# Patient Record
Sex: FEMALE | Race: ASIAN | NOT HISPANIC OR LATINO | Employment: UNEMPLOYED | URBAN - METROPOLITAN AREA
[De-identification: names, ages, dates, MRNs, and addresses within clinical notes are randomized per-mention and may not be internally consistent; named-entity substitution may affect disease eponyms.]

---

## 2017-01-05 ENCOUNTER — OFFICE VISIT - HEALTHEAST (OUTPATIENT)
Dept: NURSING | Facility: CLINIC | Age: 57
End: 2017-01-05

## 2017-01-05 ENCOUNTER — AMBULATORY - HEALTHEAST (OUTPATIENT)
Dept: FAMILY MEDICINE | Facility: CLINIC | Age: 57
End: 2017-01-05

## 2017-01-05 DIAGNOSIS — M13.0 POLYARTHRITIS: ICD-10-CM

## 2017-01-05 DIAGNOSIS — E11.40 TYPE 2 DIABETES MELLITUS WITH DIABETIC NEUROPATHY, WITHOUT LONG-TERM CURRENT USE OF INSULIN (H): ICD-10-CM

## 2017-01-05 DIAGNOSIS — B65.9 SCHISTOSOMIASIS: ICD-10-CM

## 2017-01-05 DIAGNOSIS — M51.379 DEGENERATION OF LUMBAR OR LUMBOSACRAL INTERVERTEBRAL DISC: ICD-10-CM

## 2017-01-11 ENCOUNTER — COMMUNICATION - HEALTHEAST (OUTPATIENT)
Dept: FAMILY MEDICINE | Facility: CLINIC | Age: 57
End: 2017-01-11

## 2017-01-11 ENCOUNTER — HOME CARE/HOSPICE - HEALTHEAST (OUTPATIENT)
Dept: HOME HEALTH SERVICES | Facility: HOME HEALTH | Age: 57
End: 2017-01-11

## 2017-01-11 DIAGNOSIS — E11.9 TYPE II DIABETES MELLITUS (H): ICD-10-CM

## 2017-01-16 ENCOUNTER — AMBULATORY - HEALTHEAST (OUTPATIENT)
Dept: FAMILY MEDICINE | Facility: CLINIC | Age: 57
End: 2017-01-16

## 2017-01-16 DIAGNOSIS — G47.00 INSOMNIA, UNSPECIFIED: ICD-10-CM

## 2017-01-18 ENCOUNTER — COMMUNICATION - HEALTHEAST (OUTPATIENT)
Dept: NURSING | Facility: CLINIC | Age: 57
End: 2017-01-18

## 2017-01-18 ENCOUNTER — HOME CARE/HOSPICE - HEALTHEAST (OUTPATIENT)
Dept: HOME HEALTH SERVICES | Facility: HOME HEALTH | Age: 57
End: 2017-01-18

## 2017-01-18 ENCOUNTER — COMMUNICATION - HEALTHEAST (OUTPATIENT)
Dept: HOME HEALTH SERVICES | Facility: HOME HEALTH | Age: 57
End: 2017-01-18

## 2017-01-19 ENCOUNTER — RECORDS - HEALTHEAST (OUTPATIENT)
Dept: ADMINISTRATIVE | Facility: OTHER | Age: 57
End: 2017-01-19

## 2017-01-19 ENCOUNTER — AMBULATORY - HEALTHEAST (OUTPATIENT)
Dept: FAMILY MEDICINE | Facility: CLINIC | Age: 57
End: 2017-01-19

## 2017-01-19 DIAGNOSIS — Z91.81 RISK FOR FALLS: ICD-10-CM

## 2017-01-19 DIAGNOSIS — E66.9 OBESITY: ICD-10-CM

## 2017-01-19 DIAGNOSIS — M25.50 JOINT PAIN: ICD-10-CM

## 2017-01-25 ENCOUNTER — COMMUNICATION - HEALTHEAST (OUTPATIENT)
Dept: FAMILY MEDICINE | Facility: CLINIC | Age: 57
End: 2017-01-25

## 2017-01-25 ENCOUNTER — HOME CARE/HOSPICE - HEALTHEAST (OUTPATIENT)
Dept: HOME HEALTH SERVICES | Facility: HOME HEALTH | Age: 57
End: 2017-01-25

## 2017-01-25 DIAGNOSIS — M54.2 CERVICALGIA: ICD-10-CM

## 2017-02-01 ENCOUNTER — OFFICE VISIT - HEALTHEAST (OUTPATIENT)
Dept: NURSING | Facility: CLINIC | Age: 57
End: 2017-02-01

## 2017-02-01 DIAGNOSIS — E16.2 HYPOGLYCEMIA: ICD-10-CM

## 2017-02-01 DIAGNOSIS — E11.9 TYPE 2 DIABETES MELLITUS WITHOUT COMPLICATION, WITHOUT LONG-TERM CURRENT USE OF INSULIN (H): ICD-10-CM

## 2017-02-01 LAB — HBA1C MFR BLD: 8.5 % (ref 3.5–6)

## 2017-02-02 ENCOUNTER — COMMUNICATION - HEALTHEAST (OUTPATIENT)
Dept: NURSING | Facility: CLINIC | Age: 57
End: 2017-02-02

## 2017-02-08 ENCOUNTER — HOME CARE/HOSPICE - HEALTHEAST (OUTPATIENT)
Dept: HOME HEALTH SERVICES | Facility: HOME HEALTH | Age: 57
End: 2017-02-08

## 2017-02-08 ENCOUNTER — COMMUNICATION - HEALTHEAST (OUTPATIENT)
Dept: FAMILY MEDICINE | Facility: CLINIC | Age: 57
End: 2017-02-08

## 2017-02-08 DIAGNOSIS — M51.379 DEGENERATION OF LUMBAR OR LUMBOSACRAL INTERVERTEBRAL DISC: ICD-10-CM

## 2017-02-14 ENCOUNTER — AMBULATORY - HEALTHEAST (OUTPATIENT)
Dept: FAMILY MEDICINE | Facility: CLINIC | Age: 57
End: 2017-02-14

## 2017-02-15 ENCOUNTER — COMMUNICATION - HEALTHEAST (OUTPATIENT)
Dept: FAMILY MEDICINE | Facility: CLINIC | Age: 57
End: 2017-02-15

## 2017-02-22 ENCOUNTER — HOME CARE/HOSPICE - HEALTHEAST (OUTPATIENT)
Dept: HOME HEALTH SERVICES | Facility: HOME HEALTH | Age: 57
End: 2017-02-22

## 2017-02-22 ENCOUNTER — COMMUNICATION - HEALTHEAST (OUTPATIENT)
Dept: FAMILY MEDICINE | Facility: CLINIC | Age: 57
End: 2017-02-22

## 2017-03-01 ENCOUNTER — HOME CARE/HOSPICE - HEALTHEAST (OUTPATIENT)
Dept: HOME HEALTH SERVICES | Facility: HOME HEALTH | Age: 57
End: 2017-03-01

## 2017-03-02 ENCOUNTER — AMBULATORY - HEALTHEAST (OUTPATIENT)
Dept: FAMILY MEDICINE | Facility: CLINIC | Age: 57
End: 2017-03-02

## 2017-03-02 DIAGNOSIS — R52 PAIN: ICD-10-CM

## 2017-03-08 ENCOUNTER — HOME CARE/HOSPICE - HEALTHEAST (OUTPATIENT)
Dept: HOME HEALTH SERVICES | Facility: HOME HEALTH | Age: 57
End: 2017-03-08

## 2017-03-09 ENCOUNTER — OFFICE VISIT - HEALTHEAST (OUTPATIENT)
Dept: FAMILY MEDICINE | Facility: CLINIC | Age: 57
End: 2017-03-09

## 2017-03-09 DIAGNOSIS — E11.40: ICD-10-CM

## 2017-03-09 DIAGNOSIS — R52 PAIN: ICD-10-CM

## 2017-03-09 DIAGNOSIS — M25.50 JOINT PAIN: ICD-10-CM

## 2017-03-09 DIAGNOSIS — B65.9 SCHISTOSOMIASIS: ICD-10-CM

## 2017-03-09 DIAGNOSIS — E55.9 VITAMIN D DEFICIENCY: ICD-10-CM

## 2017-03-09 DIAGNOSIS — M13.0 POLYARTHRITIS: ICD-10-CM

## 2017-03-09 DIAGNOSIS — E11.40 DIABETES MELLITUS WITH NEUROPATHY (H): ICD-10-CM

## 2017-03-09 DIAGNOSIS — E66.01 MORBID OBESITY, UNSPECIFIED OBESITY TYPE (H): ICD-10-CM

## 2017-03-09 DIAGNOSIS — G47.00 INSOMNIA: ICD-10-CM

## 2017-03-09 ASSESSMENT — MIFFLIN-ST. JEOR: SCORE: 1414.46

## 2017-03-14 ENCOUNTER — COMMUNICATION - HEALTHEAST (OUTPATIENT)
Dept: NURSING | Facility: CLINIC | Age: 57
End: 2017-03-14

## 2017-03-17 ENCOUNTER — RECORDS - HEALTHEAST (OUTPATIENT)
Dept: ADMINISTRATIVE | Facility: OTHER | Age: 57
End: 2017-03-17

## 2017-03-20 ENCOUNTER — COMMUNICATION - HEALTHEAST (OUTPATIENT)
Dept: HOME HEALTH SERVICES | Facility: HOME HEALTH | Age: 57
End: 2017-03-20

## 2017-03-20 ENCOUNTER — HOME CARE/HOSPICE - HEALTHEAST (OUTPATIENT)
Dept: HOME HEALTH SERVICES | Facility: HOME HEALTH | Age: 57
End: 2017-03-20

## 2017-03-20 ENCOUNTER — COMMUNICATION - HEALTHEAST (OUTPATIENT)
Dept: FAMILY MEDICINE | Facility: CLINIC | Age: 57
End: 2017-03-20

## 2017-03-21 ENCOUNTER — AMBULATORY - HEALTHEAST (OUTPATIENT)
Dept: FAMILY MEDICINE | Facility: CLINIC | Age: 57
End: 2017-03-21

## 2017-03-29 ENCOUNTER — OFFICE VISIT - HEALTHEAST (OUTPATIENT)
Dept: SURGERY | Facility: CLINIC | Age: 57
End: 2017-03-29

## 2017-03-29 ENCOUNTER — HOME CARE/HOSPICE - HEALTHEAST (OUTPATIENT)
Dept: HOME HEALTH SERVICES | Facility: HOME HEALTH | Age: 57
End: 2017-03-29

## 2017-03-29 DIAGNOSIS — E11.9 TYPE II DIABETES MELLITUS (H): ICD-10-CM

## 2017-03-29 DIAGNOSIS — Z71.3 WEIGHT LOSS COUNSELING, ENCOUNTER FOR: ICD-10-CM

## 2017-03-29 ASSESSMENT — MIFFLIN-ST. JEOR: SCORE: 1404.48

## 2017-04-11 ENCOUNTER — COMMUNICATION - HEALTHEAST (OUTPATIENT)
Dept: FAMILY MEDICINE | Facility: CLINIC | Age: 57
End: 2017-04-11

## 2017-04-11 DIAGNOSIS — K21.9 GERD (GASTROESOPHAGEAL REFLUX DISEASE): ICD-10-CM

## 2017-04-20 ENCOUNTER — COMMUNICATION - HEALTHEAST (OUTPATIENT)
Dept: FAMILY MEDICINE | Facility: CLINIC | Age: 57
End: 2017-04-20

## 2017-04-20 ENCOUNTER — OFFICE VISIT - HEALTHEAST (OUTPATIENT)
Dept: FAMILY MEDICINE | Facility: CLINIC | Age: 57
End: 2017-04-20

## 2017-04-20 ENCOUNTER — RECORDS - HEALTHEAST (OUTPATIENT)
Dept: MAMMOGRAPHY | Facility: CLINIC | Age: 57
End: 2017-04-20

## 2017-04-20 DIAGNOSIS — R52 PAIN: ICD-10-CM

## 2017-04-20 DIAGNOSIS — M13.0 POLYARTHRITIS: ICD-10-CM

## 2017-04-20 DIAGNOSIS — E55.9 VITAMIN D DEFICIENCY: ICD-10-CM

## 2017-04-20 DIAGNOSIS — E66.01 MORBID OBESITY, UNSPECIFIED OBESITY TYPE (H): ICD-10-CM

## 2017-04-20 DIAGNOSIS — E11.40: ICD-10-CM

## 2017-04-20 DIAGNOSIS — E11.9 TYPE 2 DIABETES MELLITUS WITHOUT COMPLICATION, WITHOUT LONG-TERM CURRENT USE OF INSULIN (H): ICD-10-CM

## 2017-04-20 DIAGNOSIS — E11.9 TYPE II DIABETES MELLITUS (H): ICD-10-CM

## 2017-04-20 DIAGNOSIS — E11.40 DIABETES MELLITUS WITH NEUROPATHY (H): ICD-10-CM

## 2017-04-20 DIAGNOSIS — Z12.31 ENCOUNTER FOR SCREENING MAMMOGRAM FOR MALIGNANT NEOPLASM OF BREAST: ICD-10-CM

## 2017-04-20 DIAGNOSIS — M25.50 JOINT PAIN: ICD-10-CM

## 2017-04-20 ASSESSMENT — MIFFLIN-ST. JEOR: SCORE: 1412.87

## 2017-04-25 ENCOUNTER — COMMUNICATION - HEALTHEAST (OUTPATIENT)
Dept: FAMILY MEDICINE | Facility: CLINIC | Age: 57
End: 2017-04-25

## 2017-05-02 ENCOUNTER — COMMUNICATION - HEALTHEAST (OUTPATIENT)
Dept: NURSING | Facility: CLINIC | Age: 57
End: 2017-05-02

## 2017-05-10 ENCOUNTER — COMMUNICATION - HEALTHEAST (OUTPATIENT)
Dept: FAMILY MEDICINE | Facility: CLINIC | Age: 57
End: 2017-05-10

## 2017-05-10 DIAGNOSIS — Z00.00 PREVENTATIVE HEALTH CARE: ICD-10-CM

## 2017-05-23 ENCOUNTER — OFFICE VISIT - HEALTHEAST (OUTPATIENT)
Dept: FAMILY MEDICINE | Facility: CLINIC | Age: 57
End: 2017-05-23

## 2017-05-23 DIAGNOSIS — E66.01 MORBID OBESITY, UNSPECIFIED OBESITY TYPE (H): ICD-10-CM

## 2017-05-23 DIAGNOSIS — E55.9 VITAMIN D DEFICIENCY: ICD-10-CM

## 2017-05-23 DIAGNOSIS — E11.9 DIABETES (H): ICD-10-CM

## 2017-05-23 DIAGNOSIS — E11.9 TYPE 2 DIABETES MELLITUS WITHOUT COMPLICATION, WITHOUT LONG-TERM CURRENT USE OF INSULIN (H): ICD-10-CM

## 2017-05-23 DIAGNOSIS — M25.50 JOINT PAIN: ICD-10-CM

## 2017-05-23 DIAGNOSIS — Z79.899 POLYPHARMACY: ICD-10-CM

## 2017-05-23 LAB — HBA1C MFR BLD: 7.5 % (ref 3.5–6)

## 2017-05-23 ASSESSMENT — MIFFLIN-ST. JEOR: SCORE: 1428.18

## 2017-05-24 ENCOUNTER — COMMUNICATION - HEALTHEAST (OUTPATIENT)
Dept: FAMILY MEDICINE | Facility: CLINIC | Age: 57
End: 2017-05-24

## 2017-05-24 DIAGNOSIS — E11.9 DM (DIABETES MELLITUS) (H): ICD-10-CM

## 2017-05-31 ENCOUNTER — COMMUNICATION - HEALTHEAST (OUTPATIENT)
Dept: CARE COORDINATION | Facility: CLINIC | Age: 57
End: 2017-05-31

## 2017-06-08 ENCOUNTER — AMBULATORY - HEALTHEAST (OUTPATIENT)
Dept: CARE COORDINATION | Facility: CLINIC | Age: 57
End: 2017-06-08

## 2017-06-22 ENCOUNTER — COMMUNICATION - HEALTHEAST (OUTPATIENT)
Dept: FAMILY MEDICINE | Facility: CLINIC | Age: 57
End: 2017-06-22

## 2017-06-28 ENCOUNTER — COMMUNICATION - HEALTHEAST (OUTPATIENT)
Dept: NURSING | Facility: CLINIC | Age: 57
End: 2017-06-28

## 2017-07-03 ENCOUNTER — RECORDS - HEALTHEAST (OUTPATIENT)
Dept: ADMINISTRATIVE | Facility: OTHER | Age: 57
End: 2017-07-03

## 2017-07-15 ENCOUNTER — COMMUNICATION - HEALTHEAST (OUTPATIENT)
Dept: FAMILY MEDICINE | Facility: CLINIC | Age: 57
End: 2017-07-15

## 2017-07-15 DIAGNOSIS — M79.2 NEURALGIA: ICD-10-CM

## 2017-07-18 ENCOUNTER — COMMUNICATION - HEALTHEAST (OUTPATIENT)
Dept: FAMILY MEDICINE | Facility: CLINIC | Age: 57
End: 2017-07-18

## 2017-07-18 ENCOUNTER — OFFICE VISIT - HEALTHEAST (OUTPATIENT)
Dept: FAMILY MEDICINE | Facility: CLINIC | Age: 57
End: 2017-07-18

## 2017-07-18 DIAGNOSIS — Z79.899 POLYPHARMACY: ICD-10-CM

## 2017-07-18 DIAGNOSIS — G47.00 INSOMNIA: ICD-10-CM

## 2017-07-18 DIAGNOSIS — E11.00 TYPE 2 DIABETES MELLITUS WITH HYPEROSMOLARITY WITHOUT COMA, UNSPECIFIED LONG TERM INSULIN USE STATUS: ICD-10-CM

## 2017-07-18 DIAGNOSIS — R52 PAIN: ICD-10-CM

## 2017-07-18 DIAGNOSIS — E55.9 VITAMIN D DEFICIENCY: ICD-10-CM

## 2017-07-18 DIAGNOSIS — E66.01 MORBID OBESITY, UNSPECIFIED OBESITY TYPE (H): ICD-10-CM

## 2017-07-18 DIAGNOSIS — F43.10 PTSD (POST-TRAUMATIC STRESS DISORDER): ICD-10-CM

## 2017-07-18 ASSESSMENT — MIFFLIN-ST. JEOR: SCORE: 1473.43

## 2017-07-19 ENCOUNTER — COMMUNICATION - HEALTHEAST (OUTPATIENT)
Dept: FAMILY MEDICINE | Facility: CLINIC | Age: 57
End: 2017-07-19

## 2017-07-19 DIAGNOSIS — M25.50 JOINT PAIN: ICD-10-CM

## 2017-07-19 DIAGNOSIS — R52 PAIN: ICD-10-CM

## 2017-07-25 ENCOUNTER — RECORDS - HEALTHEAST (OUTPATIENT)
Dept: ADMINISTRATIVE | Facility: OTHER | Age: 57
End: 2017-07-25

## 2017-08-10 ENCOUNTER — COMMUNICATION - HEALTHEAST (OUTPATIENT)
Dept: NURSING | Facility: CLINIC | Age: 57
End: 2017-08-10

## 2017-08-10 ENCOUNTER — RECORDS - HEALTHEAST (OUTPATIENT)
Dept: ADMINISTRATIVE | Facility: OTHER | Age: 57
End: 2017-08-10

## 2017-08-14 ENCOUNTER — AMBULATORY - HEALTHEAST (OUTPATIENT)
Dept: CARE COORDINATION | Facility: CLINIC | Age: 57
End: 2017-08-14

## 2017-08-16 ENCOUNTER — OFFICE VISIT - HEALTHEAST (OUTPATIENT)
Dept: SLEEP MEDICINE | Facility: CLINIC | Age: 57
End: 2017-08-16

## 2017-08-16 DIAGNOSIS — G47.10 HYPERSOMNIA, UNSPECIFIED: ICD-10-CM

## 2017-08-16 DIAGNOSIS — E66.9 OBESITY: ICD-10-CM

## 2017-08-16 DIAGNOSIS — R06.83 SNORING: ICD-10-CM

## 2017-08-16 DIAGNOSIS — G47.8 SLEEP DYSFUNCTION WITH SLEEP STAGE DISTURBANCE: ICD-10-CM

## 2017-08-16 ASSESSMENT — MIFFLIN-ST. JEOR: SCORE: 1483.4

## 2017-08-23 ENCOUNTER — COMMUNICATION - HEALTHEAST (OUTPATIENT)
Dept: FAMILY MEDICINE | Facility: CLINIC | Age: 57
End: 2017-08-23

## 2017-08-23 DIAGNOSIS — M25.50 JOINT PAIN: ICD-10-CM

## 2017-08-23 DIAGNOSIS — R52 PAIN: ICD-10-CM

## 2017-08-24 ENCOUNTER — OFFICE VISIT - HEALTHEAST (OUTPATIENT)
Dept: FAMILY MEDICINE | Facility: CLINIC | Age: 57
End: 2017-08-24

## 2017-08-24 DIAGNOSIS — E11.00 TYPE 2 DIABETES MELLITUS WITH HYPEROSMOLARITY WITHOUT COMA, UNSPECIFIED LONG TERM INSULIN USE STATUS: ICD-10-CM

## 2017-08-24 DIAGNOSIS — Z79.899 POLYPHARMACY: ICD-10-CM

## 2017-08-24 DIAGNOSIS — F43.10 PTSD (POST-TRAUMATIC STRESS DISORDER): ICD-10-CM

## 2017-08-24 DIAGNOSIS — E55.9 VITAMIN D DEFICIENCY: ICD-10-CM

## 2017-08-24 DIAGNOSIS — Z23 NEED FOR VACCINATION: ICD-10-CM

## 2017-08-24 DIAGNOSIS — E66.01 MORBID OBESITY, UNSPECIFIED OBESITY TYPE (H): ICD-10-CM

## 2017-08-24 LAB — HBA1C MFR BLD: 7.6 % (ref 3.5–6)

## 2017-08-24 ASSESSMENT — MIFFLIN-ST. JEOR: SCORE: 1471.55

## 2017-08-30 ENCOUNTER — COMMUNICATION - HEALTHEAST (OUTPATIENT)
Dept: FAMILY MEDICINE | Facility: CLINIC | Age: 57
End: 2017-08-30

## 2017-08-30 DIAGNOSIS — R52 PAIN: ICD-10-CM

## 2017-08-30 DIAGNOSIS — M25.50 JOINT PAIN: ICD-10-CM

## 2017-09-12 ENCOUNTER — COMMUNICATION - HEALTHEAST (OUTPATIENT)
Dept: FAMILY MEDICINE | Facility: CLINIC | Age: 57
End: 2017-09-12

## 2017-09-12 DIAGNOSIS — R52 PAIN: ICD-10-CM

## 2017-09-12 DIAGNOSIS — M25.50 JOINT PAIN: ICD-10-CM

## 2017-09-26 ENCOUNTER — COMMUNICATION - HEALTHEAST (OUTPATIENT)
Dept: NURSING | Facility: CLINIC | Age: 57
End: 2017-09-26

## 2017-10-03 ENCOUNTER — OFFICE VISIT - HEALTHEAST (OUTPATIENT)
Dept: FAMILY MEDICINE | Facility: CLINIC | Age: 57
End: 2017-10-03

## 2017-10-03 DIAGNOSIS — E88.810 METABOLIC SYNDROME: ICD-10-CM

## 2017-10-03 DIAGNOSIS — E11.00 TYPE 2 DIABETES MELLITUS WITH HYPEROSMOLARITY WITHOUT COMA, UNSPECIFIED LONG TERM INSULIN USE STATUS: ICD-10-CM

## 2017-10-03 DIAGNOSIS — E55.9 VITAMIN D DEFICIENCY: ICD-10-CM

## 2017-10-03 DIAGNOSIS — E66.01 MORBID OBESITY, UNSPECIFIED OBESITY TYPE (H): ICD-10-CM

## 2017-10-03 DIAGNOSIS — I10 HTN (HYPERTENSION): ICD-10-CM

## 2017-10-03 DIAGNOSIS — R25.2 CRAMP AND SPASM: ICD-10-CM

## 2017-10-03 DIAGNOSIS — F43.10 PTSD (POST-TRAUMATIC STRESS DISORDER): ICD-10-CM

## 2017-10-03 DIAGNOSIS — Z79.899 POLYPHARMACY: ICD-10-CM

## 2017-10-03 ASSESSMENT — MIFFLIN-ST. JEOR: SCORE: 1517.08

## 2017-10-05 ENCOUNTER — COMMUNICATION - HEALTHEAST (OUTPATIENT)
Dept: FAMILY MEDICINE | Facility: CLINIC | Age: 57
End: 2017-10-05

## 2017-10-05 DIAGNOSIS — K21.9 GERD (GASTROESOPHAGEAL REFLUX DISEASE): ICD-10-CM

## 2017-10-16 ENCOUNTER — COMMUNICATION - HEALTHEAST (OUTPATIENT)
Dept: ADMINISTRATIVE | Facility: CLINIC | Age: 57
End: 2017-10-16

## 2017-10-17 ENCOUNTER — COMMUNICATION - HEALTHEAST (OUTPATIENT)
Dept: FAMILY MEDICINE | Facility: CLINIC | Age: 57
End: 2017-10-17

## 2017-10-25 ENCOUNTER — COMMUNICATION - HEALTHEAST (OUTPATIENT)
Dept: FAMILY MEDICINE | Facility: CLINIC | Age: 57
End: 2017-10-25

## 2017-10-25 DIAGNOSIS — M25.50 JOINT PAIN: ICD-10-CM

## 2017-10-25 DIAGNOSIS — R52 PAIN: ICD-10-CM

## 2017-10-25 DIAGNOSIS — M51.379 DEGENERATION OF LUMBAR OR LUMBOSACRAL INTERVERTEBRAL DISC: ICD-10-CM

## 2017-10-26 ENCOUNTER — COMMUNICATION - HEALTHEAST (OUTPATIENT)
Dept: FAMILY MEDICINE | Facility: CLINIC | Age: 57
End: 2017-10-26

## 2017-10-27 ENCOUNTER — COMMUNICATION - HEALTHEAST (OUTPATIENT)
Dept: FAMILY MEDICINE | Facility: CLINIC | Age: 57
End: 2017-10-27

## 2017-10-30 ENCOUNTER — COMMUNICATION - HEALTHEAST (OUTPATIENT)
Dept: FAMILY MEDICINE | Facility: CLINIC | Age: 57
End: 2017-10-30

## 2017-11-01 ENCOUNTER — COMMUNICATION - HEALTHEAST (OUTPATIENT)
Dept: CARE COORDINATION | Facility: CLINIC | Age: 57
End: 2017-11-01

## 2017-11-15 ENCOUNTER — COMMUNICATION - HEALTHEAST (OUTPATIENT)
Dept: FAMILY MEDICINE | Facility: CLINIC | Age: 57
End: 2017-11-15

## 2017-11-15 DIAGNOSIS — M79.2 NEURALGIA: ICD-10-CM

## 2017-11-16 ENCOUNTER — AMBULATORY - HEALTHEAST (OUTPATIENT)
Dept: CARE COORDINATION | Facility: CLINIC | Age: 57
End: 2017-11-16

## 2017-11-16 DIAGNOSIS — M12.9 ARTHROPATHY: ICD-10-CM

## 2017-11-29 ENCOUNTER — COMMUNICATION - HEALTHEAST (OUTPATIENT)
Dept: FAMILY MEDICINE | Facility: CLINIC | Age: 57
End: 2017-11-29

## 2017-11-29 DIAGNOSIS — M25.50 JOINT PAIN: ICD-10-CM

## 2017-11-29 DIAGNOSIS — R52 PAIN: ICD-10-CM

## 2017-12-04 ENCOUNTER — OFFICE VISIT - HEALTHEAST (OUTPATIENT)
Dept: FAMILY MEDICINE | Facility: CLINIC | Age: 57
End: 2017-12-04

## 2017-12-04 DIAGNOSIS — I10 HTN (HYPERTENSION): ICD-10-CM

## 2017-12-04 DIAGNOSIS — E55.9 VITAMIN D DEFICIENCY: ICD-10-CM

## 2017-12-04 DIAGNOSIS — E11.00 TYPE 2 DIABETES MELLITUS WITH HYPEROSMOLARITY WITHOUT COMA, UNSPECIFIED LONG TERM INSULIN USE STATUS: ICD-10-CM

## 2017-12-04 DIAGNOSIS — F43.10 PTSD (POST-TRAUMATIC STRESS DISORDER): ICD-10-CM

## 2017-12-04 DIAGNOSIS — E88.810 METABOLIC SYNDROME: ICD-10-CM

## 2017-12-04 DIAGNOSIS — Z00.00 ANNUAL PHYSICAL EXAM: ICD-10-CM

## 2017-12-04 DIAGNOSIS — L85.3 DRY SKIN DERMATITIS: ICD-10-CM

## 2017-12-04 DIAGNOSIS — E66.01 MORBID OBESITY, UNSPECIFIED OBESITY TYPE (H): ICD-10-CM

## 2017-12-04 DIAGNOSIS — D64.9 ANEMIA: ICD-10-CM

## 2017-12-04 DIAGNOSIS — R25.2 CRAMP AND SPASM: ICD-10-CM

## 2017-12-04 DIAGNOSIS — Z79.899 POLYPHARMACY: ICD-10-CM

## 2017-12-04 LAB
CHOLEST SERPL-MCNC: 181 MG/DL
FASTING STATUS PATIENT QL REPORTED: YES
HBA1C MFR BLD: 6.9 % (ref 3.5–6)
HDLC SERPL-MCNC: 35 MG/DL
LDLC SERPL CALC-MCNC: 112 MG/DL
TRIGL SERPL-MCNC: 172 MG/DL

## 2017-12-04 ASSESSMENT — MIFFLIN-ST. JEOR: SCORE: 1464.47

## 2017-12-05 ENCOUNTER — AMBULATORY - HEALTHEAST (OUTPATIENT)
Dept: FAMILY MEDICINE | Facility: CLINIC | Age: 57
End: 2017-12-05

## 2017-12-05 DIAGNOSIS — E55.9 VITAMIN D DEFICIENCY: ICD-10-CM

## 2017-12-07 ENCOUNTER — COMMUNICATION - HEALTHEAST (OUTPATIENT)
Dept: FAMILY MEDICINE | Facility: CLINIC | Age: 57
End: 2017-12-07

## 2017-12-14 ENCOUNTER — RECORDS - HEALTHEAST (OUTPATIENT)
Dept: ADMINISTRATIVE | Facility: OTHER | Age: 57
End: 2017-12-14

## 2017-12-21 ENCOUNTER — AMBULATORY - HEALTHEAST (OUTPATIENT)
Dept: NURSING | Facility: CLINIC | Age: 57
End: 2017-12-21

## 2017-12-26 ENCOUNTER — COMMUNICATION - HEALTHEAST (OUTPATIENT)
Dept: NURSING | Facility: CLINIC | Age: 57
End: 2017-12-26

## 2017-12-28 ENCOUNTER — COMMUNICATION - HEALTHEAST (OUTPATIENT)
Dept: FAMILY MEDICINE | Facility: CLINIC | Age: 57
End: 2017-12-28

## 2017-12-28 DIAGNOSIS — R52 PAIN: ICD-10-CM

## 2017-12-28 DIAGNOSIS — M25.50 JOINT PAIN: ICD-10-CM

## 2017-12-29 ENCOUNTER — HOSPITAL ENCOUNTER (OUTPATIENT)
Dept: MRI IMAGING | Facility: HOSPITAL | Age: 57
Discharge: HOME | End: 2017-12-29
Attending: FAMILY MEDICINE

## 2017-12-29 DIAGNOSIS — R25.2 CRAMP AND SPASM: ICD-10-CM

## 2018-01-09 ENCOUNTER — OFFICE VISIT - HEALTHEAST (OUTPATIENT)
Dept: FAMILY MEDICINE | Facility: CLINIC | Age: 58
End: 2018-01-09

## 2018-01-09 DIAGNOSIS — R25.2 CRAMP AND SPASM: ICD-10-CM

## 2018-01-09 DIAGNOSIS — E88.810 METABOLIC SYNDROME: ICD-10-CM

## 2018-01-09 DIAGNOSIS — M13.0 POLYARTHRITIS: ICD-10-CM

## 2018-01-09 DIAGNOSIS — I10 HTN (HYPERTENSION): ICD-10-CM

## 2018-01-09 DIAGNOSIS — E78.5 HYPERLIPIDEMIA LDL GOAL <100: ICD-10-CM

## 2018-01-09 DIAGNOSIS — E11.00 TYPE 2 DIABETES MELLITUS WITH HYPEROSMOLARITY WITHOUT COMA, UNSPECIFIED LONG TERM INSULIN USE STATUS: ICD-10-CM

## 2018-01-09 DIAGNOSIS — E55.9 VITAMIN D DEFICIENCY: ICD-10-CM

## 2018-01-09 DIAGNOSIS — F43.10 PTSD (POST-TRAUMATIC STRESS DISORDER): ICD-10-CM

## 2018-01-09 DIAGNOSIS — E66.01 MORBID OBESITY, UNSPECIFIED OBESITY TYPE (H): ICD-10-CM

## 2018-01-09 DIAGNOSIS — Z12.11 SCREEN FOR COLON CANCER: ICD-10-CM

## 2018-01-09 ASSESSMENT — MIFFLIN-ST. JEOR: SCORE: 1466.73

## 2018-01-13 ENCOUNTER — COMMUNICATION - HEALTHEAST (OUTPATIENT)
Dept: FAMILY MEDICINE | Facility: CLINIC | Age: 58
End: 2018-01-13

## 2018-01-13 DIAGNOSIS — E11.40 TYPE 2 DIABETES MELLITUS WITH DIABETIC NEUROPATHY, WITHOUT LONG-TERM CURRENT USE OF INSULIN (H): ICD-10-CM

## 2018-01-16 ENCOUNTER — AMBULATORY - HEALTHEAST (OUTPATIENT)
Dept: LAB | Facility: CLINIC | Age: 58
End: 2018-01-16

## 2018-01-16 DIAGNOSIS — Z12.11 SCREEN FOR COLON CANCER: ICD-10-CM

## 2018-01-16 LAB
HEMOCCULT SP1 STL QL: NEGATIVE
HEMOCCULT SP2 STL QL: NEGATIVE
HEMOCCULT SP3 STL QL: NEGATIVE

## 2018-01-22 ENCOUNTER — AMBULATORY - HEALTHEAST (OUTPATIENT)
Dept: NURSING | Facility: CLINIC | Age: 58
End: 2018-01-22

## 2018-01-23 ENCOUNTER — RECORDS - HEALTHEAST (OUTPATIENT)
Dept: ADMINISTRATIVE | Facility: OTHER | Age: 58
End: 2018-01-23

## 2018-01-25 ENCOUNTER — COMMUNICATION - HEALTHEAST (OUTPATIENT)
Dept: FAMILY MEDICINE | Facility: CLINIC | Age: 58
End: 2018-01-25

## 2018-01-25 DIAGNOSIS — R52 PAIN: ICD-10-CM

## 2018-01-25 DIAGNOSIS — M25.50 JOINT PAIN: ICD-10-CM

## 2018-01-25 DIAGNOSIS — K21.9 GERD (GASTROESOPHAGEAL REFLUX DISEASE): ICD-10-CM

## 2018-01-31 ENCOUNTER — COMMUNICATION - HEALTHEAST (OUTPATIENT)
Dept: FAMILY MEDICINE | Facility: CLINIC | Age: 58
End: 2018-01-31

## 2018-01-31 DIAGNOSIS — M79.2 NEURALGIA: ICD-10-CM

## 2018-02-22 ENCOUNTER — COMMUNICATION - HEALTHEAST (OUTPATIENT)
Dept: FAMILY MEDICINE | Facility: CLINIC | Age: 58
End: 2018-02-22

## 2018-02-22 DIAGNOSIS — M25.50 JOINT PAIN: ICD-10-CM

## 2018-02-22 DIAGNOSIS — R52 PAIN: ICD-10-CM

## 2018-03-08 ENCOUNTER — COMMUNICATION - HEALTHEAST (OUTPATIENT)
Dept: FAMILY MEDICINE | Facility: CLINIC | Age: 58
End: 2018-03-08

## 2018-03-08 DIAGNOSIS — M79.2 NEURALGIA: ICD-10-CM

## 2018-03-15 ENCOUNTER — COMMUNICATION - HEALTHEAST (OUTPATIENT)
Dept: FAMILY MEDICINE | Facility: CLINIC | Age: 58
End: 2018-03-15

## 2018-03-15 DIAGNOSIS — F34.1 DYSTHYMIC DISORDER: ICD-10-CM

## 2018-03-16 ENCOUNTER — COMMUNICATION - HEALTHEAST (OUTPATIENT)
Dept: FAMILY MEDICINE | Facility: CLINIC | Age: 58
End: 2018-03-16

## 2018-03-16 DIAGNOSIS — F34.1 DYSTHYMIC DISORDER: ICD-10-CM

## 2018-03-19 ENCOUNTER — AMBULATORY - HEALTHEAST (OUTPATIENT)
Dept: FAMILY MEDICINE | Facility: CLINIC | Age: 58
End: 2018-03-19

## 2018-03-22 ENCOUNTER — COMMUNICATION - HEALTHEAST (OUTPATIENT)
Dept: FAMILY MEDICINE | Facility: CLINIC | Age: 58
End: 2018-03-22

## 2018-03-22 DIAGNOSIS — R52 PAIN: ICD-10-CM

## 2018-03-22 DIAGNOSIS — M25.50 JOINT PAIN: ICD-10-CM

## 2018-03-29 ENCOUNTER — AMBULATORY - HEALTHEAST (OUTPATIENT)
Dept: FAMILY MEDICINE | Facility: CLINIC | Age: 58
End: 2018-03-29

## 2018-03-29 ENCOUNTER — OFFICE VISIT - HEALTHEAST (OUTPATIENT)
Dept: FAMILY MEDICINE | Facility: CLINIC | Age: 58
End: 2018-03-29

## 2018-03-29 ENCOUNTER — COMMUNICATION - HEALTHEAST (OUTPATIENT)
Dept: SURGERY | Facility: CLINIC | Age: 58
End: 2018-03-29

## 2018-03-29 DIAGNOSIS — E11.00 TYPE 2 DIABETES MELLITUS WITH HYPEROSMOLARITY WITHOUT COMA, UNSPECIFIED LONG TERM INSULIN USE STATUS: ICD-10-CM

## 2018-03-29 DIAGNOSIS — Z88.9 MULTIPLE ALLERGIES: ICD-10-CM

## 2018-03-29 DIAGNOSIS — E66.01 MORBID OBESITY, UNSPECIFIED OBESITY TYPE (H): ICD-10-CM

## 2018-03-29 DIAGNOSIS — R25.2 CRAMP AND SPASM: ICD-10-CM

## 2018-03-29 DIAGNOSIS — E88.810 METABOLIC SYNDROME: ICD-10-CM

## 2018-03-29 DIAGNOSIS — E11.9 DIABETES (H): ICD-10-CM

## 2018-03-29 DIAGNOSIS — M54.2 CERVICALGIA: ICD-10-CM

## 2018-03-29 DIAGNOSIS — E55.9 VITAMIN D DEFICIENCY: ICD-10-CM

## 2018-03-29 DIAGNOSIS — L85.3 DRY SKIN: ICD-10-CM

## 2018-03-29 DIAGNOSIS — Z79.899 POLYPHARMACY: ICD-10-CM

## 2018-03-29 DIAGNOSIS — F43.10 PTSD (POST-TRAUMATIC STRESS DISORDER): ICD-10-CM

## 2018-03-29 LAB
25(OH)D3 SERPL-MCNC: 12.8 NG/ML (ref 30–80)
CREAT UR-MCNC: 31.4 MG/DL
HBA1C MFR BLD: 8.7 % (ref 3.5–6)
MICROALBUMIN UR-MCNC: <0.5 MG/DL (ref 0–1.99)
MICROALBUMIN/CREAT UR: NORMAL MG/G

## 2018-03-29 ASSESSMENT — MIFFLIN-ST. JEOR: SCORE: 1457.66

## 2018-04-05 ENCOUNTER — COMMUNICATION - HEALTHEAST (OUTPATIENT)
Dept: FAMILY MEDICINE | Facility: CLINIC | Age: 58
End: 2018-04-05

## 2018-04-05 DIAGNOSIS — M25.50 JOINT PAIN: ICD-10-CM

## 2018-04-05 DIAGNOSIS — M79.2 NEURALGIA: ICD-10-CM

## 2018-04-05 DIAGNOSIS — R52 PAIN: ICD-10-CM

## 2018-04-06 ENCOUNTER — COMMUNICATION - HEALTHEAST (OUTPATIENT)
Dept: FAMILY MEDICINE | Facility: CLINIC | Age: 58
End: 2018-04-06

## 2018-04-06 DIAGNOSIS — E11.40 DIABETES MELLITUS WITH NEUROPATHY (H): ICD-10-CM

## 2018-04-12 ENCOUNTER — RECORDS - HEALTHEAST (OUTPATIENT)
Dept: ADMINISTRATIVE | Facility: OTHER | Age: 58
End: 2018-04-12

## 2018-04-19 ENCOUNTER — COMMUNICATION - HEALTHEAST (OUTPATIENT)
Dept: FAMILY MEDICINE | Facility: CLINIC | Age: 58
End: 2018-04-19

## 2018-04-23 ENCOUNTER — OFFICE VISIT - HEALTHEAST (OUTPATIENT)
Dept: FAMILY MEDICINE | Facility: CLINIC | Age: 58
End: 2018-04-23

## 2018-04-23 ENCOUNTER — RECORDS - HEALTHEAST (OUTPATIENT)
Dept: ADMINISTRATIVE | Facility: OTHER | Age: 58
End: 2018-04-23

## 2018-04-23 DIAGNOSIS — M54.32 SCIATICA OF LEFT SIDE: ICD-10-CM

## 2018-04-23 DIAGNOSIS — E66.01 MORBID OBESITY, UNSPECIFIED OBESITY TYPE (H): ICD-10-CM

## 2018-04-23 DIAGNOSIS — M79.89 BILATERAL HAND SWELLING: ICD-10-CM

## 2018-04-23 DIAGNOSIS — E88.810 METABOLIC SYNDROME: ICD-10-CM

## 2018-04-23 DIAGNOSIS — M79.89 TOE SWELLING: ICD-10-CM

## 2018-04-23 DIAGNOSIS — E11.40: ICD-10-CM

## 2018-04-23 DIAGNOSIS — F34.1 DYSTHYMIC DISORDER: ICD-10-CM

## 2018-04-23 DIAGNOSIS — M12.9 ARTHROPATHY: ICD-10-CM

## 2018-04-23 DIAGNOSIS — F43.10 PTSD (POST-TRAUMATIC STRESS DISORDER): ICD-10-CM

## 2018-04-23 ASSESSMENT — MIFFLIN-ST. JEOR: SCORE: 1466.73

## 2018-04-24 LAB — ANA SER QL: 0.8 U

## 2018-04-30 ENCOUNTER — OFFICE VISIT - HEALTHEAST (OUTPATIENT)
Dept: PHYSICAL THERAPY | Facility: REHABILITATION | Age: 58
End: 2018-04-30

## 2018-04-30 DIAGNOSIS — M54.16 LUMBAR RADICULOPATHY: ICD-10-CM

## 2018-04-30 DIAGNOSIS — M62.81 MUSCLE WEAKNESS (GENERALIZED): ICD-10-CM

## 2018-04-30 DIAGNOSIS — R26.9 ABNORMALITY OF GAIT: ICD-10-CM

## 2018-04-30 DIAGNOSIS — M12.9 ARTHROPATHY: ICD-10-CM

## 2018-05-03 ENCOUNTER — COMMUNICATION - HEALTHEAST (OUTPATIENT)
Dept: FAMILY MEDICINE | Facility: CLINIC | Age: 58
End: 2018-05-03

## 2018-05-07 ENCOUNTER — AMBULATORY - HEALTHEAST (OUTPATIENT)
Dept: FAMILY MEDICINE | Facility: CLINIC | Age: 58
End: 2018-05-07

## 2018-05-07 DIAGNOSIS — L29.9 ITCHY SKIN: ICD-10-CM

## 2018-05-11 ENCOUNTER — COMMUNICATION - HEALTHEAST (OUTPATIENT)
Dept: FAMILY MEDICINE | Facility: CLINIC | Age: 58
End: 2018-05-11

## 2018-05-11 ENCOUNTER — OFFICE VISIT - HEALTHEAST (OUTPATIENT)
Dept: SURGERY | Facility: CLINIC | Age: 58
End: 2018-05-11

## 2018-05-11 DIAGNOSIS — R29.898 LEFT ARM WEAKNESS: ICD-10-CM

## 2018-05-11 DIAGNOSIS — M62.838 MUSCLE SPASM: ICD-10-CM

## 2018-05-11 DIAGNOSIS — R60.9 EDEMA: ICD-10-CM

## 2018-05-11 DIAGNOSIS — E66.01 OBESITY, CLASS III, BMI 40-49.9 (MORBID OBESITY) (H): ICD-10-CM

## 2018-05-11 LAB
ALBUMIN SERPL-MCNC: 3.2 G/DL (ref 3.5–5)
ALP SERPL-CCNC: 200 U/L (ref 45–120)
ALT SERPL W P-5'-P-CCNC: 15 U/L (ref 0–45)
ANION GAP SERPL CALCULATED.3IONS-SCNC: 11 MMOL/L (ref 5–18)
AST SERPL W P-5'-P-CCNC: 21 U/L (ref 0–40)
BILIRUB SERPL-MCNC: 0.5 MG/DL (ref 0–1)
BUN SERPL-MCNC: 7 MG/DL (ref 8–22)
CALCIUM SERPL-MCNC: 8.7 MG/DL (ref 8.5–10.5)
CHLORIDE BLD-SCNC: 102 MMOL/L (ref 98–107)
CO2 SERPL-SCNC: 29 MMOL/L (ref 22–31)
CREAT SERPL-MCNC: 0.62 MG/DL (ref 0.6–1.1)
GFR SERPL CREATININE-BSD FRML MDRD: >60 ML/MIN/1.73M2
GLUCOSE BLD-MCNC: 104 MG/DL (ref 70–125)
MAGNESIUM SERPL-MCNC: 1.8 MG/DL (ref 1.8–2.6)
POTASSIUM BLD-SCNC: 3.8 MMOL/L (ref 3.5–5)
PROT SERPL-MCNC: 7.1 G/DL (ref 6–8)
SODIUM SERPL-SCNC: 142 MMOL/L (ref 136–145)
VIT B12 SERPL-MCNC: 499 PG/ML (ref 213–816)

## 2018-05-11 ASSESSMENT — MIFFLIN-ST. JEOR: SCORE: 1480.34

## 2018-05-14 ENCOUNTER — OFFICE VISIT - HEALTHEAST (OUTPATIENT)
Dept: PHYSICAL THERAPY | Facility: REHABILITATION | Age: 58
End: 2018-05-14

## 2018-05-14 DIAGNOSIS — M12.9 ARTHROPATHY: ICD-10-CM

## 2018-05-14 DIAGNOSIS — M62.81 MUSCLE WEAKNESS (GENERALIZED): ICD-10-CM

## 2018-05-14 DIAGNOSIS — M54.16 LUMBAR RADICULOPATHY: ICD-10-CM

## 2018-05-14 DIAGNOSIS — R26.9 ABNORMALITY OF GAIT: ICD-10-CM

## 2018-05-15 ENCOUNTER — AMBULATORY - HEALTHEAST (OUTPATIENT)
Dept: FAMILY MEDICINE | Facility: CLINIC | Age: 58
End: 2018-05-15

## 2018-05-15 DIAGNOSIS — E11.00 TYPE 2 DIABETES MELLITUS WITH HYPEROSMOLARITY WITHOUT COMA, UNSPECIFIED LONG TERM INSULIN USE STATUS: ICD-10-CM

## 2018-05-16 ENCOUNTER — OFFICE VISIT - HEALTHEAST (OUTPATIENT)
Dept: PHYSICAL THERAPY | Facility: REHABILITATION | Age: 58
End: 2018-05-16

## 2018-05-16 DIAGNOSIS — M12.9 ARTHROPATHY: ICD-10-CM

## 2018-05-16 DIAGNOSIS — M62.81 MUSCLE WEAKNESS (GENERALIZED): ICD-10-CM

## 2018-05-16 DIAGNOSIS — R26.9 ABNORMALITY OF GAIT: ICD-10-CM

## 2018-05-16 DIAGNOSIS — M54.16 LUMBAR RADICULOPATHY: ICD-10-CM

## 2018-05-16 LAB — VIT B1 PYROPHOSHATE BLD-SCNC: 201 NMOL/L (ref 70–180)

## 2018-05-21 ENCOUNTER — OFFICE VISIT - HEALTHEAST (OUTPATIENT)
Dept: PHYSICAL THERAPY | Facility: REHABILITATION | Age: 58
End: 2018-05-21

## 2018-05-21 DIAGNOSIS — M12.9 ARTHROPATHY: ICD-10-CM

## 2018-05-21 DIAGNOSIS — M54.16 LUMBAR RADICULOPATHY: ICD-10-CM

## 2018-05-21 DIAGNOSIS — R26.9 ABNORMALITY OF GAIT: ICD-10-CM

## 2018-05-21 DIAGNOSIS — M62.81 MUSCLE WEAKNESS (GENERALIZED): ICD-10-CM

## 2018-05-23 ENCOUNTER — OFFICE VISIT - HEALTHEAST (OUTPATIENT)
Dept: PHYSICAL THERAPY | Facility: REHABILITATION | Age: 58
End: 2018-05-23

## 2018-05-23 DIAGNOSIS — M12.9 ARTHROPATHY: ICD-10-CM

## 2018-05-23 DIAGNOSIS — M62.81 MUSCLE WEAKNESS (GENERALIZED): ICD-10-CM

## 2018-05-23 DIAGNOSIS — M54.16 LUMBAR RADICULOPATHY: ICD-10-CM

## 2018-05-23 DIAGNOSIS — R26.9 ABNORMALITY OF GAIT: ICD-10-CM

## 2018-06-13 ENCOUNTER — RECORDS - HEALTHEAST (OUTPATIENT)
Dept: ADMINISTRATIVE | Facility: OTHER | Age: 58
End: 2018-06-13

## 2018-06-19 ENCOUNTER — HOSPITAL ENCOUNTER (OUTPATIENT)
Dept: MRI IMAGING | Facility: HOSPITAL | Age: 58
Discharge: HOME | End: 2018-06-19
Attending: PSYCHIATRY & NEUROLOGY

## 2018-06-19 DIAGNOSIS — G24.8 FOCAL DYSTONIA: ICD-10-CM

## 2018-06-19 LAB
CREAT BLD-MCNC: 0.6 MG/DL
CREAT BLD-MCNC: 0.6 MG/DL (ref 0.6–1.1)
POC GFR AMER AF HE - HISTORICAL: >60 ML/MIN/1.73M2
POC GFR NON AMER AF HE - HISTORICAL: >60 ML/MIN/1.73M2

## 2018-06-27 ENCOUNTER — OFFICE VISIT - HEALTHEAST (OUTPATIENT)
Dept: PHARMACY | Facility: CLINIC | Age: 58
End: 2018-06-27

## 2018-06-27 DIAGNOSIS — R52 GENERALIZED PAIN: ICD-10-CM

## 2018-06-27 DIAGNOSIS — E11.42 TYPE 2 DIABETES MELLITUS WITH DIABETIC POLYNEUROPATHY, WITHOUT LONG-TERM CURRENT USE OF INSULIN (H): ICD-10-CM

## 2018-06-27 DIAGNOSIS — R79.89 LOW VITAMIN D LEVEL: ICD-10-CM

## 2018-07-02 ENCOUNTER — OFFICE VISIT - HEALTHEAST (OUTPATIENT)
Dept: FAMILY MEDICINE | Facility: CLINIC | Age: 58
End: 2018-07-02

## 2018-07-02 DIAGNOSIS — M13.0 POLYARTHRITIS: ICD-10-CM

## 2018-07-02 DIAGNOSIS — G24.1 ADULT ONSET FOCAL TORSION DYSTONIA: ICD-10-CM

## 2018-07-02 DIAGNOSIS — E11.40: ICD-10-CM

## 2018-07-02 DIAGNOSIS — Z79.899 POLYPHARMACY: ICD-10-CM

## 2018-07-02 DIAGNOSIS — F43.10 PTSD (POST-TRAUMATIC STRESS DISORDER): ICD-10-CM

## 2018-07-02 DIAGNOSIS — E88.810 METABOLIC SYNDROME: ICD-10-CM

## 2018-07-02 DIAGNOSIS — M54.32 SCIATICA OF LEFT SIDE: ICD-10-CM

## 2018-07-02 DIAGNOSIS — E11.00 TYPE 2 DIABETES MELLITUS WITH HYPEROSMOLARITY WITHOUT COMA, UNSPECIFIED LONG TERM INSULIN USE STATUS: ICD-10-CM

## 2018-07-02 DIAGNOSIS — E55.9 VITAMIN D DEFICIENCY: ICD-10-CM

## 2018-07-02 DIAGNOSIS — E66.01 MORBID OBESITY, UNSPECIFIED OBESITY TYPE (H): ICD-10-CM

## 2018-07-02 LAB
HBA1C MFR BLD: 8.1 % (ref 3.5–6)
TSH SERPL DL<=0.005 MIU/L-ACNC: 1.23 UIU/ML (ref 0.3–5)

## 2018-07-02 ASSESSMENT — MIFFLIN-ST. JEOR: SCORE: 1440.32

## 2018-07-03 LAB — 25(OH)D3 SERPL-MCNC: 12.7 NG/ML (ref 30–80)

## 2018-07-05 ENCOUNTER — AMBULATORY - HEALTHEAST (OUTPATIENT)
Dept: FAMILY MEDICINE | Facility: CLINIC | Age: 58
End: 2018-07-05

## 2018-07-05 DIAGNOSIS — E55.9 VITAMIN D DEFICIENCY: ICD-10-CM

## 2018-07-05 LAB
CERULOPLASMIN SERPL-MCNC: 44 MG/DL (ref 23–53)
COPPER SERPL-MCNC: 161 UG/DL (ref 80–155)

## 2018-07-06 ENCOUNTER — COMMUNICATION - HEALTHEAST (OUTPATIENT)
Dept: SURGERY | Facility: CLINIC | Age: 58
End: 2018-07-06

## 2018-08-09 ENCOUNTER — COMMUNICATION - HEALTHEAST (OUTPATIENT)
Dept: FAMILY MEDICINE | Facility: CLINIC | Age: 58
End: 2018-08-09

## 2018-08-09 DIAGNOSIS — M79.2 NEURALGIA: ICD-10-CM

## 2018-08-13 ENCOUNTER — RECORDS - HEALTHEAST (OUTPATIENT)
Dept: ADMINISTRATIVE | Facility: OTHER | Age: 58
End: 2018-08-13

## 2018-08-15 ENCOUNTER — OFFICE VISIT - HEALTHEAST (OUTPATIENT)
Dept: PHARMACY | Facility: CLINIC | Age: 58
End: 2018-08-15

## 2018-08-15 DIAGNOSIS — R05.9 COUGH: ICD-10-CM

## 2018-08-15 DIAGNOSIS — E11.42 TYPE 2 DIABETES MELLITUS WITH DIABETIC POLYNEUROPATHY, WITHOUT LONG-TERM CURRENT USE OF INSULIN (H): ICD-10-CM

## 2018-08-21 ENCOUNTER — OFFICE VISIT - HEALTHEAST (OUTPATIENT)
Dept: FAMILY MEDICINE | Facility: CLINIC | Age: 58
End: 2018-08-21

## 2018-08-21 DIAGNOSIS — E11.00 TYPE 2 DIABETES MELLITUS WITH HYPEROSMOLARITY WITHOUT COMA, UNSPECIFIED LONG TERM INSULIN USE STATUS: ICD-10-CM

## 2018-08-21 DIAGNOSIS — R09.82 POST-NASAL DRIP: ICD-10-CM

## 2018-08-21 DIAGNOSIS — R05.9 COUGH: ICD-10-CM

## 2018-08-21 ASSESSMENT — MIFFLIN-ST. JEOR: SCORE: 1440.59

## 2018-09-18 ENCOUNTER — OFFICE VISIT - HEALTHEAST (OUTPATIENT)
Dept: FAMILY MEDICINE | Facility: CLINIC | Age: 58
End: 2018-09-18

## 2018-09-18 DIAGNOSIS — R09.82 POST-NASAL DRIP: ICD-10-CM

## 2018-09-18 DIAGNOSIS — E88.810 METABOLIC SYNDROME: ICD-10-CM

## 2018-09-18 DIAGNOSIS — E11.00 TYPE 2 DIABETES MELLITUS WITH HYPEROSMOLARITY WITHOUT COMA, UNSPECIFIED LONG TERM INSULIN USE STATUS: ICD-10-CM

## 2018-09-18 DIAGNOSIS — E55.9 VITAMIN D DEFICIENCY: ICD-10-CM

## 2018-09-18 DIAGNOSIS — E66.01 MORBID OBESITY, UNSPECIFIED OBESITY TYPE (H): ICD-10-CM

## 2018-09-18 DIAGNOSIS — R05.9 COUGH: ICD-10-CM

## 2018-09-18 DIAGNOSIS — Z79.899 POLYPHARMACY: ICD-10-CM

## 2018-09-18 DIAGNOSIS — Z23 NEED FOR IMMUNIZATION AGAINST INFLUENZA: ICD-10-CM

## 2018-09-18 DIAGNOSIS — M13.0 POLYARTHRITIS: ICD-10-CM

## 2018-09-18 DIAGNOSIS — F43.10 PTSD (POST-TRAUMATIC STRESS DISORDER): ICD-10-CM

## 2018-09-18 ASSESSMENT — MIFFLIN-ST. JEOR: SCORE: 1445.7

## 2018-09-24 ENCOUNTER — OFFICE VISIT - HEALTHEAST (OUTPATIENT)
Dept: PHARMACY | Facility: CLINIC | Age: 58
End: 2018-09-24

## 2018-09-24 DIAGNOSIS — R05.9 COUGH: ICD-10-CM

## 2018-09-24 DIAGNOSIS — E11.42 TYPE 2 DIABETES MELLITUS WITH DIABETIC POLYNEUROPATHY, WITHOUT LONG-TERM CURRENT USE OF INSULIN (H): ICD-10-CM

## 2018-09-24 DIAGNOSIS — R52 GENERALIZED PAIN: ICD-10-CM

## 2018-09-24 DIAGNOSIS — Z79.899 POLYPHARMACY: ICD-10-CM

## 2018-10-01 ENCOUNTER — COMMUNICATION - HEALTHEAST (OUTPATIENT)
Dept: FAMILY MEDICINE | Facility: CLINIC | Age: 58
End: 2018-10-01

## 2018-10-01 DIAGNOSIS — M25.50 JOINT PAIN: ICD-10-CM

## 2018-10-01 DIAGNOSIS — R52 PAIN: ICD-10-CM

## 2018-10-09 ENCOUNTER — OFFICE VISIT - HEALTHEAST (OUTPATIENT)
Dept: FAMILY MEDICINE | Facility: CLINIC | Age: 58
End: 2018-10-09

## 2018-10-09 DIAGNOSIS — E88.810 METABOLIC SYNDROME: ICD-10-CM

## 2018-10-09 DIAGNOSIS — R60.9 1+ PITTING EDEMA: ICD-10-CM

## 2018-10-09 DIAGNOSIS — R09.82 POST-NASAL DRIP: ICD-10-CM

## 2018-10-09 DIAGNOSIS — E11.40: ICD-10-CM

## 2018-10-09 DIAGNOSIS — E11.9 DIABETES MELLITUS, TYPE 2 (H): ICD-10-CM

## 2018-10-09 DIAGNOSIS — E66.01 MORBID OBESITY, UNSPECIFIED OBESITY TYPE (H): ICD-10-CM

## 2018-10-09 DIAGNOSIS — F43.10 PTSD (POST-TRAUMATIC STRESS DISORDER): ICD-10-CM

## 2018-10-09 DIAGNOSIS — E55.9 VITAMIN D DEFICIENCY: ICD-10-CM

## 2018-10-09 DIAGNOSIS — Z79.899 POLYPHARMACY: ICD-10-CM

## 2018-10-09 DIAGNOSIS — R06.83 SNORING: ICD-10-CM

## 2018-10-09 DIAGNOSIS — R25.2 CRAMP AND SPASM: ICD-10-CM

## 2018-10-09 DIAGNOSIS — R05.9 COUGH: ICD-10-CM

## 2018-10-09 LAB
ALBUMIN UR-MCNC: NEGATIVE MG/DL
APPEARANCE UR: ABNORMAL
BACTERIA #/AREA URNS HPF: ABNORMAL HPF
BILIRUB UR QL STRIP: NEGATIVE
COLOR UR AUTO: YELLOW
GLUCOSE UR STRIP-MCNC: NEGATIVE MG/DL
HBA1C MFR BLD: 6.6 % (ref 3.5–6)
HGB UR QL STRIP: NEGATIVE
KETONES UR STRIP-MCNC: NEGATIVE MG/DL
LEUKOCYTE ESTERASE UR QL STRIP: ABNORMAL
NITRATE UR QL: NEGATIVE
PH UR STRIP: 8 [PH] (ref 5–8)
RBC #/AREA URNS AUTO: ABNORMAL HPF
SP GR UR STRIP: 1.01 (ref 1–1.03)
SQUAMOUS #/AREA URNS AUTO: ABNORMAL LPF
UROBILINOGEN UR STRIP-ACNC: ABNORMAL
WBC #/AREA URNS AUTO: ABNORMAL HPF

## 2018-10-09 ASSESSMENT — MIFFLIN-ST. JEOR: SCORE: 1440.02

## 2018-10-10 LAB — BACTERIA SPEC CULT: NO GROWTH

## 2018-10-17 ENCOUNTER — RECORDS - HEALTHEAST (OUTPATIENT)
Dept: ADMINISTRATIVE | Facility: OTHER | Age: 58
End: 2018-10-17

## 2018-10-23 ENCOUNTER — COMMUNICATION - HEALTHEAST (OUTPATIENT)
Dept: ADMINISTRATIVE | Facility: CLINIC | Age: 58
End: 2018-10-23

## 2018-10-29 ENCOUNTER — COMMUNICATION - HEALTHEAST (OUTPATIENT)
Dept: FAMILY MEDICINE | Facility: CLINIC | Age: 58
End: 2018-10-29

## 2018-10-29 DIAGNOSIS — K21.9 GERD (GASTROESOPHAGEAL REFLUX DISEASE): ICD-10-CM

## 2018-11-20 ENCOUNTER — OFFICE VISIT - HEALTHEAST (OUTPATIENT)
Dept: SLEEP MEDICINE | Facility: CLINIC | Age: 58
End: 2018-11-20

## 2018-11-20 DIAGNOSIS — G47.10 HYPERSOMNIA: ICD-10-CM

## 2018-11-20 DIAGNOSIS — G47.8 SLEEP DYSFUNCTION WITH SLEEP STAGE DISTURBANCE: ICD-10-CM

## 2018-11-20 DIAGNOSIS — R06.83 SNORING: ICD-10-CM

## 2018-11-26 ENCOUNTER — COMMUNICATION - HEALTHEAST (OUTPATIENT)
Dept: FAMILY MEDICINE | Facility: CLINIC | Age: 58
End: 2018-11-26

## 2018-11-26 DIAGNOSIS — M79.2 NEURALGIA: ICD-10-CM

## 2018-11-26 DIAGNOSIS — Z88.9 MULTIPLE ALLERGIES: ICD-10-CM

## 2018-11-26 DIAGNOSIS — M51.379 DEGENERATION OF LUMBAR OR LUMBOSACRAL INTERVERTEBRAL DISC: ICD-10-CM

## 2018-11-27 ENCOUNTER — OFFICE VISIT - HEALTHEAST (OUTPATIENT)
Dept: FAMILY MEDICINE | Facility: CLINIC | Age: 58
End: 2018-11-27

## 2018-11-27 DIAGNOSIS — R79.89 ELEVATED VITAMIN B12 LEVEL: ICD-10-CM

## 2018-11-27 DIAGNOSIS — R25.2 CRAMP AND SPASM: ICD-10-CM

## 2018-11-27 DIAGNOSIS — F32.1 MODERATE MAJOR DEPRESSION (H): ICD-10-CM

## 2018-11-27 DIAGNOSIS — R79.0 ABNORMAL BLOOD LEVEL OF COPPER: ICD-10-CM

## 2018-11-27 DIAGNOSIS — R09.82 POST-NASAL DRIP: ICD-10-CM

## 2018-11-27 DIAGNOSIS — F43.10 PTSD (POST-TRAUMATIC STRESS DISORDER): ICD-10-CM

## 2018-11-27 DIAGNOSIS — E11.00 TYPE 2 DIABETES MELLITUS WITH HYPEROSMOLARITY WITHOUT COMA, WITHOUT LONG-TERM CURRENT USE OF INSULIN (H): ICD-10-CM

## 2018-11-27 DIAGNOSIS — Z79.899 POLYPHARMACY: ICD-10-CM

## 2018-11-27 DIAGNOSIS — E55.9 VITAMIN D DEFICIENCY: ICD-10-CM

## 2018-11-27 ASSESSMENT — MIFFLIN-ST. JEOR: SCORE: 1459.31

## 2018-11-28 LAB — 25(OH)D3 SERPL-MCNC: 14.7 NG/ML (ref 30–80)

## 2018-11-30 LAB
COPPER SERPL-MCNC: 146 UG/DL (ref 80–155)
VIT B1 PYROPHOSHATE BLD-SCNC: 111 NMOL/L (ref 70–180)

## 2018-12-02 ENCOUNTER — RECORDS - HEALTHEAST (OUTPATIENT)
Dept: ADMINISTRATIVE | Facility: OTHER | Age: 58
End: 2018-12-02

## 2018-12-03 ENCOUNTER — RECORDS - HEALTHEAST (OUTPATIENT)
Dept: ADMINISTRATIVE | Facility: OTHER | Age: 58
End: 2018-12-03

## 2018-12-04 ENCOUNTER — RECORDS - HEALTHEAST (OUTPATIENT)
Dept: ADMINISTRATIVE | Facility: OTHER | Age: 58
End: 2018-12-04

## 2018-12-05 ENCOUNTER — RECORDS - HEALTHEAST (OUTPATIENT)
Dept: ADMINISTRATIVE | Facility: OTHER | Age: 58
End: 2018-12-05

## 2018-12-06 ENCOUNTER — RECORDS - HEALTHEAST (OUTPATIENT)
Dept: ADMINISTRATIVE | Facility: OTHER | Age: 58
End: 2018-12-06

## 2018-12-08 ENCOUNTER — RECORDS - HEALTHEAST (OUTPATIENT)
Dept: ADMINISTRATIVE | Facility: OTHER | Age: 58
End: 2018-12-08

## 2018-12-09 ENCOUNTER — RECORDS - HEALTHEAST (OUTPATIENT)
Dept: ADMINISTRATIVE | Facility: OTHER | Age: 58
End: 2018-12-09

## 2018-12-11 ENCOUNTER — COMMUNICATION - HEALTHEAST (OUTPATIENT)
Dept: FAMILY MEDICINE | Facility: CLINIC | Age: 58
End: 2018-12-11

## 2018-12-13 ENCOUNTER — COMMUNICATION - HEALTHEAST (OUTPATIENT)
Dept: FAMILY MEDICINE | Facility: CLINIC | Age: 58
End: 2018-12-13

## 2018-12-13 ENCOUNTER — OFFICE VISIT - HEALTHEAST (OUTPATIENT)
Dept: FAMILY MEDICINE | Facility: CLINIC | Age: 58
End: 2018-12-13

## 2018-12-13 DIAGNOSIS — R09.82 POST-NASAL DRIP: ICD-10-CM

## 2018-12-13 DIAGNOSIS — F43.10 PTSD (POST-TRAUMATIC STRESS DISORDER): ICD-10-CM

## 2018-12-13 DIAGNOSIS — E11.00 TYPE 2 DIABETES MELLITUS WITH HYPEROSMOLARITY WITHOUT COMA, WITHOUT LONG-TERM CURRENT USE OF INSULIN (H): ICD-10-CM

## 2018-12-13 DIAGNOSIS — Z09 HOSPITAL DISCHARGE FOLLOW-UP: ICD-10-CM

## 2018-12-13 DIAGNOSIS — E87.6 HYPOKALEMIA: ICD-10-CM

## 2018-12-13 DIAGNOSIS — I50.30 DIASTOLIC CONGESTIVE HEART FAILURE, UNSPECIFIED HF CHRONICITY (H): ICD-10-CM

## 2018-12-13 DIAGNOSIS — Z75.8 LANGUAGE BARRIER AFFECTING HEALTH CARE: ICD-10-CM

## 2018-12-13 DIAGNOSIS — Z60.3 LANGUAGE BARRIER AFFECTING HEALTH CARE: ICD-10-CM

## 2018-12-13 DIAGNOSIS — D50.8 OTHER IRON DEFICIENCY ANEMIA: ICD-10-CM

## 2018-12-13 DIAGNOSIS — R25.2 CRAMP AND SPASM: ICD-10-CM

## 2018-12-13 DIAGNOSIS — Z79.899 POLYPHARMACY: ICD-10-CM

## 2018-12-13 DIAGNOSIS — Z74.8 DEPENDENT FOR TRANSPORTATION: ICD-10-CM

## 2018-12-13 LAB
ANION GAP SERPL CALCULATED.3IONS-SCNC: 13 MMOL/L (ref 5–18)
BASOPHILS # BLD AUTO: 0 THOU/UL (ref 0–0.2)
BASOPHILS NFR BLD AUTO: 0 % (ref 0–2)
BUN SERPL-MCNC: 9 MG/DL (ref 8–22)
CALCIUM SERPL-MCNC: 8.8 MG/DL (ref 8.5–10.5)
CHLORIDE BLD-SCNC: 94 MMOL/L (ref 98–107)
CO2 SERPL-SCNC: 31 MMOL/L (ref 22–31)
CREAT SERPL-MCNC: 0.8 MG/DL (ref 0.6–1.1)
EOSINOPHIL # BLD AUTO: 0.1 THOU/UL (ref 0–0.4)
EOSINOPHIL NFR BLD AUTO: 1 % (ref 0–6)
ERYTHROCYTE [DISTWIDTH] IN BLOOD BY AUTOMATED COUNT: 21.8 % (ref 11–14.5)
GFR SERPL CREATININE-BSD FRML MDRD: >60 ML/MIN/1.73M2
GLUCOSE BLD-MCNC: 298 MG/DL (ref 70–125)
HCT VFR BLD AUTO: 36.9 % (ref 35–47)
HGB BLD-MCNC: 10.1 G/DL (ref 12–16)
LYMPHOCYTES # BLD AUTO: 2.1 THOU/UL (ref 0.8–4.4)
LYMPHOCYTES NFR BLD AUTO: 22 % (ref 20–40)
MCH RBC QN AUTO: 21.7 PG (ref 27–34)
MCHC RBC AUTO-ENTMCNC: 27.4 G/DL (ref 32–36)
MCV RBC AUTO: 79 FL (ref 80–100)
MONOCYTES # BLD AUTO: 0.5 THOU/UL (ref 0–0.9)
MONOCYTES NFR BLD AUTO: 5 % (ref 2–10)
NEUTROPHILS # BLD AUTO: 6.5 THOU/UL (ref 2–7.7)
NEUTROPHILS NFR BLD AUTO: 71 % (ref 50–70)
PLATELET # BLD AUTO: 204 THOU/UL (ref 140–440)
PMV BLD AUTO: 11.8 FL (ref 8.5–12.5)
POTASSIUM BLD-SCNC: 3.6 MMOL/L (ref 3.5–5)
RBC # BLD AUTO: 4.65 MILL/UL (ref 3.8–5.4)
SODIUM SERPL-SCNC: 138 MMOL/L (ref 136–145)
WBC: 9.2 THOU/UL (ref 4–11)

## 2018-12-13 SDOH — SOCIAL STABILITY - SOCIAL INSECURITY: ACCULTURATION DIFFICULTY: Z60.3

## 2018-12-13 ASSESSMENT — MIFFLIN-ST. JEOR: SCORE: 1391.44

## 2018-12-14 ENCOUNTER — AMBULATORY - HEALTHEAST (OUTPATIENT)
Dept: FAMILY MEDICINE | Facility: CLINIC | Age: 58
End: 2018-12-14

## 2018-12-14 ENCOUNTER — COMMUNICATION - HEALTHEAST (OUTPATIENT)
Dept: LAB | Facility: CLINIC | Age: 58
End: 2018-12-14

## 2018-12-14 ENCOUNTER — RECORDS - HEALTHEAST (OUTPATIENT)
Dept: ADMINISTRATIVE | Facility: OTHER | Age: 58
End: 2018-12-14

## 2018-12-14 DIAGNOSIS — I50.30 DIASTOLIC CONGESTIVE HEART FAILURE, UNSPECIFIED HF CHRONICITY (H): ICD-10-CM

## 2018-12-18 ENCOUNTER — COMMUNICATION - HEALTHEAST (OUTPATIENT)
Dept: FAMILY MEDICINE | Facility: CLINIC | Age: 58
End: 2018-12-18

## 2018-12-21 ENCOUNTER — AMBULATORY - HEALTHEAST (OUTPATIENT)
Dept: LAB | Facility: CLINIC | Age: 58
End: 2018-12-21

## 2018-12-21 ENCOUNTER — AMBULATORY - HEALTHEAST (OUTPATIENT)
Dept: FAMILY MEDICINE | Facility: CLINIC | Age: 58
End: 2018-12-21

## 2018-12-21 DIAGNOSIS — I50.30 DIASTOLIC CONGESTIVE HEART FAILURE, UNSPECIFIED HF CHRONICITY (H): ICD-10-CM

## 2018-12-21 LAB — BNP SERPL-MCNC: <10 PG/ML (ref 0–87)

## 2018-12-24 ENCOUNTER — OFFICE VISIT - HEALTHEAST (OUTPATIENT)
Dept: CARDIOLOGY | Facility: CLINIC | Age: 58
End: 2018-12-24

## 2018-12-24 ENCOUNTER — AMBULATORY - HEALTHEAST (OUTPATIENT)
Dept: CARDIOLOGY | Facility: CLINIC | Age: 58
End: 2018-12-24

## 2018-12-24 DIAGNOSIS — R07.9 CHEST PAIN, UNSPECIFIED TYPE: ICD-10-CM

## 2018-12-24 DIAGNOSIS — R09.02 HYPOXIA: ICD-10-CM

## 2018-12-24 DIAGNOSIS — I27.20 PULMONARY HYPERTENSION (H): ICD-10-CM

## 2018-12-24 LAB
ATRIAL RATE - MUSE: 74 BPM
DIASTOLIC BLOOD PRESSURE - MUSE: NORMAL MMHG
INTERPRETATION ECG - MUSE: NORMAL
P AXIS - MUSE: 43 DEGREES
PR INTERVAL - MUSE: 170 MS
QRS DURATION - MUSE: 78 MS
QT - MUSE: 410 MS
QTC - MUSE: 455 MS
R AXIS - MUSE: 53 DEGREES
SYSTOLIC BLOOD PRESSURE - MUSE: NORMAL MMHG
T AXIS - MUSE: 20 DEGREES
VENTRICULAR RATE- MUSE: 74 BPM

## 2018-12-24 ASSESSMENT — MIFFLIN-ST. JEOR: SCORE: 1398.69

## 2018-12-27 ENCOUNTER — COMMUNICATION - HEALTHEAST (OUTPATIENT)
Dept: SCHEDULING | Facility: CLINIC | Age: 58
End: 2018-12-27

## 2019-01-03 ENCOUNTER — OFFICE VISIT - HEALTHEAST (OUTPATIENT)
Dept: FAMILY MEDICINE | Facility: CLINIC | Age: 59
End: 2019-01-03

## 2019-01-03 DIAGNOSIS — I27.20 PULMONARY HTN (H): ICD-10-CM

## 2019-01-03 DIAGNOSIS — I10 ESSENTIAL HYPERTENSION: ICD-10-CM

## 2019-01-03 DIAGNOSIS — L84 PRE-ULCERATIVE CORN OR CALLOUS: ICD-10-CM

## 2019-01-03 DIAGNOSIS — R09.02 HYPOXIA: ICD-10-CM

## 2019-01-03 DIAGNOSIS — D50.8 IRON DEFICIENCY ANEMIA SECONDARY TO INADEQUATE DIETARY IRON INTAKE: ICD-10-CM

## 2019-01-03 DIAGNOSIS — E11.00 TYPE 2 DIABETES MELLITUS WITH HYPEROSMOLARITY WITHOUT COMA, WITHOUT LONG-TERM CURRENT USE OF INSULIN (H): ICD-10-CM

## 2019-01-03 DIAGNOSIS — E66.01 MORBID OBESITY, UNSPECIFIED OBESITY TYPE (H): ICD-10-CM

## 2019-01-03 DIAGNOSIS — Z51.81 ENCOUNTER FOR THERAPEUTIC DRUG MONITORING: ICD-10-CM

## 2019-01-03 DIAGNOSIS — Z79.899 POLYPHARMACY: ICD-10-CM

## 2019-01-03 LAB
ALBUMIN SERPL-MCNC: 3.9 G/DL (ref 3.5–5)
ALBUMIN SERPL-MCNC: 3.9 G/DL (ref 3.5–5)
ALP SERPL-CCNC: 229 U/L (ref 45–120)
ALP SERPL-CCNC: 229 U/L (ref 45–120)
ALT SERPL W P-5'-P-CCNC: 45 U/L (ref 0–45)
ALT SERPL W P-5'-P-CCNC: 45 U/L (ref 0–45)
ANION GAP SERPL CALCULATED.3IONS-SCNC: 20 MMOL/L (ref 5–18)
AST SERPL W P-5'-P-CCNC: 42 U/L (ref 0–40)
AST SERPL W P-5'-P-CCNC: 42 U/L (ref 0–40)
BASOPHILS # BLD AUTO: 0.1 THOU/UL (ref 0–0.2)
BASOPHILS NFR BLD AUTO: 1 % (ref 0–2)
BILIRUB DIRECT SERPL-MCNC: 0.2 MG/DL
BILIRUB SERPL-MCNC: 0.4 MG/DL (ref 0–1)
BILIRUB SERPL-MCNC: 0.4 MG/DL (ref 0–1)
BUN SERPL-MCNC: 9 MG/DL (ref 8–22)
CALCIUM SERPL-MCNC: 10 MG/DL (ref 8.5–10.5)
CHLORIDE BLD-SCNC: 94 MMOL/L (ref 98–107)
CHOLEST SERPL-MCNC: 253 MG/DL
CO2 SERPL-SCNC: 27 MMOL/L (ref 22–31)
CREAT SERPL-MCNC: 0.73 MG/DL (ref 0.6–1.1)
EOSINOPHIL # BLD AUTO: 0.2 THOU/UL (ref 0–0.4)
EOSINOPHIL NFR BLD AUTO: 2 % (ref 0–6)
ERYTHROCYTE [DISTWIDTH] IN BLOOD BY AUTOMATED COUNT: 23.4 % (ref 11–14.5)
FASTING STATUS PATIENT QL REPORTED: NO
GFR SERPL CREATININE-BSD FRML MDRD: >60 ML/MIN/1.73M2
GLUCOSE BLD-MCNC: 117 MG/DL (ref 70–125)
HBA1C MFR BLD: 6.3 % (ref 3.5–6)
HCT VFR BLD AUTO: 41 % (ref 35–47)
HDLC SERPL-MCNC: 53 MG/DL
HGB BLD-MCNC: 12.8 G/DL (ref 12–16)
HIV 1+2 AB+HIV1 P24 AG SERPL QL IA: NEGATIVE
LDLC SERPL CALC-MCNC: 145 MG/DL
LYMPHOCYTES # BLD AUTO: 2.5 THOU/UL (ref 0.8–4.4)
LYMPHOCYTES NFR BLD AUTO: 27 % (ref 20–40)
MCH RBC QN AUTO: 24.5 PG (ref 27–34)
MCHC RBC AUTO-ENTMCNC: 31.4 G/DL (ref 32–36)
MCV RBC AUTO: 78 FL (ref 80–100)
MONOCYTES # BLD AUTO: 0.6 THOU/UL (ref 0–0.9)
MONOCYTES NFR BLD AUTO: 6 % (ref 2–10)
NEUTROPHILS # BLD AUTO: 6.1 THOU/UL (ref 2–7.7)
NEUTROPHILS NFR BLD AUTO: 65 % (ref 50–70)
PLATELET # BLD AUTO: 211 THOU/UL (ref 140–440)
PMV BLD AUTO: 8.8 FL (ref 7–10)
POTASSIUM BLD-SCNC: 3.3 MMOL/L (ref 3.5–5)
PROT SERPL-MCNC: 8 G/DL (ref 6–8)
PROT SERPL-MCNC: 8 G/DL (ref 6–8)
RBC # BLD AUTO: 5.25 MILL/UL (ref 3.8–5.4)
SODIUM SERPL-SCNC: 141 MMOL/L (ref 136–145)
TRIGL SERPL-MCNC: 274 MG/DL
TSH SERPL DL<=0.005 MIU/L-ACNC: 2.3 UIU/ML (ref 0.3–5)
WBC: 9.5 THOU/UL (ref 4–11)

## 2019-01-03 ASSESSMENT — MIFFLIN-ST. JEOR: SCORE: 1387.35

## 2019-01-07 ENCOUNTER — AMBULATORY - HEALTHEAST (OUTPATIENT)
Dept: FAMILY MEDICINE | Facility: CLINIC | Age: 59
End: 2019-01-07

## 2019-01-07 DIAGNOSIS — E87.6 HYPOKALEMIA: ICD-10-CM

## 2019-01-07 LAB — ANA SER QL: 0.6 U

## 2019-01-10 ENCOUNTER — RECORDS - HEALTHEAST (OUTPATIENT)
Dept: ADMINISTRATIVE | Facility: OTHER | Age: 59
End: 2019-01-10

## 2019-01-11 ENCOUNTER — AMBULATORY - HEALTHEAST (OUTPATIENT)
Dept: LAB | Facility: CLINIC | Age: 59
End: 2019-01-11

## 2019-01-11 DIAGNOSIS — E87.6 HYPOKALEMIA: ICD-10-CM

## 2019-01-11 LAB — POTASSIUM BLD-SCNC: 3.4 MMOL/L (ref 3.5–5)

## 2019-01-23 ENCOUNTER — HOSPITAL ENCOUNTER (OUTPATIENT)
Dept: NUCLEAR MEDICINE | Facility: HOSPITAL | Age: 59
Discharge: HOME | End: 2019-01-23
Attending: INTERNAL MEDICINE

## 2019-01-23 ENCOUNTER — HOSPITAL ENCOUNTER (OUTPATIENT)
Dept: RADIOLOGY | Facility: HOSPITAL | Age: 59
Discharge: HOME | End: 2019-01-23
Attending: INTERNAL MEDICINE

## 2019-01-23 DIAGNOSIS — I27.20 PULMONARY HYPERTENSION (H): ICD-10-CM

## 2019-01-23 DIAGNOSIS — R09.02 HYPOXIA: ICD-10-CM

## 2019-01-24 ENCOUNTER — COMMUNICATION - HEALTHEAST (OUTPATIENT)
Dept: FAMILY MEDICINE | Facility: CLINIC | Age: 59
End: 2019-01-24

## 2019-01-24 DIAGNOSIS — E78.5 HYPERLIPIDEMIA LDL GOAL <100: ICD-10-CM

## 2019-01-24 DIAGNOSIS — E11.00 TYPE 2 DIABETES MELLITUS WITH HYPEROSMOLARITY WITHOUT COMA (H): ICD-10-CM

## 2019-01-27 ENCOUNTER — COMMUNICATION - HEALTHEAST (OUTPATIENT)
Dept: FAMILY MEDICINE | Facility: CLINIC | Age: 59
End: 2019-01-27

## 2019-01-27 DIAGNOSIS — E87.6 HYPOKALEMIA: ICD-10-CM

## 2019-01-29 ENCOUNTER — OFFICE VISIT - HEALTHEAST (OUTPATIENT)
Dept: FAMILY MEDICINE | Facility: CLINIC | Age: 59
End: 2019-01-29

## 2019-01-29 DIAGNOSIS — R09.02 HYPOXIA: ICD-10-CM

## 2019-01-29 DIAGNOSIS — I10 ESSENTIAL HYPERTENSION: ICD-10-CM

## 2019-01-29 DIAGNOSIS — H53.8 BLURRY VISION: ICD-10-CM

## 2019-01-29 DIAGNOSIS — Z79.899 POLYPHARMACY: ICD-10-CM

## 2019-01-29 DIAGNOSIS — I27.20 PULMONARY HTN (H): ICD-10-CM

## 2019-01-29 DIAGNOSIS — E11.00 TYPE 2 DIABETES MELLITUS WITH HYPEROSMOLARITY WITHOUT COMA, WITHOUT LONG-TERM CURRENT USE OF INSULIN (H): ICD-10-CM

## 2019-01-29 DIAGNOSIS — E87.6 HYPOKALEMIA: ICD-10-CM

## 2019-01-29 DIAGNOSIS — F43.10 PTSD (POST-TRAUMATIC STRESS DISORDER): ICD-10-CM

## 2019-01-29 DIAGNOSIS — E66.01 MORBID OBESITY, UNSPECIFIED OBESITY TYPE (H): ICD-10-CM

## 2019-01-29 ASSESSMENT — MIFFLIN-ST. JEOR: SCORE: 1377.15

## 2019-02-04 ENCOUNTER — AMBULATORY - HEALTHEAST (OUTPATIENT)
Dept: LAB | Facility: HOSPITAL | Age: 59
End: 2019-02-04

## 2019-02-04 DIAGNOSIS — E87.6 HYPOKALEMIA: ICD-10-CM

## 2019-02-04 LAB
ANION GAP SERPL CALCULATED.3IONS-SCNC: 11 MMOL/L (ref 5–18)
BUN SERPL-MCNC: 8 MG/DL (ref 8–22)
CALCIUM SERPL-MCNC: 9.9 MG/DL (ref 8.5–10.5)
CHLORIDE BLD-SCNC: 92 MMOL/L (ref 98–107)
CO2 SERPL-SCNC: 34 MMOL/L (ref 22–31)
CREAT SERPL-MCNC: 0.87 MG/DL (ref 0.6–1.1)
GFR SERPL CREATININE-BSD FRML MDRD: >60 ML/MIN/1.73M2
GLUCOSE BLD-MCNC: 327 MG/DL (ref 70–125)
POTASSIUM BLD-SCNC: 4.2 MMOL/L (ref 3.5–5)
SODIUM SERPL-SCNC: 137 MMOL/L (ref 136–145)

## 2019-02-06 ENCOUNTER — COMMUNICATION - HEALTHEAST (OUTPATIENT)
Dept: FAMILY MEDICINE | Facility: CLINIC | Age: 59
End: 2019-02-06

## 2019-02-06 DIAGNOSIS — E11.40 TYPE 2 DIABETES MELLITUS WITH DIABETIC NEUROPATHY, WITHOUT LONG-TERM CURRENT USE OF INSULIN (H): ICD-10-CM

## 2019-02-07 ENCOUNTER — COMMUNICATION - HEALTHEAST (OUTPATIENT)
Dept: CARDIOLOGY | Facility: CLINIC | Age: 59
End: 2019-02-07

## 2019-02-10 ENCOUNTER — COMMUNICATION - HEALTHEAST (OUTPATIENT)
Dept: FAMILY MEDICINE | Facility: CLINIC | Age: 59
End: 2019-02-10

## 2019-02-11 ENCOUNTER — AMBULATORY - HEALTHEAST (OUTPATIENT)
Dept: FAMILY MEDICINE | Facility: CLINIC | Age: 59
End: 2019-02-11

## 2019-02-18 ENCOUNTER — COMMUNICATION - HEALTHEAST (OUTPATIENT)
Dept: FAMILY MEDICINE | Facility: CLINIC | Age: 59
End: 2019-02-18

## 2019-02-18 ENCOUNTER — AMBULATORY - HEALTHEAST (OUTPATIENT)
Dept: CARE COORDINATION | Facility: CLINIC | Age: 59
End: 2019-02-18

## 2019-02-18 DIAGNOSIS — E87.6 HYPOKALEMIA: ICD-10-CM

## 2019-02-18 DIAGNOSIS — F34.1 DYSTHYMIC DISORDER: ICD-10-CM

## 2019-02-18 DIAGNOSIS — E66.01 MORBID OBESITY, UNSPECIFIED OBESITY TYPE (H): ICD-10-CM

## 2019-02-18 DIAGNOSIS — E11.9 TYPE 2 DIABETES MELLITUS WITHOUT COMPLICATION, WITHOUT LONG-TERM CURRENT USE OF INSULIN (H): ICD-10-CM

## 2019-02-19 ENCOUNTER — OFFICE VISIT - HEALTHEAST (OUTPATIENT)
Dept: CARDIOLOGY | Facility: CLINIC | Age: 59
End: 2019-02-19

## 2019-02-19 DIAGNOSIS — I27.20 PULMONARY HTN (H): ICD-10-CM

## 2019-02-19 ASSESSMENT — MIFFLIN-ST. JEOR: SCORE: 1385.09

## 2019-02-21 ENCOUNTER — OFFICE VISIT - HEALTHEAST (OUTPATIENT)
Dept: FAMILY MEDICINE | Facility: CLINIC | Age: 59
End: 2019-02-21

## 2019-02-21 DIAGNOSIS — M54.2 CERVICALGIA: ICD-10-CM

## 2019-02-21 DIAGNOSIS — I27.20 PULMONARY HTN (H): ICD-10-CM

## 2019-02-21 DIAGNOSIS — H54.7 POOR VISION: ICD-10-CM

## 2019-02-21 DIAGNOSIS — R25.2 CRAMP AND SPASM: ICD-10-CM

## 2019-02-21 ASSESSMENT — MIFFLIN-ST. JEOR: SCORE: 1379.64

## 2019-02-22 ENCOUNTER — AMBULATORY - HEALTHEAST (OUTPATIENT)
Dept: NURSING | Facility: CLINIC | Age: 59
End: 2019-02-22

## 2019-02-26 ENCOUNTER — RECORDS - HEALTHEAST (OUTPATIENT)
Dept: MAMMOGRAPHY | Facility: CLINIC | Age: 59
End: 2019-02-26

## 2019-02-26 ENCOUNTER — COMMUNICATION - HEALTHEAST (OUTPATIENT)
Dept: ADMINISTRATIVE | Facility: CLINIC | Age: 59
End: 2019-02-26

## 2019-02-26 DIAGNOSIS — Z12.31 ENCOUNTER FOR SCREENING MAMMOGRAM FOR MALIGNANT NEOPLASM OF BREAST: ICD-10-CM

## 2019-03-01 ENCOUNTER — COMMUNICATION - HEALTHEAST (OUTPATIENT)
Dept: SLEEP MEDICINE | Facility: CLINIC | Age: 59
End: 2019-03-01

## 2019-03-01 DIAGNOSIS — R06.83 SNORING: ICD-10-CM

## 2019-03-01 DIAGNOSIS — G47.10 HYPERSOMNIA: ICD-10-CM

## 2019-03-05 ENCOUNTER — COMMUNICATION - HEALTHEAST (OUTPATIENT)
Dept: NURSING | Facility: CLINIC | Age: 59
End: 2019-03-05

## 2019-03-11 ENCOUNTER — HOSPITAL ENCOUNTER (OUTPATIENT)
Dept: MRI IMAGING | Facility: HOSPITAL | Age: 59
Discharge: HOME | End: 2019-03-11
Attending: INTERNAL MEDICINE

## 2019-03-11 DIAGNOSIS — I27.20 PULMONARY HTN (H): ICD-10-CM

## 2019-03-18 ENCOUNTER — AMBULATORY - HEALTHEAST (OUTPATIENT)
Dept: CARE COORDINATION | Facility: CLINIC | Age: 59
End: 2019-03-18

## 2019-03-18 LAB
CCTA EJECTION FRACTION: 62 %
CCTA INTERVENTRICULAR SETPUM: 0.7 CM (ref 0.6–1.1)
CCTA LEFT INTERNAL DIMENSION IN SYSTOLE: 3.1 CM (ref 2.1–4)
CCTA LEFT VENTRICULAR INTERNAL DIMENSION IN DIASTOLE: 4.9 CM (ref 3.5–6)
CCTA LEFT VENTRICULAR MASS: 101 G
CCTA POSTERIOR WALL: 0.6 CM (ref 0.6–1.1)
MR CARDIAC LEFT VENTRIAL CARDIAC INDEX: 2.8 L/MIN/M2 (ref 1.75–3.8)
MR CARDIAC LEFT VENTRICAL CARDIAC OUTPUT: 5.2 L/MIN (ref 2.8–8.8)
MR CARDIAC LEFT VENTRICULAR DIASTOLIC VOLUME INDEX: 69.2 ML/M2 (ref 41–81)
MR CARDIAC LEFT VENTRICULAR MASS INDEX: 61.63 G/M2 (ref 63–95)
MR CARDIAC LEFT VENTRICULAR MASS: 114 G (ref 75–175)
MR CARDIAC LEFT VENTRICULAR STROKE VOLUME INDEX: 43.25 ML/M2 (ref 26–56)
MR CARDIAC LEFT VENTRICULAR SYSTOLIC VOLUME INDEX: 25.95 ML/M2 (ref 12–20)
MR EJECTION FRACTION: 62.5 %
MR HEIGHT: 1.52 M
MR LEFT VENTRICULAR DYSTOLIC VOLUMEN: 128 ML (ref 52–141)
MR LEFT VENTRICULAR STROKE VOLUMEN: 80 ML (ref 33–97)
MR LEFT VENTRICULAR SYSTOLIC VOLUME: 48 ML (ref 13–51)
MR WEIGHT: 88.8 KG

## 2019-03-19 ENCOUNTER — COMMUNICATION - HEALTHEAST (OUTPATIENT)
Dept: FAMILY MEDICINE | Facility: CLINIC | Age: 59
End: 2019-03-19

## 2019-03-27 ENCOUNTER — AMBULATORY - HEALTHEAST (OUTPATIENT)
Dept: NURSING | Facility: CLINIC | Age: 59
End: 2019-03-27

## 2019-03-28 ENCOUNTER — RECORDS - HEALTHEAST (OUTPATIENT)
Dept: ADMINISTRATIVE | Facility: OTHER | Age: 59
End: 2019-03-28

## 2019-04-01 ENCOUNTER — COMMUNICATION - HEALTHEAST (OUTPATIENT)
Dept: NURSING | Facility: CLINIC | Age: 59
End: 2019-04-01

## 2019-04-01 ENCOUNTER — COMMUNICATION - HEALTHEAST (OUTPATIENT)
Dept: FAMILY MEDICINE | Facility: CLINIC | Age: 59
End: 2019-04-01

## 2019-04-01 DIAGNOSIS — E55.9 VITAMIN D DEFICIENCY: ICD-10-CM

## 2019-04-02 ENCOUNTER — COMMUNICATION - HEALTHEAST (OUTPATIENT)
Dept: FAMILY MEDICINE | Facility: CLINIC | Age: 59
End: 2019-04-02

## 2019-04-02 ENCOUNTER — AMBULATORY - HEALTHEAST (OUTPATIENT)
Dept: FAMILY MEDICINE | Facility: CLINIC | Age: 59
End: 2019-04-02

## 2019-04-02 DIAGNOSIS — M54.2 CERVICALGIA: ICD-10-CM

## 2019-04-02 DIAGNOSIS — K59.00 CONSTIPATED: ICD-10-CM

## 2019-04-02 DIAGNOSIS — R25.2 CRAMP AND SPASM: ICD-10-CM

## 2019-04-04 ENCOUNTER — RECORDS - HEALTHEAST (OUTPATIENT)
Dept: ADMINISTRATIVE | Facility: OTHER | Age: 59
End: 2019-04-04

## 2019-04-15 ENCOUNTER — RECORDS - HEALTHEAST (OUTPATIENT)
Dept: ADMINISTRATIVE | Facility: OTHER | Age: 59
End: 2019-04-15

## 2019-04-16 ENCOUNTER — AMBULATORY - HEALTHEAST (OUTPATIENT)
Dept: PULMONOLOGY | Facility: OTHER | Age: 59
End: 2019-04-16

## 2019-04-16 DIAGNOSIS — I27.20 PULMONARY HTN (H): ICD-10-CM

## 2019-04-17 ENCOUNTER — RECORDS - HEALTHEAST (OUTPATIENT)
Dept: ADMINISTRATIVE | Facility: OTHER | Age: 59
End: 2019-04-17

## 2019-04-17 ENCOUNTER — RECORDS - HEALTHEAST (OUTPATIENT)
Dept: SLEEP MEDICINE | Facility: CLINIC | Age: 59
End: 2019-04-17

## 2019-04-17 DIAGNOSIS — R06.83 SNORING: ICD-10-CM

## 2019-04-17 DIAGNOSIS — G47.10 HYPERSOMNIA, UNSPECIFIED: ICD-10-CM

## 2019-04-19 ENCOUNTER — RECORDS - HEALTHEAST (OUTPATIENT)
Dept: ADMINISTRATIVE | Facility: OTHER | Age: 59
End: 2019-04-19

## 2019-04-23 ENCOUNTER — OFFICE VISIT - HEALTHEAST (OUTPATIENT)
Dept: FAMILY MEDICINE | Facility: CLINIC | Age: 59
End: 2019-04-23

## 2019-04-23 ENCOUNTER — RECORDS - HEALTHEAST (OUTPATIENT)
Dept: ADMINISTRATIVE | Facility: OTHER | Age: 59
End: 2019-04-23

## 2019-04-23 ENCOUNTER — COMMUNICATION - HEALTHEAST (OUTPATIENT)
Dept: SLEEP MEDICINE | Facility: CLINIC | Age: 59
End: 2019-04-23

## 2019-04-23 DIAGNOSIS — I27.20 PULMONARY HTN (H): ICD-10-CM

## 2019-04-23 DIAGNOSIS — Z60.3 LANGUAGE BARRIER AFFECTING HEALTH CARE: ICD-10-CM

## 2019-04-23 DIAGNOSIS — Z59.6 POVERTY: ICD-10-CM

## 2019-04-23 DIAGNOSIS — E11.00 TYPE 2 DIABETES MELLITUS WITH HYPEROSMOLARITY WITHOUT COMA, WITHOUT LONG-TERM CURRENT USE OF INSULIN (H): ICD-10-CM

## 2019-04-23 DIAGNOSIS — Z00.00 ANNUAL PHYSICAL EXAM: ICD-10-CM

## 2019-04-23 DIAGNOSIS — E66.01 MORBID OBESITY, UNSPECIFIED OBESITY TYPE (H): ICD-10-CM

## 2019-04-23 DIAGNOSIS — F43.10 PTSD (POST-TRAUMATIC STRESS DISORDER): ICD-10-CM

## 2019-04-23 DIAGNOSIS — Z75.8 LANGUAGE BARRIER AFFECTING HEALTH CARE: ICD-10-CM

## 2019-04-23 DIAGNOSIS — E11.9 CONTROLLED TYPE 2 DIABETES MELLITUS WITHOUT COMPLICATION, WITHOUT LONG-TERM CURRENT USE OF INSULIN (H): ICD-10-CM

## 2019-04-23 DIAGNOSIS — Z12.11 SCREEN FOR COLON CANCER: ICD-10-CM

## 2019-04-23 DIAGNOSIS — E55.9 VITAMIN D DEFICIENCY: ICD-10-CM

## 2019-04-23 DIAGNOSIS — F17.200 SMOKER: ICD-10-CM

## 2019-04-23 DIAGNOSIS — Z71.6 ENCOUNTER FOR SMOKING CESSATION COUNSELING: ICD-10-CM

## 2019-04-23 DIAGNOSIS — Z12.11 COLON CANCER SCREENING: ICD-10-CM

## 2019-04-23 DIAGNOSIS — I10 ESSENTIAL HYPERTENSION: ICD-10-CM

## 2019-04-23 DIAGNOSIS — Z79.899 POLYPHARMACY: ICD-10-CM

## 2019-04-23 SDOH — ECONOMIC STABILITY - INCOME SECURITY: LOW INCOME: Z59.6

## 2019-04-23 SDOH — SOCIAL STABILITY - SOCIAL INSECURITY: ACCULTURATION DIFFICULTY: Z60.3

## 2019-04-23 ASSESSMENT — MIFFLIN-ST. JEOR: SCORE: 1378.57

## 2019-04-24 ENCOUNTER — AMBULATORY - HEALTHEAST (OUTPATIENT)
Dept: FAMILY MEDICINE | Facility: CLINIC | Age: 59
End: 2019-04-24

## 2019-04-24 DIAGNOSIS — E11.00 TYPE 2 DIABETES MELLITUS WITH HYPEROSMOLARITY WITHOUT COMA, WITHOUT LONG-TERM CURRENT USE OF INSULIN (H): ICD-10-CM

## 2019-04-24 DIAGNOSIS — E11.9 CONTROLLED TYPE 2 DIABETES MELLITUS WITHOUT COMPLICATION, WITHOUT LONG-TERM CURRENT USE OF INSULIN (H): ICD-10-CM

## 2019-04-25 ENCOUNTER — OFFICE VISIT - HEALTHEAST (OUTPATIENT)
Dept: CARDIOLOGY | Facility: CLINIC | Age: 59
End: 2019-04-25

## 2019-04-25 DIAGNOSIS — E11.00 TYPE 2 DIABETES MELLITUS WITH HYPEROSMOLARITY WITHOUT COMA, WITHOUT LONG-TERM CURRENT USE OF INSULIN (H): ICD-10-CM

## 2019-05-01 ENCOUNTER — OFFICE VISIT - HEALTHEAST (OUTPATIENT)
Dept: SLEEP MEDICINE | Facility: CLINIC | Age: 59
End: 2019-05-01

## 2019-05-01 ENCOUNTER — COMMUNICATION - HEALTHEAST (OUTPATIENT)
Dept: SLEEP MEDICINE | Facility: CLINIC | Age: 59
End: 2019-05-01

## 2019-05-08 ENCOUNTER — OFFICE VISIT - HEALTHEAST (OUTPATIENT)
Dept: SLEEP MEDICINE | Facility: CLINIC | Age: 59
End: 2019-05-08

## 2019-05-08 DIAGNOSIS — G47.69 SLEEP-RELATED MOVEMENT DISORDER: ICD-10-CM

## 2019-05-08 DIAGNOSIS — G47.34 SLEEP RELATED HYPOXIA: ICD-10-CM

## 2019-05-08 DIAGNOSIS — G47.10 HYPERSOMNIA: ICD-10-CM

## 2019-05-08 DIAGNOSIS — G47.33 OBSTRUCTIVE SLEEP APNEA: ICD-10-CM

## 2019-05-08 ASSESSMENT — MIFFLIN-ST. JEOR: SCORE: 1381.12

## 2019-05-10 ENCOUNTER — RECORDS - HEALTHEAST (OUTPATIENT)
Dept: HEALTH INFORMATION MANAGEMENT | Facility: CLINIC | Age: 59
End: 2019-05-10

## 2019-05-14 ENCOUNTER — AMBULATORY - HEALTHEAST (OUTPATIENT)
Dept: LAB | Facility: CLINIC | Age: 59
End: 2019-05-14

## 2019-05-14 DIAGNOSIS — Z00.00 ANNUAL PHYSICAL EXAM: ICD-10-CM

## 2019-05-14 DIAGNOSIS — Z12.11 COLON CANCER SCREENING: ICD-10-CM

## 2019-05-14 LAB
HEMOCCULT SP1 STL QL: NEGATIVE
HEMOCCULT SP2 STL QL: NEGATIVE
HEMOCCULT SP3 STL QL: NEGATIVE

## 2019-05-20 ENCOUNTER — RECORDS - HEALTHEAST (OUTPATIENT)
Dept: ADMINISTRATIVE | Facility: OTHER | Age: 59
End: 2019-05-20

## 2019-05-20 ENCOUNTER — AMBULATORY - HEALTHEAST (OUTPATIENT)
Dept: FAMILY MEDICINE | Facility: CLINIC | Age: 59
End: 2019-05-20

## 2019-05-20 DIAGNOSIS — R25.2 CRAMP AND SPASM: ICD-10-CM

## 2019-05-20 DIAGNOSIS — M54.2 CERVICALGIA: ICD-10-CM

## 2019-06-11 ENCOUNTER — COMMUNICATION - HEALTHEAST (OUTPATIENT)
Dept: FAMILY MEDICINE | Facility: CLINIC | Age: 59
End: 2019-06-11

## 2019-06-11 DIAGNOSIS — I10 ESSENTIAL HYPERTENSION: ICD-10-CM

## 2019-06-17 ENCOUNTER — AMBULATORY - HEALTHEAST (OUTPATIENT)
Dept: FAMILY MEDICINE | Facility: CLINIC | Age: 59
End: 2019-06-17

## 2019-06-17 DIAGNOSIS — K59.00 CONSTIPATED: ICD-10-CM

## 2019-06-26 ENCOUNTER — RECORDS - HEALTHEAST (OUTPATIENT)
Dept: ADMINISTRATIVE | Facility: OTHER | Age: 59
End: 2019-06-26

## 2019-07-02 ENCOUNTER — RECORDS - HEALTHEAST (OUTPATIENT)
Dept: HEALTH INFORMATION MANAGEMENT | Facility: CLINIC | Age: 59
End: 2019-07-02

## 2019-07-02 ENCOUNTER — COMMUNICATION - HEALTHEAST (OUTPATIENT)
Dept: NURSING | Facility: CLINIC | Age: 59
End: 2019-07-02

## 2019-07-03 ENCOUNTER — AMBULATORY - HEALTHEAST (OUTPATIENT)
Dept: CARE COORDINATION | Facility: CLINIC | Age: 59
End: 2019-07-03

## 2019-07-09 ENCOUNTER — AMBULATORY - HEALTHEAST (OUTPATIENT)
Dept: FAMILY MEDICINE | Facility: CLINIC | Age: 59
End: 2019-07-09

## 2019-07-09 ENCOUNTER — RECORDS - HEALTHEAST (OUTPATIENT)
Dept: ADMINISTRATIVE | Facility: OTHER | Age: 59
End: 2019-07-09

## 2019-07-11 ENCOUNTER — COMMUNICATION - HEALTHEAST (OUTPATIENT)
Dept: CARE COORDINATION | Facility: CLINIC | Age: 59
End: 2019-07-11

## 2019-07-11 DIAGNOSIS — G47.8 PULMONARY HYPERTENSION DUE TO SLEEP-DISORDERED BREATHING (H): ICD-10-CM

## 2019-07-11 DIAGNOSIS — I27.29 PULMONARY HYPERTENSION DUE TO SLEEP-DISORDERED BREATHING (H): ICD-10-CM

## 2019-07-11 DIAGNOSIS — F34.1 DYSTHYMIC DISORDER: ICD-10-CM

## 2019-07-11 DIAGNOSIS — F43.10 PTSD (POST-TRAUMATIC STRESS DISORDER): ICD-10-CM

## 2019-07-12 ENCOUNTER — COMMUNICATION - HEALTHEAST (OUTPATIENT)
Dept: NURSING | Facility: CLINIC | Age: 59
End: 2019-07-12

## 2019-07-15 ENCOUNTER — AMBULATORY - HEALTHEAST (OUTPATIENT)
Dept: FAMILY MEDICINE | Facility: CLINIC | Age: 59
End: 2019-07-15

## 2019-07-16 ENCOUNTER — COMMUNICATION - HEALTHEAST (OUTPATIENT)
Dept: FAMILY MEDICINE | Facility: CLINIC | Age: 59
End: 2019-07-16

## 2019-07-16 ENCOUNTER — OFFICE VISIT - HEALTHEAST (OUTPATIENT)
Dept: FAMILY MEDICINE | Facility: CLINIC | Age: 59
End: 2019-07-16

## 2019-07-16 DIAGNOSIS — E55.9 VITAMIN D DEFICIENCY: ICD-10-CM

## 2019-07-16 DIAGNOSIS — Z00.00 ANNUAL PHYSICAL EXAM: ICD-10-CM

## 2019-07-16 DIAGNOSIS — E11.00 TYPE 2 DIABETES MELLITUS WITH HYPEROSMOLARITY WITHOUT COMA, WITHOUT LONG-TERM CURRENT USE OF INSULIN (H): ICD-10-CM

## 2019-07-16 LAB
ANION GAP SERPL CALCULATED.3IONS-SCNC: 9 MMOL/L (ref 5–18)
BUN SERPL-MCNC: 15 MG/DL (ref 8–22)
CALCIUM SERPL-MCNC: 9.2 MG/DL (ref 8.5–10.5)
CHLORIDE BLD-SCNC: 92 MMOL/L (ref 98–107)
CO2 SERPL-SCNC: 33 MMOL/L (ref 22–31)
CREAT SERPL-MCNC: 0.76 MG/DL (ref 0.6–1.1)
CREAT UR-MCNC: 63.3 MG/DL
GFR SERPL CREATININE-BSD FRML MDRD: >60 ML/MIN/1.73M2
GLUCOSE BLD-MCNC: 241 MG/DL (ref 70–125)
HBA1C MFR BLD: 8.6 % (ref 3.5–6)
MICROALBUMIN UR-MCNC: <0.5 MG/DL (ref 0–1.99)
MICROALBUMIN/CREAT UR: NORMAL MG/G
POTASSIUM BLD-SCNC: 4 MMOL/L (ref 3.5–5)
SODIUM SERPL-SCNC: 134 MMOL/L (ref 136–145)

## 2019-07-16 ASSESSMENT — MIFFLIN-ST. JEOR: SCORE: 1372.93

## 2019-07-17 ENCOUNTER — AMBULATORY - HEALTHEAST (OUTPATIENT)
Dept: OTHER | Facility: CLINIC | Age: 59
End: 2019-07-17

## 2019-07-17 LAB — 25(OH)D3 SERPL-MCNC: 18.8 NG/ML (ref 30–80)

## 2019-07-23 ENCOUNTER — COMMUNICATION - HEALTHEAST (OUTPATIENT)
Dept: FAMILY MEDICINE | Facility: CLINIC | Age: 59
End: 2019-07-23

## 2019-07-23 ENCOUNTER — OFFICE VISIT - HEALTHEAST (OUTPATIENT)
Dept: FAMILY MEDICINE | Facility: CLINIC | Age: 59
End: 2019-07-23

## 2019-07-23 DIAGNOSIS — R25.2 CRAMP AND SPASM: ICD-10-CM

## 2019-07-23 DIAGNOSIS — F17.200 SMOKER: ICD-10-CM

## 2019-07-23 DIAGNOSIS — E08.40 DIABETES MELLITUS DUE TO UNDERLYING CONDITION WITH DIABETIC NEUROPATHY, WITHOUT LONG-TERM CURRENT USE OF INSULIN (H): ICD-10-CM

## 2019-07-23 DIAGNOSIS — I10 ESSENTIAL HYPERTENSION: ICD-10-CM

## 2019-07-23 DIAGNOSIS — E55.9 VITAMIN D DEFICIENCY: ICD-10-CM

## 2019-07-23 DIAGNOSIS — G47.8 PULMONARY HYPERTENSION DUE TO SLEEP-DISORDERED BREATHING (H): ICD-10-CM

## 2019-07-23 DIAGNOSIS — Z12.11 COLON CANCER SCREENING: ICD-10-CM

## 2019-07-23 DIAGNOSIS — Z79.899 POLYPHARMACY: ICD-10-CM

## 2019-07-23 DIAGNOSIS — Z91.148 NONCOMPLIANCE WITH MEDICATION REGIMEN: ICD-10-CM

## 2019-07-23 DIAGNOSIS — E11.42 DIABETIC POLYNEUROPATHY ASSOCIATED WITH TYPE 2 DIABETES MELLITUS (H): ICD-10-CM

## 2019-07-23 DIAGNOSIS — Z60.3 LANGUAGE BARRIER AFFECTING HEALTH CARE: ICD-10-CM

## 2019-07-23 DIAGNOSIS — Z59.6 POVERTY: ICD-10-CM

## 2019-07-23 DIAGNOSIS — Z75.8 LANGUAGE BARRIER AFFECTING HEALTH CARE: ICD-10-CM

## 2019-07-23 DIAGNOSIS — E66.01 MORBID OBESITY, UNSPECIFIED OBESITY TYPE (H): ICD-10-CM

## 2019-07-23 DIAGNOSIS — E11.42 TYPE 2 DIABETES MELLITUS WITH DIABETIC POLYNEUROPATHY, WITHOUT LONG-TERM CURRENT USE OF INSULIN (H): ICD-10-CM

## 2019-07-23 DIAGNOSIS — G47.33 OSA (OBSTRUCTIVE SLEEP APNEA): ICD-10-CM

## 2019-07-23 DIAGNOSIS — E11.9 CONTROLLED TYPE 2 DIABETES MELLITUS WITHOUT COMPLICATION, WITHOUT LONG-TERM CURRENT USE OF INSULIN (H): ICD-10-CM

## 2019-07-23 DIAGNOSIS — K59.03 DRUG-INDUCED CONSTIPATION: ICD-10-CM

## 2019-07-23 DIAGNOSIS — I27.29 PULMONARY HYPERTENSION DUE TO SLEEP-DISORDERED BREATHING (H): ICD-10-CM

## 2019-07-23 DIAGNOSIS — M12.9 ARTHROPATHY: ICD-10-CM

## 2019-07-23 DIAGNOSIS — F43.10 PTSD (POST-TRAUMATIC STRESS DISORDER): ICD-10-CM

## 2019-07-23 LAB — HBA1C MFR BLD: NORMAL % (ref 3.5–6)

## 2019-07-23 SDOH — ECONOMIC STABILITY - INCOME SECURITY: LOW INCOME: Z59.6

## 2019-07-23 SDOH — SOCIAL STABILITY - SOCIAL INSECURITY: ACCULTURATION DIFFICULTY: Z60.3

## 2019-07-23 ASSESSMENT — MIFFLIN-ST. JEOR: SCORE: 1389.62

## 2019-07-29 ENCOUNTER — COMMUNICATION - HEALTHEAST (OUTPATIENT)
Dept: FAMILY MEDICINE | Facility: CLINIC | Age: 59
End: 2019-07-29

## 2019-08-05 ENCOUNTER — COMMUNICATION - HEALTHEAST (OUTPATIENT)
Dept: NURSING | Facility: CLINIC | Age: 59
End: 2019-08-05

## 2019-08-14 ENCOUNTER — AMBULATORY - HEALTHEAST (OUTPATIENT)
Dept: NURSING | Facility: CLINIC | Age: 59
End: 2019-08-14

## 2019-08-28 ENCOUNTER — AMBULATORY - HEALTHEAST (OUTPATIENT)
Dept: FAMILY MEDICINE | Facility: CLINIC | Age: 59
End: 2019-08-28

## 2019-08-28 DIAGNOSIS — F34.1 DYSTHYMIC DISORDER: ICD-10-CM

## 2019-08-29 ENCOUNTER — OFFICE VISIT - HEALTHEAST (OUTPATIENT)
Dept: FAMILY MEDICINE | Facility: CLINIC | Age: 59
End: 2019-08-29

## 2019-08-29 DIAGNOSIS — R25.2 CRAMP AND SPASM: ICD-10-CM

## 2019-08-29 DIAGNOSIS — M54.2 CERVICALGIA: ICD-10-CM

## 2019-08-29 DIAGNOSIS — Z79.899 POLYPHARMACY: ICD-10-CM

## 2019-08-29 DIAGNOSIS — H25.89 OTHER AGE-RELATED CATARACT OF BOTH EYES: ICD-10-CM

## 2019-08-29 DIAGNOSIS — Z75.8 LANGUAGE BARRIER AFFECTING HEALTH CARE: ICD-10-CM

## 2019-08-29 DIAGNOSIS — I27.29 PULMONARY HYPERTENSION DUE TO SLEEP-DISORDERED BREATHING (H): ICD-10-CM

## 2019-08-29 DIAGNOSIS — E11.9 CONTROLLED TYPE 2 DIABETES MELLITUS WITHOUT COMPLICATION, WITHOUT LONG-TERM CURRENT USE OF INSULIN (H): ICD-10-CM

## 2019-08-29 DIAGNOSIS — Z01.818 PREOP GENERAL PHYSICAL EXAM: ICD-10-CM

## 2019-08-29 DIAGNOSIS — G47.8 PULMONARY HYPERTENSION DUE TO SLEEP-DISORDERED BREATHING (H): ICD-10-CM

## 2019-08-29 DIAGNOSIS — E66.01 MORBID OBESITY, UNSPECIFIED OBESITY TYPE (H): ICD-10-CM

## 2019-08-29 DIAGNOSIS — H54.7 POOR VISION: ICD-10-CM

## 2019-08-29 DIAGNOSIS — E08.40 DIABETES MELLITUS DUE TO UNDERLYING CONDITION WITH DIABETIC NEUROPATHY, WITHOUT LONG-TERM CURRENT USE OF INSULIN (H): ICD-10-CM

## 2019-08-29 DIAGNOSIS — G47.33 OSA (OBSTRUCTIVE SLEEP APNEA): ICD-10-CM

## 2019-08-29 DIAGNOSIS — Z60.3 LANGUAGE BARRIER AFFECTING HEALTH CARE: ICD-10-CM

## 2019-08-29 DIAGNOSIS — G89.4 CHRONIC PAIN SYNDROME: ICD-10-CM

## 2019-08-29 LAB
ANION GAP SERPL CALCULATED.3IONS-SCNC: 17 MMOL/L (ref 5–18)
ATRIAL RATE - MUSE: 77 BPM
BUN SERPL-MCNC: 13 MG/DL (ref 8–22)
CALCIUM SERPL-MCNC: 9.3 MG/DL (ref 8.5–10.5)
CHLORIDE BLD-SCNC: 96 MMOL/L (ref 98–107)
CO2 SERPL-SCNC: 27 MMOL/L (ref 22–31)
CREAT SERPL-MCNC: 0.83 MG/DL (ref 0.6–1.1)
DIASTOLIC BLOOD PRESSURE - MUSE: 68 MMHG
GFR SERPL CREATININE-BSD FRML MDRD: >60 ML/MIN/1.73M2
GLUCOSE BLD-MCNC: 183 MG/DL (ref 70–125)
INTERPRETATION ECG - MUSE: NORMAL
P AXIS - MUSE: 43 DEGREES
POTASSIUM BLD-SCNC: 4.1 MMOL/L (ref 3.5–5)
PR INTERVAL - MUSE: 158 MS
QRS DURATION - MUSE: 82 MS
QT - MUSE: 406 MS
QTC - MUSE: 459 MS
R AXIS - MUSE: 54 DEGREES
SODIUM SERPL-SCNC: 140 MMOL/L (ref 136–145)
SYSTOLIC BLOOD PRESSURE - MUSE: 112 MMHG
T AXIS - MUSE: 30 DEGREES
VENTRICULAR RATE- MUSE: 77 BPM

## 2019-08-29 SDOH — SOCIAL STABILITY - SOCIAL INSECURITY: ACCULTURATION DIFFICULTY: Z60.3

## 2019-08-29 ASSESSMENT — MIFFLIN-ST. JEOR: SCORE: 1386.67

## 2019-09-16 ENCOUNTER — OFFICE VISIT - HEALTHEAST (OUTPATIENT)
Dept: FAMILY MEDICINE | Facility: CLINIC | Age: 59
End: 2019-09-16

## 2019-09-16 DIAGNOSIS — F40.298 OTHER SPECIFIED PHOBIA: ICD-10-CM

## 2019-09-16 DIAGNOSIS — F43.10 PTSD (POST-TRAUMATIC STRESS DISORDER): ICD-10-CM

## 2019-09-16 DIAGNOSIS — F34.1 DYSTHYMIC DISORDER: ICD-10-CM

## 2019-09-16 DIAGNOSIS — Z23 NEED FOR IMMUNIZATION AGAINST INFLUENZA: ICD-10-CM

## 2019-09-16 ASSESSMENT — MIFFLIN-ST. JEOR: SCORE: 1384.85

## 2019-09-25 ENCOUNTER — COMMUNICATION - HEALTHEAST (OUTPATIENT)
Dept: NURSING | Facility: CLINIC | Age: 59
End: 2019-09-25

## 2019-09-26 ENCOUNTER — RECORDS - HEALTHEAST (OUTPATIENT)
Dept: ADMINISTRATIVE | Facility: OTHER | Age: 59
End: 2019-09-26

## 2019-09-29 ENCOUNTER — COMMUNICATION - HEALTHEAST (OUTPATIENT)
Dept: FAMILY MEDICINE | Facility: CLINIC | Age: 59
End: 2019-09-29

## 2019-09-29 DIAGNOSIS — E08.40 DIABETES MELLITUS DUE TO UNDERLYING CONDITION WITH DIABETIC NEUROPATHY, WITHOUT LONG-TERM CURRENT USE OF INSULIN (H): ICD-10-CM

## 2019-10-31 ENCOUNTER — AMBULATORY - HEALTHEAST (OUTPATIENT)
Dept: FAMILY MEDICINE | Facility: CLINIC | Age: 59
End: 2019-10-31

## 2019-10-31 ENCOUNTER — AMBULATORY - HEALTHEAST (OUTPATIENT)
Dept: LAB | Facility: CLINIC | Age: 59
End: 2019-10-31

## 2019-10-31 DIAGNOSIS — E55.9 VITAMIN D DEFICIENCY: ICD-10-CM

## 2019-11-01 LAB — 25(OH)D3 SERPL-MCNC: 19.4 NG/ML (ref 30–80)

## 2019-11-11 ENCOUNTER — COMMUNICATION - HEALTHEAST (OUTPATIENT)
Dept: FAMILY MEDICINE | Facility: CLINIC | Age: 59
End: 2019-11-11

## 2019-11-11 DIAGNOSIS — E11.42 DIABETIC POLYNEUROPATHY ASSOCIATED WITH TYPE 2 DIABETES MELLITUS (H): ICD-10-CM

## 2019-11-11 DIAGNOSIS — K21.9 GERD (GASTROESOPHAGEAL REFLUX DISEASE): ICD-10-CM

## 2019-11-11 DIAGNOSIS — M12.9 ARTHROPATHY: ICD-10-CM

## 2019-11-13 ENCOUNTER — COMMUNICATION - HEALTHEAST (OUTPATIENT)
Dept: FAMILY MEDICINE | Facility: CLINIC | Age: 59
End: 2019-11-13

## 2019-12-11 ENCOUNTER — AMBULATORY - HEALTHEAST (OUTPATIENT)
Dept: FAMILY MEDICINE | Facility: CLINIC | Age: 59
End: 2019-12-11

## 2019-12-11 DIAGNOSIS — F34.1 DYSTHYMIC DISORDER: ICD-10-CM

## 2019-12-11 DIAGNOSIS — F32.1 MODERATE MAJOR DEPRESSION (H): ICD-10-CM

## 2019-12-11 DIAGNOSIS — E08.40 DIABETES MELLITUS DUE TO UNDERLYING CONDITION WITH DIABETIC NEUROPATHY, WITHOUT LONG-TERM CURRENT USE OF INSULIN (H): ICD-10-CM

## 2019-12-12 ENCOUNTER — OFFICE VISIT - HEALTHEAST (OUTPATIENT)
Dept: FAMILY MEDICINE | Facility: CLINIC | Age: 59
End: 2019-12-12

## 2019-12-12 DIAGNOSIS — I51.89 DIASTOLIC DYSFUNCTION: ICD-10-CM

## 2019-12-12 DIAGNOSIS — E78.5 DYSLIPIDEMIA: ICD-10-CM

## 2019-12-12 DIAGNOSIS — E55.9 VITAMIN D DEFICIENCY: ICD-10-CM

## 2019-12-12 DIAGNOSIS — G47.8 PULMONARY HYPERTENSION DUE TO SLEEP-DISORDERED BREATHING (H): ICD-10-CM

## 2019-12-12 DIAGNOSIS — I27.29 PULMONARY HYPERTENSION DUE TO SLEEP-DISORDERED BREATHING (H): ICD-10-CM

## 2019-12-12 DIAGNOSIS — E08.40 DIABETES MELLITUS DUE TO UNDERLYING CONDITION WITH DIABETIC NEUROPATHY, WITHOUT LONG-TERM CURRENT USE OF INSULIN (H): ICD-10-CM

## 2019-12-12 DIAGNOSIS — F32.1 MODERATE MAJOR DEPRESSION (H): ICD-10-CM

## 2019-12-12 DIAGNOSIS — E11.9 CONTROLLED TYPE 2 DIABETES MELLITUS WITHOUT COMPLICATION, WITHOUT LONG-TERM CURRENT USE OF INSULIN (H): ICD-10-CM

## 2019-12-12 DIAGNOSIS — D50.8 IRON DEFICIENCY ANEMIA SECONDARY TO INADEQUATE DIETARY IRON INTAKE: ICD-10-CM

## 2019-12-12 DIAGNOSIS — G47.33 OSA (OBSTRUCTIVE SLEEP APNEA): ICD-10-CM

## 2019-12-12 DIAGNOSIS — Z51.81 ENCOUNTER FOR THERAPEUTIC DRUG MONITORING: ICD-10-CM

## 2019-12-12 DIAGNOSIS — I10 ESSENTIAL HYPERTENSION: ICD-10-CM

## 2019-12-12 DIAGNOSIS — F43.10 PTSD (POST-TRAUMATIC STRESS DISORDER): ICD-10-CM

## 2019-12-12 DIAGNOSIS — Z11.59 NEED FOR HEPATITIS B SCREENING TEST: ICD-10-CM

## 2019-12-12 DIAGNOSIS — F34.1 DYSTHYMIC DISORDER: ICD-10-CM

## 2019-12-12 DIAGNOSIS — Z11.59 NEED FOR HEPATITIS C SCREENING TEST: ICD-10-CM

## 2019-12-12 DIAGNOSIS — H40.002 GLAUCOMA SUSPECT, LEFT: ICD-10-CM

## 2019-12-12 LAB — HBA1C MFR BLD: 8.1 % (ref 3.5–6)

## 2019-12-12 ASSESSMENT — MIFFLIN-ST. JEOR: SCORE: 1377.15

## 2019-12-12 ASSESSMENT — PATIENT HEALTH QUESTIONNAIRE - PHQ9: SUM OF ALL RESPONSES TO PHQ QUESTIONS 1-9: 2

## 2020-01-09 ENCOUNTER — AMBULATORY - HEALTHEAST (OUTPATIENT)
Dept: LAB | Facility: CLINIC | Age: 60
End: 2020-01-09

## 2020-01-09 DIAGNOSIS — E08.40 DIABETES MELLITUS DUE TO UNDERLYING CONDITION WITH DIABETIC NEUROPATHY, WITHOUT LONG-TERM CURRENT USE OF INSULIN (H): ICD-10-CM

## 2020-01-09 DIAGNOSIS — E78.5 DYSLIPIDEMIA: ICD-10-CM

## 2020-01-09 DIAGNOSIS — Z51.81 ENCOUNTER FOR THERAPEUTIC DRUG MONITORING: ICD-10-CM

## 2020-01-09 DIAGNOSIS — I51.89 DIASTOLIC DYSFUNCTION: ICD-10-CM

## 2020-01-09 DIAGNOSIS — D50.8 IRON DEFICIENCY ANEMIA SECONDARY TO INADEQUATE DIETARY IRON INTAKE: ICD-10-CM

## 2020-01-09 DIAGNOSIS — Z11.59 NEED FOR HEPATITIS C SCREENING TEST: ICD-10-CM

## 2020-01-09 DIAGNOSIS — Z11.59 NEED FOR HEPATITIS B SCREENING TEST: ICD-10-CM

## 2020-01-09 LAB
ALBUMIN SERPL-MCNC: 3.4 G/DL (ref 3.5–5)
ALP SERPL-CCNC: 192 U/L (ref 45–120)
ALT SERPL W P-5'-P-CCNC: 15 U/L (ref 0–45)
ANION GAP SERPL CALCULATED.3IONS-SCNC: 14 MMOL/L (ref 5–18)
AST SERPL W P-5'-P-CCNC: 17 U/L (ref 0–40)
BASOPHILS # BLD AUTO: 0 THOU/UL (ref 0–0.2)
BASOPHILS NFR BLD AUTO: 0 % (ref 0–2)
BILIRUB SERPL-MCNC: 0.5 MG/DL (ref 0–1)
BNP SERPL-MCNC: <10 PG/ML (ref 0–91)
BUN SERPL-MCNC: 10 MG/DL (ref 8–22)
CALCIUM SERPL-MCNC: 9.5 MG/DL (ref 8.5–10.5)
CHLORIDE BLD-SCNC: 100 MMOL/L (ref 98–107)
CHOLEST SERPL-MCNC: 205 MG/DL
CO2 SERPL-SCNC: 29 MMOL/L (ref 22–31)
CREAT SERPL-MCNC: 0.73 MG/DL (ref 0.6–1.1)
EOSINOPHIL # BLD AUTO: 0.1 THOU/UL (ref 0–0.4)
EOSINOPHIL NFR BLD AUTO: 2 % (ref 0–6)
ERYTHROCYTE [DISTWIDTH] IN BLOOD BY AUTOMATED COUNT: 15.4 % (ref 11–14.5)
FASTING STATUS PATIENT QL REPORTED: YES
GFR SERPL CREATININE-BSD FRML MDRD: >60 ML/MIN/1.73M2
GLUCOSE BLD-MCNC: 169 MG/DL (ref 70–125)
HCT VFR BLD AUTO: 36.7 % (ref 35–47)
HDLC SERPL-MCNC: 36 MG/DL
HGB BLD-MCNC: 10.1 G/DL (ref 12–16)
LDLC SERPL CALC-MCNC: 113 MG/DL
LYMPHOCYTES # BLD AUTO: 1.6 THOU/UL (ref 0.8–4.4)
LYMPHOCYTES NFR BLD AUTO: 24 % (ref 20–40)
MCH RBC QN AUTO: 21.6 PG (ref 27–34)
MCHC RBC AUTO-ENTMCNC: 27.5 G/DL (ref 32–36)
MCV RBC AUTO: 78 FL (ref 80–100)
MONOCYTES # BLD AUTO: 0.4 THOU/UL (ref 0–0.9)
MONOCYTES NFR BLD AUTO: 5 % (ref 2–10)
NEUTROPHILS # BLD AUTO: 4.6 THOU/UL (ref 2–7.7)
NEUTROPHILS NFR BLD AUTO: 68 % (ref 50–70)
PLATELET # BLD AUTO: 189 THOU/UL (ref 140–440)
PMV BLD AUTO: 12.8 FL (ref 8.5–12.5)
POTASSIUM BLD-SCNC: 4.1 MMOL/L (ref 3.5–5)
PROT SERPL-MCNC: 7.3 G/DL (ref 6–8)
RBC # BLD AUTO: 4.68 MILL/UL (ref 3.8–5.4)
SODIUM SERPL-SCNC: 143 MMOL/L (ref 136–145)
TRIGL SERPL-MCNC: 281 MG/DL
WBC: 6.7 THOU/UL (ref 4–11)

## 2020-01-10 LAB
HBV CORE AB SERPL QL IA: POSITIVE
HBV SURFACE AG SERPL QL IA: NEGATIVE
HCV AB SERPL QL IA: NEGATIVE
HEPATITIS B SURFACE ANTIBODY LHE- HISTORICAL: POSITIVE

## 2020-02-01 ENCOUNTER — COMMUNICATION - HEALTHEAST (OUTPATIENT)
Dept: FAMILY MEDICINE | Facility: CLINIC | Age: 60
End: 2020-02-01

## 2020-02-01 DIAGNOSIS — E87.6 HYPOKALEMIA: ICD-10-CM

## 2020-02-01 DIAGNOSIS — E78.5 HYPERLIPIDEMIA LDL GOAL <100: ICD-10-CM

## 2020-02-01 DIAGNOSIS — E11.00 TYPE 2 DIABETES MELLITUS WITH HYPEROSMOLARITY WITHOUT COMA (H): ICD-10-CM

## 2020-02-10 ENCOUNTER — COMMUNICATION - HEALTHEAST (OUTPATIENT)
Dept: FAMILY MEDICINE | Facility: CLINIC | Age: 60
End: 2020-02-10

## 2020-02-10 DIAGNOSIS — E11.40 TYPE 2 DIABETES MELLITUS WITH DIABETIC NEUROPATHY, WITHOUT LONG-TERM CURRENT USE OF INSULIN (H): ICD-10-CM

## 2020-03-02 ENCOUNTER — COMMUNICATION - HEALTHEAST (OUTPATIENT)
Dept: FAMILY MEDICINE | Facility: CLINIC | Age: 60
End: 2020-03-02

## 2020-03-02 DIAGNOSIS — R25.2 CRAMP AND SPASM: ICD-10-CM

## 2020-03-02 DIAGNOSIS — I10 ESSENTIAL HYPERTENSION: ICD-10-CM

## 2020-03-02 DIAGNOSIS — M54.2 CERVICALGIA: ICD-10-CM

## 2020-03-02 DIAGNOSIS — G89.4 CHRONIC PAIN SYNDROME: ICD-10-CM

## 2020-03-30 ENCOUNTER — COMMUNICATION - HEALTHEAST (OUTPATIENT)
Dept: FAMILY MEDICINE | Facility: CLINIC | Age: 60
End: 2020-03-30

## 2020-03-30 DIAGNOSIS — E55.9 VITAMIN D DEFICIENCY: ICD-10-CM

## 2020-06-22 ENCOUNTER — COMMUNICATION - HEALTHEAST (OUTPATIENT)
Dept: FAMILY MEDICINE | Facility: CLINIC | Age: 60
End: 2020-06-22

## 2020-06-22 DIAGNOSIS — R25.2 CRAMP AND SPASM: ICD-10-CM

## 2020-06-22 DIAGNOSIS — G89.4 CHRONIC PAIN SYNDROME: ICD-10-CM

## 2020-06-22 DIAGNOSIS — E11.42 TYPE 2 DIABETES MELLITUS WITH DIABETIC POLYNEUROPATHY, WITHOUT LONG-TERM CURRENT USE OF INSULIN (H): ICD-10-CM

## 2020-06-22 DIAGNOSIS — M54.2 CERVICALGIA: ICD-10-CM

## 2020-06-22 DIAGNOSIS — E11.9 CONTROLLED TYPE 2 DIABETES MELLITUS WITHOUT COMPLICATION, WITHOUT LONG-TERM CURRENT USE OF INSULIN (H): ICD-10-CM

## 2020-08-03 ENCOUNTER — AMBULATORY - HEALTHEAST (OUTPATIENT)
Dept: FAMILY MEDICINE | Facility: CLINIC | Age: 60
End: 2020-08-03

## 2020-08-03 DIAGNOSIS — E08.40 DIABETES MELLITUS DUE TO UNDERLYING CONDITION WITH DIABETIC NEUROPATHY, WITHOUT LONG-TERM CURRENT USE OF INSULIN (H): ICD-10-CM

## 2020-08-03 DIAGNOSIS — E56.9 VITAMIN DEFICIENCY: ICD-10-CM

## 2020-08-04 ENCOUNTER — OFFICE VISIT - HEALTHEAST (OUTPATIENT)
Dept: FAMILY MEDICINE | Facility: CLINIC | Age: 60
End: 2020-08-04

## 2020-08-04 ENCOUNTER — RECORDS - HEALTHEAST (OUTPATIENT)
Dept: ADMINISTRATIVE | Facility: OTHER | Age: 60
End: 2020-08-04

## 2020-08-04 DIAGNOSIS — Z79.899 POLYPHARMACY: ICD-10-CM

## 2020-08-04 DIAGNOSIS — E56.9 VITAMIN DEFICIENCY: ICD-10-CM

## 2020-08-04 DIAGNOSIS — R25.2 CRAMP AND SPASM: ICD-10-CM

## 2020-08-04 DIAGNOSIS — M54.2 CERVICALGIA: ICD-10-CM

## 2020-08-04 DIAGNOSIS — E08.40 DIABETES MELLITUS DUE TO UNDERLYING CONDITION WITH DIABETIC NEUROPATHY, WITHOUT LONG-TERM CURRENT USE OF INSULIN (H): ICD-10-CM

## 2020-08-04 DIAGNOSIS — Z60.3 LANGUAGE BARRIER AFFECTING HEALTH CARE: ICD-10-CM

## 2020-08-04 DIAGNOSIS — Z12.11 COLON CANCER SCREENING: ICD-10-CM

## 2020-08-04 DIAGNOSIS — E66.01 MORBID OBESITY, UNSPECIFIED OBESITY TYPE (H): ICD-10-CM

## 2020-08-04 DIAGNOSIS — H40.003 GLAUCOMA SUSPECT, BILATERAL: ICD-10-CM

## 2020-08-04 DIAGNOSIS — H54.7 POOR VISION: ICD-10-CM

## 2020-08-04 DIAGNOSIS — Z79.899 CONTROLLED SUBSTANCE AGREEMENT SIGNED: ICD-10-CM

## 2020-08-04 DIAGNOSIS — I27.29 PULMONARY HYPERTENSION DUE TO SLEEP-DISORDERED BREATHING (H): ICD-10-CM

## 2020-08-04 DIAGNOSIS — G89.4 CHRONIC PAIN SYNDROME: ICD-10-CM

## 2020-08-04 DIAGNOSIS — Z75.8 LANGUAGE BARRIER AFFECTING HEALTH CARE: ICD-10-CM

## 2020-08-04 DIAGNOSIS — Z00.00 ANNUAL PHYSICAL EXAM: ICD-10-CM

## 2020-08-04 DIAGNOSIS — E11.65 POORLY CONTROLLED TYPE 2 DIABETES MELLITUS (H): ICD-10-CM

## 2020-08-04 DIAGNOSIS — Z59.6 POVERTY: ICD-10-CM

## 2020-08-04 DIAGNOSIS — G47.8 PULMONARY HYPERTENSION DUE TO SLEEP-DISORDERED BREATHING (H): ICD-10-CM

## 2020-08-04 LAB
CREAT UR-MCNC: 78.2 MG/DL
HBA1C MFR BLD: 7.3 %
MICROALBUMIN UR-MCNC: 1.13 MG/DL (ref 0–1.99)
MICROALBUMIN/CREAT UR: 14.5 MG/G

## 2020-08-04 SDOH — SOCIAL STABILITY - SOCIAL INSECURITY: ACCULTURATION DIFFICULTY: Z60.3

## 2020-08-04 SDOH — ECONOMIC STABILITY - INCOME SECURITY: LOW INCOME: Z59.6

## 2020-08-04 ASSESSMENT — MIFFLIN-ST. JEOR: SCORE: 1381.79

## 2020-08-04 ASSESSMENT — PATIENT HEALTH QUESTIONNAIRE - PHQ9: SUM OF ALL RESPONSES TO PHQ QUESTIONS 1-9: 5

## 2020-08-05 LAB — 25(OH)D3 SERPL-MCNC: 23.2 NG/ML (ref 30–80)

## 2020-08-10 ENCOUNTER — COMMUNICATION - HEALTHEAST (OUTPATIENT)
Dept: FAMILY MEDICINE | Facility: CLINIC | Age: 60
End: 2020-08-10

## 2020-08-27 ENCOUNTER — RECORDS - HEALTHEAST (OUTPATIENT)
Dept: ADMINISTRATIVE | Facility: OTHER | Age: 60
End: 2020-08-27

## 2020-08-27 LAB — RETINOPATHY: NEGATIVE

## 2020-09-08 ENCOUNTER — RECORDS - HEALTHEAST (OUTPATIENT)
Dept: HEALTH INFORMATION MANAGEMENT | Facility: CLINIC | Age: 60
End: 2020-09-08

## 2020-10-13 ENCOUNTER — COMMUNICATION - HEALTHEAST (OUTPATIENT)
Dept: FAMILY MEDICINE | Facility: CLINIC | Age: 60
End: 2020-10-13

## 2020-10-13 DIAGNOSIS — K21.9 GERD (GASTROESOPHAGEAL REFLUX DISEASE): ICD-10-CM

## 2020-11-05 ENCOUNTER — OFFICE VISIT - HEALTHEAST (OUTPATIENT)
Dept: FAMILY MEDICINE | Facility: CLINIC | Age: 60
End: 2020-11-05

## 2020-11-05 DIAGNOSIS — K02.9 DENTAL CARIES: ICD-10-CM

## 2020-11-05 DIAGNOSIS — Z51.81 ENCOUNTER FOR THERAPEUTIC DRUG MONITORING: ICD-10-CM

## 2020-11-05 DIAGNOSIS — Z59.6 POVERTY: ICD-10-CM

## 2020-11-05 DIAGNOSIS — G47.33 OSA (OBSTRUCTIVE SLEEP APNEA): ICD-10-CM

## 2020-11-05 DIAGNOSIS — I10 ESSENTIAL HYPERTENSION: ICD-10-CM

## 2020-11-05 DIAGNOSIS — Z75.8 LANGUAGE BARRIER AFFECTING HEALTH CARE: ICD-10-CM

## 2020-11-05 DIAGNOSIS — Z79.899 POLYPHARMACY: ICD-10-CM

## 2020-11-05 DIAGNOSIS — R25.2 CRAMP OF MUSCLE OF LEFT UPPER EXTREMITY: ICD-10-CM

## 2020-11-05 DIAGNOSIS — Z60.3 LANGUAGE BARRIER AFFECTING HEALTH CARE: ICD-10-CM

## 2020-11-05 DIAGNOSIS — E08.41 DIABETIC MONONEUROPATHY ASSOCIATED WITH DIABETES MELLITUS DUE TO UNDERLYING CONDITION (H): ICD-10-CM

## 2020-11-05 DIAGNOSIS — F32.1 MODERATE MAJOR DEPRESSION (H): ICD-10-CM

## 2020-11-05 DIAGNOSIS — E66.01 MORBID OBESITY, UNSPECIFIED OBESITY TYPE (H): ICD-10-CM

## 2020-11-05 DIAGNOSIS — E08.40 DIABETES MELLITUS DUE TO UNDERLYING CONDITION WITH DIABETIC NEUROPATHY, WITHOUT LONG-TERM CURRENT USE OF INSULIN (H): ICD-10-CM

## 2020-11-05 DIAGNOSIS — I27.29 PULMONARY HYPERTENSION DUE TO SLEEP-DISORDERED BREATHING (H): ICD-10-CM

## 2020-11-05 DIAGNOSIS — Z12.11 SCREEN FOR COLON CANCER: ICD-10-CM

## 2020-11-05 DIAGNOSIS — G47.8 PULMONARY HYPERTENSION DUE TO SLEEP-DISORDERED BREATHING (H): ICD-10-CM

## 2020-11-05 DIAGNOSIS — Z23 NEED FOR IMMUNIZATION AGAINST INFLUENZA: ICD-10-CM

## 2020-11-05 LAB
ALBUMIN SERPL-MCNC: 3.4 G/DL (ref 3.5–5)
ALP SERPL-CCNC: 174 U/L (ref 45–120)
ALT SERPL W P-5'-P-CCNC: 12 U/L (ref 0–45)
ANION GAP SERPL CALCULATED.3IONS-SCNC: 9 MMOL/L (ref 5–18)
AST SERPL W P-5'-P-CCNC: 16 U/L (ref 0–40)
BILIRUB SERPL-MCNC: 0.6 MG/DL (ref 0–1)
BUN SERPL-MCNC: 9 MG/DL (ref 8–22)
CALCIUM SERPL-MCNC: 8.8 MG/DL (ref 8.5–10.5)
CHLORIDE BLD-SCNC: 95 MMOL/L (ref 98–107)
CO2 SERPL-SCNC: 33 MMOL/L (ref 22–31)
CREAT SERPL-MCNC: 0.87 MG/DL (ref 0.6–1.1)
GFR SERPL CREATININE-BSD FRML MDRD: >60 ML/MIN/1.73M2
GLUCOSE BLD-MCNC: 293 MG/DL (ref 70–125)
HBA1C MFR BLD: 7.6 %
MAGNESIUM SERPL-MCNC: 1.9 MG/DL (ref 1.8–2.6)
POTASSIUM BLD-SCNC: 3.8 MMOL/L (ref 3.5–5)
PROT SERPL-MCNC: 7.3 G/DL (ref 6–8)
SODIUM SERPL-SCNC: 137 MMOL/L (ref 136–145)
VIT B12 SERPL-MCNC: 539 PG/ML (ref 213–816)

## 2020-11-05 SDOH — ECONOMIC STABILITY - INCOME SECURITY: LOW INCOME: Z59.6

## 2020-11-05 SDOH — SOCIAL STABILITY - SOCIAL INSECURITY: ACCULTURATION DIFFICULTY: Z60.3

## 2020-11-05 ASSESSMENT — MIFFLIN-ST. JEOR: SCORE: 1389.43

## 2020-11-08 ENCOUNTER — COMMUNICATION - HEALTHEAST (OUTPATIENT)
Dept: FAMILY MEDICINE | Facility: CLINIC | Age: 60
End: 2020-11-08

## 2020-11-08 DIAGNOSIS — I10 ESSENTIAL HYPERTENSION: ICD-10-CM

## 2020-11-09 LAB
HEMOCCULT SP1 STL QL: NEGATIVE
HEMOCCULT SP2 STL QL: POSITIVE
HEMOCCULT SP3 STL QL: NEGATIVE

## 2020-11-20 ENCOUNTER — COMMUNICATION - HEALTHEAST (OUTPATIENT)
Dept: FAMILY MEDICINE | Facility: CLINIC | Age: 60
End: 2020-11-20

## 2020-12-10 ENCOUNTER — OFFICE VISIT - HEALTHEAST (OUTPATIENT)
Dept: PODIATRY | Facility: CLINIC | Age: 60
End: 2020-12-10

## 2020-12-10 DIAGNOSIS — M21.6X1 PRONATION DEFORMITY OF BOTH FEET: ICD-10-CM

## 2020-12-10 DIAGNOSIS — E08.42 DIABETIC POLYNEUROPATHY ASSOCIATED WITH DIABETES MELLITUS DUE TO UNDERLYING CONDITION (H): ICD-10-CM

## 2020-12-10 DIAGNOSIS — M21.6X2 PRONATION DEFORMITY OF BOTH FEET: ICD-10-CM

## 2020-12-10 ASSESSMENT — MIFFLIN-ST. JEOR: SCORE: 1388.13

## 2021-01-05 ENCOUNTER — COMMUNICATION - HEALTHEAST (OUTPATIENT)
Dept: FAMILY MEDICINE | Facility: CLINIC | Age: 61
End: 2021-01-05

## 2021-01-05 DIAGNOSIS — E78.5 HYPERLIPIDEMIA LDL GOAL <100: ICD-10-CM

## 2021-01-05 DIAGNOSIS — E11.00 TYPE 2 DIABETES MELLITUS WITH HYPEROSMOLARITY WITHOUT COMA (H): ICD-10-CM

## 2021-01-05 DIAGNOSIS — E87.6 HYPOKALEMIA: ICD-10-CM

## 2021-01-21 ENCOUNTER — COMMUNICATION - HEALTHEAST (OUTPATIENT)
Dept: FAMILY MEDICINE | Facility: CLINIC | Age: 61
End: 2021-01-21

## 2021-01-21 DIAGNOSIS — G89.4 CHRONIC PAIN SYNDROME: ICD-10-CM

## 2021-01-21 DIAGNOSIS — R25.2 CRAMP AND SPASM: ICD-10-CM

## 2021-01-21 DIAGNOSIS — M54.2 CERVICALGIA: ICD-10-CM

## 2021-02-17 ENCOUNTER — AMBULATORY - HEALTHEAST (OUTPATIENT)
Dept: FAMILY MEDICINE | Facility: CLINIC | Age: 61
End: 2021-02-17

## 2021-02-17 DIAGNOSIS — E08.40 DIABETES MELLITUS DUE TO UNDERLYING CONDITION WITH DIABETIC NEUROPATHY, WITHOUT LONG-TERM CURRENT USE OF INSULIN (H): ICD-10-CM

## 2021-02-17 DIAGNOSIS — E55.9 VITAMIN D INSUFFICIENCY: ICD-10-CM

## 2021-02-18 ENCOUNTER — OFFICE VISIT - HEALTHEAST (OUTPATIENT)
Dept: FAMILY MEDICINE | Facility: CLINIC | Age: 61
End: 2021-02-18

## 2021-02-18 DIAGNOSIS — E08.40 DIABETES MELLITUS DUE TO UNDERLYING CONDITION WITH DIABETIC NEUROPATHY, WITHOUT LONG-TERM CURRENT USE OF INSULIN (H): ICD-10-CM

## 2021-02-18 DIAGNOSIS — E78.2 MIXED HYPERLIPIDEMIA: ICD-10-CM

## 2021-02-18 DIAGNOSIS — Z60.3 LANGUAGE BARRIER AFFECTING HEALTH CARE: ICD-10-CM

## 2021-02-18 DIAGNOSIS — E55.9 VITAMIN D INSUFFICIENCY: ICD-10-CM

## 2021-02-18 DIAGNOSIS — I27.29 PULMONARY HYPERTENSION DUE TO SLEEP-DISORDERED BREATHING (H): ICD-10-CM

## 2021-02-18 DIAGNOSIS — Z79.899 POLYPHARMACY: ICD-10-CM

## 2021-02-18 DIAGNOSIS — Z75.8 LANGUAGE BARRIER AFFECTING HEALTH CARE: ICD-10-CM

## 2021-02-18 DIAGNOSIS — G47.8 PULMONARY HYPERTENSION DUE TO SLEEP-DISORDERED BREATHING (H): ICD-10-CM

## 2021-02-18 DIAGNOSIS — E66.01 MORBID OBESITY, UNSPECIFIED OBESITY TYPE (H): ICD-10-CM

## 2021-02-18 DIAGNOSIS — F32.1 MODERATE MAJOR DEPRESSION (H): ICD-10-CM

## 2021-02-18 LAB
CHOLEST SERPL-MCNC: 156 MG/DL
FASTING STATUS PATIENT QL REPORTED: ABNORMAL
HBA1C MFR BLD: 8 %
HDLC SERPL-MCNC: 37 MG/DL
LDLC SERPL CALC-MCNC: 67 MG/DL
TRIGL SERPL-MCNC: 262 MG/DL

## 2021-02-18 SDOH — SOCIAL STABILITY - SOCIAL INSECURITY: ACCULTURATION DIFFICULTY: Z60.3

## 2021-02-18 ASSESSMENT — MIFFLIN-ST. JEOR: SCORE: 1380.82

## 2021-02-18 ASSESSMENT — PATIENT HEALTH QUESTIONNAIRE - PHQ9: SUM OF ALL RESPONSES TO PHQ QUESTIONS 1-9: 6

## 2021-02-19 LAB — 25(OH)D3 SERPL-MCNC: 18.2 NG/ML (ref 30–80)

## 2021-02-23 ENCOUNTER — AMBULATORY - HEALTHEAST (OUTPATIENT)
Dept: FAMILY MEDICINE | Facility: CLINIC | Age: 61
End: 2021-02-23

## 2021-02-23 DIAGNOSIS — E55.9 VITAMIN D DEFICIENCY: ICD-10-CM

## 2021-02-24 ENCOUNTER — AMBULATORY - HEALTHEAST (OUTPATIENT)
Dept: OTHER | Facility: CLINIC | Age: 61
End: 2021-02-24

## 2021-03-04 ENCOUNTER — COMMUNICATION - HEALTHEAST (OUTPATIENT)
Dept: FAMILY MEDICINE | Facility: CLINIC | Age: 61
End: 2021-03-04

## 2021-03-04 DIAGNOSIS — E11.40 TYPE 2 DIABETES MELLITUS WITH DIABETIC NEUROPATHY, WITHOUT LONG-TERM CURRENT USE OF INSULIN (H): ICD-10-CM

## 2021-03-17 ENCOUNTER — AMBULATORY - HEALTHEAST (OUTPATIENT)
Dept: EDUCATION SERVICES | Facility: CLINIC | Age: 61
End: 2021-03-17

## 2021-03-17 DIAGNOSIS — E11.9 CONTROLLED TYPE 2 DIABETES MELLITUS WITHOUT COMPLICATION, WITHOUT LONG-TERM CURRENT USE OF INSULIN (H): ICD-10-CM

## 2021-04-04 ENCOUNTER — COMMUNICATION - HEALTHEAST (OUTPATIENT)
Dept: FAMILY MEDICINE | Facility: CLINIC | Age: 61
End: 2021-04-04

## 2021-04-04 DIAGNOSIS — E55.9 VITAMIN D DEFICIENCY: ICD-10-CM

## 2021-04-10 ENCOUNTER — RECORDS - HEALTHEAST (OUTPATIENT)
Dept: ADMINISTRATIVE | Facility: OTHER | Age: 61
End: 2021-04-10

## 2021-04-10 ENCOUNTER — OFFICE VISIT - HEALTHEAST (OUTPATIENT)
Dept: FAMILY MEDICINE | Facility: CLINIC | Age: 61
End: 2021-04-10

## 2021-04-10 DIAGNOSIS — R22.0 SWELLING OF LEFT SIDE OF FACE: ICD-10-CM

## 2021-04-10 DIAGNOSIS — K04.7 TOOTH ABSCESS: ICD-10-CM

## 2021-04-12 ENCOUNTER — COMMUNICATION - HEALTHEAST (OUTPATIENT)
Dept: FAMILY MEDICINE | Facility: CLINIC | Age: 61
End: 2021-04-12

## 2021-04-12 DIAGNOSIS — M54.2 CERVICALGIA: ICD-10-CM

## 2021-04-12 DIAGNOSIS — G89.4 CHRONIC PAIN SYNDROME: ICD-10-CM

## 2021-04-12 DIAGNOSIS — R25.2 CRAMP AND SPASM: ICD-10-CM

## 2021-04-14 ENCOUNTER — AMBULATORY - HEALTHEAST (OUTPATIENT)
Dept: EDUCATION SERVICES | Facility: CLINIC | Age: 61
End: 2021-04-14

## 2021-04-14 DIAGNOSIS — E11.9 CONTROLLED TYPE 2 DIABETES MELLITUS WITHOUT COMPLICATION, WITHOUT LONG-TERM CURRENT USE OF INSULIN (H): ICD-10-CM

## 2021-04-21 ENCOUNTER — TRANSFERRED RECORDS (OUTPATIENT)
Dept: HEALTH INFORMATION MANAGEMENT | Facility: CLINIC | Age: 61
End: 2021-04-21

## 2021-05-12 ENCOUNTER — TRANSFERRED RECORDS (OUTPATIENT)
Dept: HEALTH INFORMATION MANAGEMENT | Facility: CLINIC | Age: 61
End: 2021-05-12

## 2021-05-20 ENCOUNTER — RECORDS - HEALTHEAST (OUTPATIENT)
Dept: ADMINISTRATIVE | Facility: OTHER | Age: 61
End: 2021-05-20

## 2021-05-20 ENCOUNTER — OFFICE VISIT - HEALTHEAST (OUTPATIENT)
Dept: FAMILY MEDICINE | Facility: CLINIC | Age: 61
End: 2021-05-20

## 2021-05-20 ENCOUNTER — RECORDS - HEALTHEAST (OUTPATIENT)
Dept: MAMMOGRAPHY | Facility: CLINIC | Age: 61
End: 2021-05-20

## 2021-05-20 DIAGNOSIS — R53.83 FATIGUE, UNSPECIFIED TYPE: ICD-10-CM

## 2021-05-20 DIAGNOSIS — Z12.31 ENCOUNTER FOR SCREENING MAMMOGRAM FOR MALIGNANT NEOPLASM OF BREAST: ICD-10-CM

## 2021-05-20 DIAGNOSIS — Z60.3 LANGUAGE BARRIER AFFECTING HEALTH CARE: ICD-10-CM

## 2021-05-20 DIAGNOSIS — Z75.8 LANGUAGE BARRIER AFFECTING HEALTH CARE: ICD-10-CM

## 2021-05-20 DIAGNOSIS — G47.8 PULMONARY HYPERTENSION DUE TO SLEEP-DISORDERED BREATHING (H): ICD-10-CM

## 2021-05-20 DIAGNOSIS — D22.9 ATYPICAL MOLE: ICD-10-CM

## 2021-05-20 DIAGNOSIS — D50.8 IRON DEFICIENCY ANEMIA SECONDARY TO INADEQUATE DIETARY IRON INTAKE: ICD-10-CM

## 2021-05-20 DIAGNOSIS — I27.29 PULMONARY HYPERTENSION DUE TO SLEEP-DISORDERED BREATHING (H): ICD-10-CM

## 2021-05-20 DIAGNOSIS — E55.9 VITAMIN D INSUFFICIENCY: ICD-10-CM

## 2021-05-20 DIAGNOSIS — E66.01 MORBID OBESITY, UNSPECIFIED OBESITY TYPE (H): ICD-10-CM

## 2021-05-20 DIAGNOSIS — G47.33 OSA (OBSTRUCTIVE SLEEP APNEA): ICD-10-CM

## 2021-05-20 DIAGNOSIS — E11.9 CONTROLLED TYPE 2 DIABETES MELLITUS WITHOUT COMPLICATION, WITHOUT LONG-TERM CURRENT USE OF INSULIN (H): ICD-10-CM

## 2021-05-20 DIAGNOSIS — Z12.31 ENCOUNTER FOR SCREENING MAMMOGRAM FOR BREAST CANCER: ICD-10-CM

## 2021-05-20 DIAGNOSIS — Z79.899 POLYPHARMACY: ICD-10-CM

## 2021-05-20 DIAGNOSIS — Z00.00 ANNUAL PHYSICAL EXAM: ICD-10-CM

## 2021-05-20 DIAGNOSIS — Z12.11 SCREEN FOR COLON CANCER: ICD-10-CM

## 2021-05-20 LAB
BASOPHILS # BLD AUTO: 0 THOU/UL (ref 0–0.2)
BASOPHILS NFR BLD AUTO: 0 % (ref 0–2)
EOSINOPHIL # BLD AUTO: 0.2 THOU/UL (ref 0–0.4)
EOSINOPHIL NFR BLD AUTO: 2 % (ref 0–6)
ERYTHROCYTE [DISTWIDTH] IN BLOOD BY AUTOMATED COUNT: 19.7 % (ref 11–14.5)
HBA1C MFR BLD: 6.9 %
HCT VFR BLD AUTO: 24.8 % (ref 35–47)
HGB BLD-MCNC: 6.1 G/DL (ref 12–16)
IMM GRANULOCYTES # BLD: 0 THOU/UL
IMM GRANULOCYTES NFR BLD: 1 %
LYMPHOCYTES # BLD AUTO: 1.4 THOU/UL (ref 0.8–4.4)
LYMPHOCYTES NFR BLD AUTO: 18 % (ref 20–40)
MCH RBC QN AUTO: 16.4 PG (ref 27–34)
MCHC RBC AUTO-ENTMCNC: 24.6 G/DL (ref 32–36)
MCV RBC AUTO: 67 FL (ref 80–100)
MONOCYTES # BLD AUTO: 0.6 THOU/UL (ref 0–0.9)
MONOCYTES NFR BLD AUTO: 7 % (ref 2–10)
NEUTROPHILS # BLD AUTO: 5.7 THOU/UL (ref 2–7.7)
NEUTROPHILS NFR BLD AUTO: 72 % (ref 50–70)
OVALOCYTES: ABNORMAL
PLAT MORPH BLD: NORMAL
PLATELET # BLD AUTO: 179 THOU/UL (ref 140–440)
PMV BLD AUTO: ABNORMAL FL
POLYCHROMASIA BLD QL SMEAR: ABNORMAL
RBC # BLD AUTO: 3.72 MILL/UL (ref 3.8–5.4)
TARGETS BLD QL SMEAR: ABNORMAL
TSH SERPL DL<=0.005 MIU/L-ACNC: 1.27 UIU/ML (ref 0.3–5)
WBC: 7.9 THOU/UL (ref 4–11)

## 2021-05-20 SDOH — SOCIAL STABILITY - SOCIAL INSECURITY: ACCULTURATION DIFFICULTY: Z60.3

## 2021-05-20 ASSESSMENT — MIFFLIN-ST. JEOR: SCORE: 1354.84

## 2021-05-21 LAB — 25(OH)D3 SERPL-MCNC: 37.4 NG/ML (ref 30–80)

## 2021-05-25 ENCOUNTER — COMMUNICATION - HEALTHEAST (OUTPATIENT)
Dept: ADMINISTRATIVE | Facility: CLINIC | Age: 61
End: 2021-05-25

## 2021-05-26 ASSESSMENT — PATIENT HEALTH QUESTIONNAIRE - PHQ9: SUM OF ALL RESPONSES TO PHQ QUESTIONS 1-9: 2

## 2021-05-27 VITALS
SYSTOLIC BLOOD PRESSURE: 106 MMHG | OXYGEN SATURATION: 98 % | DIASTOLIC BLOOD PRESSURE: 66 MMHG | RESPIRATION RATE: 18 BRPM | HEART RATE: 78 BPM | TEMPERATURE: 98 F

## 2021-05-27 VITALS — HEIGHT: 60 IN | WEIGHT: 192.4 LBS | BODY MASS INDEX: 37.92 KG/M2

## 2021-05-27 NOTE — PROGRESS NOTES
This Maintenance Wellness Plan provides private information in regards to the work I have done with my Care Team from my Primary Care Clinic.  This document provides insight on the goals I have worked hard to achieve.  My Care Team congratulates me on my journey to become well.  With the assistance of my Clinic Care Guide and Primary Care Physician,  I have succeeded in improving areas of my health that I identified as barriers to becoming well.  I will continue to seek wellness and use the skills I have obtained to further my journey.  My Care Guide will follow up with me in three months.  In the meantime, if I should have any questions or concerns I will contact my Care Guide.      My Clinic Care Coordination Wellness Plan    [facility]  [address] [place]  [address]  [phone]      My Preferred Method of Contact:  Phone: [phone]    My Primary/Preferred Language:  Brittney    Preferred Learning Style:  Face to face discussion, Pictures/Diagrams and Hands on teaching    Emergency Contact: Extended Emergency Contact Information  Primary Emergency Contact: Emilia Rivera   [place]  Mobile Phone: [phone]  Relation: Child  Secondary Emergency Contact: Ryder Medina  Home Phone: [phone]  Relation: Friend     PCP:  Mei Carbone MD  Specialists:    [facility], [facility] and [facility].   Accomplishments:  Goals       COMPLETED: I have found an apartment and am also at the top of the wait list for public housing  (pt-stated)      Action steps to achieve this goal    1.  I will speak with the [facility] at outreach telephone calls.   2.  I will come meet with [facility]  on 03/27/2019 to help with housing resources.  3.  I will provide any necessary paperwork/documentation in a timely manner if needed to assist with this process      Goal update: 03/05/2019.  Date goal set:  02/22/2019        COMPLETED: I would like  to be less depressed. (pt-stated)      Action steps to achieve this goal:  1. I feel that my depression symptoms have been stabled for the most time however I will continue with individual therapy every two weeks and taking medicines as prescribed daily.        COMPLETED: I would like to get less arthritis pain. (pt-stated)      Action steps to achieve this goal:  1. I will continue taking medicine tramadol as prescribed daily.  2. I will put heat pad on twice a day to tolerate pain on my knees.  3. I will continue with exercises to reduce pain.        COMPLETED: I would like to prevent my diabetes getting worst.  (pt-stated)      Action steps to achieve this goal:    1.  I have been taking my medications daily for diabetes and checking my blood sugar two times a day.  2.  I will try my best to prevent my diabetes worsening by eating healthy diet that recommended and taking medications daily.  3.  I will follow up with primary doctor again on 8/24/2017.      Goal update: 08/10/2017.        COMPLETED: Medications:      Take one metformin with first and last meals of the day.        COMPLETED: Nutrition:      Eat 2 meals/day, first meal about 10am and 2nd meal about 6-7pm.  Keep rice to 1 cup or less per meal.            Advanced Directive/Living Will: The patient was given information regarding Adanced Directives/Living Will    Clinical Emergency Plan:  Emergency Plan Recommendation:     When to Use the Emergency Department (ED)  An emergency means you could die if you don t get care quickly. Or you could be hurt permanently (disabled). Read below to know when to use -- and when not to use -- an emergency department (also called ED).     Dangers to your life  Here are examples of emergencies. These need immediate care:  A hard time breathing  Severe chest pain  Choking  Severe bleeding  Suddenly not able to move or speak  Blacking out (fainting)`  Poisoning     Dangers of permanent injuries  Here are other  emergencies. These also need immediate care:  Deep cuts or severe burns  Broken bones, or sudden severe pain and swelling in a joint     When it s an emergency  If you have an emergency, follow these steps:     1. Go to the nearest emergency department  If you can, go to the hospital ED closest to you right away.  If you cannot get there right away, or if it is not safe to take yourself, call 911 or your police emergency number.  2. Call your primary care doctor  Tell your doctor about the emergency. Call within 24 hours of going to the ED.  If you cannot call, have someone call for you.  Go to your doctor (not the ED) for any follow-up care.     When it s not an emergency  If a problem is not an emergency, follow these steps:     1. Call your primary care doctor  If you don t know the name of your doctor, call your health plan.  If you cannot call, have someone call for you.  2. Follow instructions  Your doctor will tell you what you should do.  You may be told to see your doctor right away. You may be told to go to the ED. Or you may be told to go to an urgent care center.  Follow your doctor s advice.  Depression  Everyone feels down at times. The blues are a natural part of life. But an unhappy period that s intense or lasts for more than a couple of weeks can be a sign of depression. Depression is a serious illness. It can disrupt the lives of family and friends. If you know someone you think may be depressed, find out what you can do to help.     Know the serious signals  Warning signals for suicide include:    Threats or talk of suicide    Statements such as  I won t be a problem much longer  or  Nothing matters     Giving away possessions or making a will or  arrangements    Buying a gun or other weapon    Sudden, unexplained cheerfulness or calm after a period of depression  If you notice any of these signs, get help right away. Call a health care professional, mental health clinic, or suicide  hotline and ask what action to take. In an emergency, don t hesitate to call the police.     [facility] Mental Health Crisis Lines:  [facility] [phone]  [facility] [phone]  [facility] [phone]  [facility] [phone]  [facility], Adults [phone]  [facility], Children [phone]  [facility], Adults [phone]  [facility], Children [phone]  Long-Term Complications of Diabetes can cause health problems over time. These are called complications. They are more likely to happen if your blood sugar is often too high. Over time, high blood sugar can damage blood vessels in your body. It is important to keep your blood sugar in your target range. This can help prevent or delay complications from diabetes.  Possible complications  Complications of diabetes include:    Eye problems, including damage to the blood vessels in the eyes (retinopathy), pressure in the eye (glaucoma), and clouding of the eye s lens (a cataract). Eye problems can eventually lead to irreversible blindness.     Tooth and gum problems (periodontal disease), causing loss of teeth and bone    Blood vessel (vascular) disease leading to circulation problems, heart attack or stroke, or a need for amputation of a limb     Problems with sexual function leading to erectile dysfunction in men and sexual discomfort in women     Kidney disease (nephropathy) can eventually lead to kidney failure, which may require dialysis or kidney transplant     Nerve problems (neuropathy), causing pain or loss of feeling in your feet and other parts of your body, potentially leading to an amputation of a limb     High blood pressure (hypertension), putting strain on your heart and blood vessels    Serious infections, possibly leading to loss of toes, feet, or limbs  How to avoid complications  The serious consequences of these complications may be avoidable for most people with diabetes by managing your blood  glucose, blood pressure, and cholesterol levels. This can help you feel better and stay healthy. You can manage diabetes by tracking your blood sugar. You can also eat healthy and exercise to avoid gaining weight. And you should take medicine if directed by your healthcare provider.  Call your health care provider if:    You vomit or have diarrhea for more than 6 hours.    Your blood glucose level is higher than usual or over 250 mg/dL after you have taken extra insulin (if recommended in your sick-day plan).    You take oral medicine for diabetes, and your blood sugar is higher than usual or over 250 mg/dL, before a meal and stays that high for more than 24 hours.    Your blood glucose is lower than usual or less than 70 mg/dL    You have moderate to large amounts of ketones in your blood or urine.    You aren t better after 2 days.    All [facility] clinic patients have access to a Nurse 24 hours a day, 7 days a week.  If you have questions or want advice from a Nurse, please know [facility] is here for you.  You can call your clinic and they will connect you or you can call Care Connection at [phone].  [facility] also has Walk In Care clinics in multiple locations.  Call the number listed above for more information about our Walk In Care clinics or visit the [facility] website at [email or web address].

## 2021-05-27 NOTE — TELEPHONE ENCOUNTER
RN cannot approve Refill Request    RN can NOT refill this medication historical medication requested.     Jackie Gonzalez, [facility] Triage/Med Refill 4/3/2019    Requested Prescriptions   Pending Prescriptions Disp Refills     senna (SENOKOT) 8.6 mg tablet  0     Sig: Take 1 tablet by mouth daily.    GI Medications Refill Protocol Passed - 4/2/2019 10:49 AM       Passed - PCP or prescribing provider visit in last 12 or next 3 months.    Last office visit with prescriber/PCP: 2/21/2019 Mei aCrbone MD OR same dept: 2/21/2019 Mei Carbone MD OR same specialty: 2/21/2019 Mei Carbone MD  Last physical: 12/4/2017 Last MTM visit: Visit date not found   Next visit within 3 mo: Visit date not found  Next physical within 3 mo: Visit date not found  Prescriber OR PCP: Mei Carbone MD  Last diagnosis associated with med order: There are no diagnoses linked to this encounter.  If protocol passes may refill for 12 months if within 3 months of last provider visit (or a total of 15 months).

## 2021-05-27 NOTE — TELEPHONE ENCOUNTER
"Orders being requested: Change current orders for Tylenol  Reason service is needed/diagnosis: Patient wants orders to be for \"as needed\"  When are orders needed by: asap  Where to send Orders: Other:  verbal  Okay to leave detailed message?  Yes    Current orders are for 2x daily. Patient wants \"as needed\" because that is how she's been taking it.       "

## 2021-05-27 NOTE — TELEPHONE ENCOUNTER
Refill Request  Did you contact pharmacy: No  Medication name: Senna 8.6mg   Requested Prescriptions      No prescriptions requested or ordered in this encounter     Who prescribed the medication: Dr. Chivo Gaviria at [facility]  Pharmacy Name and Location: [facility]  Is patient out of medication: No.  5 days left  Patient notified refills processed in 72 hours:  yes  Okay to leave a detailed message: yes

## 2021-05-27 NOTE — PROGRESS NOTES
Assessment  SW met with pt and  Ra to discuss psychosocial situation which has changed since [facility] RN assessment.  Pt is now living by herself in an apartment.  Rent is $789 and her income is $775.  Pt recently befriended a woman named Ryder Medina who helped her find an apartment and move out of her daughters home.  Pt showed SW letter that Ryder wrote to [facility] [employer] worker along with Change Report form that was submitted updating her Shelter costs.  In the letter it states that Nery co-signed on pts apartment.      Next pt showed SW letter from [facility] stating she reached top of waiting list and following apartments available: [address] floor at [address] and [address] floor at [address].  Pt says she gets dizzy in the elevator and has fainted in the past.  She does not want these apartments.  She wants to wait for an apartment on a lower floor.      With permission SW called and spoke to Ryder Medina.  SW discussed that as a co-signer Ryder could have her own legal consequences if pt is unable to pay her rent.  Ryder says she fully understood her own risks before deciding to co-sign.  She says she has been attempting to persuade pt to take public housing unit with the idea that she could move to lower floor in the building when a unit becomes available.  SW confirmed that this could be a possibility.  However, Ryder is also okay with pt staying on waiting list until another unit becomes available.  She is aware housing form needs to be returned within one week and she will assist pt in completing form tomorrow.     ELENO discussed with pt that Ryder is in the process of becoming pts PCA with [facility].        Action Plan:    will:  1)  Completed housing goal.  No further SW needs identified       Care Guide will:  Goals completed, no active goals.  Place pt on maintenance.

## 2021-05-27 NOTE — TELEPHONE ENCOUNTER
RN cannot approve Refill Request    RN can NOT refill this medication med is not covered by policy/route to provider.    Antonio Dennsi, [facility] Triage/Med Refill 4/2/2019    Requested Prescriptions   Pending Prescriptions Disp Refills     VITAMIN D2 50,000 unit capsule [Pharmacy Med Name: VIT D2 1.25 MG (50,000 UNIT 54589 CAP] 4 capsule 12     Sig: TAKE 1 CAPSULE (50,000 UNITS TOTAL) BY MOUTH ONCE A WEEK.    There is no refill protocol information for this order

## 2021-05-28 NOTE — PROGRESS NOTES
Dear Dr. Carbone, Mei Garay MD  [address] [place] [address],    Thank you for the opportunity to participate in the care of Trixie Sofia.     She is a 59 y.o.  female patient who comes to the sleep medicine clinic for review of her sleep study. The study was completed on 04/17/2019 which showed the the patient had moderate obstructive sleep apnea, sleep related hypoventilation/hypoxia and periodic limb movement of sleep.  Optimal titration pressure was not obtained on the night of the study.    Current Outpatient Medications   Medication Sig Dispense Refill     artificial tears, hypromellose, (GONIOVISC) 2.5 % ophthalmic solution 2 drops each eye as needed 15 mL 12     atorvastatin (LIPITOR) 20 MG tablet TAKE 1 PILL BY MOUTH EVERYDAY FOR CHOLESTEROL 30 tablet 11     blood glucose test strips Use 1 each As Directed 3 (three) times a day. Dispense brand per patient's insurance at pharmacy discretion. 300 strip 11     condoms - male Misc Use as needed 24 each 11     fluticasone (FLONASE ALLERGY RELIEF) 50 mcg/actuation nasal spray 1 spray into each nostril daily. 16 g 12     furosemide (LASIX) 40 MG tablet Take 1 tablet (40 mg total) by mouth 2 (two) times a day. 60 tablet 3     glipiZIDE (GLUCOTROL XL) 10 MG 24 hr tablet Take 1 tablet (10 mg total) by mouth daily. 90 tablet 3     JANUMET XR 50-1,000 mg TM24 TAKE 1 TABLET BY MOUTH 2 (TWO) TIMES A DAY FOR DIABETES 180 tablet 3     lancets (ONETOUCH DELICA LANCETS) 33 gauge Misc Use to check blood sugar three times daily. 300 each 11     omeprazole (PRILOSEC) 20 MG capsule TAKE 1 CAPSULE (20 MG TOTAL) BY MOUTH DAILY BEFORE BREAKFAST. FOR STOMACH 90 capsule 3     potassium chloride (K-DUR,KLOR-CON) 20 MEQ tablet TAKE 1 TABLET (20 MEQ TOTAL) BY MOUTH DAILY. 30 tablet 10     senna (SENOKOT) 8.6 mg tablet Take 1 tablet by mouth daily. 100 tablet 3     VITAMIN D2 50,000 unit capsule TAKE 1 CAPSULE (50,000 UNITS TOTAL) BY MOUTH ONCE A WEEK. 4 capsule 12      "acetaminophen (Q-PAP EXTRA STRENGTH) 500 MG tablet Take 2 tablets (1,000 mg total) by mouth every 4 (four) hours as needed for pain. 100 tablet 11     ammonium lactate (LAC-HYDRIN) 12 % lotion Apply topically 2 (two) times a day. Apply to affected area 400 g 0     No current facility-administered medications for this visit.        Allergies   Allergen Reactions     No Known Drug Allergies        Epworths Sleepiness Scale 8/16/2017 11/20/2018   Sitting and reading 1 1   Watching TV 1 1   Sitting, inactive in a public place (e.g. a theatre or a meeting) 1 1   As a passenger in a car for an hour without a break 0 0   Lying down to rest in the afternoon when circumstances permit 0 1   Sitting and talking to someone 1 0   Sitting quietly after a lunch without alcohol 0 0   In a car, while stopped for a few minutes in traffic 0 0   Total score 4 4       Physical Exam:  BP 90/64   Pulse 82   Ht 4' 11.75\" (1.518 m)   Wt 197 lb (89.4 kg)   LMP 01/30/2013   SpO2 94%   BMI 38.80 kg/m    BMI:Body mass index is 38.8 kg/m .   GEN: NAD, obese  Psych: normal mood, normal affect     Labs/Studies:  - We reviewed the results of the overnight PSG as described on the HPI.     Lab Results   Component Value Date    WBC 9.5 01/03/2019    HGB 12.8 01/03/2019    HCT 41.0 01/03/2019    MCV 78 (L) 01/03/2019     01/03/2019         Chemistry        Component Value Date/Time     02/04/2019 1133    K 4.2 02/04/2019 1133    CL 92 (L) 02/04/2019 1133    CO2 34 (H) 02/04/2019 1133    BUN 8 02/04/2019 1133    CREATININE 0.87 02/04/2019 1133     (H) 02/04/2019 1133        Component Value Date/Time    CALCIUM 9.9 02/04/2019 1133    ALKPHOS 229 (H) 01/03/2019 1526    ALKPHOS 229 (H) 01/03/2019 1526    AST 42 (H) 01/03/2019 1526    AST 42 (H) 01/03/2019 1526    ALT 45 01/03/2019 1526    ALT 45 01/03/2019 1526    BILITOT 0.4 01/03/2019 1526    BILITOT 0.4 01/03/2019 1526            No results found for: FERRITIN        Assessment " and Plan:  In summary Trixie Sofia is a 59 y.o. year old female here for review of her sleep study.  1. Obstructive Sleep Apnea/Sleep related hypoxia  I reviewed the underlying pathophysiology with the patient.  I also reviewed the results of sleep study with the patient and a  in detail.  I explained that this was a difficult titration study and recommended that we have her return to get an in lab titration study with transcutaneous CO2 monitoring.  The patient declined any further testing.  Due to the severity of the patient's sleep disordered breathing I also strongly recommend that if the patient is unwilling to undergo an in lab titration study, that we empirically start her on BPAP therapy.  I explained to her through her niece that if her sleep disordered breathing is left untreated, she is at risk for cardiovascular events.  Despite knowing this the patient declined pressure therapy at this point in time.  I will give her a handout underlying the pathophysiology of obstructive sleep apnea and a list of the durable medical equipment companies that we commonly work with.  If the patient changes her mind, I welcome her to call us.  2.  Hypersomnia  3.  Periodic limb movement of sleep  Most likely will resolve after optimal pressure therapy.  4.  Other sleep disturbance     Patient verbalized understanding of these issues, agrees with the plan and all questions were answered today. Patient was given an opportuntity to voice any other symptoms or concerns not listed above. Patient did not have any other symptoms or concerns.        Eric Leung DO  Board Certified in Internal Medicine and Sleep Medicine  [facility].    (Note created with Dragon voice recognition and unintended spelling errors and word substitutions may occur)     All the information was obtained through the help of the patient's niece Ryder Medina who is fluent in both English and Brittney and will serve as the  interpretor. The patient waived the right to get an official .

## 2021-05-28 NOTE — TELEPHONE ENCOUNTER
Please call the patient to schedule for an earlier follow-up visit to review the results of the sleep study.  Please schedule patient to follow-up with me within 3 weeks.  May double book except for my 1/2 days.Thank you.

## 2021-05-28 NOTE — TELEPHONE ENCOUNTER
Since the patient no showed today, please mail a copy of the patient's sleep study to her and advise her in the letter to call us back to schedule a follow up visit to review her results. Thank you.

## 2021-05-28 NOTE — TELEPHONE ENCOUNTER
Letter with results mailed to the patient's address on file.    Maggie Hammonds CMA 5/1/2019 3:36 PM

## 2021-05-28 NOTE — PROGRESS NOTES
"This patient was a no show. I have no idea why this note was opened and the clinic and Epic team did not help me to get rid of it, so I am registering these comments so as to close the note. I consider this to a be a \"Catch 22\" situation.  "

## 2021-05-28 NOTE — PROGRESS NOTES
FEMALE PREVENTATIVE EXAM    Assessment and Plan:   Annual exam  Pap/HPV neg 2014 - refuses repeat today  mammo neg 2/26/19   Colon - FOBT neg 1/2018, repeat today     DMT2 - well controlled  A1c 6.3 on 1/3/19  - check today   taking janumet XR  (she want to stop b/c pills to big for her)  Advised in Jan 2019 to restart glipizide and but stop pioglitazone given CHF and edema   on 12/4/17 on lipitor but she stopped this last month - >145 on 1/3/19  microalbumin wnl 3/2018 - check today  Eye 3/29/18  And not c/o blurry vision - apt today  Smoker     Smoking cessation discussed   Started 13yo and daily since (47yrs of smoking pipe)  Pt unaware of risks associated with smoking. Discussed health risks today.    She is not ready to quit.     Vit D deficiency -  14.7 on 11/27/18 and taking ergo       PTSD: sx well controlled with current meds  Taking venlafaxine 225 and trazodone 50 and now on amitriptyline       pulm HTN and CHF  F/u with cardiology 4/25/19  Tests results listed below    Next follow up:  No follow-ups on file.    Immunization Review  Adult Imm Review: No immunizations due today  BMI: 38.3       I discussed the following with the patient:   Adult Healthy Living: Importance of regular exercise  Healthy nutrition  Weight loss referral options  Use of seat belts      Subjective:   Chief Complaint: Trixie Sofia is an 59 y.o. female here for a preventative health visit.     HPI: here for px for Prevention to discuss cancer screening with  mammo, pap, fobt    DMT2 - she wants to stop the big pill  A1c 6.3 on 1/3/19     taking janumet XR  (she want to stop b/c pills to big for her)  Advised in Jan 2019 to restart glipizide and but stop pioglitazone given CHF and edema  LDL restarted lipitor - >145 on 1/3/19  microalbumin wnl 3/2018  Eye 3/29/18  And not c/o blurry vision - apt today  Smoker - 3 pipes daily     Vit D deficiency -  14.7 on 11/27/18 and taking ergo       PTSD: sx well controlled with current  meds  Taking venlafaxine 225 and trazodone 50 and now on amitriptyline       pulm HTN and CHF  PFT complete 4/1919:  Impression:  Full Pulmonary Function Test is abnormal due to mildly reduced diffusion capacity, but RT comments indicate that patient's testing did not meet ATS standard for testing and he was unable to achieve appropriate vital capacity for diffusion capacity testing. Isolated reduction fusion capacity can be seen with conditions such as air trapping, pulmonary hypertension, or pulmonary edema.  Spirometry is normal.  Spirometry is not consistent with reversibility.  There is no hyperinflation.  There is no air-trapping.  Diffusion capacity when corrected for hemoglobin is mildly reduced.  MR MR cardiac aorta - 3/18/19  1.  No significant incidental extracardiac findings.  2.  Please refer to cardiologist's dictation for the cardiac MRI report.  Sleep study to r/o DEIRDRE - scheduled for 4/17/19 awaiting report        Healthy Habits  Are you taking a daily aspirin? No  Do you typically exercising at least 40 min, 3-4 times per week?  NO  Do you usually eat at least 4 servings of fruit and vegetables a day, include whole grains and fiber and avoid regularly eating high fat foods? Yes  Have you had an eye exam in the past two years? Yes  Do you see a dentist twice per year? NO  Do you have any concerns regarding sleep? No    Safety Screen  If you own firearms, are they secured in a locked gun cabinet or with trigger locks? The patient does not own any firearms  Do you feel you are safe where you are living?: Yes (4/23/2019  9:03 AM)  Do you feel you are safe in your relationship(s)?: Yes (4/23/2019  9:03 AM)      Review of Systems:  Please see above.  The rest of the review of systems are negative for all systems.       Cancer Screening       Status Date      PAP SMEAR Overdue 5/27/2017      Done 5/27/2014     FECAL OCCULT BLOOD/FIT ANNUAL COLON CANCER SCREENING Overdue 1/16/2019      Done 1/16/2018 OCCULT  BLOOD(ICT)     Patient has more history with this topic...    MAMMOGRAM Next Due 2/26/2021      Done 2/26/2019 MAMMO SCREENING BILATERAL     Patient has more history with this topic...          Patient Care Team:  Mei Carbone MD as PCP - General (Family Medicine)  Jose Guzman as Psychologist (Behavioral Health)  [facility] (Ophthalmology)  Klever as RN, Care Manager  Alexandre Garcia as Clinic Care Coordination Care Guide (Primary Care - CC)  Ryder Medina        History     Reviewed By Date/Time Sections Reviewed    Pat Del Rosario CMA 4/23/2019  9:06 AM Tobacco        Current Outpatient Medications   Medication Sig     acetaminophen (Q-PAP EXTRA STRENGTH) 500 MG tablet Take 2 tablets (1,000 mg total) by mouth every 4 (four) hours as needed for pain.     ammonium lactate (LAC-HYDRIN) 12 % lotion Apply topically 2 (two) times a day. Apply to affected area     artificial tears, hypromellose, (GONIOVISC) 2.5 % ophthalmic solution 2 drops each eye as needed     atorvastatin (LIPITOR) 20 MG tablet TAKE 1 PILL BY MOUTH EVERYDAY FOR CHOLESTEROL     blood glucose test strips Use 1 each As Directed 3 (three) times a day. Dispense brand per patient's insurance at pharmacy discretion.     condoms - male Misc Use as needed     fluticasone (FLONASE ALLERGY RELIEF) 50 mcg/actuation nasal spray 1 spray into each nostril daily.     furosemide (LASIX) 40 MG tablet Take 1 tablet (40 mg total) by mouth 2 (two) times a day.     glipiZIDE (GLUCOTROL XL) 10 MG 24 hr tablet Take 1 tablet (10 mg total) by mouth daily.     JANUMET XR 50-1,000 mg TM24 TAKE 1 TABLET BY MOUTH 2 (TWO) TIMES A DAY FOR DIABETES     lancets (ONETOUCH DELICA LANCETS) 33 gauge Misc Use to check blood sugar three times daily.     omeprazole (PRILOSEC) 20 MG capsule TAKE 1 CAPSULE (20 MG TOTAL) BY MOUTH DAILY BEFORE BREAKFAST. FOR STOMACH     potassium chloride (K-DUR,KLOR-CON) 20 MEQ tablet TAKE 1 TABLET (20 MEQ TOTAL) BY MOUTH DAILY.     senna (SENOKOT) 8.6  "mg tablet Take 1 tablet by mouth daily.     triamcinolone (KENALOG) 0.1 % cream Apply to affected area twice daily as needed.     VITAMIN D2 50,000 unit capsule TAKE 1 CAPSULE (50,000 UNITS TOTAL) BY MOUTH ONCE A WEEK.         Objective:   Vital Signs:   Visit Vitals  /60   Pulse 70   Temp 97.9  F (36.6  C) (Oral)   Resp 14   Ht 4' 11.96\" (1.523 m)   Wt 195 lb 11.2 oz (88.8 kg)   LMP 01/30/2013   SpO2 98%   BMI 38.27 kg/m           PHYSICAL EXAM  OBJECTIVE:  Vitals listed above within normal limits  General appearance: well groomed, pleasant, well hydrated, nontoxic appearing  ENT: PERRL, throat clear  Neck: neck supple, no lymphadenopathy, no thyromegaly  Lungs: lungs clear to auscultation bilaterally, no wheezes or rhonchi  Heart: regular rate and rhythm, no murmurs, rubs or gallops  Abdomen: soft, nontender  Neuro: no focal deficits, CN II-XII grossly intact, alert and oriented  Psych:  mood stable, appears to have good insight and judgment  Pelvic exam: declines  BREAST EXAM: declines      The 10-year ASCVD risk score (Rockham JAYLIN Jr., et al., 2013) is: 11.7%    Values used to calculate the score:      Age: 59 years      Sex: Female      Is Non- : No      Diabetic: Yes      Tobacco smoker: Yes      Systolic Blood Pressure: 100 mmHg      Is BP treated: Yes      HDL Cholesterol: 53 mg/dL      Total Cholesterol: 253 mg/dL      "

## 2021-05-29 NOTE — TELEPHONE ENCOUNTER
Refill Approved    Rx renewed per Medication Renewal Policy. Medication was last renewed on 2/11/19.    Jackie Gonzalez, [facility] Triage/Med Refill 6/12/2019     Requested Prescriptions   Pending Prescriptions Disp Refills     furosemide (LASIX) 40 MG tablet [Pharmacy Med Name: FUROSEMIDE 40 MG TAB 40 TAB] 60 tablet 3     Sig: TAKE 1 TABLET (40 MG TOTAL) BY MOUTH 2 (TWO) TIMES A DAY FOR EDEMA       Diuretics/Combination Diuretics Refill Protocol  Passed - 6/11/2019  4:43 PM        Passed - Visit with PCP or prescribing provider visit in past 12 months     Last office visit with prescriber/PCP: 2/21/2019 Mei Carbone MD OR same dept: 2/21/2019 Mei Carbone MD OR same specialty: 2/21/2019 Mei Carbone MD  Last physical: 4/23/2019 Last MTM visit: Visit date not found   Next visit within 3 mo: Visit date not found  Next physical within 3 mo: Visit date not found  Prescriber OR PCP: Mei Carbone MD  Last diagnosis associated with med order: There are no diagnoses linked to this encounter.  If protocol passes may refill for 12 months if within 3 months of last provider visit (or a total of 15 months).             Passed - Serum Potassium in past 12 months      Lab Results   Component Value Date    Potassium 4.2 02/04/2019             Passed - Serum Sodium in past 12 months      Lab Results   Component Value Date    Sodium 137 02/04/2019             Passed - Blood pressure on file in past 12 months     BP Readings from Last 1 Encounters:   05/08/19 90/64             Passed - Serum Creatinine in past 12 months      Creatinine   Date Value Ref Range Status   02/04/2019 0.87 0.60 - 1.10 mg/dL Final

## 2021-05-30 VITALS — WEIGHT: 202.25 LBS | BODY MASS INDEX: 39.71 KG/M2 | HEIGHT: 60 IN

## 2021-05-30 VITALS — BODY MASS INDEX: 39.78 KG/M2 | WEIGHT: 202.6 LBS | HEIGHT: 60 IN

## 2021-05-30 VITALS — HEIGHT: 60 IN | BODY MASS INDEX: 39.34 KG/M2 | WEIGHT: 200.4 LBS

## 2021-05-30 NOTE — TELEPHONE ENCOUNTER
Fax received from [facility], they have started the Prior Authorization Process via Cover My Meds    CoverMyMeds Key: [id number]    Medication Name:   JANUMET XR 50-1,000 mg TM24 180 tablet 3 2/8/2019     Sig: TAKE 1 TABLET BY MOUTH 2 (TWO) TIMES A DAY FOR DIABETES    Sent to pharmacy as: JANUMET XR 50-1,000 mg TM24    E-Prescribing Status: Receipt confirmed by pharmacy (2/8/2019  2:36 PM CST)          Insurance Plan: CADY  PBM: [employer]  Patient ID: [id number]    Please complete the PA process

## 2021-05-30 NOTE — TELEPHONE ENCOUNTER
Refill Approved    Rx renewed per Medication Renewal Policy. Medication was last renewed on 6/27/18.    Jackie Gonzalez, [facility] Connection Triage/Med Refill 7/24/2019     Requested Prescriptions   Pending Prescriptions Disp Refills     lancets (ONETOUCH DELICA LANCETS) 33 gauge Misc [Pharmacy Med Name: ONE TOUCH DELICA 33 LANCET MISC] 300 each 11     Sig: USE TO CHECK BLOOD SUGAR THREE TIMES DAILY.       Diabetic Supplies Refill Protocol Passed - 7/23/2019  4:19 PM        Passed - Visit with PCP or prescribing provider visit in last 6 months     Last office visit with prescriber/PCP: 7/23/2019 Mei Carbone MD OR same dept: 7/23/2019 Mei Carbone MD OR same specialty: 7/23/2019 Mei Carbone MD  Last physical: 4/23/2019 Last MTM visit: Visit date not found   Next visit within 3 mo: Visit date not found  Next physical within 3 mo: Visit date not found  Prescriber OR PCP: Mei Carbone MD  Last diagnosis associated with med order: 1. Type 2 diabetes mellitus with diabetic polyneuropathy, without long-term current use of insulin  - ONETOUCH DELICA LANCETS 33 gauge Misc [Pharmacy Med Name: ONE TOUCH DELICA 33 LANCET MISC]; USE TO CHECK BLOOD SUGAR THREE TIMES DAILY.  Dispense: 300 each; Refill: 11    If protocol passes may refill for 12 months if within 3 months of last provider visit (or a total of 15 months).             Passed - A1C in last 6 months     Hemoglobin A1c   Date Value Ref Range Status   07/23/2019  3.5 - 6.0 % Corrected     Comment:     This is a corrected result. Previous result was 9.1 % on 7/23/2019 at 1059 CDT

## 2021-05-30 NOTE — TELEPHONE ENCOUNTER
Central PA team  [phone]  Pool: HE PA MED ([id number])          PA has been initiated.       PA form completed and faxed insurance via Cover My Meds     Key:  [id number] - PA Case ID: [id number] - Rx #: [id number]     Medication:  Janumet XR 50-1000MG er tablets    Insurance:  [facility]        Response will be received via fax and may take up to 5-10 business days depending on plan

## 2021-05-30 NOTE — PROGRESS NOTES
Patient left, unable to wait per Provider.  Per Provider, call patient today to inform her that she is jeopardizing her health by walking out of her Provider (PCP and cardiac) appointments.          Pt to be called by [facility] care team to discuss issues.   See phone notes.     Dr. Mei Carbone  7/16/2019

## 2021-05-30 NOTE — PROGRESS NOTES
This Graduation Wellness Plan provides private information in regards to the work I have done with my Care Team from my Primary Care Clinic.  This document provides insight on the goals I have accomplished.  My Care Team congratulates me on my journey to maintain wellness.  This document will help guide me on my journey to maintain a healthy lifestyle.  I will use this to help me overcome any barriers I may encounter.  If I should have any questions or concerns, I will continue to contact the members of my Care Team or contact my Primary Care Clinic for assistance.       Clinical Emergency Plan:  Emergency Plan Recommendation:    When to Use the Emergency Department (ED)  An emergency means you could die if you don t get care quickly. Or you could be hurt permanently (disabled). Read below to know when to use -- and when not to use -- an emergency department (also called ED).     Dangers to your life  Here are examples of emergencies. These need immediate care:  A hard time breathing  Severe chest pain  Choking  Severe bleeding  Suddenly not able to move or speak  Blacking out (fainting)`  Poisoning     Dangers of permanent injuries  Here are other emergencies. These also need immediate care:  Deep cuts or severe burns  Broken bones, or sudden severe pain and swelling in a joint     When it s an emergency  If you have an emergency, follow these steps:     1. Go to the nearest emergency department  If you can, go to the hospital ED closest to you right away.  If you cannot get there right away, or if it is not safe to take yourself, call 911 or your police emergency number.  2. Call your primary care doctor  Tell your doctor about the emergency. Call within 24 hours of going to the ED.  If you cannot call, have someone call for you.  Go to your doctor (not the ED) for any follow-up care.     When it s not an emergency  If a problem is not an emergency, follow these steps:     1. Call your primary care doctor  If you  don t know the name of your doctor, call your health plan.  If you cannot call, have someone call for you.  2. Follow instructions  Your doctor will tell you what you should do.  You may be told to see your doctor right away. You may be told to go to the ED. Or you may be told to go to an urgent care center.  Follow your doctor s advice.  jLong-Term Complications of Diabetes can cause health problems over time. These are called complications. They are more likely to happen if your blood sugar is often too high. Over time, high blood sugar can damage blood vessels in your body. It is important to keep your blood sugar in your target range. This can help prevent or delay complications from diabetes.  Possible complications  Complications of diabetes include:    Eye problems, including damage to the blood vessels in the eyes (retinopathy), pressure in the eye (glaucoma), and clouding of the eye s lens (a cataract). Eye problems can eventually lead to irreversible blindness.     Tooth and gum problems (periodontal disease), causing loss of teeth and bone    Blood vessel (vascular) disease leading to circulation problems, heart attack or stroke, or a need for amputation of a limb     Problems with sexual function leading to erectile dysfunction in men and sexual discomfort in women     Kidney disease (nephropathy) can eventually lead to kidney failure, which may require dialysis or kidney transplant     Nerve problems (neuropathy), causing pain or loss of feeling in your feet and other parts of your body, potentially leading to an amputation of a limb     High blood pressure (hypertension), putting strain on your heart and blood vessels    Serious infections, possibly leading to loss of toes, feet, or limbs  How to avoid complications  The serious consequences of these complications may be avoidable for most people with diabetes by managing your blood glucose, blood pressure, and cholesterol levels. This can help you  feel better and stay healthy. You can manage diabetes by tracking your blood sugar. You can also eat healthy and exercise to avoid gaining weight. And you should take medicine if directed by your healthcare provider.  Call your health care provider if:    You vomit or have diarrhea for more than 6 hours.    Your blood glucose level is higher than usual or over 250 mg/dL after you have taken extra insulin (if recommended in your sick-day plan).    You take oral medicine for diabetes, and your blood sugar is higher than usual or over 250 mg/dL, before a meal and stays that high for more than 24 hours.    Your blood glucose is lower than usual or less than 70 mg/dL    You have moderate to large amounts of ketones in your blood or urine.    You aren t better after 2 days.  Depression  Everyone feels down at times. The blues are a natural part of life. But an unhappy period that s intense or lasts for more than a couple of weeks can be a sign of depression. Depression is a serious illness. It can disrupt the lives of family and friends. If you know someone you think may be depressed, find out what you can do to help.     Know the serious signals  Warning signals for suicide include:    Threats or talk of suicide    Statements such as  I won t be a problem much longer  or  Nothing matters     Giving away possessions or making a will or  arrangements    Buying a gun or other weapon    Sudden, unexplained cheerfulness or calm after a period of depression  If you notice any of these signs, get help right away. Call a health care professional, mental health clinic, or suicide hotline and ask what action to take. In an emergency, don t hesitate to call the police.     [facility] Mental Health Crisis Lines:  [facility] [phone]  [facility] [phone]  [facility] [phone]  [facility] [phone]  [facility], Adults [phone]  [facility], Children [phone]  [facility], Adults [phone]  [facility], Children [phone]      All [facility] clinic patients have access to a Nurse 24 hours a day, 7 days a week.  If you have questions or want advice from a Nurse, please know [facility] is here for you.  You can call your clinic and they will connect you or you can call [facility] at [phone].  [facility] also has Walk In Care clinics in multiple locations.  Call the number listed above for more information about our Walk In Care clinics or visit the [facility] website at [email or web address].

## 2021-05-30 NOTE — PROGRESS NOTES
Interp: [facility] [id number]    The Clinic Community Health Worker Laya the patient today at the request of the PCP to discuss possible Clinic Care Coordination enrollment.  The service was described to the patient and immediate needs were discussed.  The patient agreed to enrollment and an assessment was scheduled.  The patient was provided with contact information for the clinic CHW.               Assessment date: Monday 8/12 at 10am    [facility] states that her PCA cannot attend the assessment as she is only available in the evening times.

## 2021-05-30 NOTE — TELEPHONE ENCOUNTER
Per Provider, calling to inform patient that by leaving before being seen by both PCP and cardiac providers, she is jeopardizing her health.  Patient left this AM before being seen by PCP and without notifying CA or PCP.    Need to reschedule and emphasize need to stay to be seen.    Called via  ID [phone].     Patient is willing to reschedule but does not want pap done.  I explained again, as I did in clinic, that I am required to set up for pap but that patient and Provider discuss before exam is done.    Patient also said she was tired of waiting and didn't feel well.  I encouraged her to talk via  to CA and she can lie down while waiting.  Explained that patients may require extra time and care, making appointment run late.  I emphasized that we try to be on time and will try to make her comfortable while waiting.  Per Provider, patient may not feel well due to health problems.  Emphasized to patient that Provider wishes to see her and address health concerns.    Call transferred to  for scheduling.

## 2021-05-30 NOTE — TELEPHONE ENCOUNTER
"----- Message from Gerardo Reid MD sent at 7/11/2019  1:47 PM CDT -----  Regarding: do not reschedule her with me, see rationale below  Dr. Carbone    Trixie Sofia was a no show with me on April 25 and left today, July 11, without being seen.    She came to the office today and was roomed, but while I was reviewing her records she left. The  stated that she told him that her back hurt and she was not coming back.     She has refused the advice of Dr. Leung to have her sleep apnea treated.    She most likely has pulmonary hypertension due to untreated sleep apnea.     This is copied from Dr. Leung.    \"Obstructive Sleep Apnea/Sleep related hypoxia  I reviewed the underlying pathophysiology with the patient.  I also reviewed the results of sleep study with the patient and a  in detail.  I explained that this was a difficult titration study and recommended that we have her return to get an in lab titration study with transcutaneous CO2 monitoring.  The patient declined any further testing.  Due to the severity of the patient's sleep disordered breathing I also strongly recommend that if the patient is unwilling to undergo an in lab titration study, that we empirically start her on BPAP therapy.  I explained to her through her niece that if her sleep disordered breathing is left untreated, she is at risk for cardiovascular events.  Despite knowing this the patient declined pressure therapy at this point in time.  I will give her a handout underlying the pathophysiology of obstructive sleep apnea and a list of the durable medical equipment companies that we commonly work with.  If the patient changes her mind, I welcome her to call us.\"    There is no medication treatment for pulmonary hypertension due to untreated sleep apnea, therefore, I would request that she not be rescheduled with me unless she has been on effective BiPAP or CPAP for at least 3 months.    Unfortunately, her actions have " prevented me from seeing 4 other patients on a timely basis as we block out a double visit for all patients with an . In my opinion, her behavior is not respectful of the needs of others and while it is not the basis for my decision not to see her, it certainly causes me and my team considerable distress, as no doubt like yourself and Dr. Leung, we have limited resources to help all of those in need and who deserve our timely and compassionate care.    Your colleague,    Simone Reid

## 2021-05-30 NOTE — PROGRESS NOTES
HPI - 58 yo female here with complex medical issues.     Per home RN 7/8/19 ,patient wanting pain med stronger than tylenol  Per chart review: naproxenm gabapentin, amitripytyline  d/c on 1/3/19 per pt request b/c side effects and feels she has too many meds  Taking friends pain meds.   She does not like her pain meds too often b/c it causes constipation      h/o acute diastolic HF and h/o pulm HTN and poor f/u with cards  F/u with cardiology 4/25/19 and then left appt on 7/11/19 and then was fired by cardiologist and does not think pt should see cardiology until DEIRDRE is treated  Pt reported she waited for 2 hours before leaving  She is taking lasix and K+     DMT2 - poorly controlled  A1c 6.3 on 1/3/19 -> 8.6 on 7/16/19 and 9.1 on 7/23/19   taking janumet XR  (last visit 4/2019 she wanted to stop b/c pills to big for her)  Advised in Jan 2019 to restart glipizide but she does not have this med bottle today   on 1/3/19 on lipitor  microalbumin wnl 7/16/19  Eye 6/26/19   Smoker      GERD - omeprazole    Smoking cessation discussed   Started 11yo and daily since (47yrs of smoking pipe)  Pt unaware of risks associated with smoking. Discussed health risks last visit but she is not ready to quit.      Vit D deficiency -  14.7 on 11/27/18 -> 18.8 on 7/16/19 and taking ergo       PTSD: sx well controlled without meds  At prior visits, she had stopped venlafaxine 225 and trazodone 50        Cancer screening:  Pap/HPV neg 2014 - refused several times in the past  mammo neg 2/26/19   Colon - FOBT neg 5/14/19    Severe DEIRDRE   Seen by sleep clinic and refusing CPAP    Polypharmacy -   Reports missing meds 3-4 x per week  She has a home health RN  Missing bottles for  Glipizide, venlafaxine 225 and trazodone 50      Labs reviewed with patient:  Results for GERARDO CHILDS (MRN [id number]) as of 7/23/2019 11:17   Ref. Range 7/16/2019 08:47 7/23/2019 10:46   Sodium Latest Ref Range: 136 - 145 mmol/L 134 (L)    Potassium Latest Ref  Range: 3.5 - 5.0 mmol/L 4.0    Chloride Latest Ref Range: 98 - 107 mmol/L 92 (L)    CO2 Latest Ref Range: 22 - 31 mmol/L 33 (H)    Anion Gap, Calculation Latest Ref Range: 5 - 18 mmol/L 9    BUN Latest Ref Range: 8 - 22 mg/dL 15    Creatinine Latest Ref Range: 0.60 - 1.10 mg/dL 0.76    GFR MDRD Af Amer Latest Ref Range: >60 mL/min/1.73m2 >60    GFR MDRD Non Af Amer Latest Ref Range: >60 mL/min/1.73m2 >60    Calcium Latest Ref Range: 8.5 - 10.5 mg/dL 9.2    Glucose Latest Ref Range: 70 - 125 mg/dL 241 (H)    Vitamin D, Total (25-Hydroxy) Latest Ref Range: 30.0 - 80.0 ng/mL 18.8 (L)    Hemoglobin A1c Latest Ref Range: 3.5 - 6.0 % 8.6 (H) 9.1 (H)       Current Outpatient Medications   Medication Sig     acetaminophen (Q-PAP EXTRA STRENGTH) 500 MG tablet Take 2 tablets (1,000 mg total) by mouth every 4 (four) hours as needed for pain.     atorvastatin (LIPITOR) 20 MG tablet TAKE 1 PILL BY MOUTH EVERYDAY FOR CHOLESTEROL     furosemide (LASIX) 40 MG tablet TAKE 1 TABLET (40 MG TOTAL) BY MOUTH 2 (TWO) TIMES A DAY FOR EDEMA     JANUMET XR 50-1,000 mg TM24 TAKE 1 TABLET BY MOUTH 2 (TWO) TIMES A DAY FOR DIABETES     omeprazole (PRILOSEC) 20 MG capsule TAKE 1 CAPSULE (20 MG TOTAL) BY MOUTH DAILY BEFORE BREAKFAST. FOR STOMACH     potassium chloride (K-DUR,KLOR-CON) 20 MEQ tablet TAKE 1 TABLET (20 MEQ TOTAL) BY MOUTH DAILY.     senna (SENOKOT) 8.6 mg tablet Take 1 tablet by mouth daily as needed for constipation.     VITAMIN D2 50,000 unit capsule TAKE 1 CAPSULE (50,000 UNITS TOTAL) BY MOUTH ONCE A WEEK.     ammonium lactate (LAC-HYDRIN) 12 % lotion Apply topically 2 (two) times a day. Apply to affected area     artificial tears, hypromellose, (GONIOVISC) 2.5 % ophthalmic solution 2 drops each eye as needed     blood glucose test strips Use 1 each As Directed 3 (three) times a day. Dispense brand per patient's insurance at pharmacy discretion.     condoms - male Misc Use as needed     fluticasone (FLONASE ALLERGY RELIEF) 50  mcg/actuation nasal spray 1 spray into each nostril daily.     glipiZIDE (GLUCOTROL XL) 10 MG 24 hr tablet Take 1 tablet (10 mg total) by mouth daily.     lancets (ONETOUCH DELICA LANCETS) 33 gauge Misc Use to check blood sugar three times daily.     Vitals:    07/23/19 1052   BP: 102/66   Pulse: 69   Resp: 24   Temp: 97.7  F (36.5  C)   TempSrc: Oral   SpO2: 97%   Weight: 198 lb (89.8 kg)   Height: 5' (1.524 m)     PHYSICAL EXAM   General Appearance: Awake and alert, in no acute distress  HEENT: neck is supple  CV: regular rate  Resp: No respiratory distress. Breathing comfortably  Musculoskeletal: moving limbs comfortably with not deficits or deformities  Skin: no rashes noted    A/P  h/o acute diastolic HF and h/o pulm HTN   cardiologist does not think pt should see cardiology until DEIRDRE is treated  continue taking lasix and K+     DMT2 - poorly controlled d/t poor compliance  A1c  9.1 on 7/23/19  Advised to take janumet XR and glipizide   on 1/3/19 on lipitor  microalbumin wnl 7/16/19  Eye 6/26/19   Smoker -cessation discussed and not ready to quit  Start  for DM and to help with pain     Vit D deficiency -  18.8 on 7/16/19 and taking ergo       PTSD: sx well controlled without meds  PHQ9 = 5     GERD - continue omeprazole     Constipation - taking senna     Cancer screening:  Pap/HPV neg 2014 - refused and will attempt to address again next visit  mammo neg 2/26/19   Colon - FOBT neg 5/14/19    Severe DEIRDRE   Seen by sleep clinic and refusing CPAP    Arthritis and chronic pain  Continue tylenol  discussed med compliance b/c sx likely related to Vit D deficiency, diabetic neuropathy, obesity, etc  Start ASA  81 for DM and to help with pain and cardiac issues  Start Gabapentin 300mg prn    Polypharmacy -   Discussed consistency with meds to help her  She will ask PCA to help remind her to to take her meds  She has a home health RN    Spent 40 min face to face with patient with more the 100% spent in  counseling, reviewing chart with patient, and coordination of care and discussing problems listed above.

## 2021-05-31 VITALS — BODY MASS INDEX: 42.21 KG/M2 | WEIGHT: 215 LBS | HEIGHT: 60 IN

## 2021-05-31 VITALS — BODY MASS INDEX: 42.76 KG/M2 | HEIGHT: 60 IN | WEIGHT: 217.8 LBS

## 2021-05-31 VITALS — HEIGHT: 60 IN | WEIGHT: 215.6 LBS | BODY MASS INDEX: 42.33 KG/M2

## 2021-05-31 VITALS — HEIGHT: 60 IN | WEIGHT: 214.5 LBS | BODY MASS INDEX: 42.11 KG/M2

## 2021-05-31 VITALS — WEIGHT: 206.5 LBS | HEIGHT: 60 IN | BODY MASS INDEX: 40.54 KG/M2

## 2021-05-31 VITALS — HEIGHT: 60 IN | WEIGHT: 226.1 LBS | BODY MASS INDEX: 44.39 KG/M2

## 2021-05-31 VITALS — BODY MASS INDEX: 42.25 KG/M2 | WEIGHT: 215.19 LBS | HEIGHT: 60 IN

## 2021-05-31 NOTE — TELEPHONE ENCOUNTER
Patient's home care RN from [facility] called and would like to reported that patient has stopped taking aspirin for 5 days due to side effects of having puffy face, numbness and heart palpitation. Patient has upcoming appointment with [facility] RN on 08/14/2019 and with PCP on 08/29/2019, please advise.

## 2021-05-31 NOTE — PROGRESS NOTES
Let's talk about her and best strategies.   She has had poor compliance with most treatments and medications. Tramadol as a chronic narcotic is not the best option for her long term. Her neuro apt was an EMG that showed polyneuropathy likely related to diabetes and possible carpal tunnel.   I am unclear what she is motivated to actually do. And when I spoke with her, she was also unclear.     So let's talk about best options for her before I just order more referrals and meds that she may refuse.     Dr. Mei Carbone  8/22/2019

## 2021-05-31 NOTE — PROGRESS NOTES
Lj Carbone,    Need advice and clarification post RN assessment.     1) ASA - patient stopped taking it after 1 week because of palpitation. Please advice.    2) Pain mgmt- reports pain is not under controlled. Was seen by [facility]ine center, neurologist in the past. Last seen by Neurologist on 6/13/18. Should she goes back to see a neurologist, spine center or pain clinic? States she still has some tramadol at home from previous refill- States the only medication seems to help was Tramadol. States Gabapentin makes her too drowsy through out the day even she takes it at night. Report chronic pain to lower back, coccyx with pressure/sitting, arms/hands, and legs. Burning sensation, numbness, and achy feeling.     3) Agrees to go see a new cardiologist. Need a new referral.     4) Her PCA has been accompanies patient to her appts and drives her to appts so that she doesn't have to wait too long. PCA took 2 days off from work for patient's cataract surgery. PCA speaks good English and [place] Very reliable.     Patient is enrolled back with [facility] (formerly [facility]).     Thanks.   Rajni

## 2021-06-01 ENCOUNTER — OFFICE VISIT - HEALTHEAST (OUTPATIENT)
Dept: FAMILY MEDICINE | Facility: CLINIC | Age: 61
End: 2021-06-01

## 2021-06-01 ENCOUNTER — RECORDS - HEALTHEAST (OUTPATIENT)
Dept: ADMINISTRATIVE | Facility: OTHER | Age: 61
End: 2021-06-01

## 2021-06-01 VITALS — WEIGHT: 215 LBS | BODY MASS INDEX: 42.21 KG/M2 | HEIGHT: 60 IN

## 2021-06-01 VITALS — HEIGHT: 60 IN | WEIGHT: 218 LBS | BODY MASS INDEX: 42.8 KG/M2

## 2021-06-01 VITALS — HEIGHT: 60 IN | WEIGHT: 213 LBS | BODY MASS INDEX: 41.82 KG/M2

## 2021-06-01 VITALS — WEIGHT: 209.31 LBS | BODY MASS INDEX: 41.09 KG/M2 | HEIGHT: 60 IN

## 2021-06-01 VITALS — WEIGHT: 208.5 LBS | BODY MASS INDEX: 40.72 KG/M2

## 2021-06-01 VITALS — WEIGHT: 205 LBS | BODY MASS INDEX: 40.09 KG/M2

## 2021-06-01 VITALS — HEIGHT: 60 IN | WEIGHT: 209.38 LBS | BODY MASS INDEX: 41.11 KG/M2

## 2021-06-01 DIAGNOSIS — D64.9 SEVERE ANEMIA: ICD-10-CM

## 2021-06-01 DIAGNOSIS — D50.8 IRON DEFICIENCY ANEMIA SECONDARY TO INADEQUATE DIETARY IRON INTAKE: ICD-10-CM

## 2021-06-01 DIAGNOSIS — Z75.8 LANGUAGE BARRIER AFFECTING HEALTH CARE: ICD-10-CM

## 2021-06-01 DIAGNOSIS — Z60.3 LANGUAGE BARRIER AFFECTING HEALTH CARE: ICD-10-CM

## 2021-06-01 LAB
IRON SATN MFR SERPL: 3 % (ref 20–50)
IRON SERPL-MCNC: 15 UG/DL (ref 42–175)
LDH SERPL L TO P-CCNC: 135 U/L (ref 125–220)
RETICS # AUTO: 0.12 MILL/UL (ref 0.01–0.11)
RETICS/RBC NFR AUTO: 3.49 % (ref 0.8–2.7)
TIBC SERPL-MCNC: 504 UG/DL (ref 313–563)
TRANSFERRIN SERPL-MCNC: 403 MG/DL (ref 212–360)

## 2021-06-01 SDOH — SOCIAL STABILITY - SOCIAL INSECURITY: ACCULTURATION DIFFICULTY: Z60.3

## 2021-06-01 ASSESSMENT — MIFFLIN-ST. JEOR: SCORE: 1372.08

## 2021-06-01 NOTE — TELEPHONE ENCOUNTER
Medication Request  Medication name:   - Contour Next Meter, Lancets, Test Strips  Pharmacy Name and Location: [facility]  Reason for request: Request received from patient's pharmacy by fax.      Pharmacy Comments:  Patient's insurance prefers Contour Next.   When did you use medication last?:  Information not provided  Patient offered appointment:  N/a  Okay to leave a detailed message: no

## 2021-06-01 NOTE — PROGRESS NOTES
"HPI - 60 yo female here for help with paperwork for public housing.     Flu shot due    Current Outpatient Medications   Medication Sig     acetaminophen (Q-PAP EXTRA STRENGTH) 500 MG tablet Take 2 tablets (1,000 mg total) by mouth every 4 (four) hours as needed for pain.     ammonium lactate (LAC-HYDRIN) 12 % lotion Apply topically 2 (two) times a day. Apply to affected area     atorvastatin (LIPITOR) 20 MG tablet TAKE 1 PILL BY MOUTH EVERYDAY FOR CHOLESTEROL     blood glucose test strips Use 1 each As Directed 3 (three) times a day. Dispense brand per patient's insurance at pharmacy discretion.     furosemide (LASIX) 40 MG tablet TAKE 1 TABLET (40 MG TOTAL) BY MOUTH 2 (TWO) TIMES A DAY FOR EDEMA     glipiZIDE (GLUCOTROL XL) 10 MG 24 hr tablet Take 1 tablet (10 mg total) by mouth daily.     JANUMET XR 50-1,000 mg TM24 TAKE 1 TABLET BY MOUTH 2 (TWO) TIMES A DAY FOR DIABETES     lancets (ONETOUCH DELICA LANCETS) 33 gauge Misc USE TO CHECK BLOOD SUGAR THREE TIMES DAILY.     naproxen (NAPROSYN) 500 MG tablet Take 1 tablet (500 mg total) by mouth 2 (two) times a day with meals.     omeprazole (PRILOSEC) 20 MG capsule TAKE 1 CAPSULE (20 MG TOTAL) BY MOUTH DAILY BEFORE BREAKFAST. FOR STOMACH     potassium chloride (K-DUR,KLOR-CON) 20 MEQ tablet TAKE 1 TABLET (20 MEQ TOTAL) BY MOUTH DAILY.     senna (SENOKOT) 8.6 mg tablet Take 1 tablet by mouth daily as needed for constipation.     traMADol (ULTRAM) 50 mg tablet Take 1 tablet (50 mg total) by mouth every 6 (six) hours as needed for severe pain (7-10).     VITAMIN D2 50,000 unit capsule TAKE 1 CAPSULE (50,000 UNITS TOTAL) BY MOUTH ONCE A WEEK.     Vitals:    09/16/19 1542   BP: 102/60   Pulse: 82   Resp: 14   Temp: 98.2  F (36.8  C)   TempSrc: Oral   SpO2: 98%   Weight: 198 lb 9.6 oz (90.1 kg)   Height: 4' 11.53\" (1.512 m)     PHYSICAL EXAM   General Appearance: Awake and alert, in no acute distress  HEENT: neck is supple  CV: regular rate  Resp: No respiratory distress. " Breathing comfortably  Musculoskeletal: moving limbs comfortably with not deficits or deformities  Skin: no rashes noted    A/P   Other specified phobia - fear of elevators  PTSD (post-traumatic stress disorder)   Depression With Anxiety  - paperwork for public housing filled out stating to accommodate a first or 2nd floor apartment b/c of fear of elevators and PSTD -- see scanned document     Need for immunization against influenza    - Influenza,Seasonal,Quad,INJ =/>6months

## 2021-06-01 NOTE — PROGRESS NOTES
Clinic Care Coordination Contact    Follow Up Progress Note      Assessment: monthly f/u on diabetes, pain mgmt  Writer spoke with patient and PCA via phone today  Patient reports she continue to have pain daily and looking forward to see PCP at the end of the month to discuss about her pain.  States she has no concerns at this time.   Came to see PCP on 9/16/19 to fill out paperwork for public housing to accommodate 1st or 2nd floor b/c of fear of elevator due to PTSD. Patient states they submitted the form already.   Patient states she doesn't need visit on 10/3/19 for public housing paperwork because it's already filled out PCP on 9/16/19. Requested to cancel appt on 10/3/19 with.   States she will attend appt on 10/31/19 with PCA.     Goals addressed this encounter:   Goals Addressed    None          Intervention/Education provided during outreach:           Plan:   1) Pt will attend her appt with PCP on 10/31/19 - PCA plans to attend also.   2) Appt with PCP on 10/3/19 was cancelled per patient request - not needed.

## 2021-06-02 VITALS — BODY MASS INDEX: 41.91 KG/M2 | WEIGHT: 213.5 LBS | HEIGHT: 60 IN

## 2021-06-02 VITALS — WEIGHT: 195.7 LBS | HEIGHT: 60 IN | BODY MASS INDEX: 38.42 KG/M2

## 2021-06-02 VITALS — BODY MASS INDEX: 41.33 KG/M2 | WEIGHT: 210.5 LBS | HEIGHT: 60 IN

## 2021-06-02 VITALS — HEIGHT: 60 IN | BODY MASS INDEX: 38.68 KG/M2 | WEIGHT: 197 LBS

## 2021-06-02 VITALS — BODY MASS INDEX: 40.95 KG/M2 | WEIGHT: 209.4 LBS

## 2021-06-02 VITALS — BODY MASS INDEX: 39.27 KG/M2 | WEIGHT: 200 LBS | HEIGHT: 60 IN

## 2021-06-02 VITALS — WEIGHT: 195.25 LBS | HEIGHT: 60 IN | BODY MASS INDEX: 38.33 KG/M2

## 2021-06-02 VITALS — BODY MASS INDEX: 38.44 KG/M2 | HEIGHT: 60 IN | WEIGHT: 195.8 LBS

## 2021-06-02 VITALS — WEIGHT: 214.56 LBS | BODY MASS INDEX: 41.96 KG/M2

## 2021-06-02 VITALS — WEIGHT: 209.25 LBS | BODY MASS INDEX: 41.08 KG/M2 | HEIGHT: 60 IN

## 2021-06-02 VITALS — BODY MASS INDEX: 38.24 KG/M2 | HEIGHT: 60 IN

## 2021-06-02 VITALS — BODY MASS INDEX: 38.95 KG/M2 | HEIGHT: 60 IN | WEIGHT: 198.4 LBS

## 2021-06-02 VITALS — WEIGHT: 195 LBS | BODY MASS INDEX: 38.08 KG/M2

## 2021-06-02 VITALS — WEIGHT: 197.5 LBS | HEIGHT: 60 IN | BODY MASS INDEX: 38.77 KG/M2

## 2021-06-02 LAB
BASOPHILS # BLD AUTO: 0 THOU/UL (ref 0–0.2)
BASOPHILS NFR BLD AUTO: 0 % (ref 0–2)
EOSINOPHIL # BLD AUTO: 0.2 THOU/UL (ref 0–0.4)
EOSINOPHIL NFR BLD AUTO: 3 % (ref 0–6)
ERYTHROCYTE [DISTWIDTH] IN BLOOD BY AUTOMATED COUNT: 20.8 % (ref 11–14.5)
HCT VFR BLD AUTO: 23.9 % (ref 35–47)
HGB BLD-MCNC: 5.9 G/DL (ref 12–16)
IMM GRANULOCYTES # BLD: 0 THOU/UL
IMM GRANULOCYTES NFR BLD: 1 %
LAB AP CHARGES (HE HISTORICAL CONVERSION): NORMAL
LYMPHOCYTES # BLD AUTO: 1.1 THOU/UL (ref 0.8–4.4)
LYMPHOCYTES NFR BLD AUTO: 19 % (ref 20–40)
MCH RBC QN AUTO: 17.2 PG (ref 27–34)
MCHC RBC AUTO-ENTMCNC: 24.7 G/DL (ref 32–36)
MCV RBC AUTO: 70 FL (ref 80–100)
MONOCYTES # BLD AUTO: 0.3 THOU/UL (ref 0–0.9)
MONOCYTES NFR BLD AUTO: 5 % (ref 2–10)
NEUTROPHILS # BLD AUTO: 4.4 THOU/UL (ref 2–7.7)
NEUTROPHILS NFR BLD AUTO: 73 % (ref 50–70)
OVALOCYTES: ABNORMAL
PATH REPORT.COMMENTS IMP SPEC: NORMAL
PATH REPORT.COMMENTS IMP SPEC: NORMAL
PATH REPORT.FINAL DX SPEC: NORMAL
PATH REPORT.MICROSCOPIC SPEC OTHER STN: ABNORMAL
PATH REPORT.MICROSCOPIC SPEC OTHER STN: NORMAL
PATH REPORT.RELEVANT HX SPEC: NORMAL
PLAT MORPH BLD: NORMAL
PLATELET # BLD AUTO: 156 THOU/UL (ref 140–440)
PMV BLD AUTO: ABNORMAL FL
POLYCHROMASIA BLD QL SMEAR: ABNORMAL
RBC # BLD AUTO: 3.44 MILL/UL (ref 3.8–5.4)
TARGETS BLD QL SMEAR: ABNORMAL
WBC: 6 THOU/UL (ref 4–11)

## 2021-06-03 VITALS — BODY MASS INDEX: 38.68 KG/M2 | HEIGHT: 60 IN | WEIGHT: 197 LBS

## 2021-06-03 VITALS
DIASTOLIC BLOOD PRESSURE: 60 MMHG | OXYGEN SATURATION: 98 % | BODY MASS INDEX: 38.99 KG/M2 | RESPIRATION RATE: 14 BRPM | WEIGHT: 198.6 LBS | HEART RATE: 82 BPM | TEMPERATURE: 98.2 F | HEIGHT: 60 IN | SYSTOLIC BLOOD PRESSURE: 102 MMHG

## 2021-06-03 VITALS — BODY MASS INDEX: 39.56 KG/M2 | WEIGHT: 196.25 LBS | HEIGHT: 59 IN

## 2021-06-03 VITALS — BODY MASS INDEX: 38.87 KG/M2 | HEIGHT: 60 IN | WEIGHT: 198 LBS

## 2021-06-03 VITALS — BODY MASS INDEX: 39.07 KG/M2 | HEIGHT: 60 IN | WEIGHT: 199 LBS

## 2021-06-03 NOTE — TELEPHONE ENCOUNTER
Central PA team  [phone]  Pool: LISA PA MED ([id number])          PA has been initiated.       PA form completed and faxed insurance via Cover My Meds     Key:   [id number] - PA Case ID: [id number] - Rx #: [id number]     Medication:  Janumet XR 50-1000MG er tablets    Insurance:  [facility]        Response will be received via fax and may take up to 5-10 business days depending on plan

## 2021-06-03 NOTE — TELEPHONE ENCOUNTER
RN cannot approve Refill Request    RN can NOT refill this medication medication not on med list. Last office visit: 9/16/2019 Mei Carbone MD Last Physical: 8/29/2019 Last MTM visit: Visit date not found Last visit same specialty: 9/16/2019 Mei Carbone MD.  Next visit within 3 mo: Visit date not found  Next physical within 3 mo: Visit date not found      Patricia Mckeon, [facility] Triage/Med Refill 11/12/2019    Requested Prescriptions   Pending Prescriptions Disp Refills     gabapentin (NEURONTIN) 300 MG capsule [Pharmacy Med Name: GABAPENTIN 300 MG CAPSULE 300 CAP] 30 capsule 3     Sig: TAKE 1 PILL BY MOUTH AT BEDTIME AS NEEDED.       Gabapentin/Levetiracetam/Tiagabine Refill Protocol  Passed - 11/11/2019 12:25 PM        Passed - PCP or prescribing provider visit in past 12 months or next 3 months     Last office visit with prescriber/PCP: 9/16/2019 Mei Carbone MD OR same dept: 9/16/2019 Mei Carbone MD OR same specialty: 9/16/2019 Mei Carbone MD  Last physical: 8/29/2019 Last MTM visit: Visit date not found   Next visit within 3 mo: Visit date not found  Next physical within 3 mo: Visit date not found  Prescriber OR PCP: Mei Carbone MD  Last diagnosis associated with med order: 1. Arthritis  - gabapentin (NEURONTIN) 300 MG capsule [Pharmacy Med Name: GABAPENTIN 300 MG CAPSULE 300 CAP]; TAKE 1 PILL BY MOUTH AT BEDTIME AS NEEDED.  Dispense: 30 capsule; Refill: 3    2. Diabetic polyneuropathy associated with type 2 diabetes mellitus (H)  - gabapentin (NEURONTIN) 300 MG capsule [Pharmacy Med Name: GABAPENTIN 300 MG CAPSULE 300 CAP]; TAKE 1 PILL BY MOUTH AT BEDTIME AS NEEDED.  Dispense: 30 capsule; Refill: 3    3. GERD (gastroesophageal reflux disease)  - omeprazole (PRILOSEC) 20 MG capsule; TAKE 1 CAPSULE (20 MG TOTAL) BY MOUTH DAILY BEFORE BREAKFAST. FOR STOMACH  Dispense: 90 capsule; Refill: 3    If protocol passes may refill for 12 months if within 3  months of last provider visit (or a total of 15 months).           Signed Prescriptions Disp Refills    omeprazole (PRILOSEC) 20 MG capsule 90 capsule 3     Sig: TAKE 1 CAPSULE (20 MG TOTAL) BY MOUTH DAILY BEFORE BREAKFAST. FOR STOMACH       GI Medications Refill Protocol Passed - 11/11/2019 12:25 PM        Passed - PCP or prescribing provider visit in last 12 or next 3 months.     Last office visit with prescriber/PCP: 9/16/2019 Mei Carbone MD OR same dept: 9/16/2019 Mei Carbone MD OR same specialty: 9/16/2019 Mei Carbone MD  Last physical: 8/29/2019 Last MTM visit: Visit date not found   Next visit within 3 mo: Visit date not found  Next physical within 3 mo: Visit date not found  Prescriber OR PCP: Mei Carbone MD  Last diagnosis associated with med order: 1. Arthritis  - gabapentin (NEURONTIN) 300 MG capsule [Pharmacy Med Name: GABAPENTIN 300 MG CAPSULE 300 CAP]; TAKE 1 PILL BY MOUTH AT BEDTIME AS NEEDED.  Dispense: 30 capsule; Refill: 3    2. Diabetic polyneuropathy associated with type 2 diabetes mellitus (H)  - gabapentin (NEURONTIN) 300 MG capsule [Pharmacy Med Name: GABAPENTIN 300 MG CAPSULE 300 CAP]; TAKE 1 PILL BY MOUTH AT BEDTIME AS NEEDED.  Dispense: 30 capsule; Refill: 3    3. GERD (gastroesophageal reflux disease)  - omeprazole (PRILOSEC) 20 MG capsule; TAKE 1 CAPSULE (20 MG TOTAL) BY MOUTH DAILY BEFORE BREAKFAST. FOR STOMACH  Dispense: 90 capsule; Refill: 3    If protocol passes may refill for 12 months if within 3 months of last provider visit (or a total of 15 months).

## 2021-06-03 NOTE — TELEPHONE ENCOUNTER
Refill Approved    Rx renewed per Medication Renewal Policy. Medication was last renewed on .  omeprazole (PRILOSEC) 20 MG capsule 90 capsule 3 10/29/2018     Patricia Mckeon, [facility] Connection Triage/Med Refill 11/12/2019     Requested Prescriptions   Pending Prescriptions Disp Refills     gabapentin (NEURONTIN) 300 MG capsule [Pharmacy Med Name: GABAPENTIN 300 MG CAPSULE 300 CAP] 30 capsule 3     Sig: TAKE 1 PILL BY MOUTH AT BEDTIME AS NEEDED.       Gabapentin/Levetiracetam/Tiagabine Refill Protocol  Passed - 11/11/2019 12:25 PM        Passed - PCP or prescribing provider visit in past 12 months or next 3 months     Last office visit with prescriber/PCP: 9/16/2019 Mei Carbone MD OR same dept: 9/16/2019 Mei Carbone MD OR same specialty: 9/16/2019 Mei Carbone MD  Last physical: 8/29/2019 Last MTM visit: Visit date not found   Next visit within 3 mo: Visit date not found  Next physical within 3 mo: Visit date not found  Prescriber OR PCP: Mei Carbone MD  Last diagnosis associated with med order: 1. Arthritis  - gabapentin (NEURONTIN) 300 MG capsule [Pharmacy Med Name: GABAPENTIN 300 MG CAPSULE 300 CAP]; TAKE 1 PILL BY MOUTH AT BEDTIME AS NEEDED.  Dispense: 30 capsule; Refill: 3    2. Diabetic polyneuropathy associated with type 2 diabetes mellitus (H)  - gabapentin (NEURONTIN) 300 MG capsule [Pharmacy Med Name: GABAPENTIN 300 MG CAPSULE 300 CAP]; TAKE 1 PILL BY MOUTH AT BEDTIME AS NEEDED.  Dispense: 30 capsule; Refill: 3    3. GERD (gastroesophageal reflux disease)  - omeprazole (PRILOSEC) 20 MG capsule [Pharmacy Med Name: OMEPRAZOLE DR 20 MG CAPSULE 20 CAP]; TAKE 1 CAPSULE (20 MG TOTAL) BY MOUTH DAILY BEFORE BREAKFAST. FOR STOMACH  Dispense: 90 capsule; Refill: 3    If protocol passes may refill for 12 months if within 3 months of last provider visit (or a total of 15 months).             omeprazole (PRILOSEC) 20 MG capsule [Pharmacy Med Name: OMEPRAZOLE DR 20 MG CAPSULE 20 CAP] 90  capsule 3     Sig: TAKE 1 CAPSULE (20 MG TOTAL) BY MOUTH DAILY BEFORE BREAKFAST. FOR STOMACH       GI Medications Refill Protocol Passed - 11/11/2019 12:25 PM        Passed - PCP or prescribing provider visit in last 12 or next 3 months.     Last office visit with prescriber/PCP: 9/16/2019 Mei Carbone MD OR same dept: 9/16/2019 Mei Carbone MD OR same specialty: 9/16/2019 Mei Carbone MD  Last physical: 8/29/2019 Last MTM visit: Visit date not found   Next visit within 3 mo: Visit date not found  Next physical within 3 mo: Visit date not found  Prescriber OR PCP: Mei Carbone MD  Last diagnosis associated with med order: 1. Arthritis  - gabapentin (NEURONTIN) 300 MG capsule [Pharmacy Med Name: GABAPENTIN 300 MG CAPSULE 300 CAP]; TAKE 1 PILL BY MOUTH AT BEDTIME AS NEEDED.  Dispense: 30 capsule; Refill: 3    2. Diabetic polyneuropathy associated with type 2 diabetes mellitus (H)  - gabapentin (NEURONTIN) 300 MG capsule [Pharmacy Med Name: GABAPENTIN 300 MG CAPSULE 300 CAP]; TAKE 1 PILL BY MOUTH AT BEDTIME AS NEEDED.  Dispense: 30 capsule; Refill: 3    3. GERD (gastroesophageal reflux disease)  - omeprazole (PRILOSEC) 20 MG capsule [Pharmacy Med Name: OMEPRAZOLE DR 20 MG CAPSULE 20 CAP]; TAKE 1 CAPSULE (20 MG TOTAL) BY MOUTH DAILY BEFORE BREAKFAST. FOR STOMACH  Dispense: 90 capsule; Refill: 3    If protocol passes may refill for 12 months if within 3 months of last provider visit (or a total of 15 months).

## 2021-06-03 NOTE — TELEPHONE ENCOUNTER
Prior Authorization Request  Who s requesting:  Pharmacy  Pharmacy Name and Location: [facility]   Medication Name: Janumet   Insurance Plan: [employer]   Insurance Member ID Number:  [id number]  Informed patient that prior authorizations can take up to 10 business days for response:   No  Okay to leave a detailed message: No        KEY: [id number]

## 2021-06-04 VITALS
SYSTOLIC BLOOD PRESSURE: 94 MMHG | DIASTOLIC BLOOD PRESSURE: 62 MMHG | RESPIRATION RATE: 20 BRPM | HEIGHT: 59 IN | BODY MASS INDEX: 40.07 KG/M2 | TEMPERATURE: 98.1 F | WEIGHT: 198.75 LBS | HEART RATE: 84 BPM

## 2021-06-04 NOTE — PROGRESS NOTES
HPI - 60 yo female here for DM check.       DMT2 - poorly controlled  A1c 6.3 on 1/3/19 -> 8.6 on 7/16/19 and 9.1 on 7/23/19 -> 8.1 on 12/12/19   taking janumet XR  50/1000 two times a day, glipizide   on 1/3/19 on lipitor  microalbumin wnl 7/16/19  Eye 6/26/19   Smoker   pt stopped ASA 8/8/19 - see phone note by RN - caused puffy face, numbness, heart palpitations  Dry feet/callouses - used the condoms on her feet b/c lubricant helps    Smoking cessation discussed previously  Started 11yo and daily since (47yrs of smoking pipe)  Pt unaware of risks associated with smoking. Discussed health risks last visit but she is not ready to quit.     h/o acute diastolic HF and h/o pulm HTN and poor f/u with cards  F/u with cardiology 4/25/19 and then left appt on 7/11/19 and then was fired by cardiologist and does not think pt should see cardiology until DEIRDRE is treated  She is taking lasix and K+      Vit D deficiency -  19.4 on 10/31/19 and taking ergo       PTSD: sx well controlled without meds  At prior visits, she had stopped venlafaxine 225 and trazodone 50  PHQ9 - 2 today     GERD - omeprazole     H/o pulm HTN  Repeat EKG today  Discussed the need to address her DEIRDRE and will schedule f/u apt to discuss DEIRDRE and CPAP machine    Chronic pain   using tylenol and naproxen   rx for tramadol for breakthrough pain only    Vision problems  Glaucoma and cataract  left cataract -Surgery Date:  9/12/2019 which did not improve vision, so she does not want to have surgery on right eye    Polypharmacy - RN sets up med box  Med bottles and list do not match  Tylenol - bottle at home  Stop lac-hydrin   ASA but stopped b/c side effects  Lasix - she has 2 bottles from Nov and Dec  No gabapentin - stopped b/c dizziness  Glipizide - 3 bottles from Oct, Nov, Dec   Omeprazole 2 bottles  Senna - no bottle but at home  Tramadol = last filled 8/29/19 - no bottle    Current Outpatient Medications   Medication Sig     acetaminophen (Q-PAP  EXTRA STRENGTH) 500 MG tablet Take 2 tablets (1,000 mg total) by mouth every 4 (four) hours as needed for pain.     acetaminophen 500 mg coapsule Take 1 tablet PO up to 4 times daily as needed for pain     ammonium lactate (LAC-HYDRIN) 12 % lotion Apply topically 2 (two) times a day. Apply to affected area     aspirin 81 MG EC tablet      atorvastatin (LIPITOR) 20 MG tablet TAKE 1 PILL BY MOUTH EVERYDAY FOR CHOLESTEROL     blood glucose test (CONTOUR NEXT TEST STRIPS) strips Use 1 each As Directed three times daily at 7:30am, 11:30am and 4:30pm.     blood glucose test strips Use 1 each As Directed 3 (three) times a day. Dispense brand per patient's insurance at pharmacy discretion.     blood-glucose meter (CONTOUR NEXT METER) Misc Test blood sugar three times a day.     brimonidine (ALPHAGAN) 0.2 % ophthalmic solution INSTILL 1 DROP INTO BOTH EYES BID     furosemide (LASIX) 40 MG tablet TAKE 1 TABLET (40 MG TOTAL) BY MOUTH 2 (TWO) TIMES A DAY FOR EDEMA     gabapentin (NEURONTIN) 300 MG capsule TAKE 1 PILL BY MOUTH AT BEDTIME AS NEEDED.     generic lancets (FINGERSTIX LANCETS) Use 1 application As Directed three times daily at 7:30am, 11:30am and 4:30pm.     glipiZIDE (GLUCOTROL XL) 10 MG 24 hr tablet Take 1 tablet (10 mg total) by mouth daily.     hypromellose (NATURES TEARS) Drop Instill 1-2 drops into affected eye(s) 4 times daily as directed     JANUMET XR 50-1,000 mg TM24 TAKE 1 TABLET BY MOUTH 2 (TWO) TIMES A DAY FOR DIABETES     ketorolac (ACULAR) 0.5 % ophthalmic solution      lancets (ONETOUCH DELICA LANCETS) 33 gauge Misc USE TO CHECK BLOOD SUGAR THREE TIMES DAILY.     naproxen (NAPROSYN) 500 MG tablet Take 1 tablet (500 mg total) by mouth 2 (two) times a day with meals.     ofloxacin (OCUFLOX) 0.3 % ophthalmic solution      omeprazole (PRILOSEC) 20 MG capsule TAKE 1 CAPSULE (20 MG TOTAL) BY MOUTH DAILY BEFORE BREAKFAST. FOR STOMACH     polyethylene glycol 3350,bulk, Powd Mix 1 capful (17gm) in 8 ounces of  "water, juice, or tea and drink daily     potassium chloride (K-DUR,KLOR-CON) 20 MEQ tablet TAKE 1 TABLET (20 MEQ TOTAL) BY MOUTH DAILY.     pravastatin (PRAVACHOL) 10 MG tablet Take 2 tablet by mouth every evening     prednisoLONE acetate (PRED-FORTE) 1 % ophthalmic suspension      senna (SENOKOT) 8.6 mg tablet Take 1 tablet by mouth daily as needed for constipation.     timolol maleate (TIMOPTIC) 0.5 % ophthalmic solution INSTILL 1 DROP INTO BOTH EYES QAM     traMADol (ULTRAM) 50 mg tablet Take 1 tablet (50 mg total) by mouth every 6 (six) hours as needed for severe pain (7-10).     VITAMIN D2 50,000 unit capsule TAKE 1 CAPSULE (50,000 UNITS TOTAL) BY MOUTH ONCE A WEEK.     Vitals:    12/12/19 0824   BP: 94/62   Pulse: 84   Resp: 20   Temp: 98.1  F (36.7  C)   TempSrc: Oral   Weight: 198 lb 12 oz (90.2 kg)   Height: 4' 11\" (1.499 m)     PHYSICAL EXAM   General Appearance: Awake and alert, in no acute distress  HEENT: neck is supple  CV: regular rate  Resp: No respiratory distress. Breathing comfortably  Musculoskeletal: moving limbs comfortably with not deficits or deformities  Skin: no rashes noted    A/P  DMT2 - poorly controlled but improving  A1c 8.1 on 12/12/19  continue janumet XR  50/1000 two times a day, glipizide  LDL  lipitor - recheck next month  microalbumin wnl 7/16/19  Eye 6/26/19   Smoker   pt stopped ASA b/c side effects        h/o acute diastolic HF and h/o pulm HTN and poor f/u with cards  Check BNP  Encourage CPAP     Vit D deficiency - continue taking ergo       PTSD: sx well controlled without meds    GERD - omeprazole  Advised to limit spicy foods and hot peppers      H/o pulm HTN  Encouraged use of CPAP machine    Chronic pain   using tylenol and naproxen   rx for tramadol for breakthrough pain only    Vision problems  Glaucoma and cataract  Continue eye drops as ordered by eye doc    Polypharmacy - RN sets up med box  Medication bottles and list reconciled    Spent 40 min face to face with " patient with more the 50% spent in counseling, reviewing chart with patient, and coordination of care, medication reconciliation and discussing problems listed above.

## 2021-06-05 VITALS — WEIGHT: 191 LBS | BODY MASS INDEX: 37.75 KG/M2

## 2021-06-05 NOTE — TELEPHONE ENCOUNTER
Refill Approved    Rx renewed per Medication Renewal Policy. Medication was last renewed on 1/27/19 and 2/20/19.    Luly Rodriguez, [facility] Triage/Med Refill 2/2/2020     Requested Prescriptions   Pending Prescriptions Disp Refills     potassium chloride (K-DUR,KLOR-CON) 20 MEQ tablet [Pharmacy Med Name: POTASSIUM CHLOR 20 MEQ ER 20 TAB] 30 tablet 3     Sig: TAKE 1 TABLET (20 MEQ TOTAL) BY MOUTH DAILY.       Potassium Supplements Refill Protocol Passed - 2/1/2020  1:45 PM        Passed - PCP or prescribing provider visit in past 12 months       Last office visit with prescriber/PCP: 12/12/2019 Mei Carbone MD OR same dept: 12/12/2019 Mei Carbone MD OR same specialty: 12/12/2019 Mei Carbone MD  Last physical: 8/29/2019 Last MTM visit: Visit date not found   Next visit within 3 mo: Visit date not found  Next physical within 3 mo: Visit date not found  Prescriber OR PCP: Mei Carbone MD  Last diagnosis associated with med order: 1. Hypokalemia  - potassium chloride (K-DUR,KLOR-CON) 20 MEQ tablet [Pharmacy Med Name: POTASSIUM CHLOR 20 MEQ ER 20 TAB]; TAKE 1 TABLET (20 MEQ TOTAL) BY MOUTH DAILY.  Dispense: 30 tablet; Refill: 3    2. Type 2 diabetes mellitus with hyperosmolarity without coma (H)  - atorvastatin (LIPITOR) 20 MG tablet [Pharmacy Med Name: ATORVASTATIN 20 MG TABLET 20 TAB]; TAKE 1 PILL BY MOUTH EVERYDAY FOR CHOLESTEROL  Dispense: 30 tablet; Refill: 3    3. Hyperlipidemia LDL goal <100  - atorvastatin (LIPITOR) 20 MG tablet [Pharmacy Med Name: ATORVASTATIN 20 MG TABLET 20 TAB]; TAKE 1 PILL BY MOUTH EVERYDAY FOR CHOLESTEROL  Dispense: 30 tablet; Refill: 3    If protocol passes may refill for 12 months if within 3 months of last provider visit (or a total of 15 months).             Passed - Potassium level in last 12 months     Lab Results   Component Value Date    Potassium 4.1 01/09/2020             atorvastatin (LIPITOR) 20 MG tablet [Pharmacy Med Name: ATORVASTATIN 20 MG  TABLET 20 TAB] 30 tablet 3     Sig: TAKE 1 PILL BY MOUTH EVERYDAY FOR CHOLESTEROL       Statins Refill Protocol (Hmg CoA Reductase Inhibitors) Passed - 2/1/2020  1:45 PM        Passed - PCP or prescribing provider visit in past 12 months      Last office visit with prescriber/PCP: 12/12/2019 Mei Carbone MD OR same dept: 12/12/2019 Mei Carbone MD OR same specialty: 12/12/2019 Mei Carbone MD  Last physical: 8/29/2019 Last MTM visit: Visit date not found   Next visit within 3 mo: Visit date not found  Next physical within 3 mo: Visit date not found  Prescriber OR PCP: Mei Carbone MD  Last diagnosis associated with med order: 1. Hypokalemia  - potassium chloride (K-DUR,KLOR-CON) 20 MEQ tablet [Pharmacy Med Name: POTASSIUM CHLOR 20 MEQ ER 20 TAB]; TAKE 1 TABLET (20 MEQ TOTAL) BY MOUTH DAILY.  Dispense: 30 tablet; Refill: 3    2. Type 2 diabetes mellitus with hyperosmolarity without coma (H)  - atorvastatin (LIPITOR) 20 MG tablet [Pharmacy Med Name: ATORVASTATIN 20 MG TABLET 20 TAB]; TAKE 1 PILL BY MOUTH EVERYDAY FOR CHOLESTEROL  Dispense: 30 tablet; Refill: 3    3. Hyperlipidemia LDL goal <100  - atorvastatin (LIPITOR) 20 MG tablet [Pharmacy Med Name: ATORVASTATIN 20 MG TABLET 20 TAB]; TAKE 1 PILL BY MOUTH EVERYDAY FOR CHOLESTEROL  Dispense: 30 tablet; Refill: 3    If protocol passes may refill for 12 months if within 3 months of last provider visit (or a total of 15 months).

## 2021-06-06 NOTE — TELEPHONE ENCOUNTER
Controlled Substance Refill Request  Medication Name:   Requested Prescriptions     Pending Prescriptions Disp Refills     furosemide (LASIX) 40 MG tablet [Pharmacy Med Name: FUROSEMIDE 40 MG TABS 40 TAB] 180 tablet 2     Sig: TAKE 1 TABLET (40 MG TOTAL) BY MOUTH 2 (TWO) TIMES A DAY FOR EDEMA     traMADoL (ULTRAM) 50 mg tablet [Pharmacy Med Name: TRAMADOL HCL 50 MG TABLET 50 TAB] 10 tablet 0     Sig: TAKE 1 TABLET (50 MG TOTAL) BY MOUTH EVERY 6 (SIX) HOURS AS NEEDED FOR SEVERE PAIN (7-10).     naproxen (NAPROSYN) 500 MG tablet [Pharmacy Med Name: NAPROXEN 500 MG TABLET 500 TAB] 30 tablet 0     Sig: TAKE 1 TABLET (500 MG TOTAL) BY MOUTH 2 (TWO) TIMES A DAY WITH MEALS.     Date Last Fill: 8/29/19  Requested [facility]: [facility]  Submit electronically to pharmacy  Controlled Substance Agreement on file:   Encounter-Level CSA Scan Date - 01/26/2016:    Scan on 1/27/2016 11:33 AM        Last office visit:  12/12/19         Provider Refill Request  Medication:  naproxen  RN can NOT refill this medication per RN refill protocol because med is not covered by policy/route to provider     Rx renewed per Medication Renewal policy  Lasix  Lab Results   Component Value Date    CREATININE 0.73 01/09/2020    BUN 10 01/09/2020     01/09/2020    K 4.1 01/09/2020     01/09/2020    CO2 29 01/09/2020

## 2021-06-06 NOTE — TELEPHONE ENCOUNTER
Refill Approved    Rx renewed per Medication Renewal Policy. Medication was last renewed on 2/8/2019    Ramila Nathan, [facility] Triage/Med Refill 2/15/2020     Requested Prescriptions   Pending Prescriptions Disp Refills     JANUMET XR 50-1,000 mg TM24 [Pharmacy Med Name: JANUMET XR 50-1,000 MG TAB  TAB] 180 tablet 3     Sig: TAKE 1 TABLET BY MOUTH 2 (TWO) TIMES A DAY FOR DIABETES       Metformin Refill Protocol Passed - 2/10/2020 11:22 AM        Passed - Blood pressure in last 12 months     BP Readings from Last 1 Encounters:   12/12/19 94/62             Passed - LFT or AST or ALT in last 12 months     Albumin   Date Value Ref Range Status   01/09/2020 3.4 (L) 3.5 - 5.0 g/dL Final     Bilirubin, Total   Date Value Ref Range Status   01/09/2020 0.5 0.0 - 1.0 mg/dL Final     Bilirubin, Direct   Date Value Ref Range Status   01/03/2019 0.2 <=0.5 mg/dL Final     Alkaline Phosphatase   Date Value Ref Range Status   01/09/2020 192 (H) 45 - 120 U/L Final     AST   Date Value Ref Range Status   01/09/2020 17 0 - 40 U/L Final     ALT   Date Value Ref Range Status   01/09/2020 15 0 - 45 U/L Final     Protein, Total   Date Value Ref Range Status   01/09/2020 7.3 6.0 - 8.0 g/dL Final                Passed - GFR or Serum Creatinine in last 6 months     GFR MDRD Non Af Amer   Date Value Ref Range Status   01/09/2020 >60 >60 mL/min/1.73m2 Final     GFR MDRD Af Amer   Date Value Ref Range Status   01/09/2020 >60 >60 mL/min/1.73m2 Final             Passed - Visit with PCP or prescribing provider visit in last 6 months or next 3 months     Last office visit with prescriber/PCP: 12/12/2019 OR same dept: 12/12/2019 Mei Carbone MD OR same specialty: 12/12/2019 Mei Carbone MD Last physical: 8/29/2019 Last MTM visit: Visit date not found         Next appt within 3 mo: Visit date not found  Next physical within 3 mo: Visit date not found  Prescriber OR PCP: Mei Carbone MD  Last diagnosis  associated with med order: 1. Type 2 diabetes mellitus with diabetic neuropathy, without long-term current use of insulin (H)  - JANUMET XR 50-1,000 mg TM24 [Pharmacy Med Name: JANUMET XR 50-1,000 MG TAB  TAB]; TAKE 1 TABLET BY MOUTH 2 (TWO) TIMES A DAY FOR DIABETES  Dispense: 180 tablet; Refill: 3     If protocol passes may refill for 12 months if within 3 months of last provider visit (or a total of 15 months).           Passed - A1C in last 6 months     Hemoglobin A1c   Date Value Ref Range Status   12/12/2019 8.1 (H) 3.5 - 6.0 % Final               Passed - Microalbumin in last year      Microalbumin, Random Urine   Date Value Ref Range Status   07/16/2019 <0.50 0.00 - 1.99 mg/dL Final

## 2021-06-07 NOTE — TELEPHONE ENCOUNTER
RN cannot approve Refill Request    RN can NOT refill this medication med is not covered by policy/route to provider     . Last office visit: 12/12/2019 Mei Carbone MD Last Physical: 8/29/2019 Last MTM visit: Visit date not found Last visit same specialty: 12/12/2019 Mei Carbone MD.  Next visit within 3 mo: Visit date not found  Next physical within 3 mo: Visit date not found      Jackie Gonzalez, [facility] Triage/Med Refill 3/31/2020    Requested Prescriptions   Pending Prescriptions Disp Refills     VITAMIN D2 1,250 mcg (50,000 unit) capsule [Pharmacy Med Name: VIT D2 1.25 MG (50,000 UNIT 1.25 MG CAP] 4 capsule 12     Sig: TAKE 1 CAPSULE (50,000 UNITS TOTAL) BY MOUTH ONCE A WEEK FOR BONE HEALTH       There is no refill protocol information for this order

## 2021-06-08 NOTE — PROGRESS NOTES
MTM Encounter    Assessment/Plan  Diabetes: Likely not controlled to goal of 7% based on current blood sugar readings.  Her glucose may have been elevated more recently because she is not feeling well, but we also discussed that she has gone back to one large meal per day because she does not feel hungry the rest of the day.  We spent about 5-10 minutes discussing diet and the importance of spreading out intake throughout the day to avoid hyper- and hypo- glycemia.  Her A1c is likely artificially low because she is having hypoglycemia from her glipizide when she does not eat for long periods of time, so she is consuming extra calories and carbs in the form of juice to keep her blood sugar up.  She seems to acknowledge the importance of changing her eating habits today.  If she is still eating one meal per day at next follow up, I would recommend switching her glipizide to pioglitazone or Invokana as both of these medications have a much lower risk of hypoglycemia, similar to Janumet.  I am getting a few labs today to aid in the decision making process at her next appt.  Of note, her alk phos was elevated to 319 six weeks ago.  I did not repeat this lab because I did not have a chance to discuss with PCP.  - Consider switching glipizide to pioglitazone 15mg QD  - A1c  -microalbumin    Follow Up  With PCP in one month.  Next MTM follow up at the discretion of PCP.    Subjective/Objective  Trixie Sofia is a 57 y.o. female here for a medication therapy management (MTM) appointment; her chief concern today is diabetes.  She is not feeling well today.  Her arm locks up while she is here.    Diabetes: Trixie is taking Janumet 50/1000mg BID and glipizide 5mg.  She notes that she drank a whole container of orange juice yesterday.  If she doesn't drink some kind of sweet drink, then she get shaky, tired and sweaty.  This happens randomly throughout the day.  Last A1c: 7.5% on 10/31/16  Glucose readings: Trixie checks her glucose 0-1  time(s) per day.  AM fasting range: , about 75% of her readings are between 150-250.  Last microalbumin/creatinine: wnl Feb '16  Last diabetic eye exam: In the past year, not sure when  Last diabetic foot exam: In the past six months  On aspirin: Yes.  10 year ASCVD 10.9%  On statin: moderate intensity  Current complications: none     ----------------    Neh's medication list was reviewed with them, discussing reason for use, directions for use, and potential side effects of each medication as needed. Indication, safety, efficacy, and convenience was assessed for all medications addressed above.  No environmental factors were noted currently affecting patient.  This care plan was communicated via EMR with her primary care provider, Mei Carbone MD, who is the authorizing prescriber for this visit.  Direct supervision was available by either the patient's PCP or other available provider.  Time and complexity billing metrics are included in the docflowsheet linked to this visit.  Time spent: 25 minutes      Dell Arias, Kerri  [facility] Pharmacist  [place], [facility]  [phone]

## 2021-06-08 NOTE — PROGRESS NOTES
-Care Guide assisted patient in coordination with [place] surgery follow up which is scheduled on 03/17/2017 @ 10 am at location: [address].  -Patient reported that all medications are able to fill with helps from home care nurse and health insurance is active.  -Patient stated that no form and paper work need to be filled out.  -Care Guide will outreach patient again in 8 weeks and informed to call sooner if needed.

## 2021-06-08 NOTE — PROGRESS NOTES
MTM Encounter    Assessment/Plan  Diabetes: Uncontrolled.  Worsening diabetic control is due to Janumet being incorrectly ordered as once daily instead of twice daily.  This was corrected today.  Trixie feels that she can add one more tablet to her pill box starting today since the RN wont be over to fill her pill box until next week.  Message sent to pharmacy requesting early fill of this med as the dose has changed (from once daily to twice daily).  - Increase Janumet 1000-50 to BID    Pain Control: Paper prescription for tramadol provided today from PCP.  - Tramadol one tablet BID    Schistosomiasis: Successfully completed Biltricide 20mg/kg TID x 1d.    Follow Up  One month for glucose review    Subjective/Objective  Trixie Sofia is a 57 y.o. female here for a medication therapy management (MTM) appointment; her chief concern today is diabetes follow up.  She has a nurse setting up her pill box every two weeks.       Diabetes: Trixie is taking Janumet one tablet daily and glipizide 5mg one tablet daily.  Trixie eats 1-2 meals per day.  Last A1c: 7.5% on 10/31/16  Glucose readings: Trixie checks her glucose 0-1 time(s) per day.  Random blood sugar readings:139-349  Last microalbumin/creatinine: wnl Feb '16  On aspirin: Yes  On statin: moderate intensity    GERD: Well controlled with stomach medication (omeprazole).    Pain control: Working well - enables her to walk with minimal pain.  Without the pain medication, she cannot walk.  She is currently out of refills for her tramadol and needs a new prescription for this.    Schistosomiasis: Completed treatment with Biltricide last month without issue.    ----------------    Trixie's medication list was reviewed with them, discussing reason for use, directions for use, and potential side effects of each medication as needed. Indication, safety, efficacy, and convenience was assessed for all medications addressed above.  No environmental factors were noted currently affecting patient.   This care plan was communicated via EMR with her primary care provider, Mei Carbone MD, who is the authorizing prescriber for this visit.  Direct supervision was available by either the patient's PCP or other available provider.  Time and complexity billing metrics are included in the docflowsheet linked to this visit.  Time spent: 15 minutes      Dell Arias, Kerri  [facility] Pharmacist  [facility]  [phone]

## 2021-06-09 ENCOUNTER — COMMUNICATION - HEALTHEAST (OUTPATIENT)
Dept: ADMINISTRATIVE | Facility: HOSPITAL | Age: 61
End: 2021-06-09

## 2021-06-09 NOTE — PROGRESS NOTES
HPI - 56 yo female here for DM check.       Foot pain - she was seen by podiatry and dx'd with DM neuropathy and had gabapentin increased to 3 tabs TID. Gabapentin 2 tabs TID makes her a little dizzy, so  increased venlafaxine instead  Now taking gabapentin 300mg BID and venlafaxine 225mg daily - this is helping with the foot pain     Dry cracked heels  At her last visit, she was advised to soak in epsom salt, pumis stone (not knife), Lac-hydrin lotion and given rx clotrimazole for athlete's foot. The epsom salt burned.         DM   A1c 7.0 on 8/916 and 7.5 on 10/31/16 -> 8.5 on 2/1/17  On metformin 1000mg BID and Januvia - change to combined tab of janumet in Dec 2016  Added glipizide 5mg XL 12/2016  BP wnl  ASA  LDL 73 on lipitor  2/1/17 clinic pharm  microalbumin wnl 2/1/17      Vit D deficiency - 20.0 on 8/15/15 and 24.3 on 10/24/16  Taking ergo       Abdo pain -   S/p tx for H pylori with prevpac  Tried ranitidine but PPI works better for her  schisto equivocal on 12/12/16 -       Obesity -   Initial baritric consult on 10/7/16 and f/u on 12/16/16  Labs ordered and weight loss plan outlined     Polypharmacy - home health RN sets up meds    Current Outpatient Prescriptions   Medication Sig Note     acetaminophen (Q-PAP EXTRA STRENGTH) 500 MG tablet Take 2 tablets (1,000 mg total) by mouth 2 (two) times a day as needed for pain.      amitriptyline (ELAVIL) 50 MG tablet Take 1 tablet (50 mg total) by mouth bedtime.      artificial tears, hypromellose, (GONIOVISC) 2.5 % ophthalmic solution 2 drops each eye as needed      aspirin 81 MG EC tablet Take 1 tablet (81 mg total) by mouth daily.      atorvastatin (LIPITOR) 10 MG tablet Take 1 tablet (10 mg total) by mouth bedtime.      CONTOUR NEXT STRIPS strips TEST FASTING DAILY      gabapentin (NEURONTIN) 300 MG capsule TAKE 1 CAPSULE ORALLY 2 TIMES DAILY      generic lancets (MICROLET LANCET) Use to check blood sugar once daily.  Contour Next glucometer.      glipiZIDE  "(GLUCOTROL) 5 MG 24 hr tablet Take 1 tablet (5 mg total) by mouth daily with breakfast.      meloxicam (MOBIC) 15 MG tablet Take 1 tablet (15 mg total) by mouth daily.      naproxen (EC NAPROSYN) 500 MG EC tablet Take 1 tablet (500 mg total) by mouth 2 (two) times a day with meals.      omeprazole (PRILOSEC) 20 MG capsule Take 1 capsule (20 mg total) by mouth daily.      sitaGLIPtin-metformin 50-1,000 mg TM24 Take 1 tablet by mouth 2 (two) times a day.      TRAVEL SICKNESS, MECLIZINE, 25 mg chewable tablet CHEW 1 TABLET BY MOUTH 3 TIMES A DAY AS NEEDED FOR DIZZINESS OR NAUSEA      traZODone (DESYREL) 50 MG tablet TAKE 1 TABLET BY MOUTH AT BEDTIME AS NEEDED.      venlafaxine 225 mg TR24 Take 225 mg by mouth daily.      VITAMIN D2 50,000 unit capsule TAKE 1 CAPSULE BY MOUTH ONCE A WEEK      ammonium lactate (LAC-HYDRIN) 12 % lotion APPLY TO AFFECTED AREA TWICE DAILY.      capsaicin (ZOSTRIX) 0.025 % cream Apply 1 application topically 2 (two) times a day. 11/5/2015: Received from: External Pharmacy     clotrimazole (LOTRIMIN) 1 % external solution Apply to feet 2 times per day      Vitals:    03/09/17 0837   BP: 120/70   Pulse: 75   Resp: 16   Temp: 97.9  F (36.6  C)   TempSrc: Oral   SpO2: 95%   Weight: 202 lb 9.6 oz (91.9 kg)   Height: 5' 0.25\" (1.53 m)     PHYSICAL EXAM   General Appearance: Awake and alert, in no acute distress  HEENT: neck is supple  CV: regular rate  Resp: No respiratory distress. Breathing comfortably  Musculoskeletal: moving limbs comfortably with not deficits or deformities  Skin: no rashes noted    A/P  Foot pain from diabetic neuropathy  Now taking gabapentin 300mg BID and venlafaxine 225mg daily - this is helping with the foot pain     Dry cracked heels   Lac-hydrin lotion and given rx clotrimazole for athlete's foot        DM   A1c 7.0 on 8/916 and 7.5 on 10/31/16 -> 8.5 on 2/1/17  On metformin 1000mg BID and Januvia - change to combined tab of janumet in Dec 2016  glipizide 5mg XL " 12/2016 will change to pioglitizone 15mg  per pharmacist recommendation  BP wnl  ASA  LDL 73 on lipitor  2/1/17 clinic pharm  microalbumin wnl 2/1/17  Eye clinic referral ordered.       Vit D deficiency - 20.0 on 8/15/15 and 24.3 on 10/24/16  Taking ergo       Abdo pain -   PPI is helping and will treat schisto equivocal on 12/12/16 -       Obesity -   Initial baritric consult on 10/7/16 and f/u on 12/16/16  Labs ordered and weight loss plan outlined     Polypharmacy - home health RN sets up meds  Will print new med list for RN    mammo - will do next apt    Spent 40 min face to face with patient with more the 50% spent in counseling, reviewing chart, and coordination of care and discussing problems listed above.

## 2021-06-09 NOTE — PROGRESS NOTES
-Care Guide reminded patient of upcoming appointments and arranged transportation.  -Patient reported that home care nurse is coming to set up medications every other weeks and health insurance is active.  -Care Guide will outreach patient again in 6 weeks and informed to call sooner if needed.

## 2021-06-09 NOTE — PROGRESS NOTES
Patient was informed of a resident following the surgeon today. Patient declined the resident in visit.

## 2021-06-09 NOTE — TELEPHONE ENCOUNTER
Controlled Substance Refill Request  Medication Name:   Requested Prescriptions     Pending Prescriptions Disp Refills     traMADoL (ULTRAM) 50 mg tablet [Pharmacy Med Name: TRAMADOL HCL 50 MG TABLET 50 TAB] 10 tablet 2     Sig: TAKE 1 TABLET (50 MG TOTAL) BY MOUTH EVERY 6 (SIX) HOURS AS NEEDED FOR SEVERE PAIN (7-10).     naproxen (NAPROSYN) 500 MG tablet [Pharmacy Med Name: NAPROXEN 500 MG TABLET 500 TAB] 30 tablet 2     Sig: TAKE 1 TABLET (500 MG TOTAL) BY MOUTH 2 (TWO) TIMES A DAY WITH MEALS.     Date Last Fill: 3/3/20  Requested [facility]acy: [email or web address]  Submit electronically to pharmacy  Controlled Substance Agreement on file:   Encounter-Level CSA Scan Date - 01/26/2016:    Scan on 1/27/2016 11:33 AM        Last office visit:  12/12/19         Provider Refill Request  Medication:  naproxen  RN can NOT refill this medication per RN refill protocol because med is not covered by policy/route to provider

## 2021-06-09 NOTE — PROGRESS NOTES
"Bariatric Clinic Follow-Up Visit:    Trixie Sofia is a 57 y.o.  female with Body mass index is 38.81 kg/(m^2).  presenting here today for follow-up on non-surgical efforts for weight loss. Original Intake visit occurred on 10/7/16 with a weight of 200 lbs and BMI of 38.8.  Along with diet and behavior changes, she has been using dietary to assist her weight loss goals.  See her intake visit notes for details on identified contributors to weight gain in the past.  Her diabetes has worsened over the last few months with rising A1c. She continues to have a heavy simple starch laden diet of rice and juice.  She doesn't \"like meat\" and can't commit to having an egg each morning as \"that's too much\". Mostly eats rice and soup in the AM but eating a breakfast is improved from our first meeting when she tended to go all day before having a larger dinner meal.  Limited ambulation restricts her physical exercise.      Weight:   Wt Readings from Last 2 Encounters:   03/29/17 200 lb 6.4 oz (90.9 kg)   03/09/17 202 lb 9.6 oz (91.9 kg)    pounds  Height: 5' 0.25\" (1.53 m) (3/29/2017 11:27 AM)  Initial Weight: 200 lbs (3/29/2017 11:27 AM)  Weight: 200 lb 6.4 oz (90.9 kg) (3/29/2017 11:27 AM)  Weight loss from initial: -0.4 (3/29/2017 11:27 AM)  % Weight loss: -0.2 % (3/29/2017 11:27 AM)  BMI (Calculated): 38.8 (3/29/2017 11:27 AM)  SpO2: 100 % (3/29/2017 11:27 AM)  Heart Rate (/min): 81 (3/29/2017 11:27 AM)  BP (mmHg): 139/72 (3/29/2017 11:27 AM)  Waist Circumference (In): 46.5 Inches (10/7/2016  8:41 AM)  Hip Circumference (In): 48 Inches (10/7/2016  8:41 AM)  Neck Circumference (In): 16.5 Inches (10/7/2016  8:41 AM)  NSAIDS: Yes (3/29/2017 11:27 AM)    Comorbidities:  Patient Active Problem List   Diagnosis     Dizziness     Obesity     Neck Pain     Insomnia     Headache     Drowsiness     Edema     Pain During Urination (Dysuria)     Neuralgia     Constipation     Oral Leukoplakia     Sciatica     Generalized Pain     Fatigue     " Vitamin D Deficiency     Lower Back Pain     Depression With Anxiety     Lumbar Disc Degeneration L4 - L5     Lichen Planus     Urinary Incontinence     Arthritis     Frequent, Full-bladder Emptying (Polyuria)     Vaginal Discharge     Menopause     Abdominal Pain In The [place] (Epigastric)     Joint Pain Fingers     Myalgia And Myositis     Mouth pain     Tinnitus     Pain in left ear     Vertigo     Diabetes mellitus, type 2     Controlled substance agreement signed - tramadol 60tabs/mo     Multiple allergies     Polypharmacy     Cracked skin on feet     Type 2 diabetes mellitus with hyperosmolarity without coma, unspecified long term insulin use status     Diabetes mellitus with neuropathy     HTN (hypertension)     PTSD (post-traumatic stress disorder)     DM neuropathy, painful     Tinea pedis       Current Outpatient Prescriptions:      acetaminophen (Q-PAP EXTRA STRENGTH) 500 MG tablet, Take 2 tablets (1,000 mg total) by mouth 2 (two) times a day as needed for pain., Disp: 100 tablet, Rfl: 6     amitriptyline (ELAVIL) 50 MG tablet, Take 1 tablet (50 mg total) by mouth bedtime., Disp: 30 tablet, Rfl: 11     ammonium lactate (LAC-HYDRIN) 12 % lotion, APPLY TO AFFECTED AREA TWICE DAILY., Disp: 400 g, Rfl: 0     artificial tears, hypromellose, (GONIOVISC) 2.5 % ophthalmic solution, 2 drops each eye as needed, Disp: 15 mL, Rfl: 12     aspirin 81 MG EC tablet, Take 1 tablet (81 mg total) by mouth daily., Disp: 90 tablet, Rfl: 3     atorvastatin (LIPITOR) 10 MG tablet, Take 1 tablet (10 mg total) by mouth bedtime., Disp: 90 tablet, Rfl: 4     capsaicin (ZOSTRIX) 0.025 % cream, Apply 1 application topically 2 (two) times a day., Disp: , Rfl:      clotrimazole (LOTRIMIN) 1 % external solution, Apply to feet 2 times per day, Disp: 30 mL, Rfl: 0     CONTOUR NEXT STRIPS strips, TEST FASTING DAILY, Disp: 50 strip, Rfl: 6     gabapentin (NEURONTIN) 300 MG capsule, TAKE 1 CAPSULE ORALLY 2 TIMES DAILY, Disp: 60  capsule, Rfl: 5     generic lancets (MICROLET LANCET), Use to check blood sugar once daily.  Contour Next glucometer., Disp: 100 each, Rfl: 3     guaiFENesin (ROBITUSSIN) 100 mg/5 mL syrup, Take 10 mL (200 mg total) by mouth 3 (three) times a day as needed for cough., Disp: 120 mL, Rfl: 0     naproxen (EC NAPROSYN) 500 MG EC tablet, Take 1 tablet (500 mg total) by mouth 2 (two) times a day as needed (with meals)., Disp: 60 tablet, Rfl: 2     omeprazole (PRILOSEC) 20 MG capsule, Take 1 capsule (20 mg total) by mouth daily., Disp: 30 capsule, Rfl: 11     pioglitazone (ACTOS) 15 MG tablet, Take 1 tablet (15 mg total) by mouth daily., Disp: 30 tablet, Rfl: 11     sitaGLIPtin-metformin 50-1,000 mg TM24, Take 1 tablet by mouth 2 (two) times a day., Disp: 180 tablet, Rfl: 3     TRAVEL SICKNESS, MECLIZINE, 25 mg chewable tablet, CHEW 1 TABLET BY MOUTH 3 TIMES A DAY AS NEEDED FOR DIZZINESS OR NAUSEA, Disp: 90 tablet, Rfl: 3     traZODone (DESYREL) 50 MG tablet, TAKE 1 TABLET BY MOUTH AT BEDTIME AS NEEDED., Disp: 30 tablet, Rfl: 11     venlafaxine 225 mg TR24, Take 225 mg by mouth daily., Disp: 30 each, Rfl: 11     VITAMIN D2 50,000 unit capsule, TAKE 1 CAPSULE BY MOUTH ONCE A WEEK, Disp: 4 capsule, Rfl: 0      Interim: Since our last visit, she has maintained weight from her intake in October.  Walking a minute at a time. Works with [place] dietician. Checking her Blood sugar 141 mg/dl. Last A1c was elevating to 8.5%.     Plan:   1.  Diet: Aim for 1300 kcal daily.  2. Exercise: some walking.  3. Medication: not a good candidate for weight loss medications due.  4.  Stress Reduction:  Doing well, still finds meetings helpful.  5. Goals: 180 lbs         We discussed [facility] Bariatric Basics including:  -eating 3 meals daily  -eating protein first  -eating slowly, chewing food well  -avoiding/limiting calorie containing beverages  -We discussed the importance of restorative sleep and stress management in maintaining a  healthy weight.  -We discussed the National Weight Control Registry healthy weight maintenance strategies and ways to optimize metabolism.  -We discussed the importance of physical activity including cardiovascular and strength training in maintaining a healthier weight and explored viable options.    Most recent labs:  Lab Results   Component Value Date    WBC 7.3 10/24/2016    HGB 11.7 (L) 10/24/2016    HCT 35.9 10/24/2016    MCV 78 (L) 10/24/2016     10/24/2016     Lab Results   Component Value Date    CHOL 130 10/24/2016     Lab Results   Component Value Date    HDL 31 (L) 10/24/2016     Lab Results   Component Value Date    LDLCALC 73 10/24/2016     Lab Results   Component Value Date    TRIG 132 10/24/2016     No components found for: CHOLHDL  Lab Results   Component Value Date    ALT 33 12/12/2016    AST 32 12/12/2016    ALKPHOS 319 (H) 12/12/2016    BILITOT 0.6 12/12/2016     Lab Results   Component Value Date    HGBA1C 8.5 (H) 02/01/2017     Lab Results   Component Value Date    QBUFSBNX59 799 10/24/2016     Lab Results   Component Value Date    AEPLAVIU48NC 24.3 (L) 10/24/2016     No results found for: FERRITIN  Lab Results   Component Value Date    PTH 35 10/24/2016     Lab Results   Component Value Date    62124 86 10/31/2016     No results found for: 7597  Lab Results   Component Value Date    TSH 1.74 12/12/2014     No results found for: TESTOSTERONE    DIETARY HISTORY    Positive Changes Since Last Visit: eating more regular meals  Struggling With: protein with each meal, rice in higher quantities, juices.    Knowledgeable in Reading Food Labels: no  Getting Adequate Protein: no  Sleeping 7-8 hours/day yes      PHYSICAL ACTIVITY PATTERNS:  Cardiovascular: no  Strength Training: no    REVIEW OF SYSTEMS  GENERAL/CONSTITUTIONAL:  No recent illnesses  HEENT:   nl  CARDIOVASCULAR:   nl  PULMONARY:   nl  GASTROINTESTINAL:  nl  UROLOGIC:  nl  NEUROLOGIC:  nl  PSYCHIATRIC:  Mood is good  "today.  MUSCULOSKELETAL/RHEUMATOLOGIC   uses cane/walker to ambulated.  ENDOCRINE:  Reports checking her blood sugar daily and today was 141 mg/dl per her recall.  DERMATOLOGIC:  nl    PHYSICAL EXAM:  Vitals: /72 (Patient Site: Left Arm, Patient Position: Sitting, Cuff Size: Adult Large)  Pulse 81  Ht 5' 0.25\" (1.53 m)  Wt 200 lb 6.4 oz (90.9 kg)  LMP 01/30/2013  SpO2 100%  Breastfeeding? No  BMI 38.81 kg/m2  Height: 5' 0.25\" (1.53 m) (3/29/2017 11:27 AM)  Initial Weight: 200 lbs (3/29/2017 11:27 AM)  Weight: 200 lb 6.4 oz (90.9 kg) (3/29/2017 11:27 AM)  Weight loss from initial: -0.4 (3/29/2017 11:27 AM)  % Weight loss: -0.2 % (3/29/2017 11:27 AM)  BMI (Calculated): 38.8 (3/29/2017 11:27 AM)  SpO2: 100 % (3/29/2017 11:27 AM)  Heart Rate (/min): 81 (3/29/2017 11:27 AM)  BP (mmHg): 139/72 (3/29/2017 11:27 AM)  Waist Circumference (In): 46.5 Inches (10/7/2016  8:41 AM)  Hip Circumference (In): 48 Inches (10/7/2016  8:41 AM)  Neck Circumference (In): 16.5 Inches (10/7/2016  8:41 AM)  NSAIDS: Yes (3/29/2017 11:27 AM)    GEN: Pleasant, well groomed, in no acute distress  HEENT: PEERLA, EOMI, airway patent .  NECK:  no anterior/supraclavicular lymphadenopathy, thyroid normal   HEART: Rhythm regular, rate regular, no murmur   LUNGS: Clear without crackles or wheezes. No cough.  ABDOMEN: obese.  EXTREMITIES: ambulates with cane.  NEURO: Alert and Oriented X3, normal gait and speech.  SKIN: no pallor.        25 minutes was spent in direct consultation, with over 50% of it spent in counseling regarding their plan for excess weight reduction and health modification.  Adolfo Huntley MD  [facility]  12:01 PM  "

## 2021-06-10 NOTE — TELEPHONE ENCOUNTER
----- Message from Mei Carbone MD sent at 8/10/2020  9:00 AM CDT -----  Please let patient know that her Vitamin D is low. Rx for high dose Vitamin D (ergocalciferol 50,000 units) to take one tab weekly was sent to her pharmacy. She should RTC in 3 months to have this rechecked.   Dr. Mei Carbone

## 2021-06-10 NOTE — PROGRESS NOTES
-Patient is able to get all the medications refill at pharmacy with active health insurance.  -Patient stated that she has home care nurse who sets up medications every other week.  -Care Guide will outreach patient in 8 weeks and informed to call sooner if needed.

## 2021-06-10 NOTE — PROGRESS NOTES
HPI - 56 yo female here for DM check .    DM   A1c 7.0 on 8/916 and 7.5 on 10/31/16 -> 8.5 on 2/1/17 -> 7.5 today  Home -200  changed to combined tab of janumet in Dec 2016  changed to pioglitizone 15mg in Feb  per pharmacist recommendation  BP wnl  ASA  LDL 73 in 10/24/16 on lipitor  microalbumin wnl 2/1/17  Eye clinic 3/17/17      Vit D deficiency - 20.0 on 8/15/15 and 24.3 on 10/24/16  Taking ergo       Abdo pain -   PPI is helping  And no pain with med     PTSD:   Taking venlafaxine and trazodone  Sleeping better  No bad dreams  Mood is better     Obesity -   Seen at Bariatric clinic 3/29/17  Gained a few pounds     mammo normal 3/2017     Polypharmacy - home health RN sets up meds    She feels the med is helping with her pain.   Discussed weight loss and how the extra weight is hard on her body and makes the joint pain worse.   Pain Meds: tylenol, naproxen, gabapentin, amitriptyline  She is getting massage at [facility] - Dr. Salvador Garcia [phone]    Current Outpatient Prescriptions   Medication Sig Note     acetaminophen (Q-PAP EXTRA STRENGTH) 500 MG tablet Take 2 tablets (1,000 mg total) by mouth 2 (two) times a day as needed for pain.      amitriptyline (ELAVIL) 50 MG tablet Take 1 tablet (50 mg total) by mouth bedtime.      artificial tears, hypromellose, (GONIOVISC) 2.5 % ophthalmic solution 2 drops each eye as needed      aspirin 81 MG EC tablet Take 1 tablet (81 mg total) by mouth daily.      atorvastatin (LIPITOR) 10 MG tablet Take 1 tablet (10 mg total) by mouth bedtime.      blood glucose test (CONTOUR NEXT STRIPS) strips TEST FASTING DAILY      gabapentin (NEURONTIN) 300 MG capsule TAKE 1 CAPSULE ORALLY 2 TIMES DAILY      generic lancets (MICROLET LANCET) Dispense brand per patient's insurance at pharmacy discretion.      naproxen (EC NAPROSYN) 500 MG EC tablet Take 1 tablet (500 mg total) by mouth 2 (two) times a day as needed (with meals).      omeprazole (PRILOSEC) 20 MG  capsule TAKE 1 CAPSULE ORALLY DAILY      pioglitazone (ACTOS) 15 MG tablet Take 1 tablet (15 mg total) by mouth daily.      sitaGLIPtin-metformin 50-1,000 mg TM24 Take 1 tablet by mouth 2 (two) times a day.      TRAVEL SICKNESS, MECLIZINE, 25 mg chewable tablet CHEW 1 TABLET BY MOUTH 3 TIMES A DAY AS NEEDED FOR DIZZINESS OR NAUSEA      traZODone (DESYREL) 50 MG tablet TAKE 1 TABLET BY MOUTH AT BEDTIME AS NEEDED.      venlafaxine 225 mg TR24 Take 225 mg by mouth daily.      VITAMIN D2 50,000 unit capsule TAKE 1 CAPSULE BY MOUTH ONCE A WEEK      ammonium lactate (LAC-HYDRIN) 12 % lotion APPLY TO AFFECTED AREA TWICE DAILY.      capsaicin (ZOSTRIX) 0.025 % cream Apply 1 application topically 2 (two) times a day. 11/5/2015: Received from: External Pharmacy     clotrimazole (LOTRIMIN) 1 % external solution Apply to feet 2 times per day      Vitals:    05/23/17 1135   BP: 128/74   Pulse: 77   Resp: 18   Temp: 97.5  F (36.4  C)   TempSrc: Oral   SpO2: 98%   Weight: 206 lb 8 oz (93.7 kg)   Height: 5' (1.524 m)     PHYSICAL EXAM   General Appearance: Awake and alert, in no acute distress  HEENT: neck is supple  CV: regular rate  Resp: No respiratory distress. Breathing comfortably  Musculoskeletal: moving limbs comfortably with not deficits or deformities  Skin: no rashes noted    A/P  DM  - improving  A1c 7.0 on 8/916 and 7.5 on 10/31/16 -> 8.5 on 2/1/17 -> 7.5 today  Home -200  changed to combined tab of janumet in Dec 2016  changed to pioglitizone 15mg in Feb  per pharmacist recommendation  BP wnl  ASA  LDL 73 in 10/24/16 on lipitor  microalbumin wnl 2/1/17  Eye clinic 3/17/17      Vit D deficiency - 20.0 on 8/15/15 and 24.3 on 10/24/16  Taking ergo   Recheck today      Abdo pain -   PPI is helping  And no pain with med     PTSD: doing well with meds  Taking venlafaxine and trazodone     Obesity -   Seen at Bariatric clinic 3/29/17  Encouraged working on weight loss  I discussed today the possibility of weight loss  meds, like phenteramine  She has been going for walks     mammo normal 3/2017     Polypharmacy - home health RN sets up meds    chronic pain.   Pain Meds: tylenol, naproxen, gabapentin, amitriptyline  She is getting [facility] at [facility]   Discussed weight loss and how the extra weight is hard on her body and makes the joint pain worse    Spent 25 min face to face with patient with more the 50% spent in counseling, reviewing chart, and coordination of care and discussing problems listed above.

## 2021-06-10 NOTE — TELEPHONE ENCOUNTER
Have been unable to reach patient re: test results.    Called pharmacy, who verified that ergocalciferol was a refill last delivered to patient 7/22/20.  A f/u appt with PCP for 11/05/20 was scheduled on 8/4/20.

## 2021-06-10 NOTE — TELEPHONE ENCOUNTER
Left voice message requesting call back to clinic at [phone] to receive lab results and schedule 3 months f/u appt.

## 2021-06-10 NOTE — PROGRESS NOTES
HPI- 58 yo female her for DM check and back/leg pain.    She feels the med is helping with her pain.   Discussed weight loss and how the extra weight is hard on her body and makes the joint pain worse.   Pain Meds: tylenol, naproxen, gabapentin, amitriptyline  She is getting massage at [facility] - Dr. Salvador Garcia [phone]    DM   A1c 7.0 on 8/916 and 7.5 on 10/31/16 -> 8.5 on 2/1/17  changed to combined tab of janumet in Dec 2016  changed to pioglitizone 15mg in Feb  per pharmacist recommendation  BP wnl  ASA  LDL 73 on lipitor  microalbumin wnl 2/1/17  Eye clinic 3/17/17      Vit D deficiency - 20.0 on 8/15/15 and 24.3 on 10/24/16  Taking ergo       Abdo pain -   PPI is helping and will treat schisto equivocal on 12/12/16 -     PTSD:   Taking venlafaxine and trazodone  Sleeping better  No bad dreams  Mood is better    Obesity -   Seen at Bariatric clinic 3/29/17    mammo today    Polypharmacy - home health RN sets up meds  Will print new med list for RN    Current Outpatient Prescriptions   Medication Sig Note     acetaminophen (Q-PAP EXTRA STRENGTH) 500 MG tablet Take 2 tablets (1,000 mg total) by mouth 2 (two) times a day as needed for pain.      amitriptyline (ELAVIL) 50 MG tablet Take 1 tablet (50 mg total) by mouth bedtime.      ammonium lactate (LAC-HYDRIN) 12 % lotion APPLY TO AFFECTED AREA TWICE DAILY.      artificial tears, hypromellose, (GONIOVISC) 2.5 % ophthalmic solution 2 drops each eye as needed      aspirin 81 MG EC tablet Take 1 tablet (81 mg total) by mouth daily.      atorvastatin (LIPITOR) 10 MG tablet Take 1 tablet (10 mg total) by mouth bedtime.      capsaicin (ZOSTRIX) 0.025 % cream Apply 1 application topically 2 (two) times a day. 11/5/2015: Received from: External Pharmacy     clotrimazole (LOTRIMIN) 1 % external solution Apply to feet 2 times per day      CONTOUR NEXT STRIPS strips TEST FASTING DAILY      gabapentin (NEURONTIN) 300 MG capsule TAKE 1 CAPSULE ORALLY 2  "TIMES DAILY      generic lancets (MICROLET LANCET) Use to check blood sugar once daily.  Contour Next glucometer.      naproxen (EC NAPROSYN) 500 MG EC tablet Take 1 tablet (500 mg total) by mouth 2 (two) times a day as needed (with meals).      omeprazole (PRILOSEC) 20 MG capsule TAKE 1 CAPSULE ORALLY DAILY      pioglitazone (ACTOS) 15 MG tablet Take 1 tablet (15 mg total) by mouth daily.      sitaGLIPtin-metformin 50-1,000 mg TM24 Take 1 tablet by mouth 2 (two) times a day.      TRAVEL SICKNESS, MECLIZINE, 25 mg chewable tablet CHEW 1 TABLET BY MOUTH 3 TIMES A DAY AS NEEDED FOR DIZZINESS OR NAUSEA      traZODone (DESYREL) 50 MG tablet TAKE 1 TABLET BY MOUTH AT BEDTIME AS NEEDED.      venlafaxine 225 mg TR24 Take 225 mg by mouth daily.      VITAMIN D2 50,000 unit capsule TAKE 1 CAPSULE BY MOUTH ONCE A WEEK      Vitals:    04/20/17 1045   BP: 118/76   Patient Site: Left Arm   Patient Position: Sitting   Cuff Size: Adult Regular   Pulse: 64   Resp: 14   Temp: 97.7  F (36.5  C)   TempSrc: Oral   Weight: 202 lb 4 oz (91.7 kg)   Height: 5' 0.25\" (1.53 m)     PHYSICAL EXAM   General Appearance: Awake and alert, in no acute distress  HEENT: neck is supple  CV: regular rate  Resp: No respiratory distress. Breathing comfortably  Musculoskeletal: moving limbs comfortably with not deficits or deformities  Skin: no rashes noted    A/P  Back and leg pain :  She feels the med is helping with her pain.   Discussed weight loss and how the extra weight is hard on her body and makes the joint pain worse.   Pain Meds: tylenol, naproxen, gabapentin, amitriptyline    DM   A1c 7.0 on 8/916 and 7.5 on 10/31/16 -> 8.5 on 2/1/17  changed to combined tab of janumet in Dec 2016  changed to pioglitizone 15mg in Feb  per pharmacist recommendation  BP wnl  ASA  LDL 73 on lipitor  microalbumin wnl 2/1/17  Eye clinic 3/17/17      Vit D deficiency - 20.0 on 8/15/15 and 24.3 on 10/24/16  Taking ergo       Abdo pain -   PPI is helping and will treat " schisto equivocal on 12/12/16 -     PTSD:   Taking venlafaxine and trazodone  Sleeping better  No bad dreams  Mood is better    Obesity -   Seen at Bariatric clinic 3/29/17  Jan 2016 217#  Dec 2016 199#  March and April 202#    mammo today    Polypharmacy - home health RN sets up meds  Will print new med list for RN    Spent 25 min face to face with patient with more the 50% spent in counseling, reviewing chart, and coordination of care and discussing problems listed above.

## 2021-06-10 NOTE — PROGRESS NOTES
Programs applying for: Medical Assistance (renewal)  Case: [id number]  Renewal month: July, due June 8th  FW: Flavio [phone]    6/8/17: I met with Trixie Sofia to complete her health insurance renewal. She has an [facility] worker but she isn't sure if she completed the paperwork. I completed the annual certain populations renewal plus a release with Juany's name on it. This was faxed to [facility] today. She forgot to bring a bank statement with her today. I gave her an envelope and she will mail this back to  [facility] as soon as possible.    6/5/17: In [facility], there isn't an end date. I called Trixie Sofia. She said she isn't sure if she has received the renewal. She said she has someone come to her house to help her with the mail. I called [facility]. They said she needs to turn in the combined Certain Populations renewal for June 8th. I called Trixie Sofia back and scheduled an appointment with her for Thursday. She will bring her bank statement with.

## 2021-06-11 NOTE — PROGRESS NOTES
I met with Trixie Bismark to complete her health insurance renewal. She has an [facility] [address] [facility] at the [facility] worker but she isn't sure if she completed the paperwork. I completed the annual certain populations renewal plus a release with Juany's name on it. This was faxed to [facility] today. She forgot to bring a bank statement with her today. I gave her an envelope and she will mail this back to [facility] as soon as possible.    Cesilia Torrez, Financial Resource Guide  P: [phone]  F: [phone]

## 2021-06-11 NOTE — PROGRESS NOTES
HPI-  58 yo female here with several issues.           Dental pain x 1 month  She had a tooth pulled 1 month ago and after that she started having swollen gums and pain.   She has not had f/u with Dentist  [facility] care guide had trouble getting her an apt.     DM   A1c 7.0 on 8/916 and 7.5 on 10/31/16 -> 8.5 on 2/1/17 -> 7.5 on 5/23/17  Home -245  changed to combined tab of janumet in Dec 2016  changed to pioglitizone 15mg in Feb  per pharmacist recommendation  BP wnl  ASA  LDL 73 in 10/24/16 on lipitor  microalbumin wnl 2/1/17  Eye clinic 3/17/17    Vit D deficiency - 20.0 on 8/15/15 and 24.3 on 10/24/16  Taking ergo     PTSD:   Taking venlafaxine and trazodone  Sleeping  And mood has gotten worse since May  No bad dreams  Wakes up sob and fatigue and heart racing in the middle of the night  She snores    Left arm and left leg pain -   No different from prior visit and meds help briefly     Polypharmacy - home health RN sets up meds    Current Outpatient Prescriptions   Medication Sig Note     acetaminophen (Q-PAP EXTRA STRENGTH) 500 MG tablet Take 2 tablets (1,000 mg total) by mouth 2 (two) times a day as needed for pain.      amitriptyline (ELAVIL) 50 MG tablet Take 1 tablet (50 mg total) by mouth bedtime.      aspirin 81 MG EC tablet Take 1 tablet (81 mg total) by mouth daily.      atorvastatin (LIPITOR) 10 MG tablet TAKE 1 TABLET BEDTIME      blood glucose test (CONTOUR NEXT STRIPS) strips TEST FASTING DAILY      capsaicin (ZOSTRIX) 0.025 % cream Apply 1 application topically 2 (two) times a day. 11/5/2015: Received from: External Pharmacy     gabapentin (NEURONTIN) 300 MG capsule TAKE 1 CAPSULE ORALLY 2 TIMES DAILY      generic lancets (MICROLET LANCET) Dispense brand per patient's insurance at pharmacy discretion.      HYDROcodone-acetaminophen 5-325 mg per tablet Take 1-2 tablets by mouth every 8 (eight) hours as needed for pain. 7/18/2017: Received from: External Pharmacy     naproxen (EC NAPROSYN) 500 MG  "EC tablet Take 1 tablet (500 mg total) by mouth 2 (two) times a day as needed (with meals).      omeprazole (PRILOSEC) 20 MG capsule TAKE 1 CAPSULE ORALLY DAILY      pioglitazone (ACTOS) 15 MG tablet Take 1 tablet (15 mg total) by mouth daily.      sitaGLIPtin-metformin 50-1,000 mg TM24 Take 1 tablet by mouth 2 (two) times a day.      TRAVEL SICKNESS, MECLIZINE, 25 mg chewable tablet CHEW 1 TABLET BY MOUTH 3 TIMES A DAY AS NEEDED FOR DIZZINESS OR NAUSEA      traZODone (DESYREL) 50 MG tablet TAKE 1 TABLET BY MOUTH AT BEDTIME AS NEEDED.      venlafaxine 225 mg TR24 Take 225 mg by mouth daily.      ammonium lactate (LAC-HYDRIN) 12 % lotion APPLY TO AFFECTED AREA TWICE DAILY.      artificial tears, hypromellose, (GONIOVISC) 2.5 % ophthalmic solution 2 drops each eye as needed      clotrimazole (LOTRIMIN) 1 % external solution Apply to feet 2 times per day      ergocalciferol (VITAMIN D2) 50,000 unit capsule Take 1 capsule (50,000 Units total) by mouth once a week.      Vitals:    07/18/17 1119   BP: 124/74   Patient Site: Right Arm   Patient Position: Sitting   Cuff Size: Adult Large   Pulse: 87   Temp: 98.1  F (36.7  C)   TempSrc: Oral   SpO2: 95%   Weight: 215 lb 9.6 oz (97.8 kg)   Height: 5' 0.25\" (1.53 m)     PHYSICAL EXAM   General Appearance: Awake and alert, in no acute distress  HEENT: neck is supple  CV: regular rate  Resp: No respiratory distress. Breathing comfortably  Musculoskeletal: moving limbs comfortably with not deficits or deformities  Skin: no rashes noted    A/P  Dental pain x 1 month  Will ask specialty  to help schedule f/u apt.    DM   A1c 7.0 on 8/916 and 7.5 on 10/31/16 -> 8.5 on 2/1/17 -> 7.5 on 5/23/17  Home -245  changed to combined tab of janumet in Dec 2016  changed to pioglitizone 15mg in Feb  per pharmacist recommendation  BP wnl  ASA  LDL 73 in 10/24/16 on lipitor  microalbumin wnl 2/1/17  Eye clinic 3/17/17    Vit D deficiency - 20.0 on 8/15/15 and 24.3 on " 10/24/16  Taking ergo     PTSD:   Taking venlafaxine and trazodone  Sleeping  And mood has gotten worse since May  No bad dreams  Wakes up sob and fatigue and heart racing in the middle of the night  She snores  Will refer to sleep center to r/o DEIRDRE     Left arm and left leg pain -   No different from prior visit and meds help briefly     Polypharmacy - home health RN sets up meds    Spent 40 min face to face with patient with more the 50% spent in counseling, reviewing chart, and coordination of care and discussing problems listed above.

## 2021-06-11 NOTE — PROGRESS NOTES
Programs applying for: Medical Assistance (renewal)  Case: [id number]  Renewal month: Reed, Due Dec  FW: Flvaio [phone]     6/13/2017: Bank statement was faxed today to [facility]   6/22/2017: No end date showing in [place]. I will check again at the end of the month.   8/1/2017: Patient is active.   This subscriber has eligibility for MA: Medical Assistance.  Elig Type DX: Disabled  Eligibility Begin Date: 01/01/2015  Eligibility End Date: --/--/----

## 2021-06-11 NOTE — PROGRESS NOTES
-Care Guide reminded patient of upcoming appointments with PCP.  -Patient reported that home care nurse is coming to set up medications every other week and health insurance is active.  -Care Guide will outreach patient again in 6 weeks and informed to call sooner if needed.

## 2021-06-12 NOTE — PROGRESS NOTES
HPI -60-year-old female here to follow-up on her depression and diabetes  She is here with her PCA     Chronic pain   Pain management includes tylenol, naproxen and tramadol prn for breakthrough pain only -  Encouraged movement  She will try massage again when she is able (given the pandemic) as she found this helpful   Encouraged movement and mild/gentle exercise     DMT2 -   A1c 7.3 on 8/4/20 -> 7.6 today  Home blood sugars in 190's   janumet XR  50/1000 two times a day, glipizide   on 1/9/20 on  lipitor  microalbumin wml 8/4/20 - not on lisinopril b/c low BP  Eye exam 8/27/20  Smoker   She is reporting some foot pain and requesting a follow-up appointment with podiatry to replace her orthotic shoes     h/o acute diastolic HF and h/o pulm HTN d/t DEIRDRE  Continue lasix and K+  Monitor for now, as she is still not interested in treating her DEIRDRE      H/o pulm HTN  Discussed the need to address her DEIRDRE and will schedule f/u apt to discuss DEIRDRE and CPAP machine  Cardiologist wanted her to treat DEIRDRE and when she want to see him last year, she left w/o being seeing     Vit D deficiency -  last level 23.2 on weekly ergo       PTSD:   sx manageable  At prior visits, she had stopped venlafaxine 225 and trazodone 50  PHQ9 - 5 on 8/4/20     GERD - sx imrpoved with omeprazole     FOBT neg 5/2019 - not yet returned     Obesity - 200# with BMI 39.6 today        Polypharmacy -   Suspect poor compliance but home health RN helps set up meds  Getting home health from [facility]    Periodic arm cramps into Erb's palsy position - occurring less frequently  MRI neck 6/19/18: C5-C6 disc space level there is stable minimal canal compromise  Brain MRI 6/13/18: wnl   Neuro consult with Dr. 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    She has dental caries and some tooth pain    Due for flu  shot    Patient Active Problem List   Diagnosis     Depression With Anxiety     Lichen planus     Arthritis     Controlled substance agreement signed - tramadol 60tabs/mo     Polypharmacy     Diabetes mellitus with neuropathy (H)     PTSD (post-traumatic stress disorder)     Morbid obesity, unspecified obesity type (H)     Moderate major depression (H)     Hypoxia     Essential hypertension     Pulmonary hypertension due to sleep-disordered breathing (H)     Iron deficiency anemia     Poor vision     Controlled type 2 diabetes mellitus without complication, without long-term current use of insulin (H)     Language barrier affecting health care     Poverty     Smoker     DEIRDRE (obstructive sleep apnea)     Chronic pain syndrome     Other specified phobia - fear of elevators     Current Outpatient Medications   Medication Sig     acetaminophen (Q-PAP EXTRA STRENGTH) 500 MG tablet Take 2 tablets (1,000 mg total) by mouth every 4 (four) hours as needed for pain.     atorvastatin (LIPITOR) 20 MG tablet TAKE 1 PILL BY MOUTH EVERYDAY FOR CHOLESTEROL     blood glucose test (CONTOUR NEXT TEST STRIPS) strips Use 1 each As Directed three times daily at 7:30am, 11:30am and 4:30pm.     blood-glucose meter (CONTOUR NEXT METER) Misc Test blood sugar three times a day.     furosemide (LASIX) 40 MG tablet TAKE 1 TABLET (40 MG TOTAL) BY MOUTH 2 (TWO) TIMES A DAY FOR EDEMA     generic lancets (FINGERSTIX LANCETS) Use 1 application As Directed three times daily at 7:30am, 11:30am and 4:30pm.     glipiZIDE (GLUCOTROL XL) 10 MG 24 hr tablet TAKE 1 PILL BY MOUTH DAILY FOR DIABETES     JANUMET XR 50-1,000 mg TM24 TAKE 1 TABLET BY MOUTH 2 (TWO) TIMES A DAY FOR DIABETES     omeprazole (PRILOSEC) 20 MG capsule TAKE 1 CAPSULE (20 MG TOTAL) BY MOUTH DAILY BEFORE BREAKFAST FOR STOMACH     potassium chloride (K-DUR,KLOR-CON) 20 MEQ tablet TAKE 1 TABLET (20 MEQ TOTAL) BY MOUTH DAILY.     VITAMIN D2 1,250 mcg (50,000 unit) capsule TAKE 1 CAPSULE  "(50,000 UNITS TOTAL) BY MOUTH ONCE A WEEK FOR BONE HEALTH     naproxen (NAPROSYN) 500 MG tablet TAKE 1 TABLET (500 MG TOTAL) BY MOUTH 2 (TWO) TIMES A DAY WITH MEALS.     senna (SENOKOT) 8.6 mg tablet Take 1 tablet by mouth daily as needed for constipation.     timoloL (BETIMOL) 0.5 % ophthalmic solution Administer 1 drop to both eyes daily.     traMADoL (ULTRAM) 50 mg tablet TAKE 1 TABLET (50 MG TOTAL) BY MOUTH EVERY 6 (SIX) HOURS AS NEEDED FOR SEVERE PAIN (7-10).     Vitals:    11/05/20 0924   BP: 124/70   Pulse: 67   Resp: 16   Temp: 98.1  F (36.7  C)   TempSrc: Oral   SpO2: 100%   Weight: 200 lb 4.8 oz (90.9 kg)   Height: 4' 11.65\" (1.515 m)     PHYSICAL EXAM   General Appearance: Awake and alert, in no acute distress  HEENT: neck is supple  CV: regular rate  Resp: No respiratory distress. Breathing comfortably  Musculoskeletal: moving limbs comfortably with not deficits or deformities  Skin: no rashes noted        A/P  Diabetes mellitus due to underlying condition with diabetic neuropathy, without long-term current use of insulin (H)  Goal <8.0 given complexity, compliance and understanding challenges  - Glycosylated Hemoglobin A1c  Discussed the use of Victoza to help with weight loss, appetite, diabetes control.  She is not interested in any injectables at this time  Continue current regimen    Diabetic mononeuropathy associated with diabetes mellitus due to underlying condition (H)  - Ambulatory referral to Podiatry    Need for immunization against influenza  - Influenza, Recombinant, Inj, Quadrivalent, PF, 18+YRS    Moderate major depression (H)  Symptoms currently well controlled with current regimen    Screen for colon cancer  Encouraged to retest and personally given instructions to her and her's PCA  - Occult Blood(ICT)     Essential hypertension  Well-controlled with current meds     Language barrier affecting health care    Morbid obesity, unspecified obesity type (H)  Discussed option of Victoza, " referral to bariatric clinic but she is not interested in either  Discussed working on diet and exercise    Pulmonary hypertension due to sleep-disordered breathing (H)  Unable to address her pulmonary hypertension without her willing to use the CPAP.  We will continue to monitor     DEIRDRE (obstructive sleep apnea)  Patient refuses to use CPAP    Polypharmacy  Medications reconciled    Poverty      Cramp of muscle of left upper extremity  Unclear etiology.  But during the exam her neck and arm twisted and her wrist flexed almost to an Erb's palsy type position.  Her previous work-up with neurology, MRI, EMG were unrevealing.  We will recheck her electrolytes and vitamins again  - Comprehensive Metabolic Panel  - Vitamin B12  - Magnesium    Encounter for therapeutic drug monitoring  - Comprehensive Metabolic Panel  - Vitamin B12  - Magnesium     Dental caries  See will help her schedule a dental appointment  - Ambulatory referral to Dentistry        Spent 25 min face to face with patient with more the 50% spent in counseling, reviewing chart with patient and coordination of care, medication reconciliation and discussing problems listed above.   Brtitney professional phone  used.

## 2021-06-12 NOTE — PROGRESS NOTES
"HPI - 58 yo female here for f/u on consults.       Sleep consult 8/16/17  Per consult note: high risk for DEIRDRE and advised split-night nocturnal polysomnography    Podiatrist Dr. Taylor requestng for orders to be signed for diabetic shoe, lachydrin, capsaicin.  She does not know how she got this apt but says \"an  just called me, but I have been seeing him to get new shoes every year for a few years..\"  Today she reports diabetic foot pain. She is interested having the shoe.   Will sign orders    DM   A1c 7.0 on 8/916 and 7.5 on 10/31/16 -> 8.5 on 2/1/17 -> 7.5 on 5/23/17   Home -245  changed to combined tab of janumet in Dec 2016  changed to pioglitizone 15mg in Feb  per pharmacist recommendation  BP wnl  ASA  LDL 73 in 10/24/16 on lipitor  microalbumin wnl 2/1/17  Eye clinic 3/17/17     Vit D deficiency - 20.0 on 8/15/15 and 24.3 on 10/24/16 -> 21.6 on 5/23/17  Taking ergo      PTSD:   Taking venlafaxine and trazodone    No bad dreams  Wakes up sob and fatigue and heart racing in the middle of the night  She snores       Current Outpatient Prescriptions   Medication Sig Note     acetaminophen (Q-PAP EXTRA STRENGTH) 500 MG tablet Take 2 tablets (1,000 mg total) by mouth 2 (two) times a day as needed for pain.      amitriptyline (ELAVIL) 50 MG tablet TAKE 1 TABLET BY MOUTH AT BEDTIME.      aspirin 81 MG EC tablet Take 1 tablet (81 mg total) by mouth daily.      atorvastatin (LIPITOR) 10 MG tablet TAKE 1 TABLET BEDTIME      blood glucose test (CONTOUR NEXT STRIPS) strips TEST FASTING DAILY      ergocalciferol (VITAMIN D2) 50,000 unit capsule Take 1 capsule (50,000 Units total) by mouth once a week.      gabapentin (NEURONTIN) 300 MG capsule TAKE 1 CAPSULE ORALLY 2 TIMES DAILY      generic lancets (MICROLET LANCET) Dispense brand per patient's insurance at pharmacy discretion.      HYDROcodone-acetaminophen 5-325 mg per tablet Take 1-2 tablets by mouth every 8 (eight) hours as needed for pain. 7/18/2017: " "Received from: External Pharmacy     naproxen (EC NAPROSYN) 500 MG EC tablet TAKE ONE TABLET BY MOUTH 2 TIMES A DAY WITH MEALS      omeprazole (PRILOSEC) 20 MG capsule TAKE 1 CAPSULE ORALLY DAILY      pioglitazone (ACTOS) 15 MG tablet Take 1 tablet (15 mg total) by mouth daily.      sitaGLIPtin-metformin 50-1,000 mg TM24 Take 1 tablet by mouth 2 (two) times a day.      TRAVEL SICKNESS, MECLIZINE, 25 mg chewable tablet CHEW 1 TABLET BY MOUTH 3 TIMES A DAY AS NEEDED FOR DIZZINESS OR NAUSEA      traZODone (DESYREL) 50 MG tablet TAKE 1 TABLET BY MOUTH AT BEDTIME AS NEEDED.      venlafaxine 225 mg TR24 Take 225 mg by mouth daily.      ammonium lactate (LAC-HYDRIN) 12 % lotion APPLY TO AFFECTED AREA TWICE DAILY.      artificial tears, hypromellose, (GONIOVISC) 2.5 % ophthalmic solution 2 drops each eye as needed      capsaicin (ZOSTRIX) 0.025 % cream Apply 1 application topically 2 (two) times a day. 11/5/2015: Received from: External Pharmacy     clotrimazole (LOTRIMIN) 1 % external solution Apply to feet 2 times per day      Vitals:    08/24/17 1521   BP: 120/72   Patient Site: Right Arm   Patient Position: Sitting   Cuff Size: Adult Large   Pulse: 84   Resp: 20   Temp: 97.9  F (36.6  C)   TempSrc: Oral   Weight: 215 lb 3 oz (97.6 kg)   Height: 5' 0.25\" (1.53 m)     PHYSICAL EXAM   General Appearance: Awake and alert, in no acute distress  HEENT: neck is supple  CV: regular rate  Resp: No respiratory distress. Breathing comfortably  Musculoskeletal: moving limbs comfortably with not deficits or deformities  Skin: no rashes noted  DM foot exam: weak pedal pulses, no deformities but thick callouses with cracks on bilateral big toe and 2nd toes, partial  sensation to microfilament,    A/P  Sleep consult 8/16/17  Per consult note: high risk for DEIRDRE and advised split-night nocturnal polysomnography  I discussed with her the process, DEIRDRE with pictures and health risks. She refuses to sleep in the hospital.     Podiatrist Dr." "Brandon requestng for orders to be signed for diabetic shoe, lachydrin, capsaicin.  She does not know how she got this apt but says \"an  just called me, but I have been seeing him to get new shoes every year for a few years..\"  Today she reports diabetic foot pain. She is interested having the shoe.     DM   A1c 7.0 on 8/916 and 7.5 on 10/31/16 -> 8.5 on 2/1/17 -> 7.5 on 5/23/17 - check today  changed to combined tab of janumet in Dec 2016  changed to pioglitizone 15mg in Feb  per pharmacist recommendation  BP wnl  ASA  LDL 73 in 10/24/16 on lipitor  microalbumin wnl 2/1/17  Eye clinic 3/17/17     Vit D deficiency - 20.0 on 8/15/15 and 24.3 on 10/24/16  Taking ergo      PTSD:   Taking venlafaxine and trazodone      Spent 25 min face to face with patient with more the 50% spent in counseling, reviewing chart, and coordination of care and discussing problems listed above.        "

## 2021-06-12 NOTE — TELEPHONE ENCOUNTER
Refill Approved    Rx renewed per Medication Renewal Policy. Medication was last renewed on 11/12/19.    Kamilla Renteria, [facility] Triage/Med Refill 10/16/2020     Requested Prescriptions   Pending Prescriptions Disp Refills     omeprazole (PRILOSEC) 20 MG capsule [Pharmacy Med Name: OMEPRAZOLE DR 20 MG CAPSULE 20 Capsule] 30 capsule 3     Sig: TAKE 1 CAPSULE (20 MG TOTAL) BY MOUTH DAILY BEFORE BREAKFAST FOR STOMACH       GI Medications Refill Protocol Passed - 10/13/2020  7:59 AM        Passed - PCP or prescribing provider visit in last 12 or next 3 months.     Last office visit with prescriber/PCP: 12/12/2019 Mei Carbone MD OR same dept: 12/12/2019 Mei Carbone MD OR same specialty: 12/12/2019 Mei Carbone MD  Last physical: 8/4/2020 Last MTM visit: Visit date not found   Next visit within 3 mo: Visit date not found  Next physical within 3 mo: Visit date not found  Prescriber OR PCP: Mei Carbone MD  Last diagnosis associated with med order: 1. GERD (gastroesophageal reflux disease)  - omeprazole (PRILOSEC) 20 MG capsule [Pharmacy Med Name: OMEPRAZOLE DR 20 MG CAPSULE 20 Capsule]; TAKE 1 CAPSULE (20 MG TOTAL) BY MOUTH DAILY BEFORE BREAKFAST. FOR STOMACH  Dispense: 30 capsule; Refill: 3    If protocol passes may refill for 12 months if within 3 months of last provider visit (or a total of 15 months).

## 2021-06-12 NOTE — PROGRESS NOTES
Dear Dr. Mei Carbone Md  [address] [place] [address]    Thank you for the opportunity to participate in the care of Ms. Trixie Sofia.    She is a 57 y.o. female who comes to the clinic with a chief complaint of excessive daytime sleepiness that has been going on for about 7 years. While the patient denies any episodes of witness apnea, she has been told that she does snore loudly at night as per her children. Her review of systems is significant for weight changes,  Chest pain, cough, constipation, and excessive urination. All these issues will be addressed by her PCP.       Past Medical History  Past Medical History:   Diagnosis Date     Depression      Diabetes mellitus      Hypertension         Past Surgical History  Past Surgical History:   Procedure Laterality Date     TONGUE SURGERY Left 2006    surgery in [place] for mass on Tongue        Meds  Current Outpatient Prescriptions   Medication Sig Dispense Refill     acetaminophen (Q-PAP EXTRA STRENGTH) 500 MG tablet Take 2 tablets (1,000 mg total) by mouth 2 (two) times a day as needed for pain. 100 tablet 6     amitriptyline (ELAVIL) 50 MG tablet TAKE 1 TABLET BY MOUTH AT BEDTIME. 30 tablet 11     ammonium lactate (LAC-HYDRIN) 12 % lotion APPLY TO AFFECTED AREA TWICE DAILY. 400 g 0     artificial tears, hypromellose, (GONIOVISC) 2.5 % ophthalmic solution 2 drops each eye as needed 15 mL 12     aspirin 81 MG EC tablet Take 1 tablet (81 mg total) by mouth daily. 90 tablet 3     atorvastatin (LIPITOR) 10 MG tablet TAKE 1 TABLET BEDTIME 90 tablet 1     blood glucose test (CONTOUR NEXT STRIPS) strips TEST FASTING DAILY 50 strip 6     capsaicin (ZOSTRIX) 0.025 % cream Apply 1 application topically 2 (two) times a day.       clotrimazole (LOTRIMIN) 1 % external solution Apply to feet 2 times per day 30 mL 0     ergocalciferol (VITAMIN D2) 50,000 unit capsule Take 1 capsule (50,000 Units total) by mouth once a week. 12 capsule 1     gabapentin  (NEURONTIN) 300 MG capsule TAKE 1 CAPSULE ORALLY 2 TIMES DAILY 60 capsule 5     generic lancets (MICROLET LANCET) Dispense brand per patient's insurance at pharmacy discretion. 100 each 11     HYDROcodone-acetaminophen 5-325 mg per tablet Take 1-2 tablets by mouth every 8 (eight) hours as needed for pain.       naproxen (EC NAPROSYN) 500 MG EC tablet TAKE ONE TABLET BY MOUTH 2 TIMES A DAY WITH MEALS 60 tablet 0     omeprazole (PRILOSEC) 20 MG capsule TAKE 1 CAPSULE ORALLY DAILY 30 capsule 5     pioglitazone (ACTOS) 15 MG tablet Take 1 tablet (15 mg total) by mouth daily. 30 tablet 11     sitaGLIPtin-metformin 50-1,000 mg TM24 Take 1 tablet by mouth 2 (two) times a day. 180 tablet 3     TRAVEL SICKNESS, MECLIZINE, 25 mg chewable tablet CHEW 1 TABLET BY MOUTH 3 TIMES A DAY AS NEEDED FOR DIZZINESS OR NAUSEA 90 tablet 3     traZODone (DESYREL) 50 MG tablet TAKE 1 TABLET BY MOUTH AT BEDTIME AS NEEDED. 30 tablet 11     venlafaxine 225 mg TR24 Take 225 mg by mouth daily. 30 each 11     No current facility-administered medications for this visit.         Allergies  No known drug allergies     Social History  Social History     Social History     Marital status: Single     Spouse name: N/A     Number of children: N/A     Years of education: N/A     Occupational History     Not on file.     Social History Main Topics     Smoking status: Current Every Day Smoker     Years: 30.00     Types: Pipe     Smokeless tobacco: Never Used     Alcohol use No     Drug use: No     Sexual activity: No     Other Topics Concern     Not on file     Social History Narrative    [place] refugee[place] [facility]. Lives with daughter, son-in-law and grandchildren.  passed away when she was pregnant with her daughter.         Family History  Family History   Problem Relation Age of Onset     Diabetes Mother      Hypertension Mother      Hyperlipidemia Mother      Diabetes Sister      Hypertension Sister       Diabetes Sister      Hypertension Sister      Diabetes Sister      Hypertension Sister      No Medical Problems Sister      Breast cancer Neg Hx       Review of Systems:  Constitutional: Negative except as noted in HPI.   Eyes: Negative except as noted in HPI.   ENT: Negative except as noted in HPI.   Cardiovascular: Negative except as noted in HPI.   Respiratory: Negative except as noted in HPI.   Gastrointestinal: Negative except as noted in HPI.   Genitourinary: Negative except as noted in HPI.   Musculoskeletal: Negative except as noted in HPI.   Integumentary: Negative except as noted in HPI.   Neurological: Negative except as noted in HPI.   Psychiatric: Negative except as noted in HPI.   Endocrine: Negative except as noted in HPI.   Hematologic/Lymphatic: Negative except as noted in HPI.      STOP BANG 8/16/2017   Do you snore loudly (louder than talking or loud enough to be heard through closed doors)? 1   Do you often feel tired, fatigued, or sleepy during daytime? 1   Has anyone observed you stop breathing in your sleep? 0   Do you have or are you being treated for high blood pressure? 0   BMI more than 35 kg/m2 1   Age over 50 years old? 1   Neck circumference greater than 16 inches? 0   Gender male? 0   Total Score 4   Epworths Sleepiness Scale 8/16/2017   Sitting and reading 1   Watching TV 1   Sitting, inactive in a public place (e.g. a theatre or a meeting) 1   As a passenger in a car for an hour without a break 0   Lying down to rest in the afternoon when circumstances permit 0   Sitting and talking to someone 1   Sitting quietly after a lunch without alcohol 0   In a car, while stopped for a few minutes in traffic 0   Total score 4   Rooming 8/16/2017   Usual bedtime 10   Sleep Latency 60 mn   Awakenings 6   Wake Up Time 5   Energy Drinks 0   Coffee 0   Cola 0   Difficulty falling asleep Yes   Difficulty staying asleep Yes   Excessive daytime tiredness Yes   Excessive daytime sleepiness Yes   Dozing  "off while driving No   Shift Worker No   Sleep Walking? No   Sleep Talking? Yes   Restless legs symptoms No       Physical Exam:  /72  Pulse 74  Ht 5' 0.25\" (1.53 m)  Wt 217 lb 12.8 oz (98.8 kg)  LMP 01/30/2013  SpO2 99%  BMI 42.18 kg/m2  BMI:Body mass index is 42.18 kg/(m^2).   GEN: NAD, Morbidly Obese  Head: Normocephalic.  EYES: PERRLA, EOMI  ENT: Oropharynx is clear, mallampatti class 3+ airway. Uvula is intact  Nasal mucosa is moist without erythema  Neck : Thyroid is within normal limits. Neck circ 15.75 inches  CV: Regular rate and rhythm, S1 & S2 positive.  LUNGS: Bilateral breathsounds heard.   ABDOMEN: Positive bowel sounds in all quadrants, soft, no rebound or guarding  MUSCULOSKELETAL: Bilateral trace leg swelling  SKIN: warm, dry, no rashes  Neurological: Alert, oriented to time, place, and person.  Psych: normal mood, normal affect     Labs/Studies:     Lab Results   Component Value Date    WBC 7.3 10/24/2016    HGB 11.7 (L) 10/24/2016    HCT 35.9 10/24/2016    MCV 78 (L) 10/24/2016     10/24/2016         Chemistry        Component Value Date/Time     12/12/2016 0854    K 4.3 12/12/2016 0854     12/12/2016 0854    CO2 23 12/12/2016 0854    BUN 6 (L) 12/12/2016 0854    CREATININE 0.67 12/12/2016 0854     (H) 12/12/2016 0854        Component Value Date/Time    CALCIUM 9.4 12/12/2016 0854    ALKPHOS 319 (H) 12/12/2016 0854    AST 32 12/12/2016 0854    ALT 33 12/12/2016 0854    BILITOT 0.6 12/12/2016 0854            No results found for: FERRITIN  Lab Results   Component Value Date    TSH 1.74 12/12/2014         Assessment and Plan:  In summary Trixie Sofia is a 57 y.o. year old female here for sleep disturbance.  1. Hypersomnia   Mrs. Trixie Sofia has high risk for obstructive sleep apnea based on the history of excessive daytime sleepiness, snoring and a crowded airway. I educated the patient on the underlying pathophysiology of obstructive sleep apnea. We reviewed the risks " associated with sleep apnea, including increased cardiovascular risk and overall death. We talked about treatments briefly. I recommend getting an split-night nocturnal polysomnography. The patient should return to the clinic to discuss results and treatment option in a patient-centered approach. Patient would like to discuss this with her primary care provider before deciding to proceed with the sleep study. I've put a standing order for the sleep study.  2.Snoring  3. Other sleep disturbance  4. Morbid Obesity    History of cranial facial surgery? Yes, it was a surgery on her tongue.    Patient verbalized understanding of these issues, agrees with the plan and all questions were answered today. Patient was given an opportuntity to voice any other symptoms or concerns not listed above. Patient did not have any other symptoms or concerns.      Follow up as needed.     Eric Leung DO  Board Certified in Internal Medicine and Sleep Medicine  [facility].    (Note created with Dragon voice recognition and unintended spelling errors and word substitutions may occur)     All the information was obtained through the help of the interpretor.

## 2021-06-12 NOTE — PROGRESS NOTES
Updated emergency care plan for maintenance plan.    Diabetes: Sick-Day Plan  Infections, the flu, and even a cold, can cause your blood sugar to rise. And, eating less, nausea, and vomiting may cause your blood glucose to fall (hypoglycemia). Ask your health care provider to help you develop a sick-day plan. The following information can help.    Call your health care provider if:    You vomit or have diarrhea for more than 6 hours.    Your blood glucose level is higher than usual or over 250 mg/dL after you have taken extra insulin (if recommended in your sick-day plan).    You take oral medicine for diabetes, and your blood sugar is higher than usual or over 250 mg/dL, before a meal and stays that high for more than 24 hours.    Your blood glucose is lower than usual or less than 70 mg/dL    You have moderate to large amounts of ketones in your blood or urine.    You aren t better after 2 days.    Depression  Everyone feels down at times. The blues are a natural part of life. But an unhappy period that s intense or lasts for more than a couple of weeks can be a sign of depression. Depression is a serious illness. It can disrupt the lives of family and friends. If you know someone you think may be depressed, find out what you can do to help.    Know the serious signals  Warning signals for suicide include:    Threats or talk of suicide    Statements such as  I won t be a problem much longer  or  Nothing matters     Giving away possessions or making a will or  arrangements    Buying a gun or other weapon    Sudden, unexplained cheerfulness or calm after a period of depression  If you notice any of these signs, get help right away. Call a health care professional, mental health clinic, or suicide hotline and ask what action to take. In an emergency, don t hesitate to call the police.    [facility] Mental Health Crisis Lines:  [facility] [phone]  [facility] [phone]  [facility]  [phone]  [facility] [phone]  [facility], Adults [phone]  [facility], Children [phone]  [facility] Adults [phone]  [facility], Children [phone]    High Blood Pressure (Hypertension)  You have been diagnosed with high blood pressure (also called hypertension). This means the force of blood against your artery walls is too strong. It also means your heart is working hard to move blood. High blood pressure usually has no symptoms, but over time, it can damage your heart, blood vessels, eyes, kidneys, and other organs. With help from your doctor, you can manage your blood pressure and protect your health.  Taking medications    Learn to take your own blood pressure. Keep a record of your results. Ask your doctor which readings mean that you need medical attention.    Take your blood pressure medication exactly as directed. Don t skip doses. Missing doses can cause your blood pressure to get out of control.    Avoid medications that contain heart stimulants, including over-the-counter drugs. Check for warnings about high blood pressure on the label.    Check with your doctor before taking a decongestant. Some decongestants can worsen high blood pressure.  Lifestyle changes    Maintain a healthy weight. Get help to lose any extra pounds.    Cut back on salt.  o Limit canned, dried, packaged, and fast foods.  o Don t add salt to your food at the table.  o Season foods with herbs instead of salt when you cook.    Follow the DASH (Dietary Approaches to Stop Hypertension) eating plan. This plan recommends vegetables, fruits, whole gains, and other heart healthy foods.    Begin an exercise program. Ask your doctor how to get started. The American Heart Association recommends aerobic exercise 3 to 4 times a week for an average of 40 minutes at a time, with your doctor's approval. Simple activities like walking or gardening can help.    Break the smoking habit. Enroll in a  stop-smoking program to improve your chances of success. Ask your health care provider about programs and medications to help you stop smoking.    Limit drinks that contain caffeine (coffee, black or green tea, cola) to 2 per day.    Never take stimulants such as amphetamines or cocaine; these drugs can be deadly for someone with high blood pressure.    Control your stress. Learn stress-management techniques.    Limit alcohol to no more than 1 drink a day for women and 2 drinks a day for men.  Follow-up care  Make a follow-up appointment as directed by our staff.     When to seek medical care  Call your doctor immediately if you have any of the following:    Chest pain or shortness of breath (call 911)    Moderate to severe headache    Weakness in the muscles of your face, arms, or legs    Trouble speaking    Extreme drowsiness    Confusion    Fainting or dizziness    Pulsating or rushing sound in your ears    Unexplained nosebleed    Weakness, tingling, or numbness of your face, arms, or legs    Change in vision    Blood pressure measured at home that is greater than 180/110

## 2021-06-13 NOTE — TELEPHONE ENCOUNTER
Central PA team  [phone]  Pool: HE PA MED ([id number])          PA has been initiated.       PA form completed and faxed insurance via Cover My Meds     Key:  [id number]     Medication:  janumet 50-1000mg    Insurance:  [employer]         Response will be received via fax and may take up to 5-10 business days depending on plan

## 2021-06-13 NOTE — PATIENT INSTRUCTIONS - HE
Please call one of the [facility] locations below to schedule an appointment. If you received a prescription please bring it with you to your appointment. Some locations are limited to what they carry.    Office Locations    [facility]  [address] [place] [address]  Home Medical Equipment, [address]   Phone: [phone]   Orthotics and Prosthetics, [address]   Phone: [phone]    [facility]  Home Medical Equipment  [address], [address], [place] [address]   Phone: [phone]    Orthotics and Prosthetics ([facility])    [address], [address], [place] [address]  Phone: [phone]    [facility] [place]  [address]  [address]   [place] [address]   Phone: [phone]    [facility]  [address]  [place] [address]  Phone: [phone]    [facility]   [address]  [place] [address]  Phone: [phone]    [facility]  [address]  [place] [address]  Phone: [phone]    [facility]  [address]  [address]  [place] [address]  Phone: [phone]    [place]   [address] [place] [address]   Phone: [phone]

## 2021-06-13 NOTE — PROGRESS NOTES
HPI -  58 yo female here DM check.     Left arm cramps that twists in internal rotation - started 4 years ago  0-3 x per month and last for 30mins  CMP wnl except glucose 12/2016     DM /metabolic syndrome  A1c 7.0 on 8/916 and 7.5 on 10/31/16 -> 8.5 on 2/1/17 -> 7.5 on 5/23/17 -> 7.6 on 8/24/17  Home -245  changed to combined tab of janumet in Dec 2016  changed to pioglitizone 15mg in Feb  per pharmacist recommendation  BP wnl  ASA  LDL 73 in 10/24/16 on lipitor  microalbumin wnl 2/1/17  Eye clinic 3/17/17    Vit D deficiency - 20.0 on 8/15/15 and 24.3 on 10/24/16 -> 21.6 on 5/23/17  Taking ergo       PTSD:   Taking venlafaxine and trazodone   Sleep ok but mood low when in pain    FOBT - neg 8/2016    Sleep consult 8/16/17  Per consult note: high risk for DEIRDRE and advised split-night nocturnal polysomnography  But she does not want to the test    Morbid obesity - BMI 44.2  Last seen at  bariatric clinic 3/27/17        REVIEW OF SYSTEMS  General: no weight changes, fatigue  HEENT:  no HA,  no vision changes, URI sx  Respiratory:  no cough, dyspnea  Cardiovascular: no chest pain, palpitations  Gastrointestinal: no nausea/vomiting, diarrhea/constipation, melena  : no dysuria, no vaginal d/c  Neurologic: no seizures, focal weakness, tremors  Skin: no rashes    Patient Active Problem List   Diagnosis     Dizziness     Obesity     Neck Pain     Insomnia     Headache     Drowsiness     Edema     Pain During Urination (Dysuria)     Neuralgia     Constipation     Oral Leukoplakia     Sciatica     Generalized Pain     Fatigue     Vitamin D Deficiency     Lower Back Pain     Depression With Anxiety     Lumbar Disc Degeneration L4 - L5     Lichen Planus     Urinary Incontinence     Arthritis     Frequent, Full-bladder Emptying (Polyuria)     Vaginal Discharge     Menopause     Abdominal Pain In The Central Upper Belly (Epigastric)     Joint Pain Fingers     Myalgia And Myositis     Mouth pain     Tinnitus     Pain in  left ear     Vertigo     Diabetes mellitus, type 2     Controlled substance agreement signed - tramadol 60tabs/mo     Multiple allergies     Polypharmacy     Cracked skin on feet     Type 2 diabetes mellitus with hyperosmolarity without coma, unspecified long term insulin use status     Diabetes mellitus with neuropathy     HTN (hypertension)     PTSD (post-traumatic stress disorder)     DM neuropathy, painful     Tinea pedis     Morbid obesity, unspecified obesity type         Past Medical History:   Diagnosis Date     Depression      Diabetes mellitus      Hypertension      Family History   Problem Relation Age of Onset     Diabetes Mother      Hypertension Mother      Hyperlipidemia Mother      Diabetes Sister      Hypertension Sister      Diabetes Sister      Hypertension Sister      Diabetes Sister      Hypertension Sister      No Medical Problems Sister      Breast cancer Neg Hx      OB History      Para Term  AB Living    1 1 1   1    SAB TAB Ectopic Multiple Live Births                Social History     Social History     Marital status: Single     Spouse name: N/A     Number of children: N/A     Years of education: N/A     Occupational History     Not on file.     Social History Main Topics     Smoking status: Current Every Day Smoker     Years: 30.00     Types: Pipe     Smokeless tobacco: Never Used     Alcohol use No     Drug use: No     Sexual activity: No     Other Topics Concern     Not on file     Social History Narrative    [place] refugee. [place] card. Lives with daughter, son-in-law and grandchildren.  passed away when she was pregnant with her daughter.          Current Outpatient Prescriptions   Medication Sig Note     acetaminophen (Q-PAP EXTRA STRENGTH) 500 MG tablet Take 2 tablets (1,000 mg total) by mouth 2 (two) times a day as needed for pain.      amitriptyline (ELAVIL) 50 MG tablet TAKE 1 TABLET BY MOUTH AT BEDTIME.      ammonium  lactate (LAC-HYDRIN) 12 % lotion APPLY TO AFFECTED AREA TWICE DAILY.      artificial tears, hypromellose, (GONIOVISC) 2.5 % ophthalmic solution 2 drops each eye as needed      aspirin 81 MG EC tablet Take 1 tablet (81 mg total) by mouth daily.      atorvastatin (LIPITOR) 10 MG tablet TAKE 1 TABLET BEDTIME      blood glucose test (CONTOUR NEXT STRIPS) strips TEST FASTING DAILY      capsaicin (ZOSTRIX) 0.025 % cream Apply 1 application topically 2 (two) times a day. 11/5/2015: Received from: External Pharmacy     clotrimazole (LOTRIMIN) 1 % external solution Apply to feet 2 times per day      ergocalciferol (VITAMIN D2) 50,000 unit capsule Take 1 capsule (50,000 Units total) by mouth once a week.      gabapentin (NEURONTIN) 300 MG capsule TAKE 1 CAPSULE ORALLY 2 TIMES DAILY      generic lancets (MICROLET LANCET) Dispense brand per patient's insurance at pharmacy discretion.      HYDROcodone-acetaminophen 5-325 mg per tablet Take 1-2 tablets by mouth every 8 (eight) hours as needed for pain. 7/18/2017: Received from: External Pharmacy     naproxen (EC NAPROSYN) 500 MG EC tablet TAKE ONE TABLET BY MOUTH 2 TIMES A DAY WITH MEALS      omeprazole (PRILOSEC) 20 MG capsule TAKE 1 CAPSULE ORALLY DAILY      pioglitazone (ACTOS) 15 MG tablet Take 1 tablet (15 mg total) by mouth daily.      sitaGLIPtin-metformin 50-1,000 mg TM24 Take 1 tablet by mouth 2 (two) times a day.      TRAVEL SICKNESS, MECLIZINE, 25 mg chewable tablet CHEW 1 TABLET BY MOUTH 3 TIMES A DAY AS NEEDED FOR DIZZINESS OR NAUSEA      traZODone (DESYREL) 50 MG tablet TAKE 1 TABLET BY MOUTH AT BEDTIME AS NEEDED.      venlafaxine 225 mg TR24 Take 225 mg by mouth daily.      Vitals:    10/03/17 1619   BP: 110/60   Pulse: 78   Resp: 20   Temp: 98.1  F (36.7  C)   TempSrc: Oral   SpO2: 96%   Weight: (!) 226 lb 1.6 oz (102.6 kg)   Height: 5' (1.524 m)     OBJECTIVE:  Vitals listed above within normal limits  General appearance: well groomed, pleasant, well hydrated,  nontoxic appearing  ENT: PERRL, throat clear  Neck: neck supple, no lymphadenopathy, no thyromegaly  Lungs: lungs clear to auscultation bilaterally, no wheezes or rhonchi  Heart: regular rate and rhythm, no murmurs, rubs or gallops  Abdomen: soft, nontender  Neuro: no focal deficits, CN II-XII grossly intact, alert and oriented  Psych:  mood stable, appears to have good insight and judgment    A/P  Left arm cramps that twists in internal rotation - started 4 years ago  0-3 x per month and last for 30mins  CMP wnl except glucose 12/2016   Unclear etiology    DM /metabolic syndrome  A1c 7.0 on 8/916 and 7.5 on 10/31/16 -> 8.5 on 2/1/17 -> 7.5 on 5/23/17 -> 7.6 on 8/24/17  Home -245  changed to combined tab of janumet in Dec 2016  changed to pioglitizone 15mg in Feb  per pharmacist recommendation  BP wnl  ASA  LDL 73 in 10/24/16 on lipitor  microalbumin wnl 2/1/17  Eye clinic 3/17/17    Vit D deficiency - 20.0 on 8/15/15 and 24.3 on 10/24/16 -> 21.6 on 5/23/17  Taking ergo       PTSD:   Taking venlafaxine and trazodone   Sleep ok but mood low when in pain    FOBT - neg 8/2016    Sleep consult 8/16/17  Per consult note: high risk for DEIRDRE and advised split-night nocturnal polysomnography  But she does not want to do sleep est    Morbid obesity - BMI 44.2  Will refer to [place] [facility] to see Dr. Johnson who has experience working with [place] patients and very thorough    Spent 40 min face to face with patient with more the 50% spent in counseling, reviewing chart, and coordination of care and discussing problems listed above.

## 2021-06-13 NOTE — TELEPHONE ENCOUNTER
Refill Approved    Rx renewed per Medication Renewal Policy. Medication was last renewed on 3/3/20.    Jackie Gonzalez, [facility] Triage/Med Refill 11/10/2020     Requested Prescriptions   Pending Prescriptions Disp Refills     furosemide (LASIX) 40 MG tablet [Pharmacy Med Name: FUROSEMIDE 40 MG TAB 40 Tablet] 60 tablet 2     Sig: TAKE 1 TABLET (40 MG TOTAL) BY MOUTH 2 (TWO) TIMES A DAY FOR EDEMA       Diuretics/Combination Diuretics Refill Protocol  Passed - 11/8/2020 10:10 AM        Passed - Visit with PCP or prescribing provider visit in past 12 months     Last office visit with prescriber/PCP: 11/5/2020 Mei Carbone MD OR same dept: 11/5/2020 Mei Carbone MD OR same specialty: 11/5/2020 Mei Carbone MD  Last physical: 8/4/2020 Last MTM visit: Visit date not found   Next visit within 3 mo: Visit date not found  Next physical within 3 mo: Visit date not found  Prescriber OR PCP: Mei Carbone MD  Last diagnosis associated with med order: 1. Essential hypertension  - furosemide (LASIX) 40 MG tablet [Pharmacy Med Name: FUROSEMIDE 40 MG TAB 40 Tablet]; TAKE 1 TABLET (40 MG TOTAL) BY MOUTH 2 (TWO) TIMES A DAY FOR EDEMA  Dispense: 60 tablet; Refill: 2    If protocol passes may refill for 12 months if within 3 months of last provider visit (or a total of 15 months).             Passed - Serum Potassium in past 12 months      Lab Results   Component Value Date    Potassium 3.8 11/05/2020             Passed - Serum Sodium in past 12 months      Lab Results   Component Value Date    Sodium 137 11/05/2020             Passed - Blood pressure on file in past 12 months     BP Readings from Last 1 Encounters:   11/05/20 124/70             Passed - Serum Creatinine in past 12 months      Creatinine   Date Value Ref Range Status   11/05/2020 0.87 0.60 - 1.10 mg/dL Final

## 2021-06-13 NOTE — PROGRESS NOTES
This Maintenance Wellness Plan provides private information in regards to the work I have done with my Care Team from my Primary Care Clinic.  This document provides insight on the goals I have worked hard to achieve.  My Care Team congratulates me on my journey to become well.  With the assistance of my Clinic Care Guide and Primary Care Physician,  I have succeeded in improving areas of my health that I identified as barriers to becoming well.  I will continue to seek wellness and use the skills I have obtained to further my journey.  My Care Guide will follow up with me in 3 months.  In the meantime, if I should have any questions or concerns I will contact my Care Guide.      My Clinic Care Coordination Wellness Plan    [facility]  [address] [place]  [address]  [phone]        My Preferred Method of Contact:  Phone: [phone]    My Primary/Preferred Language:  [place]    Preferred Learning Style:  Face to face discussion, Pictures/Diagrams and Hands on teaching    Emergency Contact: Extended Emergency Contact Information  Primary Emergency Contact: Emilia Rivera   [place] [facility]  Mobile Phone: [phone]  Relation: Child     PCP:  Mei Carbone MD  Specialists:    [facility] Sleep study and [facility]  Accomplishments:  Goals       COMPLETED: I would like to be less depressed. (pt-stated)            Action steps to achieve this goal:  1. I feel that my depression symptoms have been stabled for the most time however I will continue with individual therapy every two weeks and taking medicines as prescribed daily.        COMPLETED: I would like to get less arthritis pain. (pt-stated)            Action steps to achieve this goal:  1. I will continue taking medicine tramadol as prescribed daily.  2. I will put heat pad on twice a day to tolerate pain on my knees.  3. I will continue with exercises to reduce pain.        COMPLETED: I would like to  prevent my diabetes getting worst.  (pt-stated)            Action steps to achieve this goal:    1.  I have been taking my medications daily for diabetes and checking my blood sugar two times a day.  2.  I will try my best to prevent my diabetes worsening by eating healthy diet that recommended and taking medications daily.  3.  I will follow up with primary doctor again on 8/24/2017.      Goal update: 08/10/2017.        COMPLETED: Medications:            Take one metformin with first and last meals of the day.        COMPLETED: Medications:            Take one metformin in the morning and one metformin in the evening.  Eat something when you take these pills, try to have 1/2-1 cup rice with some veggies and fish.        COMPLETED: Nutrition:            Eat 2 meals/day, first meal about 10am and 2nd meal about 6-7pm.  Keep rice to 1 cup or less per meal.        COMPLETED: Nutrition:            When you take your morning and night pills make sure you eat something, try having 1/2-1 cup rice with a some veggies and fish or meat.  This can help prevent the diarrhea and stomach upset which may be caused by the metformin.            Advanced Directive/Living Will: The patient was given information regarding Adanced Directives/Living Will    Clinical Emergency Plan:  Diabetes: Sick-Day Plan  Infections, the flu, and even a cold, can cause your blood sugar to rise. And, eating less, nausea, and vomiting may cause your blood glucose to fall (hypoglycemia). Ask your health care provider to help you develop a sick-day plan. The following information can help.     Call your health care provider if:    You vomit or have diarrhea for more than 6 hours.    Your blood glucose level is higher than usual or over 250 mg/dL after you have taken extra insulin (if recommended in your sick-day plan).    You take oral medicine for diabetes, and your blood sugar is higher than usual or over 250 mg/dL, before a meal and stays that high for  more than 24 hours.    Your blood glucose is lower than usual or less than 70 mg/dL    You have moderate to large amounts of ketones in your blood or urine.    You aren t better after 2 days.     Depression  Everyone feels down at times. The blues are a natural part of life. But an unhappy period that s intense or lasts for more than a couple of weeks can be a sign of depression. Depression is a serious illness. It can disrupt the lives of family and friends. If you know someone you think may be depressed, find out what you can do to help.     Know the serious signals  Warning signals for suicide include:    Threats or talk of suicide    Statements such as  I won t be a problem much longer  or  Nothing matters     Giving away possessions or making a will or  arrangements    Buying a gun or other weapon    Sudden, unexplained cheerfulness or calm after a period of depression  If you notice any of these signs, get help right away. Call a health care professional, mental health clinic, or suicide hotline and ask what action to take. In an emergency, don t hesitate to call the police.     [facility] Mental Health Crisis Lines:  [facility] [phone]  [facility] [phone]  [facility] [phone]  [facility] [phone]  [facility], Adults [phone]  [facility], Children [phone]  [facility], Adults [phone]  [facility], Children [phone]     High Blood Pressure (Hypertension)  You have been diagnosed with high blood pressure (also called hypertension). This means the force of blood against your artery walls is too strong. It also means your heart is working hard to move blood. High blood pressure usually has no symptoms, but over time, it can damage your heart, blood vessels, eyes, kidneys, and other organs. With help from your doctor, you can manage your blood pressure and protect your health.  Taking medications    Learn to take your own blood  pressure. Keep a record of your results. Ask your doctor which readings mean that you need medical attention.    Take your blood pressure medication exactly as directed. Don t skip doses. Missing doses can cause your blood pressure to get out of control.    Avoid medications that contain heart stimulants, including over-the-counter drugs. Check for warnings about high blood pressure on the label.    Check with your doctor before taking a decongestant. Some decongestants can worsen high blood pressure.  Lifestyle changes    Maintain a healthy weight. Get help to lose any extra pounds.    Cut back on salt.    Limit canned, dried, packaged, and fast foods.    Don t add salt to your food at the table.    Season foods with herbs instead of salt when you cook.    Follow the DASH (Dietary Approaches to Stop Hypertension) eating plan. This plan recommends vegetables, fruits, whole gains, and other heart healthy foods.    Begin an exercise program. Ask your doctor how to get started. The American Heart Association recommends aerobic exercise 3 to 4 times a week for an average of 40 minutes at a time, with your doctor's approval. Simple activities like walking or gardening can help.    Break the smoking habit. Enroll in a stop-smoking program to improve your chances of success. Ask your health care provider about programs and medications to help you stop smoking.    Limit drinks that contain caffeine (coffee, black or green tea, cola) to 2 per day.    Never take stimulants such as amphetamines or cocaine; these drugs can be deadly for someone with high blood pressure.    Control your stress. Learn stress-management techniques.    Limit alcohol to no more than 1 drink a day for women and 2 drinks a day for men.  Follow-up care  Make a follow-up appointment as directed by our staff.     When to seek medical care  Call your doctor immediately if you have any of the following:    Chest pain or shortness of breath (call  [phone])    Moderate to severe headache    Weakness in the muscles of your face, arms, or legs    Trouble speaking    Extreme drowsiness    Confusion    Fainting or dizziness    Pulsating or rushing sound in your ears    Unexplained nosebleed    Weakness, tingling, or numbness of your face, arms, or legs    Change in vision    Blood pressure measured at home that is greater than 180/110     All [facility] clinic patients have access to a Nurse 24 hours a day, 7 days a week.  If you have questions or want advice from a Nurse, please know [facility] is here for you.  You can call your clinic and they will connect you or you can call [facility] at [phone].  [facility] also has Walk In Care clinics in multiple locations.  Call the number listed above for more information about our Walk In Care clinics or visit the [facility] website at [email or web address].

## 2021-06-14 ENCOUNTER — AMBULATORY - HEALTHEAST (OUTPATIENT)
Dept: FAMILY MEDICINE | Facility: CLINIC | Age: 61
End: 2021-06-14

## 2021-06-14 DIAGNOSIS — D50.8 IRON DEFICIENCY ANEMIA SECONDARY TO INADEQUATE DIETARY IRON INTAKE: ICD-10-CM

## 2021-06-14 DIAGNOSIS — D64.9 SEVERE ANEMIA: ICD-10-CM

## 2021-06-14 DIAGNOSIS — Z60.3 LANGUAGE BARRIER AFFECTING HEALTH CARE: ICD-10-CM

## 2021-06-14 DIAGNOSIS — Z75.8 LANGUAGE BARRIER AFFECTING HEALTH CARE: ICD-10-CM

## 2021-06-14 SDOH — SOCIAL STABILITY - SOCIAL INSECURITY: ACCULTURATION DIFFICULTY: Z60.3

## 2021-06-14 NOTE — PROGRESS NOTES
"Trixie came in for weight loss intake form. She is very clear that she is no interested in bariatric surgery. She states that her weight was never a problem in [facility] or [place], in [place] she weighed 80 kilo's. It has been since [place] has lived in the [place] that her weight has drastically changed. She believes it was from a surgery that she had on her tongue to remove a large tumor or cyst (she was not clear on what type of growth it was). Every since then she can only eat soft foods and food is no longer appetizing and she has no interest in eating. She states she goes to bed at 1 am and is up by 6 am, she eats breakfast between 10 and 12, rarely eats lunch or dinner and no snacks, she reports drinking 6 to 8 liters of water a day. She says since the surgery that meat tastes different and she vomits after eating it. For breakfast and her other meal of the day if she eats is \"a little rice, some veggies and chili pepper sauce\" she rarely eats fruit and says only a half of an apple. She is also Type II Diabetic. She says she exercises by walking around the house for 5 to 10 minutes a day. She does not eat tofu or fish either. WE were not able to finish today as she was not feeling well, spitting numerous time in office trash can, so a follow up appointment has been scheduled.  "

## 2021-06-14 NOTE — TELEPHONE ENCOUNTER
Refill Approved    Rx renewed per Medication Renewal Policy. Medication was last renewed on 2/2/20.    Jackie Gonzalez, [facility] Triage/Med Refill 1/6/2021     Requested Prescriptions   Pending Prescriptions Disp Refills     potassium chloride (K-DUR,KLOR-CON) 20 MEQ tablet [Pharmacy Med Name: POTASSIUM CHLOR 20 MEQ ER 20 Tablet] 30 tablet 3     Sig: TAKE 1 TABLET (20 MEQ TOTAL) BY MOUTH DAILY.       Potassium Supplements Refill Protocol Passed - 1/5/2021  9:04 AM        Passed - PCP or prescribing provider visit in past 12 months       Last office visit with prescriber/PCP: 11/5/2020 Mei Carbone MD OR same dept: 11/5/2020 Mei Carbone MD OR same specialty: 11/5/2020 Mei Carbone MD  Last physical: 8/4/2020 Last MTM visit: Visit date not found   Next visit within 3 mo: Visit date not found  Next physical within 3 mo: Visit date not found  Prescriber OR PCP: Mei Carbone MD  Last diagnosis associated with med order: 1. Hypokalemia  - potassium chloride (K-DUR,KLOR-CON) 20 MEQ tablet [Pharmacy Med Name: POTASSIUM CHLOR 20 MEQ ER 20 Tablet]; TAKE 1 TABLET (20 MEQ TOTAL) BY MOUTH DAILY.  Dispense: 30 tablet; Refill: 3    2. Type 2 diabetes mellitus with hyperosmolarity without coma (H)  - atorvastatin (LIPITOR) 20 MG tablet [Pharmacy Med Name: ATORVASTATIN CALCIUM 20 MG 20 Tablet]; TAKE 1 PILL BY MOUTH EVERYDAY FOR CHOLESTEROL  Dispense: 30 tablet; Refill: 3    3. Hyperlipidemia LDL goal <100  - atorvastatin (LIPITOR) 20 MG tablet [Pharmacy Med Name: ATORVASTATIN CALCIUM 20 MG 20 Tablet]; TAKE 1 PILL BY MOUTH EVERYDAY FOR CHOLESTEROL  Dispense: 30 tablet; Refill: 3    If protocol passes may refill for 12 months if within 3 months of last provider visit (or a total of 15 months).             Passed - Potassium level in last 12 months     Lab Results   Component Value Date    Potassium 3.8 11/05/2020                atorvastatin (LIPITOR) 20 MG tablet [Pharmacy Med Name: ATORVASTATIN  CALCIUM 20 MG 20 Tablet] 30 tablet 3     Sig: TAKE 1 PILL BY MOUTH EVERYDAY FOR CHOLESTEROL       Statins Refill Protocol (Hmg CoA Reductase Inhibitors) Passed - 1/5/2021  9:04 AM        Passed - PCP or prescribing provider visit in past 12 months      Last office visit with prescriber/PCP: 11/5/2020 Mei Carbone MD OR same dept: 11/5/2020 Mei Carbone MD OR same specialty: 11/5/2020 Mei Carbone MD  Last physical: 8/4/2020 Last MTM visit: Visit date not found   Next visit within 3 mo: Visit date not found  Next physical within 3 mo: Visit date not found  Prescriber OR PCP: Mei Carbone MD  Last diagnosis associated with med order: 1. Hypokalemia  - potassium chloride (K-DUR,KLOR-CON) 20 MEQ tablet [Pharmacy Med Name: POTASSIUM CHLOR 20 MEQ ER 20 Tablet]; TAKE 1 TABLET (20 MEQ TOTAL) BY MOUTH DAILY.  Dispense: 30 tablet; Refill: 3    2. Type 2 diabetes mellitus with hyperosmolarity without coma (H)  - atorvastatin (LIPITOR) 20 MG tablet [Pharmacy Med Name: ATORVASTATIN CALCIUM 20 MG 20 Tablet]; TAKE 1 PILL BY MOUTH EVERYDAY FOR CHOLESTEROL  Dispense: 30 tablet; Refill: 3    3. Hyperlipidemia LDL goal <100  - atorvastatin (LIPITOR) 20 MG tablet [Pharmacy Med Name: ATORVASTATIN CALCIUM 20 MG 20 Tablet]; TAKE 1 PILL BY MOUTH EVERYDAY FOR CHOLESTEROL  Dispense: 30 tablet; Refill: 3    If protocol passes may refill for 12 months if within 3 months of last provider visit (or a total of 15 months).

## 2021-06-14 NOTE — PROGRESS NOTES
Assessment:      Healthy female exam.   Patient's last menstrual period was 01/30/2013.  Last Pap: 5/27/2014. Results were: normal  Last mammogram: 4/20/2017. Results were: normal  FOBT neg 2016 - repeat declined  Check CMP, CBC, lipids,     Arm/hand cramps  Previously ate raw pork and thus at risk for neurocysticercosis  Will check MRI of brain and neck    Vit D deficiency - 20.0 on 8/15/15 and 24.3 on 10/24/16 -> 21.6 on 5/23/17  Taking ergo   Check level       PTSD:   Taking venlafaxine and trazodone   Sleep ok but mood low when in pain    Morbid obesity - BMI 44.2  Last seen at  bariatric clinic 3/27/17  weigth 226 on 10/3/17 -> 214 today  She has decrease in appetitie and doing some exercise    Knee and ankle pain - taking gabapentin and naproxen     Dry skin patches on breast and arms/hands  Breast and mammo wnl   - rx for lanolin/mineral oil     DM /metabolic syndrome  A1c 7.0 on 8/916 and 7.5 on 10/31/16 -> 8.5 on 2/1/17 -> 7.5 on 5/23/17 -> 7.6 on 8/24/17  on janumet and pioglitizone   BP wnl  ASA  LDL 73 in 10/24/16 on lipitor   microalbumin wnl 2/1/17  Eye clinic 3/17/17    Subjective:      Trixie Sofia is a 57 y.o. female who presents for an annual exam. The patient is not sexually active. The patient participates in regular exercise: no. The patient reports that there is not domestic violence in her life.    Arm/hand cramps -   Left messages to let her  MRI of her head and neck to see if there is a reason why her arm cramps.   Left arm cramps that twists in internal rotation - started 4 years ago  0-3 x per month and last for 30mins  CMP wnl except glucose 12/2016     DM /metabolic syndrome  A1c 7.0 on 8/916 and 7.5 on 10/31/16 -> 8.5 on 2/1/17 -> 7.5 on 5/23/17 -> 7.6 on 8/24/17  on janumet and pioglitizone   BP wnl  ASA  LDL 73 in 10/24/16 on lipitor   microalbumin wnl 2/1/17  Eye clinic 3/17/17     Vit D deficiency - 20.0 on 8/15/15 and 24.3 on 10/24/16 -> 21.6 on 5/23/17  Taking ergo       PTSD:  "  Taking venlafaxine and trazodone   Sleep ok but mood low when in pain       Sleep consult 8/16/17  Per consult note: high risk for DEIRDRE and advised split-night nocturnal polysomnography  But she does not want to the test     Morbid obesity - BMI 44.2  Last seen at [facility] 3/27/17  weigth 226 on 10/3/17 -> 214 today    Knee and ankle pain - taking gabapentin and naproxen     Podiatrist Dr. Taylor requestng for orders to be signed for diabetic shoe, lachydrin, capsaicin.  She does not know how she got this apt but says \"an  just called me, but I have been seeing him to get new shoes every year for a few years..\"  she reports diabetic foot pain. She is interested having the shoe.   She reports rubbing condoms on her feet help with the callouses.    Healthy Habits:   Regular Exercise: No  Sunscreen Use: No  Healthy Diet: Yes  Dental Visits Regularly: Yes  Seat Belt: Yes  Sexually active: No  Self Breast Exam Monthly:Yes  Hemoccults: No  Flex Sig: No  Colonoscopy: No  Lipid Profile: No  Glucose Screen: No  Prevention of Osteoporosis: N/A  Last Dexa: N/A  Guns at Home:  No      Immunization History   Administered Date(s) Administered     Hep A, Adult IM (19yr & older) 10/11/2012, 05/08/2013     Hep B, historic 10/16/2014     Influenza, inj, historic,unspecified 12/15/2010, 11/16/2011     Influenza, seasonal,quad inj 36+ mos 08/24/2017     Influenza, seasonal,quad inj 6-35 mos 09/20/2012, 09/13/2013, 09/30/2014     Influenza,seasonal quad, PF, 36+MOS 09/11/2015, 12/08/2016     MMR 12/15/2010, 01/28/2011     Pneumo Conj 13-V (2010&after) 02/25/2016     Pneumo Polysac 23-V 02/20/2012     Td,adult,historic,unspecified 02/20/2012     Tdap 12/15/2010, 11/16/2011, 10/16/2014     Varicella 10/16/2014     Immunization status: up to date and documented.        Gynecologic History  Patient's last menstrual period was 01/30/2013.  Contraception: none  Last Pap: 5/27/2014. Results were: normal  Last mammogram: " 2017. Results were: normal      OB History    Para Term  AB Living   1 1 1   1   SAB TAB Ectopic Multiple Live Births             # Outcome Date GA Lbr Wai/2nd Weight Sex Delivery Anes PTL Lv   1 Term                   Current Outpatient Prescriptions   Medication Sig Dispense Refill     acetaminophen (Q-PAP EXTRA STRENGTH) 500 MG tablet Take 2 tablets (1,000 mg total) by mouth 2 (two) times a day as needed for pain. 100 tablet 6     amitriptyline (ELAVIL) 50 MG tablet TAKE 1 TABLET BY MOUTH AT BEDTIME. 30 tablet 11     aspirin 81 MG EC tablet TAKE 1 PILL BY MOUTH EVERYDAY 30 tablet 0     atorvastatin (LIPITOR) 10 MG tablet TAKE 1 TABLET BEDTIME 90 tablet 1     blood glucose test (CONTOUR NEXT STRIPS) strips TEST FASTING DAILY 50 strip 6     ergocalciferol (VITAMIN D2) 50,000 unit capsule Take 1 capsule (50,000 Units total) by mouth once a week. 12 capsule 1     gabapentin (NEURONTIN) 300 MG capsule Take 1 capsule (300 mg total) by mouth 2 (two) times a day. 180 capsule 3     generic lancets (MICROLET LANCET) Dispense brand per patient's insurance at pharmacy discretion. 100 each 11     naproxen (NAPROSYN) 500 MG tablet TAKE 1 PILL BY MOUTH TWICE DAILY WITH MEALS 30 tablet 1     omeprazole (PRILOSEC) 20 MG capsule Take 1 capsule (20 mg total) by mouth daily before breakfast. 30 capsule 3     pioglitazone (ACTOS) 15 MG tablet Take 1 tablet (15 mg total) by mouth daily. 30 tablet 11     sitaGLIPtin-metformin 50-1,000 mg TM24 Take 1 tablet by mouth 2 (two) times a day. 180 tablet 3     venlafaxine 225 mg TR24 Take 225 mg by mouth daily. 30 each 11     ammonium lactate (LAC-HYDRIN) 12 % lotion APPLY TO AFFECTED AREA TWICE DAILY. 400 g 0     artificial tears, hypromellose, (GONIOVISC) 2.5 % ophthalmic solution 2 drops each eye as needed 15 mL 12     capsaicin (ZOSTRIX) 0.025 % cream Apply 1 application topically 2 (two) times a day.       clotrimazole (LOTRIMIN) 1 % external solution Apply to feet 2  times per day 30 mL 0     condoms - male Misc Use as needed 24 each 11     HYDROcodone-acetaminophen 5-325 mg per tablet Take 1-2 tablets by mouth every 8 (eight) hours as needed for pain.       TRAVEL SICKNESS, MECLIZINE, 25 mg chewable tablet CHEW 1 TABLET BY MOUTH 3 TIMES A DAY AS NEEDED FOR DIZZINESS OR NAUSEA 90 tablet 3     traZODone (DESYREL) 50 MG tablet TAKE 1 TABLET BY MOUTH AT BEDTIME AS NEEDED. 30 tablet 11     No current facility-administered medications for this visit.      Past Medical History:   Diagnosis Date     Depression      Diabetes mellitus      Hypertension      Past Surgical History:   Procedure Laterality Date     TONGUE SURGERY Left 2006    surgery in [place] for mass on Tongue     No known drug allergies  Family History   Problem Relation Age of Onset     Diabetes Mother      Hypertension Mother      Hyperlipidemia Mother      Diabetes Sister      Hypertension Sister      Diabetes Sister      Hypertension Sister      Diabetes Sister      Hypertension Sister      No Medical Problems Sister      Breast cancer Neg Hx      Social History     Social History     Marital status: Single     Spouse name: N/A     Number of children: N/A     Years of education: N/A     Occupational History     Not on file.     Social History Main Topics     Smoking status: Current Every Day Smoker     Years: 30.00     Types: Pipe     Smokeless tobacco: Never Used     Alcohol use No     Drug use: No     Sexual activity: No     Other Topics Concern     Not on file     Social History Narrative    [place] refugee. [place] in [place] in [place], directly to [place]. Green card. Lives with daughter, son-in-law and grandchildren.  passed away when she was pregnant with her daughter.        Review of Systems  Review of Systems      REVIEW OF SYSTEMS  General: no weight changes, fatigue  HEENT:  no HA,  no vision changes, URI sx  Respiratory:  no cough, dyspnea  Cardiovascular: no chest pain, palpitations  Gastrointestinal: no  nausea/vomiting, diarrhea/constipation, but does have some cramping  : no dysuria, no vaginal d/c  Neurologic: no seizures, focal weakness, tremors  Skin: rash on breasts and arm        Objective:         Vitals:    12/04/17 1315   BP: 120/72   Pulse: 84   Resp: 18   Temp: 98.4  F (36.9  C)   TempSrc: Oral   SpO2: 96%   Weight: 214 lb 8 oz (97.3 kg)   Height: 5' (1.524 m)     Body mass index is 41.89 kg/(m^2).    Physical  Physical Exam     OBJECTIVE:  Vitals listed above within normal limits  General appearance: well groomed, pleasant, well hydrated, nontoxic appearing  ENT: PERRL, throat clear  Neck: neck supple, no lymphadenopathy, no thyromegaly  Lungs: lungs clear to auscultation bilaterally, no wheezes or rhonchi  Heart: regular rate and rhythm, no murmurs, rubs or gallops  Abdomen: soft, nontender  Neuro: no focal deficits, CN II-XII grossly intact, alert and oriented  Psych:  mood stable, appears to have good insight and judgment  Pelvic exam:   deferred  BREAST EXAM: normal without suspicious masses, skin or nipple changes or axillary nodes, self-exam is taught and discussed  Skin: dry patches on breast, hands very dry with callouses on fingers

## 2021-06-14 NOTE — TELEPHONE ENCOUNTER
RN cannot approve Refill Request    RN can NOT refill this medication med is not covered by policy/route to provider. Last office visit: 11/5/2020 Mei Carbone MD Last Physical: 8/4/2020 Last MTM visit: Visit date not found Last visit same specialty: 11/5/2020 Mei Carbone MD.  Next visit within 3 mo: Visit date not found  Next physical within 3 mo: Visit date not found      Jackie Gonzalez, [facility] Triage/Med Refill 1/21/2021    Requested Prescriptions   Pending Prescriptions Disp Refills     naproxen (NAPROSYN) 500 MG tablet [Pharmacy Med Name: NAPROXEN 500 MG TABLET 500 Tablet] 30 tablet 2     Sig: TAKE 1 TABLET (500 MG TOTAL) BY MOUTH 2 (TWO) TIMES A DAY WITH MEALS.       There is no refill protocol information for this order

## 2021-06-15 ENCOUNTER — COMMUNICATION - HEALTHEAST (OUTPATIENT)
Dept: FAMILY MEDICINE | Facility: CLINIC | Age: 61
End: 2021-06-15

## 2021-06-15 NOTE — TELEPHONE ENCOUNTER
Refill Approved    Rx renewed per Medication Renewal Policy. Medication was last renewed on 2/15/20.    Harjit Carter, [facility] Triage/Med Refill 3/5/2021     Requested Prescriptions   Pending Prescriptions Disp Refills     JANUMET XR 50-1,000 mg TM24 [Pharmacy Med Name: JANUMET XR 50-1,000 MG TAB  Tablet] 60 tablet 3     Sig: TAKE 1 TABLET BY MOUTH 2 (TWO) TIMES A DAY FOR DIABETES       Metformin Refill Protocol Passed - 3/4/2021 10:29 AM        Passed - Blood pressure in last 12 months     BP Readings from Last 1 Encounters:   02/18/21 100/62             Passed - LFT or AST or ALT in last 12 months     Albumin   Date Value Ref Range Status   11/05/2020 3.4 (L) 3.5 - 5.0 g/dL Final     Bilirubin, Total   Date Value Ref Range Status   11/05/2020 0.6 0.0 - 1.0 mg/dL Final     Bilirubin, Direct   Date Value Ref Range Status   01/03/2019 0.2 <=0.5 mg/dL Final     Alkaline Phosphatase   Date Value Ref Range Status   11/05/2020 174 (H) 45 - 120 U/L Final     AST   Date Value Ref Range Status   11/05/2020 16 0 - 40 U/L Final     ALT   Date Value Ref Range Status   11/05/2020 12 0 - 45 U/L Final     Protein, Total   Date Value Ref Range Status   11/05/2020 7.3 6.0 - 8.0 g/dL Final                Passed - GFR or Serum Creatinine in last 6 months     GFR MDRD Non Af Amer   Date Value Ref Range Status   11/05/2020 >60 >60 mL/min/1.73m2 Final     GFR MDRD Af Amer   Date Value Ref Range Status   11/05/2020 >60 >60 mL/min/1.73m2 Final             Passed - Visit with PCP or prescribing provider visit in last 6 months or next 3 months     Last office visit with prescriber/PCP: 2/18/2021 OR same dept: 2/18/2021 Mei Carbone MD OR same specialty: 2/18/2021 Mei Carbone MD Last physical: Visit date not found Last MTM visit: Visit date not found         Next appt within 3 mo: Visit date not found  Next physical within 3 mo: Visit date not found  Prescriber OR PCP: Mei Carbone MD  Last diagnosis  associated with med order: 1. Type 2 diabetes mellitus with diabetic neuropathy, without long-term current use of insulin (H)  - JANUMET XR 50-1,000 mg TM24 [Pharmacy Med Name: JANUMET XR 50-1,000 MG TAB  Tablet]; TAKE 1 TABLET BY MOUTH 2 (TWO) TIMES A DAY FOR DIABETES  Dispense: 60 tablet; Refill: 3     If protocol passes may refill for 12 months if within 3 months of last provider visit (or a total of 15 months).           Passed - A1C in last 6 months     Hemoglobin A1c   Date Value Ref Range Status   02/18/2021 8.0 (H) <=5.6 % Final               Passed - Microalbumin in last year      Microalbumin, Random Urine   Date Value Ref Range Status   08/04/2020 1.13 0.00 - 1.99 mg/dL Final

## 2021-06-15 NOTE — PROGRESS NOTES
S: Pt. seen in [place] office with . Female. Dr. Talamantes. RX: DM Foot orthotics and Shoes. DX: Type 2 DM with polyneuropathy. B/L Pronation.   O: Condition unchanged since initial evaluation. Pt. has had orthotic DM intervention in the past. Foot pain for 7 years with pain of 4/10. No surgeries to date. No ulcerations.  A: Pes Planus feet upon weight bearing. Hind foot neutral. Heels inverted. Fore foot neutral, ROM within normal except DF. 1st ray flexible. Neuropathy B/L in toes. B/L edema in feet and ankles. Balance is compromised. Today I F/D Custom [place] DMFO. FO provide lift to medial long. arches and reduce shear forces while ambulating and provide total contact with plantar surface.. Pt. measures for 7.5W shoes but wants 9.5W, Pt. says 7.5 hurt her feet. Pt. choose Dr. Ramires 3210 size 9.5W shoes to accommodate DMFO. Overall fit is good. Pt. happy with fit and support. Shoes where checked for proper fit while weight bearing in length width and height. Shoe are to long but that is what Pt. wanted. Sofia cordon wear and care instructions along with break in schedule given.  P: Pt. to be seen as needed.    Yordy TERRY,STALIN

## 2021-06-15 NOTE — PROGRESS NOTES
HPI - 57 yo female here to f/u on Periodic arm cramps into Erb's palsy position and MRI.     This cramping with twisting back occurs 0-3 x per month.      HEAD MRI WITHOUT AND WITH IV CONTRAST 12/29/2017 5:38 PM INDICATION: Organic brain syndrome. Periodic arm cramps into Erb's palsy position, risk for neurocysticercosis. TECHNIQUE: Head MRI without and with intravenous contrast.   CONCLUSION: 1.  Stable examination. No restricted diffusion/acute infarct, space-occupying hemorrhage, or intracranial mass. 2.  Minimal burden nonspecific T2 hyperintensities in the periventricular and subcortical white matter. These findings are nonspecific though may be related to chronic small vessel ischemic changes.      MR CERVICAL SPINE W WO CONTRAST 12/29/2017 5:39 PM INDICATION: Neck pain, first study. Periodic arm cramps into Erb's palsy position, risk for neurocysticercosis. TECHNIQUE: MRI cervical spine with and without 9 mL Gadavist IV contrast. CONTRAST: Gadavist 9 mL. COMPARISON: Prior MRI from 11/1/2016.  CONCLUSION: 1.  No abnormal cord signal or enhancement. 2.  Mild cervical spondylosis with mild narrowing of the canal.    Vit D deficiency - 20.0 on 8/15/15 and 24.3 on 10/24/16 -> 21.6 on 5/23/17 -> 19.5 on 12/4/17  Taking ergo        PTSD:   Taking venlafaxine and trazodone   Sleep ok but mood low when in pain  No bad dreams  PHQ9 = 5 today     Morbid obesity - BMI 44.2  Last seen at  bariatric clinic 3/27/17  weigt 226 on 10/3/17 -> 214 today  She has decrease in appetitie and doing some exercise  She meet with SW to help with intake for weight loss     Knee and ankle pain - taking gabapentin and naproxen   Order zostrix cream but not covered     Dry skin patches on breast and arms/hands - improved with rx for lanolin/mineral oil   Breast and mammo wnl       DM /metabolic syndrome  A1c 7.0 on 8/916 and 7.5 on 10/31/16 -> 8.5 on 2/1/17 -> 7.5 on 5/23/17 -> 7.6 on 8/24/17 -> 6.9 on 12/4/17  on janumet and  pioglitizone   BP wnl  ASA  LDL 73 in 10/24/16 on lipitor -> 112 on 12/4/17  microalbumin wnl 2/1/17  Eye clinic 3/17/17    Prevention -   Pap and HPV neg 5/2014    Polypharmacy -   Home health RN sets up meds    Current Outpatient Prescriptions   Medication Sig Note     acetaminophen (Q-PAP EXTRA STRENGTH) 500 MG tablet Take 2 tablets (1,000 mg total) by mouth 2 (two) times a day as needed for pain.      amitriptyline (ELAVIL) 50 MG tablet TAKE 1 TABLET BY MOUTH AT BEDTIME.      aspirin 81 MG EC tablet TAKE 1 PILL BY MOUTH EVERYDAY      atorvastatin (LIPITOR) 10 MG tablet TAKE 1 TABLET BEDTIME      blood glucose test (CONTOUR NEXT STRIPS) strips TEST FASTING DAILY      ergocalciferol (ERGOCALCIFEROL) 50,000 unit capsule Take 1 capsule (50,000 Units total) by mouth once a week.      gabapentin (NEURONTIN) 300 MG capsule Take 1 capsule (300 mg total) by mouth 2 (two) times a day.      generic lancets (MICROLET LANCET) Dispense brand per patient's insurance at pharmacy discretion.      naproxen (NAPROSYN) 500 MG tablet TAKE 1 PILL BY MOUTH TWICE DAILY WITH MEALS      omeprazole (PRILOSEC) 20 MG capsule Take 1 capsule (20 mg total) by mouth daily before breakfast.      pioglitazone (ACTOS) 15 MG tablet Take 1 tablet (15 mg total) by mouth daily.      sitaGLIPtin-metformin 50-1,000 mg TM24 Take 1 tablet by mouth 2 (two) times a day.      venlafaxine 225 mg TR24 Take 225 mg by mouth daily.      ammonium lactate (LAC-HYDRIN) 12 % lotion APPLY TO AFFECTED AREA TWICE DAILY.      artificial tears, hypromellose, (GONIOVISC) 2.5 % ophthalmic solution 2 drops each eye as needed      capsaicin (ZOSTRIX) 0.025 % cream Apply 1 application topically 2 (two) times a day. 11/5/2015: Received from: External Pharmacy     clotrimazole (LOTRIMIN) 1 % external solution Apply to feet 2 times per day      condoms - male Misc Use as needed      HYDROcodone-acetaminophen 5-325 mg per tablet Take 1-2 tablets by mouth every 8 (eight) hours as  needed for pain. 7/18/2017: Received from: External Pharmacy     lanolin-min oil-zinc ox-wh.pet Oint Apply to affected area several times a day      TRAVEL SICKNESS, MECLIZINE, 25 mg chewable tablet CHEW 1 TABLET BY MOUTH 3 TIMES A DAY AS NEEDED FOR DIZZINESS OR NAUSEA      traZODone (DESYREL) 50 MG tablet TAKE 1 TABLET BY MOUTH AT BEDTIME AS NEEDED.      Vitals:    01/09/18 1403   BP: 118/70   Pulse: 82   Resp: 18   Temp: 98  F (36.7  C)   TempSrc: Oral   SpO2: 98%   Weight: 215 lb (97.5 kg)   Height: 5' (1.524 m)     PHYSICAL EXAM   General Appearance: Awake and alert, in no acute distress  HEENT: neck is supple  CV: regular rate  Resp: No respiratory distress. Breathing comfortably  Musculoskeletal: moving limbs comfortably with not deficits or deformities  Skin: no rashes noted    A/P   Periodic arm cramps into Erb's palsy position and MRI.   MRI of head and neck unrevealing  Referral to neuro    Vit D deficiency - 20.0 on 8/15/15 and 24.3 on 10/24/16 -> 21.6 on 5/23/17 -> 19.5 on 12/4/17  Taking ergo        PTSD:   Taking venlafaxine and trazodone   Sleep ok but mood low when in pain  No bad dreams  PHQ9 = 5 today     Morbid obesity - BMI 44.2  Last seen at  bariatric clinic 3/27/17  weigth 226 on 10/3/17 -> 214 today  She has decrease in appetitie and doing some exercise  She meet with  to help with intake for weight loss     Knee and ankle pain - taking gabapentin and naproxen   Order zostrix cream but not covered  rx for lidocaine gel     Dry skin patches on breast and arms/hands - improved with rx for lanolin/mineral oil   Breast and mammo wnl       DM /metabolic syndrome  A1c 7.0 on 8/916 and 7.5 on 10/31/16 -> 8.5 on 2/1/17 -> 7.5 on 5/23/17 -> 7.6 on 8/24/17 -> 6.9 on 12/4/17  on janumet and pioglitizone   BP wnl  ASA  LDL 73 in 10/24/16 on lipitor -> 112 on 12/4/17  microalbumin wnl 2/1/17  Eye clinic 3/17/17    Prevention -   Pap and HPV neg 5/2014    Polypharmacy -   Home health RN sets up  meds  FOBT ordered today    Spent 40 min face to face with patient with more the 50% spent in counseling, reviewing chart, and coordination of care and discussing problems listed above.

## 2021-06-15 NOTE — PROGRESS NOTES
This Graduation Wellness Plan provides private information in regards to the work I have done with my Care Team from my Primary Care Clinic.  This document provides insight on the goals I have accomplished.  My Care Team congratulates me on my journey to maintain wellness.  This document will help guide me on my journey to maintain a healthy lifestyle.  I will use this to help me overcome any barriers I may encounter.  If I should have any questions or concerns, I will continue to contact the members of my Care Team or contact my Primary Care Clinic for assistance.       My Clinic Care Coordination Wellness Plan    [facility]  [address] [place]  [address]  [phone]      My Preferred Method of Contact:  Phone: [phone]    My Primary/Preferred Language:  Brittney    Preferred Learning Style:  Face to face discussion, Pictures/Diagrams and Hands on teaching    Emergency Contact: Extended Emergency Contact Information  Primary Emergency Contact: Emilia Rivera   [place]  Mobile Phone: [phone]  Relation: Child     PCP:  Mei Carbone MD  Specialists:    [facility] surgeon, [facility] pharmacist.    Accomplishments:  Goals       COMPLETED: I would like to be less depressed. (pt-stated)            Action steps to achieve this goal:  1. I feel that my depression symptoms have been stabled for the most time however I will continue with individual therapy every two weeks and taking medicines as prescribed daily.        COMPLETED: I would like to get less arthritis pain. (pt-stated)            Action steps to achieve this goal:  1. I will continue taking medicine tramadol as prescribed daily.  2. I will put heat pad on twice a day to tolerate pain on my knees.  3. I will continue with exercises to reduce pain.        COMPLETED: I would like to prevent my diabetes getting worst.  (pt-stated)            Action steps to achieve this goal:    1.  I  have been taking my medications daily for diabetes and checking my blood sugar two times a day.  2.  I will try my best to prevent my diabetes worsening by eating healthy diet that recommended and taking medications daily.  3.  I will follow up with primary doctor again on 8/24/2017.      Goal update: 08/10/2017.        COMPLETED: Medications:            Take one metformin with first and last meals of the day.        COMPLETED: Medications:            Take one metformin in the morning and one metformin in the evening.  Eat something when you take these pills, try to have 1/2-1 cup rice with some veggies and fish.        COMPLETED: Nutrition:            Eat 2 meals/day, first meal about 10am and 2nd meal about 6-7pm.  Keep rice to 1 cup or less per meal.        COMPLETED: Nutrition:            When you take your morning and night pills make sure you eat something, try having 1/2-1 cup rice with a some veggies and fish or meat.  This can help prevent the diarrhea and stomach upset which may be caused by the metformin.            Advanced Directive/Living Will: The patient was given information regarding Adanced Directives/Living Will    Clinical Emergency Plan:  Diabetes: Sick-Day Plan  Infections, the flu, and even a cold, can cause your blood sugar to rise. And, eating less, nausea, and vomiting may cause your blood glucose to fall (hypoglycemia). Ask your health care provider to help you develop a sick-day plan. The following information can help.     Call your health care provider if:    You vomit or have diarrhea for more than 6 hours.    Your blood glucose level is higher than usual or over 250 mg/dL after you have taken extra insulin (if recommended in your sick-day plan).    You take oral medicine for diabetes, and your blood sugar is higher than usual or over 250 mg/dL, before a meal and stays that high for more than 24 hours.    Your blood glucose is lower than usual or less than 70 mg/dL    You have  moderate to large amounts of ketones in your blood or urine.    You aren t better after 2 days.     Depression  Everyone feels down at times. The blues are a natural part of life. But an unhappy period that s intense or lasts for more than a couple of weeks can be a sign of depression. Depression is a serious illness. It can disrupt the lives of family and friends. If you know someone you think may be depressed, find out what you can do to help.     Know the serious signals  Warning signals for suicide include:    Threats or talk of suicide    Statements such as  I won t be a problem much longer  or  Nothing matters     Giving away possessions or making a will or  arrangements    Buying a gun or other weapon    Sudden, unexplained cheerfulness or calm after a period of depression  If you notice any of these signs, get help right away. Call a health care professional, mental health clinic, or suicide hotline and ask what action to take. In an emergency, don t hesitate to call the police.     [facility] Mental Health Crisis Lines:  [facility] [phone]  [facility] [phone]  [facility] [phone]  [facility] [phone]  [facility], Adults [phone]  [facility], Children [phone]  [facility], Adults [phone]  [facility], Children [phone]     High Blood Pressure (Hypertension)  You have been diagnosed with high blood pressure (also called hypertension). This means the force of blood against your artery walls is too strong. It also means your heart is working hard to move blood. High blood pressure usually has no symptoms, but over time, it can damage your heart, blood vessels, eyes, kidneys, and other organs. With help from your doctor, you can manage your blood pressure and protect your health.  Taking medications    Learn to take your own blood pressure. Keep a record of your results. Ask your doctor which readings mean that you need medical  attention.    Take your blood pressure medication exactly as directed. Don t skip doses. Missing doses can cause your blood pressure to get out of control.    Avoid medications that contain heart stimulants, including over-the-counter drugs. Check for warnings about high blood pressure on the label.    Check with your doctor before taking a decongestant. Some decongestants can worsen high blood pressure.  Lifestyle changes    Maintain a healthy weight. Get help to lose any extra pounds.    Cut back on salt.    Limit canned, dried, packaged, and fast foods.    Don t add salt to your food at the table.    Season foods with herbs instead of salt when you cook.    Follow the DASH (Dietary Approaches to Stop Hypertension) eating plan. This plan recommends vegetables, fruits, whole gains, and other heart healthy foods.    Begin an exercise program. Ask your doctor how to get started. The American Heart Association recommends aerobic exercise 3 to 4 times a week for an average of 40 minutes at a time, with your doctor's approval. Simple activities like walking or gardening can help.    Break the smoking habit. Enroll in a stop-smoking program to improve your chances of success. Ask your health care provider about programs and medications to help you stop smoking.    Limit drinks that contain caffeine (coffee, black or green tea, cola) to 2 per day.    Never take stimulants such as amphetamines or cocaine; these drugs can be deadly for someone with high blood pressure.    Control your stress. Learn stress-management techniques.    Limit alcohol to no more than 1 drink a day for women and 2 drinks a day for men.  Follow-up care  Make a follow-up appointment as directed by our staff.     When to seek medical care  Call your doctor immediately if you have any of the following:    Chest pain or shortness of breath (call 911)    Moderate to severe headache    Weakness in the muscles of your face, arms, or legs    Trouble  speaking    Extreme drowsiness    Confusion    Fainting or dizziness    Pulsating or rushing sound in your ears    Unexplained nosebleed    Weakness, tingling, or numbness of your face, arms, or legs    Change in vision    Blood pressure measured at home that is greater than 180/110     All [facility] clinic patients have access to a Nurse 24 hours a day, 7 days a week.  If you have questions or want advice from a Nurse, please know [facility] is here for you.  You can call your clinic and they will connect you or you can call [facility] at [phone].  [facility] also has Walk In Care clinics in multiple locations.  Call the number listed above for more information about our Walk In Care clinics or visit the [facility] website at [email or web address].

## 2021-06-15 NOTE — PROGRESS NOTES
Assessment & Plan     Diabetes mellitus due to underlying condition with diabetic neuropathy, without long-term current use of insulin (H)  Diabetes has been getting progressively worse.  Her A1c went from 7.3-7.6 to now 8.0.  She reports taking her medications as they are set up by the home health nurse.  She does report that she has been eating more.  Her PCA says that they she has been getting food donations that include more snacks and she has been unable to get much exercise with the severe cold weather.  We talked about adding another medication to her current regimen of Janumet and glipizide.  She was not interested in adding more medication.  So discussed working on diet and exercise.  I will put in a referral for her to have a consultation with diabetes educator to learn more about ways to work on her diet.  I suggested she try some exercise videos to help her move even inside the house.  I suggested some makayla chi for beginners since it is very low impact and might help her with her balance.  Her PCA said that she would help her with this.  - Lipid Cascade RANDOM  - Glycosylated Hemoglobin A1c  - Ambulatory referral to Diabetes Education Program (CDE)    Vitamin D insufficiency  Recheck level and continue vitamin D  - Vitamin D, Total (25-Hydroxy)    Mixed hyperlipidemia  Continue Lipitor    Pulmonary hypertension due to sleep-disordered breathing (H)  This is a known issue that I take into account for medical decisions but no current exacerbation or new concerns.  She has known severe sleep apnea but refuses to wear CPAP machine.  Cardiology does not want to follow-up with her unless she is willing to treat her sleep apnea      Moderate major depression (H)  Reports that her symptoms have improved in fact being off the depression medications have helped her feel less fatigued and foggy.  She does have sadness related to being estranged from her daughter and her grandchildren.  Her PCA told me a little  "bit and asked the patient if she could tell me more details about her family dynamics but the patient said she did not feel like talking about that today.    Morbid obesity, unspecified obesity type (H)  She lost 2 pounds since her last visit.  We discussed working on diet and exercise as described above    Polypharmacy  Reconciled pill bottles with med list.  Health RN sets up medications    Language barrier affecting health care        Diagnosis or treatment significantly limited by social determinants of health - language, low health literacy, poverty  34 minutes spent on the date of the encounter doing chart review, history and exam, documentation and further activities as noted above       Tobacco Cessation:   reports that she has been smoking pipe. She has smoked for the past 30.00 years. She has never used smokeless tobacco.    BMI:   Estimated body mass index is 39.21 kg/m  as calculated from the following:    Height as of this encounter: 4' 11.65\" (1.515 m).    Weight as of this encounter: 198 lb 6.4 oz (90 kg).   The following high BMI interventions were performed this visit: encouragement to exercise, dietary needs education and lifestyle education regarding diet    Return in about 3 months (around 5/18/2021) for Recheck, DM f/u, Annual physical.    Mei Carbone MD  [facility]    [facility]   Ne Bismark is 61 y.o. and presents today for the following health issues   HPI   Brittney professional phone  used.   She is here with here PCA for DM check.     DMT2 -   A1c 7.3 on 8/4/20 -> 7.6 on 11/5/20 ->  8.0 2/18/20   Home blood sugars in 130 250's, no low blood sugars. She is not moving or getting any exercise.   Getting more snacks   janumet XR  50/1000 two times a day, glipizide  Some days she is eating more.    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  Awaiting new shoes per PCA     Polypharmacy- home health RN sets up med box, she denies missing meds    Vit D deficiency -  last level 23.2 on 8/4/20 on weekly ergo       Obesity - 200# with BMI 39.6 on 11/5/20 -> 198# and BMI 39.2 today    MDD/ PTSD -  PHQ9 = 6 (up from 5 on 8/4/20) off psych meds b/c she stopped these several months ago  Feeling better and less sleepy and more aware of her surrounding  No bad dreams, not thinking too much or feeling sad     Due for Px with mammo and pap    Social -   She lives alone. Her daughter does not allow her to see her grandchildren.   She is not .     Review of Systems   Please see above.  The rest of the review of systems are negative for all systems.     Current Outpatient Medications   Medication Sig     acetaminophen (Q-PAP EXTRA STRENGTH) 500 MG tablet Take 2 tablets (1,000 mg total) by mouth every 4 (four) hours as needed for pain.     atorvastatin (LIPITOR) 20 MG tablet TAKE 1 PILL BY MOUTH EVERYDAY FOR CHOLESTEROL     blood glucose test (CONTOUR NEXT TEST STRIPS) strips Use 1 each As Directed three times daily at 7:30am, 11:30am and 4:30pm.     blood-glucose meter (CONTOUR NEXT METER) Misc Test blood sugar three times a day.     furosemide (LASIX) 40 MG tablet TAKE 1 TABLET (40 MG TOTAL) BY MOUTH 2 (TWO) TIMES A DAY FOR EDEMA     generic lancets (FINGERSTIX LANCETS) Use 1 application As Directed three times daily at 7:30am, 11:30am and 4:30pm.     glipiZIDE (GLUCOTROL XL) 10 MG 24 hr tablet TAKE 1 PILL BY MOUTH DAILY FOR DIABETES     JANUMET XR 50-1,000 mg TM24 TAKE 1 TABLET BY MOUTH 2 (TWO) TIMES A DAY FOR DIABETES     omeprazole (PRILOSEC) 20 MG capsule TAKE 1 CAPSULE (20 MG TOTAL) BY MOUTH DAILY BEFORE BREAKFAST FOR STOMACH     potassium chloride (K-DUR,KLOR-CON) 20 MEQ tablet TAKE 1 TABLET (20 MEQ TOTAL) BY MOUTH DAILY.  "    VITAMIN D2 1,250 mcg (50,000 unit) capsule TAKE 1 CAPSULE (50,000 UNITS TOTAL) BY MOUTH ONCE A WEEK FOR BONE HEALTH     naproxen (NAPROSYN) 500 MG tablet TAKE 1 TABLET (500 MG TOTAL) BY MOUTH 2 (TWO) TIMES A DAY WITH MEALS.     senna (SENOKOT) 8.6 mg tablet Take 1 tablet by mouth daily as needed for constipation.     timoloL (BETIMOL) 0.5 % ophthalmic solution Administer 1 drop to both eyes daily.     timoloL maleate (TIMOPTIC) 0.5 % ophthalmic solution      traMADoL (ULTRAM) 50 mg tablet TAKE 1 TABLET (50 MG TOTAL) BY MOUTH EVERY 6 (SIX) HOURS AS NEEDED FOR SEVERE PAIN (7-10).           Objective    /62   Pulse 78   Temp 98.1  F (36.7  C) (Oral)   Resp 14   Ht 4' 11.65\" (1.515 m)   Wt 198 lb 6.4 oz (90 kg)   LMP 01/30/2013   SpO2 97%   BMI 39.21 kg/m    Body mass index is 39.21 kg/m .  Physical Exam    Vitals:    02/18/21 0901   BP: 100/62   Pulse: 78   Resp: 14   Temp: 98.1  F (36.7  C)   TempSrc: Oral   SpO2: 97%   Weight: 198 lb 6.4 oz (90 kg)   Height: 4' 11.65\" (1.515 m)     PHYSICAL EXAM   General Appearance: Awake and alert, in no acute distress  HEENT: neck is supple  CV: regular rate  Resp: No respiratory distress. Breathing comfortably  Musculoskeletal: moving limbs comfortably with not deficits or deformities  Skin: no rashes noted          "

## 2021-06-16 ENCOUNTER — COMMUNICATION - HEALTHEAST (OUTPATIENT)
Dept: SCHEDULING | Facility: CLINIC | Age: 61
End: 2021-06-16

## 2021-06-16 NOTE — TELEPHONE ENCOUNTER
RN cannot approve Refill Request    RN can NOT refill this medication med is not covered by policy/route to provider. Last office visit: Visit date not found Last Physical: Visit date not found Last MTM visit: Visit date not found Last visit same specialty: 2/18/2021 Mei Carbone MD.  Next visit within 3 mo: Visit date not found  Next physical within 3 mo: Visit date not found      Nirali Turner, [facility] Triage/Med Refill 4/4/2021    Requested Prescriptions   Pending Prescriptions Disp Refills     ergocalciferol (VITAMIN D2) 1,250 mcg (50,000 unit) capsule [Pharmacy Med Name: VIT D2 1.25 MG (50,000 UNIT 1.25 MG Capsule] 4 capsule 12     Sig: TAKE 1 CAPSULE (50,000 UNITS TOTAL) BY MOUTH ONCE A WEEK FOR BONE HEALTH       There is no refill protocol information for this order

## 2021-06-16 NOTE — PROGRESS NOTES
Assessment:   --DM education for Trixie and her PCA, Ryder([facility]) who also did interpretation for the visit.  --patient stated that if she controls her portions she knows she can control her glucose  --2-3 meals/day:  Vegetable soup with meat, 1.5 cups rice per meal or a bigger bowl of noodles.  --decreased PA during COVID, patient lives alone and has a sedentary lifestyle, per her PCA, as the weather warms she will be walking daily  --she has had increased thirst and urination  --last eye exam, 2019 cataract surgery, no appointment for 2020, patient does not want to go to the dentist, stating the last cleaning she had was too painful  --patient smokes a pipe, with dried tobacco 3-4x/day and plus chews in Bitel Nut all day long  --patient hydrates with water mainly, she uses the [place] 3:1 coffee, but only uses one packet over five days      Blood glucose record:  FBS: 144,193,158,129,137,145,183,142    Medications prescribed:  Glipizide XL 10 mg  Janumet( Januvia/Metformin) 50/1000mg twice daily    Education:  --reviewed blood glucose record, goals for BG and A1C  --discussed portions of rice/noodles, needed to reduce portions to no more than one cup  --discussed importance of exercise, sleep, and cutting back on tobacco and Bitel nut use  --discussed preventative care: yearly eye exam and daily brushing/flossing     Plan:   1. Test glucose 4x/week: alternate between fasting or two hours post meal.  2. Limit rice and/or noodles at meals to no more than one cup.  3. Follow up on 4/14/21.   4. Add in walking as weather warms up.   5. Schedule eye exam for 2021.     Subjective and Objective:      Trixie Sofia is referred by Mei Carbone for Diabetes Education.     Lab Results   Component Value Date    HGBA1C 8.0 (H) 02/18/2021             Education:     Monitoring   Meter (per above goals): Assessed and Discussed  Monitoring: Assessed and Discussed  BG goals: Assessed and Discussed    Nutrition  Management  Nutrition Management: Assessed and Discussed  Weight: Assessed  Portions/Balance: Assessed and Discussed  Carb ID/Count: Not addressed  Label Reading: Not addressed  Heart Healthy Fats: Assessed  Menu Planning: Assessed and Discussed  Dining Out: Not addressed  Physical Activity: Assessed and Discussed    Orals: Assessed and Discussed    Diabetes Disease Process: Assessed and Discussed    Acute Complications: Prevent, Detect, Treat:  Hypoglycemia: Assessed  Hyperglycemia: Assessed  Sick Days: Assessed  Driving: does not drive    Chronic Complications  Foot Care:Not addressed  Skin Care: Not addressed  Eye: Assessed and Discussed  ABC: Assessed  Teeth:Assessed and Discussed  Goal Setting and Problem Solving: Assessed and Discussed  Barriers: Assessed  Psychosocial Adjustments: Assessed      Time spent with the patient: 60 minutes for diabetes education and counseling.   Previous Education: unknown  Visit Type:DSMT  Hours Remainin, DSMT 9 and MNT 3      Hanane Vega  3/17/2021

## 2021-06-16 NOTE — TELEPHONE ENCOUNTER
Telephone Encounter by Mamta Vee at 3/22/2019  9:15 AM     Author: Mamta Vee Service: -- Author Type: --    Filed: 3/22/2019  9:16 AM Encounter Date: 3/19/2019 Status: Signed    : Mamta Vee       Per insurance, PA is not required because it is below the quantity limit. Called pharmacy and they were able to get a paid claim.

## 2021-06-16 NOTE — TELEPHONE ENCOUNTER
RN cannot approve Refill Request    RN can NOT refill this medication med is not covered by policy/route to provider. Last office visit: 2/18/2021 Mei Carbone MD Last Physical: 8/4/2020 Last MTM visit: Visit date not found Last visit same specialty: 2/18/2021 Mei Carbone MD.  Next visit within 3 mo: Visit date not found  Next physical within 3 mo: Visit date not found      Irena Winn, [facility] Triage/Med Refill 4/12/2021    Requested Prescriptions   Pending Prescriptions Disp Refills     naproxen (NAPROSYN) 500 MG tablet [Pharmacy Med Name: NAPROXEN 500 MG TABLET 500 Tablet] 30 tablet 2     Sig: TAKE 1 TABLET (500 MG TOTAL) BY MOUTH 2 (TWO) TIMES A DAY WITH MEALS FOR PAIN       There is no refill protocol information for this order

## 2021-06-16 NOTE — PROGRESS NOTES
Assessment:   --follow up visit with Trixie, who was accompanied by her PCA, Ryder([facility]), who also served as ( wavier form signed)  --patient had a tooth extraction completed 4/12, per her PCA she had been in significant pain leading up to the extraction, with poor intake two days prior.  She has relief of pain at this time, and has been able to consume her normal diet as of 4/13.  --patient stated that she had decreased her portions of rice/noodles, prior to  her tooth pain, she has not been out walking yet, due to cooler/rainy weather.   --she has not smoked her pipe or used her Bitel nut since her tooth extraction.  --she is consuming water throughout her day  --patient states that she is very fatigued and has not been sleeping well, she has some ear buzzing issues/leg pain that do not help with her sleep.   --eye exam scheduled for 4/21/21     Blood glucose record:  FBS: 145,77,147,147,162  Post meal: 196,173  14 day [id number]=849 mg/dl    Medications prescribed:  Janumet( Januvia/Metformin 50/1000 mg) twice daily  Glipizide XL 10 mg     Education:  --reviewed Bg record, goals for BG and A1C, SMBG  --discussed meals/portions of rice noodles, fruit as a snack.   --discussed sleep/stress/depression/support, benefits of adding in walking as weather warms.      Plan:   1. Patient/PCA to discuss: sleep/ear buzzing/fatigue with PCP during physical on 5/20/21.  2. Patient to test glucose 4x/week: alternate between fasting and two hours post meal.  3. Patient to add in walking as able when the weather warms.  4. Limit rice and noodles to one small bowl per meals and keep fruit as a snack, not with meals.      Subjective and Objective:      Trixie Net is referred by Mei Carbone for Diabetes Education.     Lab Results   Component Value Date    HGBA1C 8.0 (H) 02/18/2021     Wt Readings from Last 3 Encounters:   04/14/21 198 lb (89.8 kg)   04/10/21 191 lb 3 oz (86.7 kg)   02/18/21 198 lb 6.4 oz (90 kg)                        Education:     Monitoring   Meter (per above goals): Assessed and Discussed  Monitoring: Assessed and Discussed  BG goals: Assessed and Discussed    Nutrition Management  Nutrition Management: Assessed and Discussed  Weight: Assessed  Portions/Balance: Assessed and Discussed  Carb ID/Count: Not addressed  Label Reading: Not addressed  Heart Healthy Fats: Not addressed  Menu Planning: Assessed and Discussed  Dining Out: na  Physical Activity: Assessed and Discussed    Orals: Assessed and Discussed    Diabetes Disease Process: Assessed    Acute Complications: Prevent, Detect, Treat:  Hypoglycemia: Assessed  Hyperglycemia: Assessed  Sick Days: Assessed and Discussed  Driving: does not drive    Chronic Complications  Foot Care:Assessed and Discussed  Skin Care: Assessed and Discussed  Eye: Assessed, Discussed and exam scheduled for 21  ABC: Assessed  Teeth:patient had recent tooth extraction on 21  Goal Setting and Problem Solving: Assessed and Discussed  Barriers: Assessed  Psychosocial Adjustments: Assessed      Time spent with the patient: 30 minutes for diabetes education and counseling.   Previous Education: yes  Visit Type:DSMT  Hours Remainin, DSMT 8.5 and MNT 3      Hanane Vega  2021

## 2021-06-16 NOTE — TELEPHONE ENCOUNTER
Telephone Encounter by Doretha Chang at 7/31/2019  3:41 PM     Author: Doretha Chagn Service: -- Author Type: --    Filed: 7/31/2019  3:41 PM Encounter Date: 7/29/2019 Status: Signed    : Doretha Chang APPROVED:    Approval start date:06/30/2019  Approval end date:10/28/2019    Pharmacy has been notified of approval and will contact patient when medication is ready for pickup.

## 2021-06-16 NOTE — TELEPHONE ENCOUNTER
Telephone Encounter by Doretha Chang at 11/14/2019  8:18 AM     Author: Doretha Chang Service: -- Author Type: --    Filed: 11/14/2019  8:19 AM Encounter Date: 11/13/2019 Status: Signed    : Doretha Chang APPROVED:    Approval start date:[id number]  Approval end date:11/12/2020    Pharmacy has been notified of approval and will contact patient when medication is ready for pickup.

## 2021-06-17 NOTE — TELEPHONE ENCOUNTER
Reason for Call:  Other appointment     Detailed comments: Pt is scheduled to see PCP on 6/1 however PCA says she cannot take pt to the clinic in the morning and requesting for something after 4:30pm. No openings for that time until 6/14 and PCA said they were told to f/u in 2 weeks since their last visit and that it would push their f/u appt out. She wants to know if PCP can fit pt in after 4:30pm or would it be ok to wait for next 4:30pm availability. I did inform her that PCP is out of the office until 6/1 and we may not hear from her until then. Did offer to book pt for 6/14 but she declined. Please advise and call PCA back, no interp needed.    Phone Number Patient can be reached at: Cell number on file:    Telephone Information:   Mobile [phone]       Best Time: anytime    Can we leave a detailed message on this number?: Yes    Call taken on 5/25/2021 at 9:37 AM by Amanuel Perez

## 2021-06-17 NOTE — TELEPHONE ENCOUNTER
Telephone Encounter by Jojo Santana at 11/20/2020  4:08 PM     Author: Jojo Santana Service: -- Author Type: --    Filed: 11/20/2020  4:09 PM Encounter Date: 11/20/2020 Status: Signed    : Jojo Santana APPROVED:    Approval start date: 10/21/2020  Approval end date:  11/20/2021    Pharmacy has been notified of approval and will contact patient when medication is ready for pickup.

## 2021-06-17 NOTE — TELEPHONE ENCOUNTER
Telephone Encounter by Jojo Santana at 11/20/2020 12:01 PM     Author: Jojo Santana Service: -- Author Type: --    Filed: 11/20/2020 12:01 PM Encounter Date: 11/20/2020 Status: Signed    : Jojo Santana

## 2021-06-17 NOTE — PROGRESS NOTES
RUBENS Sofia is a 58 y.o. female here to have a form filled out saying she needs to be on first level of her building due to mobility difficultlies. She is moving into public housing soon.  Pains in hands, feet, lower back. Sweling in hands, feet ,face. These pains and the swelling in her hands, feet and face have been present for at least 4 years. They have not worsened recently. She usually sees Dr. aCrbone.       Has been referred to PT but hasn't gone yet. Believes she has an appt set up. Next month.   Past Medical History:   Diagnosis Date     Depression      Diabetes mellitus      Hypertension      Current Outpatient Prescriptions on File Prior to Visit   Medication Sig Dispense Refill     acetaminophen (Q-PAP EXTRA STRENGTH) 500 MG tablet Take 2 tablets (1,000 mg total) by mouth 2 (two) times a day. 100 tablet 6     amitriptyline (ELAVIL) 50 MG tablet TAKE 1 TABLET BY MOUTH AT BEDTIME. 30 tablet 11     ammonium lactate (LAC-HYDRIN) 12 % lotion APPLY TO AFFECTED AREA TWICE DAILY. 400 g 0     artificial tears, hypromellose, (GONIOVISC) 2.5 % ophthalmic solution 2 drops each eye as needed 15 mL 12     ASPIR-LOW 81 mg EC tablet TAKE 1 PILL BY MOUTH EVERYDAY 30 tablet 6     atorvastatin (LIPITOR) 20 MG tablet Take 1 tablet (20 mg total) by mouth daily. 30 tablet 11     blood glucose test (CONTOUR NEXT STRIPS) strips TEST FASTING DAILY 50 strip 6     clotrimazole (LOTRIMIN) 1 % external solution Apply to feet 2 times per day 30 mL 0     dimethicone-colloidal oatmeal (AVEENO) 1.3 % Lotn Apply to body twice daily 532 mL 11     ergocalciferol (ERGOCALCIFEROL) 50,000 unit capsule Take 1 capsule (50,000 Units total) by mouth once a week. 4 capsule 12     gabapentin (NEURONTIN) 300 MG capsule Take 1 capsule (300 mg total) by mouth 2 (two) times a day. 180 capsule 3     generic lancets (MICROLET LANCET) Dispense brand per patient's insurance at pharmacy discretion. 100 each 11     lidocaine (XYLOCAINE) 5 % ointment Apply to  areas of pain twice daily as needed 120 g 11     naproxen (NAPROSYN) 500 MG tablet TAKE 1 PILL BY MOUTH TWICE DAILY WITH MEALS FOR PAIN 30 tablet 10     omeprazole (PRILOSEC) 20 MG capsule TAKE 1 CAPSULE (20 MG TOTAL) BY MOUTH DAILY BEFORE BREAKFAST. FOR STOMACH 90 capsule 2     pioglitazone (ACTOS) 15 MG tablet Take 1 tablet (15 mg total) by mouth daily. 30 tablet 11     sitaGLIPtin-metformin 50-1,000 mg TM24 Take 1 tablet by mouth 2 (two) times a day. 180 tablet 3     venlafaxine (EFFEXOR XR) 75 MG 24 hr capsule Take 3 capsules (225 mg total) by mouth daily. 270 capsule 3     condoms - male Misc Use as needed 24 each 11     HYDROcodone-acetaminophen 5-325 mg per tablet Take 1-2 tablets by mouth every 8 (eight) hours as needed for pain.       lanolin-min oil-zinc ox-wh.pet Oint Apply to affected area several times a day 120 g 11     TRAVEL SICKNESS, MECLIZINE, 25 mg chewable tablet CHEW 1 TABLET BY MOUTH 3 TIMES A DAY AS NEEDED FOR DIZZINESS OR NAUSEA 90 tablet 3     No current facility-administered medications on file prior to visit.        Past medical and social history reviewed with no changes.   ?  ROS:   General: No fevers, chills,   Head: No headaches   MS: see HPI  Psych: No significant change in mood, anxiety level   Neuro: No numbness or tingling.   ?  O  /78  Pulse 64  Temp 98.1  F (36.7  C) (Oral)   Resp 16  Ht 5' (1.524 m)  Wt 215 lb (97.5 kg) Comment: with shoes  LMP 01/30/2013  Breastfeeding? No  BMI 41.99 kg/m2   Vitals reviewed. Nursing note reviewed.  General Appearance: Pleasant and alert, in no acute distress  HEENT: mucous membranes moist,   CV: RRR, no murmur, rubs, gallops  Resp: No respiratory distress. Clear to auscultation bilaterally. No wheezes, rales, rhonchi  MSK: no tenderness along spine. States tenderness is mostly in lower left side of back. Joints of right MCPs and PIPs are tender to palpation, slightly swollen and red. MTPs of great toes are slightly red and  swollen, bilaterally.   Skin: warm, dry, intact, no rash noted  Neuro: no focal deficits, CNs II-XII normal, no facial droop.  A/P  Trixie was seen today for facial swelling and edema.    Diagnoses and all orders for this visit:    Arthritis: she reports that joints of fingers and toes are painful, and they do appear slightly red and swollen to me. She had a negative UDAY in 2015 but I will recheck today in case she is developing a rheumatologic illness. Also referred to PT and requested that specialty scheduling assist in making sure she has an appt. Continue naproxen. I made no changes to medications today.   -     Antinuclear Antibody (UDAY) [place]  -     Ambulatory referral to Adult PT- Internal    Depression With Anxiety: She feels this is stable and does not want any change in treatment today.     DM neuropathy, painful: cont gabapentin.     Metabolic syndrome    PTSD (post-traumatic stress disorder)    Morbid obesity, unspecified obesity type    Sciatica of left side  -     Ambulatory referral to Adult PT- Internal    Bilateral hand swelling  -     Antinuclear Antibody (UDAY) [place]    Toe swelling  -     Antinuclear Antibody (UDAY) [place]       Visit completed along with assistance of Brittney .  Options for treatment and follow-up care were reviewed with the patient and/or guardian. [facility] and/or guardian engaged in the decision making process and verbalized understanding of the options discussed and agreed with the final plan.    Francisca Pham MD

## 2021-06-17 NOTE — PROGRESS NOTES
HPI - 57 yo female here to f/u on DM.        DM /metabolic syndrome  A1c 7.0 on 8/916 and 7.5 on 10/31/16 -> 8.5 on 2/1/17 -> 7.5 on 5/23/17 -> 7.6 on 8/24/17 -> 6.9 on 12/4/17 -> 8.7 today  on janumet and pioglitizone  - reports not forgetting meds, but sometimes eats lots of rice  BP wnl  ASA  LDL 73 in 10/24/16 on lipitor -> 112 on 12/4/17  microalbumin wnl 2/1/17   Eye clinic 3/17/17     Dry itchy skin      Periodic arm cramps into Erb's palsy position and MRI.   MRI of head and neck unrevealing  Referral to neuro and was scheduled for 3/21/18 but she missed this b/c transportation     Vit D deficiency - 20.0 on 8/15/15 and 24.3 on 10/24/16 -> 21.6 on 5/23/17 -> 19.5 on 12/4/17  Taking ergo          PTSD:   Taking venlafaxine and trazodone   Sleep ok but mood low when in pain  No bad dreams  PHQ9 = 10 today      Morbid obesity - BMI 44.2  Last seen at [facility] 3/27/17 - wants f/u apt  weigth 226 on 10/3/17 -> 213 today  She has decrease in appetitie and doing some exercise  She meet with SW to help with intake for weight loss      Knee and ankle pain - taking gabapentin and naproxen   Order zostrix cream but not covered  rx for lidocaine gel      Dry skin patches on breast and arms/hands - improved with rx for lanolin/mineral oil   Breast and mammo wnl     Prevention -   Pap and HPV neg 5/2014  FOBT neg 1/16/18     Polypharmacy -   Home health RN sets up meds    Current Outpatient Prescriptions   Medication Sig Note     acetaminophen (Q-PAP EXTRA STRENGTH) 500 MG tablet Take 2 tablets (1,000 mg total) by mouth 2 (two) times a day as needed for pain.      amitriptyline (ELAVIL) 50 MG tablet TAKE 1 TABLET BY MOUTH AT BEDTIME.      ASPIR-LOW 81 mg EC tablet TAKE 1 PILL BY MOUTH EVERYDAY      atorvastatin (LIPITOR) 20 MG tablet Take 1 tablet (20 mg total) by mouth daily.      gabapentin (NEURONTIN) 300 MG capsule Take 1 capsule (300 mg total) by mouth 2 (two) times a day.      naproxen (NAPROSYN) 500 MG  tablet TAKE 1 PILL BY MOUTH TWICE DAILY WITH MEALS FOR PAIN      omeprazole (PRILOSEC) 20 MG capsule TAKE 1 CAPSULE (20 MG TOTAL) BY MOUTH DAILY BEFORE BREAKFAST. FOR STOMACH      sitaGLIPtin-metformin 50-1,000 mg TM24 Take 1 tablet by mouth 2 (two) times a day.      venlafaxine (EFFEXOR XR) 75 MG 24 hr capsule Take 3 capsules (225 mg total) by mouth daily.      ammonium lactate (LAC-HYDRIN) 12 % lotion APPLY TO AFFECTED AREA TWICE DAILY.      artificial tears, hypromellose, (GONIOVISC) 2.5 % ophthalmic solution 2 drops each eye as needed      blood glucose test (CONTOUR NEXT STRIPS) strips TEST FASTING DAILY      clotrimazole (LOTRIMIN) 1 % external solution Apply to feet 2 times per day      condoms - male Misc Use as needed      generic lancets (MICROLET LANCET) Dispense brand per patient's insurance at pharmacy discretion.      HYDROcodone-acetaminophen 5-325 mg per tablet Take 1-2 tablets by mouth every 8 (eight) hours as needed for pain. 7/18/2017: Received from: External Pharmacy     lanolin-min oil-zinc -.pet Oint Apply to affected area several times a day      lidocaine (XYLOCAINE) 5 % ointment Apply to areas of pain twice daily as needed      TRAVEL SICKNESS, MECLIZINE, 25 mg chewable tablet CHEW 1 TABLET BY MOUTH 3 TIMES A DAY AS NEEDED FOR DIZZINESS OR NAUSEA      traZODone (DESYREL) 50 MG tablet TAKE 1 TABLET BY MOUTH AT BEDTIME AS NEEDED.      Vitals:    03/29/18 1057   BP: 110/72   Patient Site: Right Arm   Patient Position: Sitting   Cuff Size: Adult Large   Pulse: 72   Resp: 16   Temp: 98.1  F (36.7  C)   TempSrc: Oral   SpO2: 98%   Weight: 213 lb (96.6 kg)   Height: 5' (1.524 m)     PHYSICAL EXAM   General Appearance: Awake and alert, in no acute distress  HEENT: neck is supple  CV: regular rate  Resp: No respiratory distress. Breathing comfortably  Musculoskeletal: moving limbs comfortably with not deficits or deformities  Skin: no rashes noted    A/P  DM /metabolic syndrome  A1c 7.0 on 8/916  and 7.5 on 10/31/16 -> 8.5 on 2/1/17 -> 7.5 on 5/23/17 -> 7.6 on 8/24/17 -> 6.9 on 12/4/17 -> 8.7 today  on janumet and pioglitizone  - reports not forgetting meds, but sometimes eats lots of rice (encouraged to eat more vegetables/fruit and less rice)  BP wnl  ASA  LDL 73 in 10/24/16 on lipitor -> 112 on 12/4/17  microalbumin wnl 2/1/17  - recheck today  Eye clinic 3/17/17 - referral to eye clinic     Dry itchy skin - rx for lotions     Periodic arm cramps into Erb's palsy position and MRI.   MRI of head and neck unrevealing  Referral to neuro and was scheduled for 3/21/18 but she missed this b/c transportation  Will ask specialty  to help r/s     Vit D deficiency - 20.0 on 8/15/15 and 24.3 on 10/24/16 -> 21.6 on 5/23/17 -> 19.5 on 12/4/17  Taking ergo          PTSD:   Taking venlafaxine 225 and trazodone 50 (missing bottle) and now on amitriptyline  Sleep ok but mood low when in pain  No bad dreams  PHQ9 = 10 today      Morbid obesity - BMI 44.2  Last seen at  bariatric clinic 3/27/17 - wants f/u apt  weigth 226 on 10/3/17 -> 213 today  She has decrease in appetitie and doing some exercise  She meet with SW to help with intake for weight loss  Will ask specialty  to help r/s      Knee and ankle pain - taking gabapentin and naproxen and  Amitriptyline and tylenol (will change to BID instead of PRN)  Order zostrix cream but not covered  rx for lidocaine gel last visit      Prevention -   Pap and HPV neg 5/2014  FOBT neg 1/16/18  Breast and mammo wnl     Polypharmacy -   Home health RN sets up meds    Spent 25 min face to face with patient with more the 50% spent in counseling, reviewing chart, and coordination of care and discussing problems listed above.

## 2021-06-17 NOTE — PATIENT INSTRUCTIONS - [facility]
My Depression Action Plan  Name: Trixie Sofia   Date of Birth 1960  Date: 5/20/2021    My Doctor: Mei Carbone MD   My Clinic: [address] [address]  [phone]          GREEN    ZONE   Good Control    What it looks like:     Things are going generally well. You have normal ups and downs. You may even feel depressed from time to time, but bad moods usually last less than a day.   What you need to do:  1. Continue to care for yourself (see self care plan)  2. Check your depression survival kit and update it as needed  3. Follow your physician s recommendations including any medication.  4. Do not stop taking medication unless you consult with your physician first.           YELLOW         ZONE Getting Worse    What it looks like:     Depression is starting to interfere with your life.     It may be hard to get out of bed; you may be starting to isolate yourself from others.    Symptoms of depression are starting to last most all day and this has happened for several days.     You may have suicidal thoughts but they are not constant.   What you need to do:     1. Call your care team. Your response to treatment will improve if you keep your care team informed of your progress. Yellow periods are signs an adjustment may need to be made.     2. Continue your self-care.  Just get dressed and ready for the day.  Don't give yourself time to talk yourself out of it.    3. Talk to someone in your support network.    4. Open up your depression Depression Self-Care Plan / Wellness kit.           RED    ZONE Medical Alert - Get Help    What it looks like:     Depression is seriously interfering with your life.     You may experience these or other symptoms: You can t get out of bed most days, can t work or engage in other necessary activities, you have trouble taking care of basic hygiene, or basic responsibilities, thoughts of suicide or death that  will not go away, self-injurious behavior.     What you need to do:  1. Call your care team and request a same-day appointment. If they are not available (weekends or after hours) call your local crisis line, emergency room or 911.          Depression Self-Care Plan / Wellness Kit    Many people find that medication and therapy are helpful treatments for managing depression. In addition, making small changes to your everyday life can help to boost your mood and improve your wellbeing. Below are some tips for you to consider. Be sure to talk with your medical provider and/or behavioral health consultant if your symptoms are worsening or not improving.     Sleep   Sleep hygiene  means all of the habits that support good, restful sleep. It includes maintaining a consistent bedtime and wake time, using your bedroom only for sleeping or sex, and keeping the bedroom dark and free of distractions like a computer, smartphone, or television.     Develop a Healthy Routine  Maintain good hygiene. Get out of bed in the morning, make your bed, brush your teeth, take a shower, and get dressed. Don t spend too much time viewing media that makes you feel stressed. Find time to relax each day.    Exercise  Get some form of exercise every day. This will help reduce pain and release endorphins, the  feel good  chemicals in your brain. It can be as simple as just going for a walk or doing some gardening, anything that will get you moving.      Diet  Strive to eat healthy foods, including fruits and vegetables. Drink plenty of water. Avoid excessive sugar, caffeine, alcohol, and other mood-altering substances.     Stay Connected with Others  Stay in touch with friends and family members.    Manage Your Mood  Try deep breathing, massage therapy, biofeedback, or meditation. Take part in fun activities when you can. Try to find something to smile about each day.     Psychotherapy  Be open to working with a therapist if your provider  recommends it.     Medication  Be sure to take your medication as prescribed. Most anti-depressants need to be taken every day. It usually takes several weeks for medications to work. Not all medicines work for all people. It is important to follow-up with your provider to make sure you have a treatment plan that is working for you. Do not stop your medication abruptly without first discussing it with your provider.    Crisis Resources   These hotlines are for both adults and children. They and are open 24 hours a day, 7 days a week unless noted otherwise.      National Suicide Prevention Lifeline   8-536-043-DYGU (4180)      Crisis Text Line    www.crisistextline.org  Text HOME to 949042 from anywhere in the [place], anytime, about any type of crisis. A live, trained crisis counselor will receive the text and respond quickly.      Real Lifeline for LGBTQ Youth  A national crisis intervention and suicide lifeline for LGBTQ youth under 25. Provides a safe place to talk without judgement. Call 1-201.849.6346; text START to 331017 or visit www.thetrevorproject.org to talk to a trained counselor.      For [facility] crisis numbers, visit the [facility] website at:  https://[place].gov/dhs/people-we-serve/adults/health-care/mental-health/resources/crisis-contacts.jsp

## 2021-06-17 NOTE — PROGRESS NOTES
[facility]   [facility]-Pelvic Initial Evaluation    Patient Name: Trixie Sofia  Date of evaluation: 4/30/2018  Referral Diagnosis: Arthropathy  Referring provider: Francisca Pham MD  Visit Diagnosis:     ICD-10-CM    1. Lumbar radiculopathy M54.16    2. Abnormality of gait R26.9    3. Arthropathy M12.9    4. Muscle weakness (generalized) M62.81        Assessment:      Pt. is appropriate for skilled PT intervention as outlined in the Plan of Care (POC).  Pt. is a good candidate for skilled PT services to improve pain levels and function.  Patient has complex medical history and very poor functional mobility for her age of 58 years.  Dependent gait, generalized weakness, pain throughout entire body and specifically low back with left greater than right LE radicular pain deem patient a good candidate for skilled PT.    Patient reported a decrease in pain after manual PROM bilateral LE and grade 2 mobilization of lumbar spine for extension and rotation in prone.    Goals:  Pt. will demonstrate/verbalize independence in self-management of condition in : 6 weeks  Pt. will be independent with home exercise program in : 6 weeks  Pt. will have improved quality of sleep: waking less times/night;in 6 weeks  Patient will transfer: sit/stand;with less difficulty;in 6 weeks;Comment  Comment: greater than 3 x in 30 seconds  Patient will increase : in 6 weeks  by ___ points: 2    Patient's expectations/goals are realistic.    Barriers to Learning or Achieving Goals:  Chronicity of the problem.  Co-morbidities or other medical factors.  DM-II, HTN, Erb's Palsy left UE, chronic swellling of face and bilateral hands and feet  Mental illness or emotional factors.  PTSD       Plan / Patient Instructions:        Plan of Care:   Authorization / Certification Start Date: 04/30/18  Authorization / Certification End Date: 07/16/18  Authorization / Certification Number of Visits: 12  Communication with: Referral Source  Patient Related  Instruction: Nature of Condition;Treatment plan and rationale;Self Care instruction;Basis of treatment;Body mechanics;Posture  Times per Week: 2  Number of Weeks: 4-6  Number of Visits: 12  Precautions / Restrictions : swelling of face, hands and feet  Therapeutic Exercise: ROM;Stretching;Strengthening  Neuromuscular Reeducation: kinesio tape;posture;balance/proprioception;TNE;core  Manual Therapy: soft tissue mobilization    Plan for next visit: Initiate total body strengthening and conditioning program with focus on core strengthening to promote pain reduction of back and LE. To include LE restorator, gait training, sit to stand training, standing LE exercises and initiate home program with handout needed.  Manual PROM for LE including manual stretching of LE and lumbar mobilization for extension and rotation.     Subjective:         Social information:   Living Situation:single family home-lives with family and receives public assistance for housing. Needs .   Occupation:n/a   Work Status:NA   Equipment Available: SE cane uses on right    History of Present Illness:    Trixie is a 58 y.o. female who presents to therapy today with complex complaints of total body pain primarily low and mid back pain and radiating bialteral leg pain to feet.  Pain worse at left LE.  Also complains of bilateral shoulder pain and left arm pain.  C/o swelling and burning of face, hands and feet.  C/o intermittent spasm of left UE typically once a week.  Date of onset/duration of symptoms is approximately 4 years ago. Onset was insidious. Symptoms are not improving. She reports  a constant  history of similar symptoms. She describes their previous level of function as limited with all activity over the past 4 years. She reports her pain medication does help alleviate the pain somewhat.  History of left UE having spasms/Erb's palsy intermittently typically 1x every week or few weeks.      Pain Ratin  Pain rating at best:  6  Pain rating at worst: 10  Pain description: aching, burning and sharp    Functional limitations are described as occurring with:   ascending and descending stairs or curbs  balance  bending  personal cares dressing lower body, donning shoes and socks, donning shirt or jacket, donning bra, combing hair, washing hair and washing body  reaching at shoulder height, overhead and behind back  performing routine daily activities  sitting 3 minutes  sleeping  She can shower on her own, but slowly.  Patient reports benefit from:  pain medication         Objective:      Note: Items left blank indicates the item was not performed or not indicated at the time of the evaluation.    Patient Outcome Measures :    Modified Oswestry Low Back Pain Disablity Questionnaire  in %: 60   Scores range from 0-100%, where a score of 0% represents minimal pain and maximal function. The minimal clinically important difference is a score reduction of 12%.    Examination  1. Lumbar radiculopathy     2. Abnormality of gait     3. Arthropathy     4. Muscle weakness (generalized)       Involved side: Bilateral and with UEs and LEs  Posture Observation:      General sitting posture is  poor.  General standing posture is poor.  Cervical:  Mild forward head  Shoulder/Thoracic complex: Mild bilateral scapular protraction   Moderately increased upper thoracic kyphosis  Lumbopelvic complex: Moderately decreased lumbar lordosis  Pelvic alignment: right PSIS elevated and rotated posteriorly compared to left  Lower extremity:  Mild edema bilateral  Facial edema and redness across bilateral cheeks, eye region  Cervical ROM: rotation mod loss bilateral,   UE ROM: bilateral shoulder shrug grade -3/5,bilateral elbow flexion -3/5, bialteral forearm pronation/supination -3/5, bilateral hand grasp 2/5, bilateral finger abduction 2/5    LE ROM: ankle df 3/5, ankle pf 2/5,  knee extension 3/5, sartorius for ER -2/5 bilateral and uses hands to bring foot up to  knees.  Sit to stand 30 seconds 3 times with hands on knees, stable. Painful to do this activity at left low back.  Standing LE ROM: hip flexion 60 degrees bilateral grade 2/5, hip abduction 45 degrees left and 50 degrees right grade 2/5 strength, hip extension 10 degrees grade 2/5 bialteral.  On/off plinth slow and very difficult  Walk with SEC on right x 15  Ft in 11 seconds    Had episode of left UE palsy while lying on plinth supine and after performed PROM assessment of left shoulder.  Last approximately 60 seconds.    Lumbar ROM:  See detail in above text  Date:      *Indicate scale AROM AROM AROM   Lumbar Flexion      Lumbar Extension       Right Left Right Left Right Left   Lumbar Sidebending         Lumbar Rotation         Thoracic Flexion      Thoracic Extension      Thoracic Sidebending         Thoracic Rotation           Lower Extremity Strength:   See detail in above text  Date:      LE strength/5 Right Left Right Left Right Left   Hip Flexion (L1-3)         Hip Extension (L5-S1)         Hip Abduction (L4-5)         Hip Adduction (L2-3)         Hip External Rotation         Hip Internal Rotation         Knee Extension (L3-4)         Knee Flexion         Ankle Dorsiflexion (L4-5)         Great Toe Extension (L5)         Ankle Plantar flexion (S1)         Abdominals        Sensation   Not tested       Reflex Testing  Lumbar Dermatomes Right Left UE Reflexes Right Left   Iliac Crest and Groin (L1)   Biceps (C5-6)     Anterior Medial Thigh (L2)   Brachioradialis (C5-6)     Anterior Thigh, Medial Epicondyle Femur (L3)   Triceps (C7-8)     Lateral Thigh, Anterior Knee, Medial Leg/Malleolus (L4)   Uziel s test     Lateral Leg, Dorsal Foot (L5)   LE Reflexes     Lateral Foot (S1)   Patellar (L3-4)     Posterior Leg (S2)   Achilles (S1-2)     Other:   Babinski Response       Palpation:    Lumbar Special Tests:  Not tested due to time constraints  Lumbar Special Tests Right Left SI Tests Right  Left   Quadrant  test   SI Compression     Straight leg raise   SI Distraction     Crossover response   POSH Test     Slump   Sacral Thrust     Sit-up test  FADIR     Trunk extensor endurance test  Resisted Abduction     Prone instability test  Other:     Pubic shotgun  Other:           Treatment Today    TREATMENT MINUTES COMMENTS   Evaluation 30 Evaluation of back, LE, UE, functional mobility   Self-care/ Home management     Manual therapy 25 PROM for bilateral LE supine and prone x 8 minutes. Lumbar mobilization prone grade 2 for extension and rotation left and right x 8 minutes. STM for bilateral hamstrings muscles and gluteal muslces prone with medium pressure x 10 minutes.   Neuromuscular Re-education     Therapeutic Activity     Therapeutic Exercises     Gait training     Modality__________________                Total 55    Blank areas are intentional and mean the treatment did not include these items.     PT Evaluation Code: (Please list factors)  Patient History/Comorbidities: 4 year history of facial, hands and feet swelling, depression, DM-II, HTN  Examination: generalized weakness, unsteady gait, decreased AROM bilateral LE and UE, Erb's palsy left UE,   Clinical Presentation: evolving  Clinical Decision Making: moderate    Patient History/  Comorbidities Examination  (body structures and functions, activity limitations, and/or participation restrictions) Clinical Presentation Clinical Decision Making (Complexity)   No documented Comorbidities or personal factors 1-2 Elements Stable and/or uncomplicated Low   1-2 documented comorbidities or personal factor 3 Elements Evolving clinical presentation with changing characteristics Moderate   3-4 documented comorbidities or personal factors 4 or more Unstable and unpredictable High                Vida Lombardi, PT  4/30/2018  10:16 AM

## 2021-06-17 NOTE — PATIENT INSTRUCTIONS - HE
"Patient Instructions by Eric Leung DO at 5/8/2019  9:00 AM     Author: Eric Leung DO Service: -- Author Type: Physician    Filed: 5/8/2019  9:36 AM Encounter Date: 5/8/2019 Status: Addendum    : Eric Leung DO (Physician)    Related Notes: Original Note by Eric Leung DO (Physician) filed at 5/8/2019  9:23 AM         What is sleep apnea?    Sleep apnea is a condition that makes you stop breathing for short periods while you are asleep. There are 2 types of sleep apnea. One is called \"obstructive sleep apnea,\" and the other is called \"central sleep apnea.\"  In obstructive sleep apnea, you stop breathing because your throat narrows or closes (figure 1). In central sleep apnea, you stop breathing because your brain does not send the right signals to your muscles to make you breathe. When people talk about sleep apnea, they are usually referring to obstructive sleep apnea, which is what this article is about.  People with sleep apnea do not know that they stop breathing when they are asleep. But they do sometimes wake up startled or gasping for breath. They also often hear from loved ones that they snore.  What are the symptoms of sleep apnea? -- The main symptoms of sleep apnea are loud snoring, tiredness, and daytime sleepiness. Other symptoms can include:  ?Restless sleep  ?Waking up choking or gasping  ?Morning headaches, dry mouth, or sore throat  ?Waking up often to urinate  ?Waking up feeling unrested or groggy  ?Trouble thinking clearly or remembering things  Some people with sleep apnea don't have symptoms, or they don't know they have them. They might figure that it's normal to be tired or to snore a lot.  Should I see a doctor or nurse? -- Yes. If you think you might have sleep apnea, see your doctor.  Is there a test for sleep apnea? -- Yes. If your doctor or nurse suspects you have sleep apnea, he or she might send you for a \"sleep study.\" Sleep studies can sometimes be done " "at home, but they are usually done in a sleep lab. For the study, you spend the night in the lab, and you are hooked up to different machines that monitor your heart rate, breathing, and other body functions. The results of the test will tell your doctor or nurse if you have the disorder.  Is there anything I can do on my own to help my sleep apnea? -- Yes. Here are some things that might help:  ?Stay off your back when sleeping. (This is not always practical, because people cannot control their position while asleep. Plus, it only helps some people.)  ?Lose weight, if you are overweight  ?Avoid alcohol, because it can make sleep apnea worse  How is sleep apnea treated? -- The most effective treatment for sleep apnea is a device that keeps your airway open while you sleep. Treatment with this device is called \"continuous positive airway pressure,\" or CPAP. People getting CPAP wear a face mask at night that keeps them breathing (figure 2).  If your doctor or nurse recommends a CPAP machine, try to be patient about using it. The mask might seem uncomfortable to wear at first, and the machine might seem noisy, but using the machine can really pay off. People with sleep apnea who use a CPAP machine feel more rested and generally feel better.  There is also another device that you wear in your mouth called an \"oral appliance\" or \"mandibular advancement device.\" It also helps keep your airway open while you sleep.  In rare cases, when nothing else helps, doctors recommend surgery to keep the airway open. Surgery to do this is not always effective, and even when it is, the problem can come back.  Is sleep apnea dangerous? -- It can be. People with sleep apnea do not get good-quality sleep, so they are often tired and not alert. This puts them at risk for car accidents and other types of accidents. Plus, studies show that people with sleep apnea are more likely than others to have high blood pressure, heart attacks, and " other serious heart problems. In people with severe sleep apnea, getting treated (for example, with a CPAP machine) can help prevent some of these problems.    GRAPHICS  Airway in a person with sleep apnea    Normally when a person sleeps, the airway remains open, and air can pass from the nose and mouth to the lungs. In a person with sleep apnea, parts of the throat and mouth drop into the airway and block off the flow of air. This can cause loud snoring and interrupt breathing for short periods.  Graphic 14125 Version 5.0    Continuous positive airway pressure (CPAP) for sleep apnea    The CPAP mask gently blows air into your nose while you sleep. It puts just enough pressure on your airway to keep it from closing. The mask in this picture fits over just the nose. Other CPAP devices have masks that fit over the nose and mouth.    Equipment Instructions    We will process your PAP order and send it to a Durable Medical Equipment (DME) provider.    The medical equipment company should call you within 7 days.  If you have not heard from the company, please contact them to see if they received your order and are planning to call you.    Please call us at [phone] if you are unable to contact the medical equipment company or if they do not have the order.    If you are starting a new PAP machine, please call us after you use it the first night to let us know how it went. This call also helps us know that you received your equipment and that everything is ready. Please use our central phone number [phone]    Contact information for DME company:    -[facility] Tel: [phone]  -[facility] Tel: [phone]  -[facility] Tel: [phone]  -[employer] Tel: [phone]  -[facility] Tel: [phone]  -[facility] Tel [phone]  -[facility] Tel: [phone]  -[facility] Tel: [phone]  -[facility] Tel: [phone]  -[facility] Tel: [phone]  -[facility] Tel: [phone]  -[employer]: [phone]

## 2021-06-17 NOTE — TELEPHONE ENCOUNTER
CMT is unable to do anything at this time since PCP is out of the clinic till 6/1/21.  Will place on hold.  Thanks.

## 2021-06-18 ENCOUNTER — COMMUNICATION - HEALTHEAST (OUTPATIENT)
Dept: FAMILY MEDICINE | Facility: CLINIC | Age: 61
End: 2021-06-18

## 2021-06-18 DIAGNOSIS — E11.42 TYPE 2 DIABETES MELLITUS WITH DIABETIC POLYNEUROPATHY, WITHOUT LONG-TERM CURRENT USE OF INSULIN (H): ICD-10-CM

## 2021-06-18 DIAGNOSIS — E11.9 CONTROLLED TYPE 2 DIABETES MELLITUS WITHOUT COMPLICATION, WITHOUT LONG-TERM CURRENT USE OF INSULIN (H): ICD-10-CM

## 2021-06-18 LAB
MISCELLANEOUS TEST DEPT. - HE HISTORICAL: NORMAL
PERFORMING LAB: NORMAL
SPECIMEN STATUS: NORMAL
TEST NAME: NORMAL

## 2021-06-18 NOTE — PROGRESS NOTES
[facility] Daily Progress     Patient Name: Trixie Sofia  Date: 5/16/2018  Visit #: 3/12 through 7/30/18 for Lidia montilla  PTA visit #:    Referral Diagnosis: Arthropathy, sciatica of left side  Referring provider: Mei Carbone MD  Visit Diagnosis:     ICD-10-CM    1. Lumbar radiculopathy M54.16    2. Abnormality of gait R26.9    3. Arthropathy M12.9    4. Muscle weakness (generalized) M62.81          Assessment:     HEP/POC compliance is  fair .  Response to Intervention fatigues easily but willing to attempt exercise training  Patient is appropriate to continue with skilled physical therapy intervention, as indicated by initial plan of care.  Pt. appears to have uncontrolled DM-type 2 with abnormal lab values for BUN (low), Albumin (low) and Alkaline (high) on 5/11 labs     Patient has complex medical history and very poor functional mobility for her age of 58 years.  Dependent gait, generalized weakness, pain throughout entire body and specifically low back with left greater than right LE radicular pain deem patient a good candidate for skilled PT.    Patient reported a decrease in pain after manual PROM bilateral LE and therapeutic exercise today.    Goal Status:  Pt. will demonstrate/verbalize independence in self-management of condition in : Progressing toward  Pt. will be independent with home exercise program in : Progressing toward  Pt. will have improved quality of sleep: waking less times/night;in 6 weeks  Patient will transfer: sit/stand;with less difficulty;in 6 weeks;Comment  Comment: greater than 3 x in 30 seconds  Patient will increase : in 6 weeks;LEFS score  by ___ points: 2    Plan / Patient Education:     Continue with initial plan of care.  Progress with home program as tolerated.  Progress UBE/restorator times, LE AROM to sitting and standing from supine exercises  Plan for next visit: Progress total body strengthening and conditioning program with focus on core strengthening to  "promote pain reduction of back and LE. To include LE restorator, gait training, sit to stand training, standing LE exercises and progress home program with handout needed.  Manual therapy for left hip/back pain prn.  Subjective:     Pain Ratin  Feels about the same as last PT session  \"Pain all over today\" \"Whole body\"  For the past 3 days  Felt less pain at end of session today.  Lab values from  abnormal for BUN, Albumin, and Alkaline (uncontrolled DM-Type 2) per Epic    From Initial Eval: Trixie is a 58 y.o. female who presents to therapy today with complex complaints of total body pain primarily low and mid back pain and radiating bialteral leg pain to feet.  Pain worse at left LE.  Also complains of bilateral shoulder pain and left arm pain.  C/o swelling and burning of face, hands and feet.  C/o intermittent spasm of left UE typically once a week.  Date of onset/duration of symptoms is approximately 4 years ago. Onset was insidious. Symptoms are not improving. She reports  a constant  history of similar symptoms. She describes their previous level of function as limited with all activity over the past 4 years. She reports her pain medication does help alleviate the pain somewhat.  History of left UE having spasms/Erb's palsy intermittently typically 1x every week or few weeks.   Objective:    present  Facial edema, right hand edema and small bruising at abdomen, pt. Unsure what it's from, but reports it's a little painful-advised to seek MD consult if not improving.  Walking slowly with cane with somewhat flexed posture, Trendelenberg gait with weakness noted at left hip (pain with weightbearing left LE)  Fatigues easily    Exercises:  Exercise #1: restorator seated x 3'  Comment #1: UBE seated x 3' F/B seat at 5  (feels in back so advised to keep slower pace when doing backwards revolutions)  Exercise #2: supine ap and heel slides 10x  Comment #2: supine SLR and bridging 5x  Exercise #3: " supine trunk rotation 3x  Comment #3: seated LAQ 10x  Exercise #4:  hip flexion seated alternating then hip abd seated 10x each  Comment #4: add standing hip abduction and extension  Exercise #5: gait training for sidestepping at railing 1x with supervision and verbal cueing  Comment #5: gait training for backward walking at railing 1x, then for highstepping forward at railing 1x    Treatment Today     TREATMENT MINUTES COMMENTS   Evaluation     Self-care/ Home management     Manual therapy 5 Manual therapy in right sidelying of CFM to left gluteus medius and rina to left L/S x 5'   Neuromuscular Re-education     Therapeutic Activity     Therapeutic Exercises 20 See flow sheet for exercise training   Gait training     Modality__________________                Total 25    Blank areas are intentional and mean the treatment did not include these items.       Vida Lombardi, PT  5/16/2018

## 2021-06-18 NOTE — PROGRESS NOTES
[facility] Daily Progress     Patient Name: Trixie Sofia  Date: 5/21/2018  Visit #: 4/12 through 7/30/18 for Lidia montilla  PTA visit #:    Referral Diagnosis: Arthropathy, sciatica of left side  Referring provider: Mei Carbone MD  Visit Diagnosis:     ICD-10-CM    1. Lumbar radiculopathy M54.16    2. Abnormality of gait R26.9    3. Arthropathy M12.9    4. Muscle weakness (generalized) M62.81          Assessment:     HEP/POC compliance is  good and doing every day.  Response to Intervention fatigues easily but willing to attempt exercise training  Patient is appropriate to continue with skilled physical therapy intervention, as indicated by initial plan of care.  Pt. appears to have uncontrolled DM-type 2 with abnormal lab values for BUN (low), Albumin (low) and Alkaline (high) on 5/11 labs     Patient has complex medical history and very poor functional mobility for her age of 58 years.  Dependent gait, generalized weakness, pain throughout entire body and specifically low back with left greater than right LE radicular pain deem patient a good candidate for skilled PT.    Patient reported a decrease in pain after manual PROM bilateral LE and therapeutic exercise today.    Goal Status:  Pt. will demonstrate/verbalize independence in self-management of condition in : Progressing toward  Pt. will be independent with home exercise program in : Progressing toward  Pt. will have improved quality of sleep: waking less times/night;in 6 weeks  Patient will transfer: sit/stand;with less difficulty;in 6 weeks;Comment  Comment: greater than 3 x in 30 seconds  Patient will increase : in 6 weeks;LEFS score  by ___ points: 2    Plan / Patient Education:     Continue with initial plan of care.  Progress with home program as tolerated.  Progress UBE/restorator times, LE AROM to sitting and standing from supine exercises   One more visit then likely DC to home program per patient as transportation difficult for patient  to get to clinic.    Plan for next visit: Progress total body strengthening and conditioning program with focus on core strengthening to promote pain reduction of back and LE. To include LE restorator, gait training, sit to stand training, standing LE exercises and progress home program with handout needed.  Manual therapy for left hip/back pain prn.  Subjective:     Pain Ratin  Feels better compared to last PT session  Feels most of the pain in the neck/back and legs-taking pain meds 2x/day  Lab values from  abnormal for BUN, Albumin, and Alkaline (uncontrolled DM-Type 2) per Epic    From Initial Eval: Trixie is a 58 y.o. female who presents to therapy today with complex complaints of total body pain primarily low and mid back pain and radiating bialteral leg pain to feet.  Pain worse at left LE.  Also complains of bilateral shoulder pain and left arm pain.  C/o swelling and burning of face, hands and feet.  C/o intermittent spasm of left UE typically once a week.  Date of onset/duration of symptoms is approximately 4 years ago. Onset was insidious. Symptoms are not improving. She reports  a constant  history of similar symptoms. She describes their previous level of function as limited with all activity over the past 4 years. She reports her pain medication does help alleviate the pain somewhat.  History of left UE having spasms/Erb's palsy intermittently typically 1x every week or few weeks.   Objective:    present  Wearing small pain patch at left neck  Facial edema, right hand edema less compared to last visit but still present and small bruising at abdomen almost resolved.  Walking slowly with cane with somewhat flexed posture, Trendelenberg gait with weakness noted at left hip (pain with weightbearing left LE)  Fatigues easily  Does not want to continue with PT after last visit scheduled for     Exercises:  Exercise #1: restorator seated x 4'  Comment #1: UBE seated x 3' F/B seat at 5   (feels in back so advised to keep slower pace when doing backwards revolutions)  Exercise #2: supine ap and heel slides 10x  Comment #2: supine SLR and bridging 5x  Exercise #3: supine trunk rotation 3x  Comment #3: seated LAQ 10x  Exercise #4:  hip flexion seated alternating then hip abd seated 10x each  Comment #4: standing hip abduction and extension and hip flexion 5x each  Exercise #5: gait training for sidestepping at railing 1x with supervision and verbal cueing  Comment #5: gait training for backward walking at railing 1x, then for highstepping forward at railing 1x    Treatment Today     TREATMENT MINUTES COMMENTS   Evaluation     Self-care/ Home management     Manual therapy 5 Manual therapy in right sidelying of CFM to left gluteus medius and rina to left L/S x 5'   Neuromuscular Re-education     Therapeutic Activity     Therapeutic Exercises 20 See flow sheet for exercise training   Gait training     Modality__________________                Total 25    Blank areas are intentional and mean the treatment did not include these items.       Vida Lombardi, PT  5/21/2018

## 2021-06-18 NOTE — PROGRESS NOTES
MTM Initial Encounter  Assessment & Plan                                                     1. Generalized pain  Somewhat controlled, stable.  1000 mg twice daily Tylenol has been prescribed, however I do not see it with her today.  It is unclear she is taking this or not as she has not brought in her regular pillbox.  Her primary complaints today are her back, hips, and hands.    2. Type 2 diabetes mellitus with diabetic polyneuropathy, without long-term current use of insulin  Uncontrolled to goal of 7%. Trixie's neuropathy has been worsening over the past year which is likely due to her worsening glucose control.  We will attempt to tighten this up  in order to prevent worsening of her neuropathy.  We discussed starting insulin vs. Attempting to control with oral meds alone.  She would strongly prefer to avoid insulin for as long as possible.  Given her ongoing edema, I would prefer not to increase her pioglitazone.  We will reintroduce glipizide today.  She has had some hypoglycemia in the past with this due to variable eating habits.  Using the XL formulation will help to minimize this risk.  Her insurance is also requiring a different brand of glucometer- this is been ordered with test strips and lancets today.  - lancets (ONETOUCH DELICA LANCETS) 33 gauge Misc; Use to check blood sugar three times daily.  Dispense: 300 each; Refill: 11  - blood glucose meter (GLUCOMETER); Use 1 each As Directed as needed. Dispense glucometer brand per patient's insurance at pharmacy discretion.  Dispense: 1 each; Refill: 0  - blood glucose test strips; Use 1 each As Directed 3 (three) times a day. Dispense brand per patient's insurance at pharmacy discretion.  Dispense: 300 strip; Refill: 11  - glipiZIDE (GLUCOTROL XL) 10 MG 24 hr tablet; Take 1 tablet (10 mg total) by mouth daily.  Dispense: 90 tablet; Refill: 3    3. Low vitamin D level  - Continue current therapy     Follow Up  Seeing PCP in one week.  MTM in 6-8 weeks for  diabetes and review of topical medications (which she did not bring today).    Subjective & Objective                                                     Trixie Sofia is a 58 y.o. female coming in for an initial visit for Medication Therapy Management. She was referred to me from Mei Carbone MD    Chief Complaint: Back and hip pain, A1c >8%    Medication Adherence/Access: Trixie's medications are placed into a pill box for her every two weeks by a home care nurse.    Pain: Naproxen 500mg two times a day, amitriptyline 50mg qhs, venlafaxine 225mg daily, gabapentin 300mg two times a day, acetaminophen 1000mg two times a day.  Trixie's back and hips hurt a lot.  Her hands are also stiff.  Her hands and feet are swollen today.  This has been going on for a long time - it comes and goes.  She does not think it has gotten worse in the past year.    Diabetes: Janumet 1000-50 two times a day, pioglitazone 15mg daily, aspirin 81mg daily, atorvastatin 20mg daily.  Trixie has a burning sensation in her feet.  She has to soak her feet every night.  She does feel this has gotten a lot worse over the past year.  AM fasting range: 130-170 mg/dL  Lab Results   Component Value Date    HGBA1C 8.7 (H) 03/29/2018    HGBA1C 6.9 (H) 12/04/2017    HGBA1C 7.6 (H) 08/24/2017     Lab Results   Component Value Date    MICROALBUR <0.50 03/29/2018    LDLCALC 112 12/04/2017    CREATININE 0.62 05/11/2018     The 10-year ASCVD risk score (Ariella DC Jr, et al., 2013) is: 9.5%    Values used to calculate the score:      Age: 58 years      Sex: Female      Is Non- : No      Diabetic: Yes      Tobacco smoker: Yes      Systolic Blood Pressure: 105 mmHg      Is BP treated: No      HDL Cholesterol: 35 mg/dL      Total Cholesterol: 181 mg/dL    Low vitamin D levels: Ergocalciferol 50K units weekly.  Trixie has continued on ergo for 4+ years.  Vitamin D, Total (25-Hydroxy)   Date Value Ref Range Status   03/29/2018 12.8 (L) 30.0 - 80.0 ng/mL  Final     PMH: reviewed in EPIC   Allergies/ADRs: reviewed in EPIC   Alcohol: reviewed in EPIC   Tobacco:   History   Smoking Status     Current Every Day Smoker     Years: 30.00     Types: Pipe   Smokeless Tobacco     Never Used     Today's Vitals:   Vitals:    06/27/18 1121   BP: 105/62   Pulse: 76   Weight: 208 lb 8 oz (94.6 kg)     ----------------    Much or all of the text in this note was generated through the use of Dragon Dictate voice-to-text software. Errors in spelling or words which seem out of context are unintentional. Sound alike errors, in particular, may have escaped editing.    The patient declined an after visit summary    I spent 60 minutes with this patient today; Patient is not Medicare Part D. All changes were made via collaborative practice agreement with Mei Carbone MD. A copy of the visit note was provided to the patient's provider.     Dell Arias, PharmD, BCACP  MTM Pharmacist at [facility]       Current Outpatient Prescriptions   Medication Sig Dispense Refill     acetaminophen (Q-PAP EXTRA STRENGTH) 500 MG tablet Take 2 tablets (1,000 mg total) by mouth 2 (two) times a day. 100 tablet 6     amitriptyline (ELAVIL) 50 MG tablet TAKE 1 TABLET BY MOUTH AT BEDTIME. 30 tablet 11     ammonium lactate (LAC-HYDRIN) 12 % lotion APPLY TO AFFECTED AREA TWICE DAILY. 400 g 0     artificial tears, hypromellose, (GONIOVISC) 2.5 % ophthalmic solution 2 drops each eye as needed 15 mL 12     ASPIR-LOW 81 mg EC tablet TAKE 1 PILL BY MOUTH EVERYDAY 30 tablet 6     atorvastatin (LIPITOR) 20 MG tablet Take 1 tablet (20 mg total) by mouth daily. 30 tablet 11     blood glucose meter (GLUCOMETER) Use 1 each As Directed as needed. Dispense glucometer brand per patient's insurance at pharmacy discretion. 1 each 0     blood glucose test strips Use 1 each As Directed 3 (three) times a day. Dispense brand per patient's insurance at pharmacy discretion. 300 strip 11      clotrimazole (LOTRIMIN) 1 % external solution Apply to feet 2 times per day 30 mL 0     condoms - male Misc Use as needed 24 each 11     dimethicone-colloidal oatmeal (AVEENO) 1.3 % Lotn Apply to body twice daily 532 mL 11     ergocalciferol (ERGOCALCIFEROL) 50,000 unit capsule Take 1 capsule (50,000 Units total) by mouth once a week. 4 capsule 12     gabapentin (NEURONTIN) 300 MG capsule Take 1 capsule (300 mg total) by mouth 2 (two) times a day. 180 capsule 3     glipiZIDE (GLUCOTROL XL) 10 MG 24 hr tablet Take 1 tablet (10 mg total) by mouth daily. 90 tablet 3     lancets (ONETOUCH DELICA LANCETS) 33 gauge Misc Use to check blood sugar three times daily. 300 each 11     lanolin-min oil-zinc ox-wh.pet Oint Apply to affected area several times a day 120 g 11     lidocaine (XYLOCAINE) 5 % ointment Apply to areas of pain twice daily as needed 120 g 11     MINERIN CREME Crea        naproxen (NAPROSYN) 500 MG tablet TAKE 1 PILL BY MOUTH TWICE DAILY WITH MEALS FOR PAIN 30 tablet 10     omeprazole (PRILOSEC) 20 MG capsule TAKE 1 CAPSULE (20 MG TOTAL) BY MOUTH DAILY BEFORE BREAKFAST. FOR STOMACH 90 capsule 2     pioglitazone (ACTOS) 15 MG tablet Take 1 tablet (15 mg total) by mouth daily. 30 tablet 11     sitaGLIPtin-metformin 50-1,000 mg TM24 Take 1 tablet by mouth 2 (two) times a day. 180 tablet 3     triamcinolone (KENALOG) 0.1 % cream Apply to affected area twice daily as needed. 45 g 0     venlafaxine (EFFEXOR XR) 75 MG 24 hr capsule Take 3 capsules (225 mg total) by mouth daily. 270 capsule 3     No current facility-administered medications for this visit.

## 2021-06-18 NOTE — PROGRESS NOTES
Bariatric Clinic Follow-Up Visit:    Trixie Sofia is a 58 y.o.  female with Body mass index is 42.58 kg/(m^2).  presenting here today for follow-up on non-surgical efforts for weight loss. Original Intake visit occurred on 10/7/16 and then most recently a year ago on 3/24/17 with a weight of 200 lbs.  Along with diet and behavior changes, she has been using no regular weight loss medications due to contraindications/interactions. She's here to re-establish care as she's gained some additional weight.See her intake visit notes for details on identified contributors to weight gain in the past.    Weight:   Wt Readings from Last 2 Encounters:   05/11/18 218 lb (98.9 kg)   04/23/18 215 lb (97.5 kg)    pounds  Height: 5' (1.524 m) (5/11/2018  1:55 PM)  Initial Weight: 218 lbs (5/11/2018  1:55 PM)  Weight: 218 lb (98.9 kg) (5/11/2018  1:55 PM)  Weight loss from initial: 0 (5/11/2018  1:55 PM)  % Weight loss: 0 % (5/11/2018  1:55 PM)  BMI (Calculated): 42.6 (5/11/2018  1:55 PM)  SpO2: 100 % (5/11/2018  1:55 PM)    Comorbidities:  Patient Active Problem List   Diagnosis     Dizziness     Obesity     Neck Pain     Insomnia     Headache     Drowsiness     Edema     Pain During Urination (Dysuria)     Neuralgia     Constipation     Oral Leukoplakia     Sciatica     Generalized Pain     Fatigue     Vitamin D Deficiency     Lower Back Pain     Depression With Anxiety     Lumbar Disc Degeneration L4 - L5     Lichen Planus     Urinary Incontinence     Arthritis     Frequent, Full-bladder Emptying (Polyuria)     Vaginal Discharge     Menopause     Abdominal Pain In The [place] (Epigastric)     Joint Pain Fingers     Myalgia And Myositis     Mouth pain     Tinnitus     Pain in left ear     Vertigo     Diabetes mellitus, type 2     Controlled substance agreement signed - tramadol 60tabs/mo     Multiple allergies     Polypharmacy     Cracked skin on feet     Type 2 diabetes mellitus with hyperosmolarity without coma,  unspecified long term insulin use status     Diabetes mellitus with neuropathy     HTN (hypertension)     PTSD (post-traumatic stress disorder)     DM neuropathy, painful     Tinea pedis     Morbid obesity, unspecified obesity type     Cramp and spasm     Metabolic syndrome     Polyarthritis       Current Outpatient Prescriptions:      acetaminophen (Q-PAP EXTRA STRENGTH) 500 MG tablet, Take 2 tablets (1,000 mg total) by mouth 2 (two) times a day., Disp: 100 tablet, Rfl: 6     amitriptyline (ELAVIL) 50 MG tablet, TAKE 1 TABLET BY MOUTH AT BEDTIME., Disp: 30 tablet, Rfl: 11     ammonium lactate (LAC-HYDRIN) 12 % lotion, APPLY TO AFFECTED AREA TWICE DAILY., Disp: 400 g, Rfl: 0     artificial tears, hypromellose, (GONIOVISC) 2.5 % ophthalmic solution, 2 drops each eye as needed, Disp: 15 mL, Rfl: 12     ARTIFICIAL TEARS, POLYVIN ALC, 1.4 % ophthalmic solution, , Disp: , Rfl:      ASPIR-LOW 81 mg EC tablet, TAKE 1 PILL BY MOUTH EVERYDAY, Disp: 30 tablet, Rfl: 6     atorvastatin (LIPITOR) 20 MG tablet, Take 1 tablet (20 mg total) by mouth daily., Disp: 30 tablet, Rfl: 11     blood glucose test (CONTOUR NEXT STRIPS) strips, TEST FASTING DAILY, Disp: 50 strip, Rfl: 6     clotrimazole (LOTRIMIN) 1 % external solution, Apply to feet 2 times per day, Disp: 30 mL, Rfl: 0     condoms - male Misc, Use as needed, Disp: 24 each, Rfl: 11     dimethicone-colloidal oatmeal (AVEENO) 1.3 % Lotn, Apply to body twice daily, Disp: 532 mL, Rfl: 11     ergocalciferol (ERGOCALCIFEROL) 50,000 unit capsule, Take 1 capsule (50,000 Units total) by mouth once a week., Disp: 4 capsule, Rfl: 12     gabapentin (NEURONTIN) 300 MG capsule, Take 1 capsule (300 mg total) by mouth 2 (two) times a day., Disp: 180 capsule, Rfl: 3     generic lancets (MICROLET LANCET), Dispense brand per patient's insurance at pharmacy discretion., Disp: 100 each, Rfl: 11     HYDROcodone-acetaminophen 5-325 mg per tablet, Take 1-2 tablets by mouth every 8 (eight) hours as  needed for pain., Disp: , Rfl:      lanolin-min oil-zinc ox-wh.pet Oint, Apply to affected area several times a day, Disp: 120 g, Rfl: 11     lidocaine (XYLOCAINE) 5 % ointment, Apply to areas of pain twice daily as needed, Disp: 120 g, Rfl: 11     MINERIN CREME Crea, , Disp: , Rfl:      naproxen (NAPROSYN) 500 MG tablet, TAKE 1 PILL BY MOUTH TWICE DAILY WITH MEALS FOR PAIN, Disp: 30 tablet, Rfl: 10     omeprazole (PRILOSEC) 20 MG capsule, TAKE 1 CAPSULE (20 MG TOTAL) BY MOUTH DAILY BEFORE BREAKFAST. FOR STOMACH, Disp: 90 capsule, Rfl: 2     pioglitazone (ACTOS) 15 MG tablet, Take 1 tablet (15 mg total) by mouth daily., Disp: 30 tablet, Rfl: 11     sitaGLIPtin-metformin 50-1,000 mg TM24, Take 1 tablet by mouth 2 (two) times a day., Disp: 180 tablet, Rfl: 3     TRAVEL SICKNESS, MECLIZINE, 25 mg chewable tablet, CHEW 1 TABLET BY MOUTH 3 TIMES A DAY AS NEEDED FOR DIZZINESS OR NAUSEA, Disp: 90 tablet, Rfl: 3     triamcinolone (KENALOG) 0.1 % cream, Apply to affected area twice daily as needed., Disp: 45 g, Rfl: 0     venlafaxine (EFFEXOR XR) 75 MG 24 hr capsule, Take 3 capsules (225 mg total) by mouth daily., Disp: 270 capsule, Rfl: 3      Interim: Since our last visit, she has gained 18 lbs over the last year, has stopped glipizide and started Actos and has noticed some increased swelling lately of hands/feet and eyelids. No chest pain or productive cough. She has some chronic left arm weakness/ weakness. MRI last year showed no infarct/mass.  She continues to use gabapentin and elavil which can have some pro weight gain potential.  Her blood sugars have been running reasonable lately on morning checks, she reports 151mg/dl lately as a typical result. Her March A1c showed worsening control (8.7%).   She's working w/ PT next week on some sciatica issues.     Plan:   1.  Diet: in the past struggled with 3 meals a day, preferring one large meal in the evening. Will refer her back to the dietician to start working on  dietary knowledge  2. Exercise: PT scheduled previously  3. Medication: Given edema issues, will discuss with Dr. Carbone about Actos discontinuation, consideration for SGLT2 inhibitor if possible (doesn't wish to do injections of GLP1 agonists). Acarbose would be an alternative as well.   4.  DM II: diet change, med adjustments.   5. Goals:  Under 197 lbs would be 10% reduction in weight.   6. Left arm cramp today prior to me arriving in room but witnessed by nursing staff. No reported seizure activity, no headaches/neck pain: will check electrolytes/magnesium.       We discussed [facility] Bariatric Basics including:  -eating 3 meals daily  -eating protein first  -eating slowly, chewing food well  -avoiding/limiting calorie containing beverages  -We discussed the importance of restorative sleep and stress management in maintaining a healthy weight.  -We discussed the National Weight Control Registry healthy weight maintenance strategies and ways to optimize metabolism.  -We discussed the importance of physical activity including cardiovascular and strength training in maintaining a healthier weight and explored viable options.    Most recent labs:  Lab Results   Component Value Date    WBC 9.3 12/04/2017    HGB 12.1 12/04/2017    HCT 39.1 12/04/2017    MCV 78 (L) 12/04/2017     12/04/2017     Lab Results   Component Value Date    CHOL 181 12/04/2017     Lab Results   Component Value Date    HDL 35 (L) 12/04/2017     Lab Results   Component Value Date    LDLCALC 112 12/04/2017     Lab Results   Component Value Date    TRIG 172 (H) 12/04/2017     No components found for: CHOLHDL  Lab Results   Component Value Date    ALT 58 (H) 12/04/2017    AST 59 (H) 12/04/2017    ALKPHOS 240 (H) 12/04/2017    BILITOT 0.6 12/04/2017     Lab Results   Component Value Date    HGBA1C 8.7 (H) 03/29/2018     Lab Results   Component Value Date    EJKLBPDE36 799 10/24/2016     Lab Results   Component Value Date    [id number]  12.8 (L) 03/29/2018     No results found for: FERRITIN  Lab Results   Component Value Date    PTH 35 10/24/2016     Lab Results   Component Value Date    08369 86 10/31/2016     No results found for: 7597  Lab Results   Component Value Date    TSH 1.74 12/12/2014     No results found for: TESTOSTERONE    DIETARY HISTORY    Positive Changes Since Last Visit: none.  Struggling With: understanding bariatric technique.    Knowledgeable in Reading Food Labels: no  Getting Adequate Protein: no  Sleeping 7-8 hours/day often  Stress management na    PHYSICAL ACTIVITY PATTERNS:  Cardiovascular: none  Strength Training: getting PT next week    REVIEW OF SYSTEMS  GENERAL/CONSTITUTIONAL:  No recent illness.  HEENT:   face feels swollen to her, left eyelid more swollen this morning.  CARDIOVASCULAR:    No chest pain  PULMONARY:   no cough  GASTROINTESTINAL:  No pain  UROLOGIC:  na  NEUROLOGIC:  Left arm chronically weak the last year. No stroke hx. Uses cane to ambulate.  PSYCHIATRIC:  na  MUSCULOSKELETAL/RHEUMATOLOGIC   na  ENDOCRINE:  na  DERMATOLOGIC:  No rash. Feels hands and feet are more swollen as well.    PHYSICAL EXAM:  Vitals: /87  Pulse 77  Resp 18  Ht 5' (1.524 m)  Wt 218 lb (98.9 kg)  LMP 01/30/2013  SpO2 100%  BMI 42.58 kg/m2  Height: 5' (1.524 m) (5/11/2018  1:55 PM)  Initial Weight: 218 lbs (5/11/2018  1:55 PM)  Weight: 218 lb (98.9 kg) (5/11/2018  1:55 PM)  Weight loss from initial: 0 (5/11/2018  1:55 PM)  % Weight loss: 0 % (5/11/2018  1:55 PM)  BMI (Calculated): 42.6 (5/11/2018  1:55 PM)  SpO2: 100 % (5/11/2018  1:55 PM)    GEN: Pleasant, well groomed, in no acute distress  HEENT: PEERL, EOMI, airway clear. Has edematous appearing eyelids, left more than right, no drainage or conjunctivitis evident today .  NECK: No swelling.  HEART: Rhythm regular, rate regular, no murmur   LUNGS: Clear without crackles or wheezes. No cough.  ABDOMEN: central adiposity.  EXTREMITIES: 2 plus palpable peripheral  pulses, hands and feel mildly swollen..  NEURO: Alert and Oriented X3, normal gait and speech.  SKIN: No visible rashes, or pallor/jaundice..        25 minutes was spent in direct consultation, with over 50% of it spent in counseling regarding their plan for excess weight reduction and health modification.  Adolfo Huntley MD  [facility]  3:11 PM

## 2021-06-18 NOTE — PROGRESS NOTES
[facility] Discharge Summary  Patient Name: Neh Net  Date: 5/23/2018  Referral Diagnosis: Arthropathy, sciatica of left side  Referring provider: Mei Carbone MD  Visit Diagnosis:   1. Lumbar radiculopathy     2. Abnormality of gait     3. Arthropathy     4. Muscle weakness (generalized)         Goals:  Pt. will demonstrate/verbalize independence in self-management of condition in : Met  Pt. will be independent with home exercise program in : Met  Pt. will have improved quality of sleep: waking less times/night;in 6 weeks;Progressing toward  Patient will transfer: sit/stand;with less difficulty;in 6 weeks;Comment;Progressing toward  Comment: greater than 3 x in 30 seconds  Patient will increase : in 6 weeks;LEFS score;Met;Comment  by ___ points: 2  Comment: oswestry score as opposed to lefs    Tinnetti Total Score (calculated): 18 (was 13 at initial visit) improved 5 points  Modified Oswestry Low Back Pain Disablity Questionnaire  in %: 56 (was 60% at initial visit) improved 4%    Patient was seen for 5 visits from 4/30/18 to 5/23/18 with 0 missed appointments.  Patient received a home program and has been compliant with exercises at home  The patient was issued theraband and instructed in proper usage.  Patient wishes to discontinue outpatient PT due to much difficulty with transportation getting to PT session-no family available to take her.    Therapy will be discontinued at this time per patient request.  Patient to continue with home exercise program 2x/day until returns to MD in one month.  The patient will need a new referral to resume. Advised to discuss facial and hand pain and swelling with MD.  Advised to discuss cholesterol medication as possible source for back/LE pain if no improvement.    Thank you for your referral.  Vida Lombardi, PT  5/23/2018  9:38 AM  [facility] Daily Progress     Patient Name: Neh Net  Date: 5/23/2018  Visit #: 5/12 through 7/30/18 for Lidia  auth  PTA visit #:    Referral Diagnosis: Arthropathy, sciatica of left side  Referring provider: Mei Carbone MD  Visit Diagnosis:     ICD-10-CM    1. Lumbar radiculopathy M54.16    2. Abnormality of gait R26.9    3. Arthropathy M12.9    4. Muscle weakness (generalized) M62.81          Assessment:     HEP/POC compliance is  good and doing every day.  Response to Intervention fatigues easily but willing to attempt exercise training  Patient is appropriate to continue with skilled physical therapy intervention, as indicated by initial plan of care.  Pt. appears to have uncontrolled DM-type 2 with abnormal lab values for BUN (low), Albumin (low) and Alkaline (high) on 5/11 labs     Patient has complex medical history and very poor functional mobility for her age of 58 years.  Dependent gait, generalized weakness, pain throughout entire body and specifically low back with left greater than right LE radicular pain deem patient a good candidate for skilled PT.    Patient reported a decrease in pain after manual PROM bilateral LE and therapeutic exercise today.    Goal Status:  Pt. will demonstrate/verbalize independence in self-management of condition in : Met  Pt. will be independent with home exercise program in : Met  Pt. will have improved quality of sleep: waking less times/night;in 6 weeks;Progressing toward  Patient will transfer: sit/stand;with less difficulty;in 6 weeks;Comment;Progressing toward  Comment: greater than 3 x in 30 seconds  Patient will increase : in 6 weeks;LEFS score;Met;Comment  by ___ points: 2  Comment: oswestry score as opposed to lefs    Plan / Patient Education:     Continue with initial plan of care.  Progress with home program as tolerated.  Progress UBE/restorator times, LE AROM to sitting and standing from supine exercises   One more visit then likely DC to home program per patient as transportation difficult for patient to get to clinic.    Plan for next visit: Progress total  body strengthening and conditioning program with focus on core strengthening to promote pain reduction of back and LE. To include LE restorator, gait training, sit to stand training, standing LE exercises and progress home program with handout needed.  Manual therapy for left hip/back pain prn.  Subjective:     Pain Ratin  Feels a little worse compared to last PT session (past 2 days more sore with unknown reason)  Feels most of the pain in the neck/back and legs-taking pain meds 2x/day  Lab values from  abnormal for BUN, Albumin, and Alkaline (uncontrolled DM-Type 2) per Epic  Does not want to continue with PT after last visit scheduled for today    From Initial Eval: Trixie is a 58 y.o. female who presents to therapy today with complex complaints of total body pain primarily low and mid back pain and radiating bialteral leg pain to feet.  Pain worse at left LE.  Also complains of bilateral shoulder pain and left arm pain.  C/o swelling and burning of face, hands and feet.  C/o intermittent spasm of left UE typically once a week.  Date of onset/duration of symptoms is approximately 4 years ago. Onset was insidious. Symptoms are not improving. She reports  a constant  history of similar symptoms. She describes their previous level of function as limited with all activity over the past 4 years. She reports her pain medication does help alleviate the pain somewhat.  History of left UE having spasms/Erb's palsy intermittently typically 1x every week or few weeks.   Objective:    present  Wearing small pain patch at left neck  Facial edema, right hand edema same compared to last visit   Walking slowly with NBQcane in right hand with somewhat flexed posture, Trendelenberg gait with weakness noted at left hip (pain with weightbearing left LE)  Can walk 100' without assistive device with Trendelenburg gait with decreased weight bearing on left LE due to pain.  Fatigues easily    Tinnetti Total Score  (calculated): 18 (was 13 at initial visit) improved 5 points  Modified Oswestry Low Back Pain Disablity Questionnaire  in %: 56 (was 60% at initial visit) improved 4%        Exercises:  Exercise #1: restorator seated x 4'  Comment #1: UBE seated x 3' F/B seat at 5  (feels in back so advised to keep slower pace when doing backwards revolutions)  Exercise #2: supine ap and heel slides 10x  Comment #2: supine SLR and bridging 5x  Exercise #3: supine trunk rotation 3x  Comment #3: seated LAQ 10x  Exercise #4:  hip flexion seated alternating then hip abd seated 10x each  Comment #4: standing hip abduction and extension and hip flexion 5x each  Exercise #5: gait training for sidestepping at railing 1x with supervision and verbal cueing  O2 sats 96%, heart rate 98 bpm  Comment #5: gait training for backward walking at railing 1x  Exercise #6: yellow t-band for rowing and pulldowns 10x each    Treatment Today     TREATMENT MINUTES COMMENTS   Evaluation     Self-care/ Home management     Manual therapy  No MT today   Neuromuscular Re-education     Therapeutic Activity     Therapeutic Exercises 25 See flow sheet for exercise training   Gait training     Modality__________________                Total 25    Blank areas are intentional and mean the treatment did not include these items.       Vida Lombardi, PT  5/23/2018

## 2021-06-18 NOTE — PROGRESS NOTES
[facility] Daily Progress     Patient Name: Trixie Sofia  Date: 5/14/2018  Visit #: 2/12 through 7/30/18 for Lidia montilla  PTA visit #:    Referral Diagnosis: Arthropathy, sciatica of left side  Referring provider: Mei Carbone MD  Visit Diagnosis:     ICD-10-CM    1. Lumbar radiculopathy M54.16    2. Abnormality of gait R26.9    3. Arthropathy M12.9    4. Muscle weakness (generalized) M62.81          Assessment:     Patient is appropriate to continue with skilled physical therapy intervention, as indicated by initial plan of care.  Patient appropriate to progress therapeutic exercise and good candidate for training for home program and activity increasing levels.     Patient has complex medical history and very poor functional mobility for her age of 58 years.  Dependent gait, generalized weakness, pain throughout entire body and specifically low back with left greater than right LE radicular pain deem patient a good candidate for skilled PT.    Patient reported a decrease in pain after manual PROM bilateral LE and therapeutic exercise today.    Goal Status:  Pt. will demonstrate/verbalize independence in self-management of condition in : 6 weeks  Pt. will be independent with home exercise program in : 6 weeks  Pt. will have improved quality of sleep: waking less times/night;in 6 weeks  Patient will transfer: sit/stand;with less difficulty;in 6 weeks;Comment  Comment: greater than 3 x in 30 seconds  Patient will increase : in 6 weeks  by ___ points: 2    Plan / Patient Education:     Continue with initial plan of care.  Progress with home program as tolerated.  Progress UBE/restorator times, LE AROM to sitting and standing from supine exercises  Plan for next visit: Initiate total body strengthening and conditioning program with focus on core strengthening to promote pain reduction of back and LE. To include LE restorator, gait training, sit to stand training, standing LE exercises and initiate home  "program with handout needed.  Manual PROM for LE including manual stretching of LE and lumbar mobilization for extension and rotation.  Subjective:     Pain Ratin  \"Pain all over today\" \"Whole body\"  For the past 3 days  Felt less pain at end of session today.    From Initial Eval: Trixie is a 58 y.o. female who presents to therapy today with complex complaints of total body pain primarily low and mid back pain and radiating bialteral leg pain to feet.  Pain worse at left LE.  Also complains of bilateral shoulder pain and left arm pain.  C/o swelling and burning of face, hands and feet.  C/o intermittent spasm of left UE typically once a week.  Date of onset/duration of symptoms is approximately 4 years ago. Onset was insidious. Symptoms are not improving. She reports  a constant  history of similar symptoms. She describes their previous level of function as limited with all activity over the past 4 years. She reports her pain medication does help alleviate the pain somewhat.  History of left UE having spasms/Erb's palsy intermittently typically 1x every week or few weeks.   Objective:    present  Facial edema  Walking slowly with cane with somewhat flexed posture  Low tolerance to supine LE exercises  Exercises:  Exercise #1: restorator seated x 3'  Comment #1: UBE seated x 2' F/B  Exercise #2: supine ap and heel slides 10x  Comment #2: supine SLR and bridging 5x  Exercise #3: supine trunk rotation 3x  Comment #3: seated LAQ 10x  Exercise #4: add hip flexion seated and hip ER seated  Comment #4: add standing hip abduction and extension    Treatment Today     TREATMENT MINUTES COMMENTS   Evaluation     Self-care/ Home management     Manual therapy     Neuromuscular Re-education     Therapeutic Activity     Therapeutic Exercises 25 See flow sheet and PROM bilateral hips in supine and sidelying with manual overpressure with hip extension in sidelying   Gait training     Modality__________________           "      Total 25    Blank areas are intentional and mean the treatment did not include these items.       Vida Lombardi, PT  5/14/2018

## 2021-06-19 NOTE — PROGRESS NOTES
[facility] Follow Up Encounter  Assessment & Plan                                                     1. Type 2 diabetes mellitus with diabetic polyneuropathy, without long-term current use of insulin  Uncontrolled to goal of 7%.  We have restarted glipizide, but Trixie has not been checking her blood sugar.  It also sounds like she has been eating less than usual due to a severe cough.  We will keep her medications the same until we have some blood sugar readings to ensure that we will not cause hypoglycemia by increasing her glipizide.  Teaching done on OneTouch Ultra 2 glucometer.    - start checking blood sugar twice daily  - Continue current therapy     2. Cough  Trixie's cough is severe and significantly reduces her ability to engage during our visit today.  She would like to be seen for this as soon as possible.  I have advised her to schedule a next available at the  and to consider walk in care if she is not able to be seen soon enough.    Follow Up  Return in about 6 weeks (around 9/26/2018) for diabetes.      Subjective & Objective                                                       Trixie Sofia is a 58 y.o. female coming in for a follow up visit for Medication Therapy Management. She was referred to me from Mei Carbone MD    Chief Complaint: diabetes follow up    Medication Adherence/Access: Trixie's medications are placed into a pill box for her every two weeks by a home care nurse.    Diabetes: Janumet 1000-50 two times per day, pioglitazone 15mg daily, glipizide XL 10mg daily, aspirin 81mg daily, atorvastatin 20mg daily   Trixie has had a terrible cough which makes her feel like not eating anything.   Trixie has not had anything to eat yet today.  She has her new blood sugar meter, but has not been able to use it as she does not know how.  I demonstrated for her how to do this and today in clinic her blood sugar  was 196 mg/dL. the swelling is gone from her ankles.  She states this has improved since she  stopped eating as much.  Her feet are feeling a little better.  Lab Results   Component Value Date    HGBA1C 8.1 (H) 07/02/2018    HGBA1C 8.7 (H) 03/29/2018    HGBA1C 6.9 (H) 12/04/2017     Lab Results   Component Value Date    MICROALBUR <0.50 03/29/2018    LDLCALC 112 12/04/2017    CREATININE 0.62 05/11/2018     Cough: Guaifenisen/Dextromethoraphan   Cough medicine is not working, coughing a lot.  Doesn't feel like eating because she is coughing so much.  She does endorse night sweats, but no hemoptysis.  She sweats everytime she coughs. She endorses chest pain and headache.  She has noticed that since she stopped eating as much, her edema has improved.    PMH: reviewed in EPIC   Allergies/ADRs: reviewed in EPIC   Alcohol: reviewed in EPIC   Tobacco:   History   Smoking Status     Current Every Day Smoker     Years: 30.00     Types: Pipe   Smokeless Tobacco     Never Used     Today's Vitals:   Vitals:    08/15/18 1250   BP: 110/64   Pulse: 76   Weight: 205 lb (93 kg)     ----------------    Much or all of the text in this note was generated through the use of Dragon Dictate voice-to-text software. Errors in spelling or words which seem out of context are unintentional. Sound alike errors, in particular, may have escaped editing.    The patient declined an after visit summary    I spent 30 minutes with this patient today;  All changes were made via collaborative practice agreement with Mei Carbone MD. A copy of the visit note was provided to the patient's provider.     Dell Arias, PharmD, BCACP  MTM Pharmacist at [facility]     Current Outpatient Prescriptions   Medication Sig Dispense Refill     acetaminophen (Q-PAP EXTRA STRENGTH) 500 MG tablet Take 2 tablets (1,000 mg total) by mouth 2 (two) times a day. 100 tablet 6     amitriptyline (ELAVIL) 50 MG tablet Take 1 tablet (50 mg total) by mouth at bedtime. 30 tablet 11     ammonium lactate (LAC-HYDRIN) 12 % lotion APPLY TO  AFFECTED AREA TWICE DAILY. 400 g 0     artificial tears, hypromellose, (GONIOVISC) 2.5 % ophthalmic solution 2 drops each eye as needed 15 mL 12     ASPIR-LOW 81 mg EC tablet TAKE 1 PILL BY MOUTH EVERYDAY 30 tablet 6     atorvastatin (LIPITOR) 20 MG tablet Take 1 tablet (20 mg total) by mouth daily. 30 tablet 11     blood glucose meter (GLUCOMETER) Use 1 each As Directed as needed. Dispense glucometer brand per patient's insurance at pharmacy discretion. 1 each 0     blood glucose test strips Use 1 each As Directed 3 (three) times a day. Dispense brand per patient's insurance at pharmacy discretion. 300 strip 11     clotrimazole (LOTRIMIN) 1 % external solution Apply to feet 2 times per day 30 mL 0     condoms - male Misc Use as needed 24 each 11     dimethicone-colloidal oatmeal (AVEENO) 1.3 % Lotn Apply to body twice daily 532 mL 11     gabapentin (NEURONTIN) 300 MG capsule Take 1 capsule (300 mg total) by mouth 2 (two) times a day. 180 capsule 3     glipiZIDE (GLUCOTROL XL) 10 MG 24 hr tablet Take 1 tablet (10 mg total) by mouth daily. 90 tablet 3     lancets (ONETOUCH DELICA LANCETS) 33 gauge Misc Use to check blood sugar three times daily. 300 each 11     lanolin-min oil-zinc ox-wh.pet Oint Apply to affected area several times a day 120 g 11     lidocaine (XYLOCAINE) 5 % ointment Apply to areas of pain twice daily as needed 120 g 11     MINERIN CREME Crea        naproxen (NAPROSYN) 500 MG tablet TAKE 1 PILL BY MOUTH TWICE DAILY WITH MEALS FOR PAIN 30 tablet 10     omeprazole (PRILOSEC) 20 MG capsule TAKE 1 CAPSULE (20 MG TOTAL) BY MOUTH DAILY BEFORE BREAKFAST. FOR STOMACH 90 capsule 2     pioglitazone (ACTOS) 15 MG tablet Take 1 tablet (15 mg total) by mouth daily. 30 tablet 11     sitaGLIPtin-metformin 50-1,000 mg TM24 Take 1 tablet by mouth 2 (two) times a day. 180 tablet 3     triamcinolone (KENALOG) 0.1 % cream Apply to affected area twice daily as needed. 45 g 0     venlafaxine (EFFEXOR XR) 75 MG 24 hr  capsule Take 3 capsules (225 mg total) by mouth daily. 270 capsule 3     VITAMIN D2 50,000 unit capsule Take 1 capsule by mouth once a week.  12     No current facility-administered medications for this visit.

## 2021-06-19 NOTE — LETTER
Letter by Rajni Stevenson RN at      Author: Dah, Rajni, RN Service: -- Author Type: --    Filed:  Encounter Date: 8/14/2019 Status: (Other)       CARE COORDINATION    August 14, 2019  Nerenny Net  [address]  [place] [address]      Dear Trixie,    I am a clinic care coordinator who works with Mei Carbone MD at [facility]. I wanted to thank you for spending the time to talk with me.  Below is a description of clinic care coordination and how I can further assist you.     The clinic care coordinator team is made up of a registered nurse,  and community health worker who understand the health care system. The goal of clinic care coordination is to help you manage your health and improve access to the health care system in the most efficient manner. The team can assist you in meeting your health care goals by providing education, coordinating services, strengthening the communication among your providers, assist you with any financial, behavioral, psychosocial, chemical dependency, counseling, and/or psychiatric resources if needed.    Please feel free to contact Alexandre Garcia, the Community Health Worker, at [phone] with any questions or concerns. We are focused on providing you with the highest-quality healthcare experience possible and that all starts with you.     Sincerely,   Rajni Stevenson  Enclosed: I have enclosed a copy of the Care Plan. This has helpful information and goals that we have talked about. Please keep this in an easy to access place to use as needed.

## 2021-06-19 NOTE — LETTER
Letter by Rajni Stevenson RN at      Author: Rajni Stevenson RN Service: -- Author Type: --    Filed:  Encounter Date: 8/14/2019 Status: (Other)       Care Plan  About Me:    Patient Name:  Trixie Sofia    YOB: 1960  Age:         59 y.o.   [facility] MRN:    [id number] Telephone Information:  Home Phone [phone]   Mobile [phone]       Address:  [address] [place] [address] Email address:  No e-mail address on record      Emergency Contact(s)  Extended Emergency Contact Information      Name: Ryder Medina    Home Phone Number: [phone]  Relation: Friend      Name: None, per pt    Relation: Declined          Primary language:  Brittney     needed? Yes   [employer]:  [phone] op. 1  Other communication barriers: Language barrier, Lack of coping  Preferred Method of Communication:     Current living arrangement: I live alone  Mobility Status/ Medical Equipment: Independent w/Device    Health Maintenance  Health Maintenance Reviewed: Not assessed    My Access Plan  Medical Emergency 911   Primary Clinic Line Mei Carbone MD - [phone]   24 Hour Appointment Line [phone] or  [phone] ([phone]) (toll-free)   24 Hour Nurse Line [phone] (toll-free)   Preferred Urgent Care [facility], [phone]   [facility]  [phone]   Preferred Pharmacy [facility] - [place], [place] - [address]     Behavioral Health Crisis Line The National Suicide Prevention Lifeline at 1-144.612.7495 or 911             My Care Team Members  Patient Care Team       Relationship Specialty Notifications Start End    Mei Carbone MD PCP - General Family Medicine  3/27/15     Phone: [phone] Fax: [phone]         [address] [place] [address]    Jose Guzman Psychologist Behavioral Health  2/3/16     Psychotherapist     Phone: [phone] life  Medical [employer] [facility]  Ophthalmology  3/9/17     Phone: [phone]         Klever RN, Care Manager   4/20/17     [facility] Service.    Phone: [phone]         [facility] Main: [phone] Medina Patient Care Assistant   3/28/19     [facility] Care:[phone]            My Care Plans  Self Management and Treatment Plan  Goals and (Comments)  Goals        General    I would like to establish care with a new cardiologist in the next 30 days.  (pt-stated)     Notes - Note created  8/20/2019  2:32 PM by Rajni Stevenson RN      Action steps to achieve this goal  1.  I will speak with [facility]W at outreach calls.  2. I will attend my appt with a cardiologist as schedule  3. I will call [facility]W with any concerns or questions.   4. Prefer appts on Wednesday or Thursday before 10am so my PCA can attend with me and provide me with transportation.       Date goal set:  8/14/19        RN goal: I would like to work on lowering my A1c <7 in the next 6 months.  (pt-stated)     Notes - Note created  8/20/2019  2:35 PM by Rajni Stevenson RN    Action steps to achieve this goal  1.  I will speak with [facility] RN at outreach calls.  2. I will take all my medications as prescribed.   3. My PCA will remind me to take my meds daily  4. I will check BG daily and prn for s/s of hypo/hyperglycemia.   5. I agree to see MTM if my A1c gets worsen with next A1c check ( around 10/2019)  6. I will comes to see PCP as scheduled on 8/28/19    Date goal set:  8/14/19               Advance Care Plans/Directives Type:        My Medical and Care Information  Problem List   Patient Active Problem List   Diagnosis   ? Depression With Anxiety   ? Lichen planus   ? Arthritis   ? Controlled substance agreement signed - tramadol 60tabs/mo   ? Polypharmacy   ? Diabetes mellitus with neuropathy (H)   ? PTSD (post-traumatic stress disorder)   ? Morbid obesity, unspecified obesity type (H)   ? Moderate major depression  (H)   ? Hypoxia   ? Essential hypertension   ? Pulmonary hypertension due to sleep-disordered breathing (H)   ? Iron deficiency anemia   ? Poor vision   ? Controlled type 2 diabetes mellitus without complication, without long-term current use of insulin (H)   ? Language barrier affecting health care   ? Poverty   ? Smoker   ? DEIRDRE (obstructive sleep apnea)      Current Medications and Allergies:  See printed Medication Report.    Care Coordination Start Date: 8/14/2019   Frequency of Care Coordination:     Form Last Updated: 08/20/2019

## 2021-06-19 NOTE — PROGRESS NOTES
HPI -  57 yo female here to f/u on several issues.     DM /metabolic syndrome  A1c 7.0 on 8/916 and 7.5 on 10/31/16 -> 8.5 on 2/1/17 -> 7.5 on 5/23/17 -> 7.6 on 8/24/17 -> 6.9 on 12/4/17 -> 8.7 on 3/29/18  on janumet and pioglitizone  - reports not forgetting meds, but sometimes eats lots of rice (encouraged to eat more vegetables/fruit and less rice)  BP wnl  ASA  LDL 73 in 10/24/16 on lipitor -> 112 on 12/4/17  microalbumin wnl 2/1/17  - recheck today  Eye clinic 3/17/17 - per pt seen at [facility] or [facility] eye clinic clinic 2 months ago - ALEXANDRA signed  DM neuropathy - on venlafaxine and       Dry itchy skin - rx for lotions      Periodic arm cramps into Erb's palsy position and MRI.   MRI of head and neck on 12/2017 were unrevealing  Neuro consult with Dr. Doss at [facility] 6/13/19 who reordered MRI brain/neck, EMG, and labs for copper, ceruloplasmin, TSH, RF. Per note, if neg will do trial of artane or baclofen    Vit D deficiency - 20.0 on 8/15/15 and 24.3 on 10/24/16 -> 21.6 on 5/23/17 -> 19.5 on 12/4/17 -> 12.8 on 3/29/18  Taking ergo           PTSD: sx well controlled with current meds  Taking venlafaxine 225 and trazodone 50 and now on amitriptyline  Sleep ok but mood is better but some days is low when in pain  No bad dreams    Morbid obesity -   BMI 44.2 and 226# on 10/3/17 and today BMI 40.9 and 209#   Last seen at  bariatric clinic 3/27/17 and 5/11/18  She has decrease in appetitie and doing some exercise  She meet with SW to help with intake for weight loss  Walking and doing PT exercise and eating smaller portions     Knee and ankle pain - taking gabapentin and naproxen and  Amitriptyline and tylenol (will change to BID instead of PRN)  Order zostrix cream but not covered  rx for lidocaine gel last visit  Seen by DR Pham 4/23/18 - tested UDAY (was normal) and referred to PT - she went 4 times in May and now doing home exercises      Prevention -   Pap and HPV neg 5/2014  FOBT neg  1/16/18  Breast and mammo wnl      Polypharmacy -   Home health RN sets up meds  Seen by MTM 6/27/18    Current Outpatient Prescriptions   Medication Sig Note     amitriptyline (ELAVIL) 50 MG tablet TAKE 1 TABLET BY MOUTH AT BEDTIME.      artificial tears, hypromellose, (GONIOVISC) 2.5 % ophthalmic solution 2 drops each eye as needed      ASPIR-LOW 81 mg EC tablet TAKE 1 PILL BY MOUTH EVERYDAY      atorvastatin (LIPITOR) 20 MG tablet Take 1 tablet (20 mg total) by mouth daily.      blood glucose meter (GLUCOMETER) Use 1 each As Directed as needed. Dispense glucometer brand per patient's insurance at pharmacy discretion.      blood glucose test strips Use 1 each As Directed 3 (three) times a day. Dispense brand per patient's insurance at pharmacy discretion.      gabapentin (NEURONTIN) 300 MG capsule Take 1 capsule (300 mg total) by mouth 2 (two) times a day.      glipiZIDE (GLUCOTROL XL) 10 MG 24 hr tablet Take 1 tablet (10 mg total) by mouth daily.      lancets (ONETOUCH DELICA LANCETS) 33 gauge Misc Use to check blood sugar three times daily.      naproxen (NAPROSYN) 500 MG tablet TAKE 1 PILL BY MOUTH TWICE DAILY WITH MEALS FOR PAIN      omeprazole (PRILOSEC) 20 MG capsule TAKE 1 CAPSULE (20 MG TOTAL) BY MOUTH DAILY BEFORE BREAKFAST. FOR STOMACH      pioglitazone (ACTOS) 15 MG tablet Take 1 tablet (15 mg total) by mouth daily.      sitaGLIPtin-metformin 50-1,000 mg TM24 Take 1 tablet by mouth 2 (two) times a day.      venlafaxine (EFFEXOR XR) 75 MG 24 hr capsule Take 3 capsules (225 mg total) by mouth daily.      VITAMIN D2 50,000 unit capsule Take 1 capsule by mouth once a week. 7/2/2018: Received from: External Pharmacy Received Sig: TAKE 1 CAPSULE  50,000 UNITS TOTAL  BY MOUTH ONCE A WEEK     acetaminophen (Q-PAP EXTRA STRENGTH) 500 MG tablet Take 2 tablets (1,000 mg total) by mouth 2 (two) times a day.      ammonium lactate (LAC-HYDRIN) 12 % lotion APPLY TO AFFECTED AREA TWICE DAILY.      clotrimazole  "(LOTRIMIN) 1 % external solution Apply to feet 2 times per day      condoms - male Misc Use as needed      dimethicone-colloidal oatmeal (AVEENO) 1.3 % Lotn Apply to body twice daily      lanolin-min oil-zinc ox-wh.pet Oint Apply to affected area several times a day      lidocaine (XYLOCAINE) 5 % ointment Apply to areas of pain twice daily as needed      ADAM Sims  4/23/2018: Received from: External Pharmacy     triamcinolone (KENALOG) 0.1 % cream Apply to affected area twice daily as needed.      Vitals:    07/02/18 1053   BP: 98/64   Patient Site: Left Arm   Patient Position: Sitting   Cuff Size: Adult Large   Pulse: 75   Resp: 28   Temp: 98.3  F (36.8  C)   TempSrc: Oral   SpO2: 95%   Weight: 209 lb 5 oz (94.9 kg)   Height: 4' 11.96\" (1.523 m)     PHYSICAL EXAM   General Appearance: Awake and alert, in no acute distress  HEENT: neck is supple  CV: regular rate  Resp: No respiratory distress. Breathing comfortably  Musculoskeletal: moving limbs comfortably with not deficits or deformities  Skin: no rashes noted  DM foot exam: normal pedal pulses, no deformities, good sensation to microfilament, thick callouses on toes and heels, no sensation to microfilament on toes and ball of foot    A/P  DM /metabolic syndrome with peripheral neuropathy  A1c 7.0 on 8/916 and 7.5 on 10/31/16 -> 8.5 on 2/1/17 -> 7.5 on 5/23/17 -> 7.6 on 8/24/17 -> 6.9 on 12/4/17 -> 8.7 on 3/29/18  on janumet and pioglitizone  - reports not forgetting meds, but sometimes eats lots of rice (encouraged to eat more vegetables/fruit and less rice)  BP wnl  ASA  LDL 73 in 10/24/16 on lipitor -> 112 on 12/4/17  microalbumin wnl 2/1/17  - recheck today  Eye clinic 3/17/17 - per pt seen at [facility] or [facility] eye clinic clinic 2 months ago - ALEXANDRA signed      Dry itchy skin - rx for lotions      Periodic arm cramps into Erb's palsy position and MRI.   MRI of head and neck on 12/2017 were unrevealing  Neuro consult with Dr. Doss at [facility] " 6/13/19 who reordered MRI brain/neck, EMG, and labs for copper, ceruloplasmin, TSH, RF. Per note, if neg will do trial of artane or baclofen    Vit D deficiency - 20.0 on 8/15/15 and 24.3 on 10/24/16 -> 21.6 on 5/23/17 -> 19.5 on 12/4/17 -> 12.8 on 3/29/18  Taking ergo           PTSD: sx well controlled with current meds  Taking venlafaxine 225 and trazodone 50 and now on amitriptyline  Sleep ok but mood is better but some days is low when in pain  No bad dreams    Morbid obesity -   BMI 44.2 and 226# on 10/3/17 and today BMI 40.9 and 209#   Last seen at  bariatric clinic 3/27/17 and 5/11/18  She has decrease in appetitie and doing some exercise  She meet with SW to help with intake for weight loss  Walking and doing PT exercise and eating smaller portions     Knee and ankle pain - taking gabapentin and naproxen and  Amitriptyline and tylenol (will change to BID instead of PRN)  Order zostrix cream but not covered  rx for lidocaine gel last visit  Seen by DR Pham 4/23/18 - tested UDAY (was normal) and referred to PT - she went 4 times in May and now doing home exercises      Prevention -   Pap and HPV neg 5/2014  FOBT neg 1/16/18  Breast and mammo wnl      Polypharmacy -   Home health RN sets up meds  Seen by MTM 6/27/18    Spent 40 min face to face with patient with more the 50% spent in counseling, reviewing chart, and coordination of care and discussing problems listed above.

## 2021-06-19 NOTE — LETTER
Letter by Eric Leung DO at      Author: Eric Leung DO Service: -- Author Type: --    Filed:  Encounter Date: 5/1/2019 Status: (Other)         [facility]  [address]  [place] [address]            May 1, 2019        Dear MsShital Sofia,    Enclosed are the results of your sleep study done 4/17/19. Please call us at [phone] to schedule a follow up appointment, with Dr. Leung, so that he can go over your results with you.    Thank you,    Maggie HIGHTOWER, [facility]

## 2021-06-20 NOTE — PROGRESS NOTES
HPI - 59yo female here for f/u     She is reporting leg and face swelling and leg pain that started 2 weeks and slowly improving the last couple of days. She feels her face is still puffy and last week she could hardly open her eyes they were so swollen.   microalbumin wnl 3/29/18 and GFR >60 on 5/11/18    Cough at night and postnasal drip  Improved with  Zyrtec and flonase        DM /metabolic syndrome with peripheral neuropathy  A1c12/4/17 -> 8.7 on 3/29/18 -> 8.1 on 7/2/18  on janumet 50/1000 and pioglitizone 15mg and glipizide 10mg -   BP wnl  ASA  LDL 73 in 10/24/16 on lipitor -> 112 on 12/4/17  microalbumin wnl 3/29/18   Eye clinic per pt seen at [facility] or [facility] eye clinic clinic 2 months ago - ALEXANDRA signed       Periodic arm cramps into Erb's palsy position and MRI.   MRI of head and neck on 12/2017 were unrevealing  Neuro consult with Dr. Doss at [facility] 6/13/18 who reordered MRI brain/neck, EMG, and labs for copper, ceruloplasmin, TSH, RF. Per note, if neg will do trial of artane or baclofen  EMG scheduled for 10/10/18 - tomorrow      Vit D deficiency - 20.0 on 8/15/15 and 24.3 on 10/24/16 -> 21.6 on 5/23/17 -> 19.5 on 12/4/17 -> 12.8 on 3/29/18 ->> 12.7 on 7/2/18  Taking ergo       PTSD: sx well controlled with current meds  Taking venlafaxine 225 and trazodone 50 and now on amitriptyline  Sleep ok but mood is better but some days is low when in pain  No bad dreams      Morbid obesity -   BMI 44.2 and 226# on 10/3/17 and BMI 40.9 and 209# for the last few months  Last seen at  bariatric clinic 3/27/17 and 5/11/18  She has decrease in appetitie and doing some exercise  She met with SW to help with intake for weight loss  Walking and doing PT exercise and eating smaller portions      Knee and ankle pain - polyarthritis  Meds: gabapentin,naproxen, Amitriptyline, tylenol  rx for lidocaine gel last visit         Polypharmacy -   Home health RN sets up meds  Seen by MTM 9/24/18    Birth control -  refill condoms    Current Outpatient Prescriptions   Medication Sig Note     amitriptyline (ELAVIL) 50 MG tablet Take 1 tablet (50 mg total) by mouth at bedtime.      ASPIR-LOW 81 mg EC tablet TAKE 1 PILL BY MOUTH EVERYDAY      atorvastatin (LIPITOR) 20 MG tablet Take 1 tablet (20 mg total) by mouth daily.      blood glucose meter (GLUCOMETER) Use 1 each As Directed as needed. Dispense glucometer brand per patient's insurance at pharmacy discretion.      blood glucose test strips Use 1 each As Directed 3 (three) times a day. Dispense brand per patient's insurance at pharmacy discretion.      cetirizine (ZYRTEC) 10 MG tablet Take 1 tablet (10 mg total) by mouth daily.      condoms - male Misc Use as needed 10/9/2018: Needs more      gabapentin (NEURONTIN) 300 MG capsule Take 1 capsule (300 mg total) by mouth 2 (two) times a day.      glipiZIDE (GLUCOTROL XL) 10 MG 24 hr tablet Take 1 tablet (10 mg total) by mouth daily.      lancets (ONETOUCH DELICA LANCETS) 33 gauge Misc Use to check blood sugar three times daily.      omeprazole (PRILOSEC) 20 MG capsule TAKE 1 CAPSULE (20 MG TOTAL) BY MOUTH DAILY BEFORE BREAKFAST. FOR STOMACH      pioglitazone (ACTOS) 15 MG tablet Take 1 tablet (15 mg total) by mouth daily.      venlafaxine (EFFEXOR XR) 75 MG 24 hr capsule Take 3 capsules (225 mg total) by mouth daily.      VITAMIN D2 50,000 unit capsule Take 1 capsule by mouth once a week. 7/2/2018: Received from: External Pharmacy Received Sig: TAKE 1 CAPSULE  50,000 UNITS TOTAL  BY MOUTH ONCE A WEEK     acetaminophen (Q-PAP EXTRA STRENGTH) 500 MG tablet Take 2 tablets (1,000 mg total) by mouth 2 (two) times a day.      ammonium lactate (LAC-HYDRIN) 12 % lotion APPLY TO AFFECTED AREA TWICE DAILY.      artificial tears, hypromellose, (GONIOVISC) 2.5 % ophthalmic solution 2 drops each eye as needed      clotrimazole (LOTRIMIN) 1 % external solution Apply to feet 2 times per day      dimethicone-colloidal oatmeal (AVEENO) 1.3 % Lotn  "Apply to body twice daily      fluticasone (FLONASE ALLERGY RELIEF) 50 mcg/actuation nasal spray 1 spray into each nostril daily.      lanolin-min oil-zinc ox-wh.pet Oint Apply to affected area several times a day      lidocaine (XYLOCAINE) 5 % ointment Apply to areas of pain twice daily as needed      ADAM Sims  4/23/2018: Received from: External Pharmacy     naproxen (NAPROSYN) 500 MG tablet TAKE 1 PILL BY MOUTH TWICE DAILY WITH MEALS FOR PAIN      sitaGLIPtin-metformin 50-1,000 mg TM24 Take 1 tablet by mouth 2 (two) times a day.      triamcinolone (KENALOG) 0.1 % cream Apply to affected area twice daily as needed.      Vitals:    10/09/18 1342   BP: 132/68   Patient Site: Right Arm   Patient Position: Sitting   Cuff Size: Adult Regular   Pulse: 78   Temp: 97.7  F (36.5  C)   TempSrc: Oral   SpO2: 98%   Weight: 209 lb 4 oz (94.9 kg)   Height: 4' 11.96\" (1.523 m)     OBJECTIVE:  Vitals listed above within normal limits  General appearance: well groomed, pleasant, well hydrated, nontoxic appearing  ENT: PERRL, throat clear  Neck: neck supple, no lymphadenopathy, no thyromegaly  Lungs: lungs clear to auscultation bilaterally, no wheezes or rhonchi  Heart: regular rate and rhythm, no murmurs, rubs or gallops  Abdomen: soft, nontender  Neuro: no focal deficits, CN II-XII grossly intact, alert and oriented  Psych:  mood stable, appears to have good insight and judgment  LE edema = 1+    A/P  LE edema and puffiness  B/c morbid obesity concern for possible pulm HTN and /or CHF and will check cardiac echo and sleep study to r/o DEIRDRE  Recheck UA    Possible DEIRDRE given obesity and snoring  Referral for sleep consul      Cough at night and postnasal drip resolved with  Zyrtec and flonase        DM /metabolic syndrome with peripheral neuropathy  A1c -check today  on janumet 50/1000 and pioglitizone 15mg and glipizide 10mg -   BP wnl  ASA  LDL 73 in 10/24/16 on lipitor -> 112 on 12/4/17  microalbumin wnl 3/29/18   Eye " clinic per pt seen at [facility] or [facility] eye clinic clinic 2 months ago - ALEXANDRA signed       Periodic arm cramps into Erb's palsy position and MRI.   Neuro consult with Dr. Doss at [facility] 6/13/18   EMG scheduled for 10/10/18 - tomorrow      Vit D deficiency -  12.7 on 7/2/18  Taking ergo       PTSD: sx well controlled with current meds  Taking venlafaxine 225 and trazodone 50 and now on amitriptyline  Sleep ok but mood is better but some days is low when in pain  No bad dreams      Morbid obesity -   BMI 44.2 and 226# on 10/3/17 and BMI 40.9 and 209# for the last few months  Last seen at [facility] b[facility]ric clinic 3/27/17 and 5/11/18      Knee and ankle pain - polyarthritis  Meds: gabapentin,naproxen, Amitriptyline, tylenol  rx for lidocaine gel last visit         Polypharmacy -   Home health RN sets up meds  Seen by MTM 9/24/18    Birth control - refill condoms    Spent 40 min face to face with patient with more the 50% spent in counseling, reviewing chart, and coordination of care and discussing problems listed above.

## 2021-06-20 NOTE — PROGRESS NOTES
HPI - 57yo female here forcough.      Cough at night and postnasal drip  Started on zyrtec last month which helped some especially coughing during the day but still coughing at night  Not on ACEI  No fever, sore throat  Shortness of breath         DM /metabolic syndrome with peripheral neuropathy  A1c12/4/17 -> 8.7 on 3/29/18 -> 8.1 on 7/2/18  on janumet 50/1000 and pioglitizone 15mg and glipizide 10mg -   reports  forgetting meds 0-1 per month  BP wnl  ASA  LDL 73 in 10/24/16 on lipitor -> 112 on 12/4/17  microalbumin wnl 3/29/18   Eye clinic 3/17/17 - per pt seen at [facility] or [facility] eye clinic clinic 2 months ago - ALEXANDRA signed          Periodic arm cramps into Erb's palsy position and MRI.   MRI of head and neck on 12/2017 were unrevealing  Neuro consult with Dr. Doss at [facility] 6/13/19 who reordered MRI brain/neck, EMG, and labs for copper, ceruloplasmin, TSH, RF. Per note, if neg will do trial of artane or baclofen  EMG ordered on 7/2 and 8/1/18 but not scheduled     Vit D deficiency - 20.0 on 8/15/15 and 24.3 on 10/24/16 -> 21.6 on 5/23/17 -> 19.5 on 12/4/17 -> 12.8 on 3/29/18  Taking ergo         PTSD: sx well controlled with current meds  Taking venlafaxine 225 and trazodone 50 and now on amitriptyline  Sleep ok but mood is better but some days is low when in pain  No bad dreams     Morbid obesity -   BMI 44.2 and 226# on 10/3/17 and today BMI 40.9 and 209#   Last seen at [facility] b[facility]ric clinic 3/27/17 and 5/11/18  She has decrease in appetitie and doing some exercise  She meet with SW to help with intake for weight loss  Walking and doing PT exercise and eating smaller portions     Knee and ankle pain - polyarthritis  Meds: gabapentin,naproxen, Amitriptyline, tylenol  rx for lidocaine gel last visit  Seen by DR Pham 4/23/18 - tested UDAY (was normal) and referred to PT - she went 4 times in May and now doing home exercises  Joints ache often and soaks hands in warm water      Prevention -   Pap and  HPV neg 5/2014  FOBT neg 1/16/18  Breast and mammo wnl      Polypharmacy -   Home health RN sets up meds  Seen by MTM 6/27/18 and 8/15/18    Current Outpatient Prescriptions   Medication Sig Note     acetaminophen (Q-PAP EXTRA STRENGTH) 500 MG tablet Take 2 tablets (1,000 mg total) by mouth 2 (two) times a day.      amitriptyline (ELAVIL) 50 MG tablet Take 1 tablet (50 mg total) by mouth at bedtime.      ASPIR-LOW 81 mg EC tablet TAKE 1 PILL BY MOUTH EVERYDAY      atorvastatin (LIPITOR) 20 MG tablet Take 1 tablet (20 mg total) by mouth daily.      blood glucose meter (GLUCOMETER) Use 1 each As Directed as needed. Dispense glucometer brand per patient's insurance at pharmacy discretion.      blood glucose test strips Use 1 each As Directed 3 (three) times a day. Dispense brand per patient's insurance at pharmacy discretion.      cetirizine (ZYRTEC) 10 MG tablet Take 1 tablet (10 mg total) by mouth daily.      gabapentin (NEURONTIN) 300 MG capsule Take 1 capsule (300 mg total) by mouth 2 (two) times a day.      glipiZIDE (GLUCOTROL XL) 10 MG 24 hr tablet Take 1 tablet (10 mg total) by mouth daily.      lancets (ONETOUCH DELICA LANCETS) 33 gauge Misc Use to check blood sugar three times daily.      naproxen (NAPROSYN) 500 MG tablet TAKE 1 PILL BY MOUTH TWICE DAILY WITH MEALS FOR PAIN      omeprazole (PRILOSEC) 20 MG capsule TAKE 1 CAPSULE (20 MG TOTAL) BY MOUTH DAILY BEFORE BREAKFAST. FOR STOMACH      pioglitazone (ACTOS) 15 MG tablet Take 1 tablet (15 mg total) by mouth daily.      sitaGLIPtin-metformin 50-1,000 mg TM24 Take 1 tablet by mouth 2 (two) times a day.      venlafaxine (EFFEXOR XR) 75 MG 24 hr capsule Take 3 capsules (225 mg total) by mouth daily.      VITAMIN D2 50,000 unit capsule Take 1 capsule by mouth once a week. 7/2/2018: Received from: External Pharmacy Received Sig: TAKE 1 CAPSULE  50,000 UNITS TOTAL  BY MOUTH ONCE A WEEK     ammonium lactate (LAC-HYDRIN) 12 % lotion APPLY TO AFFECTED AREA TWICE  "DAILY.      artificial tears, hypromellose, (GONIOVISC) 2.5 % ophthalmic solution 2 drops each eye as needed      clotrimazole (LOTRIMIN) 1 % external solution Apply to feet 2 times per day      condoms - male Misc Use as needed      dimethicone-colloidal oatmeal (AVEENO) 1.3 % Lotn Apply to body twice daily      lanolin-min oil-zinc ox-wh.pet Oint Apply to affected area several times a day      lidocaine (XYLOCAINE) 5 % ointment Apply to areas of pain twice daily as needed      MINERIN ZAINAB Sims  4/23/2018: Received from: External Pharmacy     triamcinolone (KENALOG) 0.1 % cream Apply to affected area twice daily as needed.      Vitals:    09/18/18 1608   BP: 118/62   Pulse: 76   Resp: 16   Temp: 97.6  F (36.4  C)   TempSrc: Oral   SpO2: 99%   Weight: 210 lb 8 oz (95.5 kg)   Height: 4' 11.96\" (1.523 m)     OBJECTIVE:  Vitals listed above within normal limits  General appearance: well groomed, pleasant, well hydrated, nontoxic appearing  ENT: PERRL, throat clear  Neck: neck supple, no lymphadenopathy, no thyromegaly  Lungs: lungs clear to auscultation bilaterally, no wheezes or rhonchi  Heart: regular rate and rhythm, no murmurs, rubs or gallops  Abdomen: soft, nontender  Neuro: no focal deficits, CN II-XII grossly intact, alert and oriented  Psych:  mood stable, appears to have good insight and judgment    A/P  Cough at night and postnasal drip  Continue  Zyrtec  -->>>Start flonase      DM /metabolic syndrome with peripheral neuropathy  A1c12/4/17 -> 8.7 on 3/29/18 -> 8.1 on 7/2/18  on janumet 50/1000 and pioglitizone 15mg and glipizide 10mg -   reports  forgetting meds 0-1 per month  BP wnl  ASA  LDL 73 in 10/24/16 on lipitor -> 112 on 12/4/17  microalbumin wnl 3/29/18   Eye clinic per pt seen at [facility] or [facility] eye clinic clinic 2 months ago - ALEXANDRA signed  RTC for DM    --RTC after 10/3/18 for DM Check      Periodic arm cramps into Erb's palsy position and MRI.   MRI of head and neck on 12/2017 were " unrevealing  Neuro consult with Dr. Doss at [facility] 6/13/18 who reordered MRI brain/neck, EMG, and labs for copper, ceruloplasmin, TSH, RF. Per note, if neg will do trial of artane or baclofen  EMG ordered on 7/2 and 8/1/18 but not scheduled - She reports getting called to set up apt but b/c they would not set up transportation she could not get to the appointment  ->>will ask specialty  to check on the status of this apt     Vit D deficiency - 20.0 on 8/15/15 and 24.3 on 10/24/16 -> 21.6 on 5/23/17 -> 19.5 on 12/4/17 -> 12.8 on 3/29/18  Taking ergo       PTSD: sx well controlled with current meds  Taking venlafaxine 225 and trazodone 50 and now on amitriptyline  Sleep ok but mood is better but some days is low when in pain  No bad dreams     Morbid obesity -   BMI 44.2 and 226# on 10/3/17 and today BMI 40.9 and 209#   Last seen at [facility] b[facility] clinic 3/27/17 and 5/11/18  She has decrease in appetitie and doing some exercise  She meet with SW to help with intake for weight loss  Walking and doing PT exercise and eating smaller portions     Knee and ankle pain - polyarthritis  Meds: gabapentin,naproxen, Amitriptyline, tylenol  rx for lidocaine gel last visit       Polypharmacy -   Home health RN sets up meds  Seen by MTM 6/27/18 and 8/15/18    Flu shot      Spent 25 min face to face with patient with more the 50% spent in counseling, reviewing chart, and coordination of care and discussing problems listed above.

## 2021-06-20 NOTE — PROGRESS NOTES
ASSESSMENT and plan   1. Cough  Cough is been present for months is not associated with any shortness of breath weakness wheezing loss of weight or headache.  She used cough syrup over-the-counter which raised her blood sugars but did not help the cough.  She continues to smoke.  The cough is only present at night which leads me to believe it could be due to an allergen it is only when she lies down.  She is seeing her primary doctor on 8/20/2018.    2. Type 2 diabetes mellitus with hyperosmolarity without coma, unspecified long term insulin use status (H)  Blood sugars are still elevated she said her blood sugar today was above 200 yesterday was 180.  She says she still eating 2 cups of rice per meal.    3. Post-nasal drip  Significant postnasal drip noted today when we had her lie prone her coughing started cetirizine started for the patient side effects discussed      *  There are no Patient Instructions on file for this visit.    No orders of the defined types were placed in this encounter.    There are no discontinued medications.    No Follow-up on file.    CHIEF COMPLAINT:  Chief Complaint   Patient presents with     Cough       HISTORY OF PRESENT ILLNESS:  Trxiie is a 58 y.o. female with a medical history significant for type 2 diabetes obesity, myalgia and generalized body pain.  She is here today because she has a cough it has been present for a month she saw her pharmacist Dr. Beth suggested she be seen again.  She says the cough is been present for months over-the-counter cough syrups have not helped she did notes no fever chills weakness or lethargy.    REVIEW OF SYSTEMS:   Musculoskeletal complains of generalized body ache especially in her hands  GI occasional abdominal burning noted  Pulmonary positive for cough negative for shortness of breath or wheezing  CNS positive for occasional headache  All other systems are negative.    PFSH:  Not not working    TOBACCO USE:  History   Smoking Status      "Current Every Day Smoker     Years: 30.00     Types: Pipe   Smokeless Tobacco     Never Used       VITALS:  Vitals:    08/21/18 1151   BP: 106/66   Pulse: 77   Resp: 24   Temp: 97.7  F (36.5  C)   TempSrc: Oral   SpO2: 95%   Weight: 209 lb 6 oz (95 kg)   Height: 4' 11.96\" (1.523 m)     Wt Readings from Last 3 Encounters:   08/21/18 209 lb 6 oz (95 kg)   08/15/18 205 lb (93 kg)   07/02/18 209 lb 5 oz (94.9 kg)       PHYSICAL EXAM:  Interactive morbidly obese female sitting in exam room in no acute distress  HEENT neck supple mucous membranes moist oral cavity shows poor dentition no exudate noted  Respiratory system clear to auscultation equal breath sounds no wheezes no crackles good inspiratory effort    CVS regular and rhythm no murmurs rubs gallops appreciated reviewed note by Dr. Carbone, reviewed last note by Dr. Arias.    DATA REVIEWED:  Additional History from Old Records Summarized (2): 2  Reviewed last clinical notes by Dr. Tona Arias.    Decision to Obtain Records (1): 0  Radiology Tests Summarized or Ordered (1): 0  Labs Reviewed or Ordered (1): lasr hemoglobin aic 8.1  Medicine Test Summarized or Ordered (1): 0  Independent Review of EKG or X-RAY(2 each): 0    The visit lasted a total of 15 minutes face to face with the patient. Over 50% of the time was spent counseling and educating the patient about cough and smoking.    MEDICATIONS:  Current Outpatient Prescriptions   Medication Sig Dispense Refill     amitriptyline (ELAVIL) 50 MG tablet Take 1 tablet (50 mg total) by mouth at bedtime. 30 tablet 11     ASPIR-LOW 81 mg EC tablet TAKE 1 PILL BY MOUTH EVERYDAY 30 tablet 6     atorvastatin (LIPITOR) 20 MG tablet Take 1 tablet (20 mg total) by mouth daily. 30 tablet 11     gabapentin (NEURONTIN) 300 MG capsule Take 1 capsule (300 mg total) by mouth 2 (two) times a day. 180 capsule 3     glipiZIDE (GLUCOTROL XL) 10 MG 24 hr tablet Take 1 tablet (10 mg total) by mouth daily. 90 tablet 3     naproxen " (NAPROSYN) 500 MG tablet TAKE 1 PILL BY MOUTH TWICE DAILY WITH MEALS FOR PAIN 30 tablet 10     omeprazole (PRILOSEC) 20 MG capsule TAKE 1 CAPSULE (20 MG TOTAL) BY MOUTH DAILY BEFORE BREAKFAST. FOR STOMACH 90 capsule 2     pioglitazone (ACTOS) 15 MG tablet Take 1 tablet (15 mg total) by mouth daily. 30 tablet 11     sitaGLIPtin-metformin 50-1,000 mg TM24 Take 1 tablet by mouth 2 (two) times a day. 180 tablet 3     venlafaxine (EFFEXOR XR) 75 MG 24 hr capsule Take 3 capsules (225 mg total) by mouth daily. 270 capsule 3     VITAMIN D2 50,000 unit capsule Take 1 capsule by mouth once a week.  12     acetaminophen (Q-PAP EXTRA STRENGTH) 500 MG tablet Take 2 tablets (1,000 mg total) by mouth 2 (two) times a day. 100 tablet 6     ammonium lactate (LAC-HYDRIN) 12 % lotion APPLY TO AFFECTED AREA TWICE DAILY. 400 g 0     artificial tears, hypromellose, (GONIOVISC) 2.5 % ophthalmic solution 2 drops each eye as needed 15 mL 12     blood glucose meter (GLUCOMETER) Use 1 each As Directed as needed. Dispense glucometer brand per patient's insurance at pharmacy discretion. 1 each 0     blood glucose test strips Use 1 each As Directed 3 (three) times a day. Dispense brand per patient's insurance at pharmacy discretion. 300 strip 11     cetirizine (ZYRTEC) 10 MG tablet Take 1 tablet (10 mg total) by mouth daily. 30 tablet 2     clotrimazole (LOTRIMIN) 1 % external solution Apply to feet 2 times per day 30 mL 0     condoms - male Misc Use as needed 24 each 11     dimethicone-colloidal oatmeal (AVEENO) 1.3 % Lotn Apply to body twice daily 532 mL 11     lancets (ONETOUCH DELICA LANCETS) 33 gauge Misc Use to check blood sugar three times daily. 300 each 11     lanolin-min oil-zinc ox-wh.pet Oint Apply to affected area several times a day 120 g 11     lidocaine (XYLOCAINE) 5 % ointment Apply to areas of pain twice daily as needed 120 g 11     MINERIN CREME Crea        triamcinolone (KENALOG) 0.1 % cream Apply to affected area twice daily  as needed. 45 g 0     No current facility-administered medications for this visit.     Please note that this clinical encounter uses voice recognition software, there may be typographical errors present

## 2021-06-20 NOTE — PROGRESS NOTES
MTM Initial Encounter  Assessment & Plan                                                     1. Type 2 diabetes mellitus with diabetic polyneuropathy, without long-term current use of insulin  Not controlled to goal A1c <7%. Reinforced steps to take for low blood glucose. Based on fluctuation in blood glucose levels, no changes were made to current medication management. Stable on aspirin, statin, gabapentin, glipizide, pioglitazone, and sitagliptin-metformin.     2. Generalized pain  Stable but poorly controlled on acetaminophen, venlafaxine, gabapentin, amitriptyline, and naproxen without notable adverse drug effects. Trixie has an appointment with neurology next month.    3. Cough  Controlled on cetirizine and fluticasone nasal spray without notable adverse drug effects. Reinforced proper administration technique with fluticasone.    4. Polypharmacy  Patient recommended to bring in topical using for rash on her back. Current medication list contains 5 topicals, some of which should be removed.    Follow Up  Scheduled to follow up with PCP in two weeks.    Subjective & Objective                                                     Trixie Sofia is a 58 y.o. female coming in for an initial visit for Medication Therapy Management. She was referred to me from Mei Carbone MD    Chief Complaint: Diabetes    Medication Adherence/Access: Somewhat reliable. Patient reports that she forgets to take medications ~3x per week but takes missed doses as soon as onset of pain reminds her that she has forgotten. This results in Trixie missing very few actual medication doses.    1. Type 2 diabetes mellitus with diabetic polyneuropathy, without long-term current use of insulin  Patient's self-reported fasting blood glucose levels ranged from 130s-200s. Patient reports feeling cold and shaky when blood glucose approaches ~130. When she wakes up feeling low overnight, Trixie covers herself with a blanket and eats some rice before going back  to sleep.     2. Generalized pain  Patient reports pain as a 5 out of 10 on pain scale, which is controlled for her. She also states that when pain is controlled at this level that she is able to walk and carry out activities of daily living.    3. Cough  Patient reports that cough and general wellbeing has improved since last office visit.    4. Polypharmacy    PMH: reviewed in EPIC   Allergies/ADRs: reviewed in EPIC   Alcohol: reviewed in EPIC   Tobacco:   History   Smoking Status     Current Every Day Smoker     Years: 30.00     Types: Pipe   Smokeless Tobacco     Never Used     Today's Vitals:   Vitals:    09/24/18 1532 09/24/18 1533   BP: 98/68 108/62   Pulse: 76    Weight:  214 lb 9 oz (97.3 kg)     ----------------    Much or all of the text in this note was generated through the use of Dragon Dictate voice-to-text software. Errors in spelling or words which seem out of context are unintentional. Sound alike errors, in particular, may have escaped editing.    The patient declined an after visit summary    I spent 60 minutes with this patient today;  All changes were made via collaborative practice agreement with Mei Carbone MD. A copy of the visit note was provided to the patient's provider.    Lasha Graham  Pharmacy Student- PD4    This encounter and note were supervised and precepted by  Dell Arias, PharmD, BCACP  [facility] Pharmacist at [facility]       Current Outpatient Prescriptions   Medication Sig Dispense Refill     acetaminophen (Q-PAP EXTRA STRENGTH) 500 MG tablet Take 2 tablets (1,000 mg total) by mouth 2 (two) times a day. 100 tablet 6     amitriptyline (ELAVIL) 50 MG tablet Take 1 tablet (50 mg total) by mouth at bedtime. 30 tablet 11     ammonium lactate (LAC-HYDRIN) 12 % lotion APPLY TO AFFECTED AREA TWICE DAILY. 400 g 0     artificial tears, hypromellose, (GONIOVISC) 2.5 % ophthalmic solution 2 drops each eye as needed 15 mL 12     ASPIR-LOW 81 mg EC tablet TAKE  1 PILL BY MOUTH EVERYDAY 30 tablet 6     atorvastatin (LIPITOR) 20 MG tablet Take 1 tablet (20 mg total) by mouth daily. 30 tablet 11     blood glucose meter (GLUCOMETER) Use 1 each As Directed as needed. Dispense glucometer brand per patient's insurance at pharmacy discretion. 1 each 0     blood glucose test strips Use 1 each As Directed 3 (three) times a day. Dispense brand per patient's insurance at pharmacy discretion. 300 strip 11     cetirizine (ZYRTEC) 10 MG tablet Take 1 tablet (10 mg total) by mouth daily. 30 tablet 2     clotrimazole (LOTRIMIN) 1 % external solution Apply to feet 2 times per day 30 mL 0     condoms - male Misc Use as needed 24 each 11     dimethicone-colloidal oatmeal (AVEENO) 1.3 % Lotn Apply to body twice daily 532 mL 11     fluticasone (FLONASE ALLERGY RELIEF) 50 mcg/actuation nasal spray 1 spray into each nostril daily. 16 g 12     gabapentin (NEURONTIN) 300 MG capsule Take 1 capsule (300 mg total) by mouth 2 (two) times a day. 180 capsule 3     glipiZIDE (GLUCOTROL XL) 10 MG 24 hr tablet Take 1 tablet (10 mg total) by mouth daily. 90 tablet 3     lancets (ONETOUCH DELICA LANCETS) 33 gauge Misc Use to check blood sugar three times daily. 300 each 11     lanolin-min oil-zinc ox-wh.pet Oint Apply to affected area several times a day 120 g 11     lidocaine (XYLOCAINE) 5 % ointment Apply to areas of pain twice daily as needed 120 g 11     MINERIN CREME Crea        naproxen (NAPROSYN) 500 MG tablet TAKE 1 PILL BY MOUTH TWICE DAILY WITH MEALS FOR PAIN 30 tablet 10     omeprazole (PRILOSEC) 20 MG capsule TAKE 1 CAPSULE (20 MG TOTAL) BY MOUTH DAILY BEFORE BREAKFAST. FOR STOMACH 90 capsule 2     pioglitazone (ACTOS) 15 MG tablet Take 1 tablet (15 mg total) by mouth daily. 30 tablet 11     sitaGLIPtin-metformin 50-1,000 mg TM24 Take 1 tablet by mouth 2 (two) times a day. 180 tablet 3     triamcinolone (KENALOG) 0.1 % cream Apply to affected area twice daily as needed. 45 g 0     venlafaxine  (EFFEXOR XR) 75 MG 24 hr capsule Take 3 capsules (225 mg total) by mouth daily. 270 capsule 3     VITAMIN D2 50,000 unit capsule Take 1 capsule by mouth once a week.  12     No current facility-administered medications for this visit.

## 2021-06-21 NOTE — PROGRESS NOTES
Dear Dr. Mei Carbone MD  [address] [place] [address],    Thank you for the opportunity to participate in the care of Trixie Sofia.     She is a 58 y.o. y/o female patient who comes to the sleep medicine clinic for follow up.  Since the patient's last clinical visit with me she has not yet discussed with her primary care doctor as to whether or not she wishes to proceed with a sleep study.  She still continues to suffer from episodic excessive daytime sleepiness as well as snoring.    Past Medical History:   Diagnosis Date     Depression      Diabetes mellitus (H)      Hypertension        Past Surgical History:   Procedure Laterality Date     TONGUE SURGERY Left 2006    surgery in [place] for mass on Tongue       Social History     Socioeconomic History     Marital status: Single     Spouse name: Not on file     Number of children: Not on file     Years of education: Not on file     Highest education level: Not on file   Social Needs     Financial resource strain: Not on file     Food insecurity - worry: Not on file     Food insecurity - inability: Not on file     Transportation needs - medical: Not on file     Transportation needs - non-medical: Not on file   Occupational History     Not on file   Tobacco Use     Smoking status: Current Every Day Smoker     Years: 30.00     Types: Pipe     Smokeless tobacco: Never Used   Substance and Sexual Activity     Alcohol use: No     Drug use: No     Sexual activity: No     Birth control/protection: Post-menopausal   Other Topics Concern     Not on file   Social History Narrative    [place]. [place], directly to [place]. Green card. Lives with daughter, son-in-law and grandchildren.  passed away when she was pregnant with her daughter.          Current Outpatient Medications   Medication Sig Dispense Refill     acetaminophen (Q-PAP EXTRA STRENGTH) 500 MG tablet Take 2 tablets (1,000 mg total) by mouth 2 (two) times a day. 100  tablet 6     amitriptyline (ELAVIL) 50 MG tablet Take 1 tablet (50 mg total) by mouth at bedtime. 30 tablet 11     ammonium lactate (LAC-HYDRIN) 12 % lotion APPLY TO AFFECTED AREA TWICE DAILY. 400 g 0     artificial tears, hypromellose, (GONIOVISC) 2.5 % ophthalmic solution 2 drops each eye as needed 15 mL 12     ASPIR-LOW 81 mg EC tablet TAKE 1 PILL BY MOUTH EVERYDAY 30 tablet 6     atorvastatin (LIPITOR) 20 MG tablet Take 1 tablet (20 mg total) by mouth daily. 30 tablet 11     blood glucose test strips Use 1 each As Directed 3 (three) times a day. Dispense brand per patient's insurance at pharmacy discretion. 300 strip 11     cetirizine (ZYRTEC) 10 MG tablet Take 1 tablet (10 mg total) by mouth daily. 30 tablet 2     clotrimazole (LOTRIMIN) 1 % external solution Apply to feet 2 times per day 30 mL 0     condoms - male Misc Use as needed 24 each 11     dimethicone-colloidal oatmeal (AVEENO) 1.3 % Lotn Apply to body twice daily 532 mL 11     fluticasone (FLONASE ALLERGY RELIEF) 50 mcg/actuation nasal spray 1 spray into each nostril daily. 16 g 12     gabapentin (NEURONTIN) 300 MG capsule Take 1 capsule (300 mg total) by mouth 2 (two) times a day. 180 capsule 3     glipiZIDE (GLUCOTROL XL) 10 MG 24 hr tablet Take 1 tablet (10 mg total) by mouth daily. 90 tablet 3     lancets (ONETOUCH DELICA LANCETS) 33 gauge Misc Use to check blood sugar three times daily. 300 each 11     lanolin-min oil-zinc ox-wh.pet Oint Apply to affected area several times a day 120 g 11     lidocaine (XYLOCAINE) 5 % ointment Apply to areas of pain twice daily as needed 120 g 11     MINERIN CREME Crea        naproxen (NAPROSYN) 500 MG tablet TAKE 1 PILL BY MOUTH TWICE DAILY WITH MEALS FOR PAIN 30 tablet 10     omeprazole (PRILOSEC) 20 MG capsule TAKE 1 CAPSULE (20 MG TOTAL) BY MOUTH DAILY BEFORE BREAKFAST. FOR STOMACH 90 capsule 3     pioglitazone (ACTOS) 15 MG tablet Take 1 tablet (15 mg total) by mouth daily. 30 tablet 11      sitaGLIPtin-metformin 50-1,000 mg TM24 Take 1 tablet by mouth 2 (two) times a day. 180 tablet 3     triamcinolone (KENALOG) 0.1 % cream Apply to affected area twice daily as needed. 45 g 0     venlafaxine (EFFEXOR XR) 75 MG 24 hr capsule Take 3 capsules (225 mg total) by mouth daily. 270 capsule 3     VITAMIN D2 50,000 unit capsule Take 1 capsule by mouth once a week.  12     No current facility-administered medications for this visit.        Allergies   Allergen Reactions     No Known Drug Allergies        Physical Exam:  /68   Pulse 77   Wt 209 lb 6.4 oz (95 kg)   LMP 01/30/2013   SpO2 96%   BMI 40.95 kg/m    BMI:Body mass index is 40.95 kg/m .   GEN: NAD, obese  Psych: normal mood, normal affect    Labs/Studies:      Lab Results   Component Value Date    WBC 9.3 12/04/2017    HGB 12.1 12/04/2017    HCT 39.1 12/04/2017    MCV 78 (L) 12/04/2017     12/04/2017         Chemistry        Component Value Date/Time     05/11/2018 1522    K 3.8 05/11/2018 1522     05/11/2018 1522    CO2 29 05/11/2018 1522    BUN 7 (L) 05/11/2018 1522    CREATININE 0.62 05/11/2018 1522     05/11/2018 1522        Component Value Date/Time    CALCIUM 8.7 05/11/2018 1522    ALKPHOS 200 (H) 05/11/2018 1522    AST 21 05/11/2018 1522    ALT 15 05/11/2018 1522    BILITOT 0.5 05/11/2018 1522            No results found for: FERRITIN         Assessment and Plan:  In summary Trixie Sofia is a 58 y.o. year old female who is here for follow-up.    1.  Hypersomnia/snoring  I again offered the patient the option of getting a sleep study for further evaluation.  However the patient declined my offer.  I recommend that the patient call me if she changes her mind so I can put in the order for the sleep study.  Patient expressed understanding and agreed.  2.  Other sleep disturbance     Patient verbalized understanding of these issues, agrees with the plan and all questions were answered today. Patient was given an  opportuntity to voice any other symptoms or concerns not listed above. Patient did not have any other symptoms or concerns.        Eric Leung DO  Board Certified in Internal Medicine and Sleep Medicine  [facility].    I spent a total of 5 minutes of face-to-face encounter and more than 50% of the encounter was used for counseling or coordination of care.    (Note created with Dragon voice recognition and unintended spelling errors and word substitutions may occur)     All the information was obtained through the help of the .

## 2021-06-21 NOTE — LETTER
Letter by Mei Carbone MD at      Author: Mei Carbone MD Service: -- Author Type: --    Filed:  Encounter Date: 5/20/2021 Status: (Other)                    My Depression Action Plan  Name: Trixie Sofia   Date of Birth 1960  Date: 5/20/2021    My Doctor: Mei Carbone MD   My Clinic: [address] [address]  [phone]          GREEN    ZONE   Good Control    What it looks like:     Things are going generally well. You have normal ups and downs. You may even feel depressed from time to time, but bad moods usually last less than a day.   What you need to do:  1. Continue to care for yourself (see self care plan)  2. Check your depression survival kit and update it as needed  3. Follow your physicians recommendations including any medication.  4. Do not stop taking medication unless you consult with your physician first.           YELLOW         ZONE Getting Worse    What it looks like:     Depression is starting to interfere with your life.     It may be hard to get out of bed; you may be starting to isolate yourself from others.    Symptoms of depression are starting to last most all day and this has happened for several days.     You may have suicidal thoughts but they are not constant.   What you need to do:     1. Call your care team. Your response to treatment will improve if you keep your care team informed of your progress. Yellow periods are signs an adjustment may need to be made.     2. Continue your self-care.  Just get dressed and ready for the day.  Don't give yourself time to talk yourself out of it.    3. Talk to someone in your support network.    4. Open up your depression Depression Self-Care Plan / Wellness kit.           RED    ZONE Medical Alert - Get Help    What it looks like:     Depression is seriously interfering with your life.     You may experience these or other symptoms: You cant get out of bed most days,  cant work or engage in other necessary activities, you have trouble taking care of basic hygiene, or basic responsibilities, thoughts of suicide or death that will not go away, self-injurious behavior.     What you need to do:  1. Call your care team and request a same-day appointment. If they are not available (weekends or after hours) call your local crisis line, emergency room or 911.          Depression Self-Care Plan / Wellness Kit    Many people find that medication and therapy are helpful treatments for managing depression. In addition, making small changes to your everyday life can help to boost your mood and improve your wellbeing. Below are some tips for you to consider. Be sure to talk with your medical provider and/or behavioral health consultant if your symptoms are worsening or not improving.     Sleep  Sleep hygiene means all of the habits that support good, restful sleep. It includes maintaining a consistent bedtime and wake time, using your bedroom only for sleeping or sex, and keeping the bedroom dark and free of distractions like a computer, smartphone, or television.     Develop a Healthy Routine  Maintain good hygiene. Get out of bed in the morning, make your bed, brush your teeth, take a shower, and get dressed. Dont spend too much time viewing media that makes you feel stressed. Find time to relax each day.    Exercise  Get some form of exercise every day. This will help reduce pain and release endorphins, the feel good chemicals in your brain. It can be as simple as just going for a walk or doing some gardening, anything that will get you moving.      Diet  Strive to eat healthy foods, including fruits and vegetables. Drink plenty of water. Avoid excessive sugar, caffeine, alcohol, and other mood-altering substances.     Stay Connected with Others  Stay in touch with friends and family members.    Manage Your Mood  Try deep breathing, massage therapy, biofeedback, or meditation. Take part in  fun activities when you can. Try to find something to smile about each day.     Psychotherapy  Be open to working with a therapist if your provider recommends it.     Medication  Be sure to take your medication as prescribed. Most anti-depressants need to be taken every day. It usually takes several weeks for medications to work. Not all medicines work for all people. It is important to follow-up with your provider to make sure you have a treatment plan that is working for you. Do not stop your medication abruptly without first discussing it with your provider.    Crisis Resources   These hotlines are for both adults and children. They and are open 24 hours a day, 7 days a week unless noted otherwise.      National Suicide Prevention Lifeline   3-432-308-TALK (7777)      Crisis Text Line    www.crisistextline.org  Text HOME to 792768 from anywhere in the [place], anytime, about any type of crisis. A live, trained crisis counselor will receive the text and respond quickly.      Real Lifeline for LGBTQ Youth  A national crisis intervention and suicide lifeline for LGBTQ youth under 25. Provides a safe place to talk without judgement. Call 1-280.174.4160; text START to 470047 or visit www.theZandovorproject.org to talk to a trained counselor.      For [facility] crisis numbers, visit the [facility] website at:  https://[place].gov/dhs/people-we-serve/adults/health-care/mental-health/resources/crisis-contacts.jsp

## 2021-06-21 NOTE — PROGRESS NOTES
HPI - 59 yo female here to f/u on consultations.      Periodic arm cramps into Erb's palsy position - occurring less frequently  MRI neck 6/19/18: C5-C6 disc space level there is stable minimal canal compromise  Brain MRI 6/13/18: wnl   Neuro consult with Dr. 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    LE edema and puffiness -improved  B/c morbid obesity concern for possible pulm HTN and /or CHF  UA = no proteinuria on 10/9/18   cardiac echo = not yet scheduled    Snoring/hypersomnia   sleep study to r/o DEIRDRE- sleep consult on 11/20/18 but patient declined sleep study       DM /metabolic syndrome with peripheral neuropathy  A1c -6.6 on 10/9/18  on janumet 50/1000 and pioglitizone 15mg and glipizide 10mg -   BP wnl  ASA  LDL 73 in 10/24/16 on lipitor -> 112 on 12/4/17  microalbumin wnl 3/29/18   Eye clinic per pt seen at [facility] or [facility] eye clinic clinic in 8/2018?  - ALEXANDRA signed 10/2018           Vit D deficiency -  12.7 on 7/2/18  Taking ergo       PTSD/MDD: sx well controlled with current meds  Taking venlafaxine 225 and trazodone 50 and now on amitriptyline  Sleep ok but mood is better but some days is low when in pain  No bad dreams      Morbid obesity -   BMI 44.2 and 226# on 10/3/17 and BMI 40.9 and 209# for the last few months  Last seen at  bariatric clinic 3/27/17 and 5/11/18      Knee and ankle pain - polyarthritis  Meds: gabapentin,naproxen, Amitriptyline, tylenol  rx for lidocaine gel last visit  Requesting Ace Wraps         Polypharmacy -   Home health RN sets up meds  Seen by MTM 9/24/18     Birth control - refill condoms    Current Outpatient Medications   Medication Sig Note     acetaminophen (Q-PAP EXTRA STRENGTH) 500 MG tablet Take 2 tablets (1,000 mg total) by mouth 2 (two) times a day.      amitriptyline (ELAVIL) 50 MG tablet Take 1 tablet (50 mg total) by mouth  at bedtime.      artificial tears, hypromellose, (GONIOVISC) 2.5 % ophthalmic solution 2 drops each eye as needed      ASPIR-LOW 81 mg EC tablet TAKE 1 PILL BY MOUTH EVERYDAY      atorvastatin (LIPITOR) 20 MG tablet Take 1 tablet (20 mg total) by mouth daily.      blood glucose test strips Use 1 each As Directed 3 (three) times a day. Dispense brand per patient's insurance at pharmacy discretion.      cetirizine (ZYRTEC) 10 MG tablet Take 1 tablet (10 mg total) by mouth daily.      glipiZIDE (GLUCOTROL XL) 10 MG 24 hr tablet Take 1 tablet (10 mg total) by mouth daily.      lancets (ONETOUCH DELICA LANCETS) 33 gauge Misc Use to check blood sugar three times daily.      naproxen (NAPROSYN) 500 MG tablet TAKE 1 PILL BY MOUTH TWICE DAILY WITH MEALS FOR PAIN      omeprazole (PRILOSEC) 20 MG capsule TAKE 1 CAPSULE (20 MG TOTAL) BY MOUTH DAILY BEFORE BREAKFAST. FOR STOMACH      pioglitazone (ACTOS) 15 MG tablet Take 1 tablet (15 mg total) by mouth daily.      venlafaxine (EFFEXOR XR) 75 MG 24 hr capsule Take 3 capsules (225 mg total) by mouth daily.      VITAMIN D2 50,000 unit capsule Take 1 capsule by mouth once a week. 7/2/2018: Received from: External Pharmacy Received Sig: TAKE 1 CAPSULE  50,000 UNITS TOTAL  BY MOUTH ONCE A WEEK     ammonium lactate (LAC-HYDRIN) 12 % lotion APPLY TO AFFECTED AREA TWICE DAILY.      clotrimazole (LOTRIMIN) 1 % external solution Apply to feet 2 times per day      condoms - male Misc Use as needed      dimethicone-colloidal oatmeal (AVEENO) 1.3 % Lotn Apply to body twice daily      fluticasone (FLONASE ALLERGY RELIEF) 50 mcg/actuation nasal spray 1 spray into each nostril daily.      gabapentin (NEURONTIN) 300 MG capsule Take 1 capsule (300 mg total) by mouth 2 (two) times a day.      lanolin-min oil-zinc ox-wh.pet Oint Apply to affected area several times a day      lidocaine (XYLOCAINE) 5 % ointment Apply to areas of pain twice daily as needed      ADAM Sims  4/23/2018: Received  "from: External Pharmacy     sitaGLIPtin-metformin 50-1,000 mg TM24 Take 1 tablet by mouth 2 (two) times a day.      triamcinolone (KENALOG) 0.1 % cream Apply to affected area twice daily as needed.      Vitals:    11/27/18 1328   BP: 100/60   Cuff Size: Adult Large   Pulse: 98   Resp: 18   Temp: 97.8  F (36.6  C)   TempSrc: Oral   SpO2: 96%   Weight: 213 lb 8 oz (96.8 kg)   Height: 4' 11.96\" (1.523 m)     PHYSICAL EXAM   General Appearance: Awake and alert, in no acute distress  HEENT: left eye lid puffy  CV: regular rate  Resp: No respiratory distress. Breathing comfortably  Musculoskeletal: moving limbs comfortably with not deficits or deformities  Skin: no rashes noted  No LE edema    A/P   Periodic arm cramps into Erb's palsy position - occurring less frequently  MRI neck 6/19/18: C5-C6 disc space level there is stable minimal canal compromise  Brain MRI 6/13/18: wnl   Neuro consult with Dr. Doss at [facility] 6/13/18   Vit B1 201 - high  And copper level was high 161 () on 5/11/18  B12 and ceruloplasmin were normal  EMG on 10/17/18     LE edema and puffiness -improved    cardiac echo = needs to be scheduled    Snoring/hypersomnia   sleep consult on 11/20/18 but patient declined sleep study        Vit D deficiency -  12.7 on 7/2/18  Taking ergo   Recheck      DM /metabolic syndrome with peripheral neuropathy - well controlled  A1c -6.6 on 10/9/18  on janumet 50/1000 and pioglitizone 15mg and glipizide 10mg -   BP wnl  ASA  LDL 73 in 10/24/16 on lipitor -> 112 on 12/4/17  microalbumin wnl 3/29/18   Eye clinic per pt seen at [facility] or [facility] clinic clinic in 8/2018?     PTSD/MDD: sx well controlled with current meds  Taking venlafaxine 225 and trazodone 50 and now on amitriptyline    Knee and ankle pain - polyarthritis  Meds: gabapentin,naproxen, Amitriptyline, tylenol  rx for lidocaine gel last visit  Given Ace Wraps and belly binder     Left eyelid swelling - cold compress " advised      Polypharmacy -   Home health RN sets up meds  Seen by MTM 9/24/18    Spent 40 min face to face with patient with more the 50% spent in counseling, reviewing chart, and coordination of care and discussing problems listed above.

## 2021-06-22 NOTE — PROGRESS NOTES
HPI - 57 yo female here for hospital followup.     She was admitted to [facility] on 12/2-12/9/18  She reports leaving AMA b/c bed was uncomfortable.   Per CareEverywhere:  NO DISCHARGE SUMMARY AVAILABLE   --  --hgb 7.6 with MCV 74.3 -> 9.4  --bld cx neg  --K+ 3.3 and ferritin 8   --CXR: The cardiac silhouette is enlarged. Low lung volumes. Mild central vascular prominence but no peripheral edema. No airspace consolidation. No visible pneumothorax.    --Echo 2018-12-03 09:07.   Normal LV size, thickness and systolic function, EF > 55%.   Moderate RV dilatation.   D shaped septum suggestive of RV pressure/volume overload.   Normal RV systolic function.   Moderate RA dilatation.   Mild mitral regurgitation.   Moderate tricuspid regurgitation.   Moderate pulmonary systolic hypertension.   No previous study for comparison   Left Ventricular Ejection Fraction: 60 %    --Chest CT:1. No pulmonary embolus or thoracic aortic aneurysm or dissection.  2. CT findings of fluid overload/heart failure with mild pulmonary edema, small pleural effusions, and cardiomegaly.    --EKG NSR    Today is reporting chest and upper back pain and dizziness - which is the same as when at the hospital.    Per chart review from clinic:  At her last apt on 10/9/18 she was reporting LE edema and puffiness  B/c morbid obesity concern for possible pulm HTN and /or CHF   I ordered acardiac echo and sleep study to r/o DEIRDRE and UA  UA = no proteinuria on 10/9/18   cardiac echo ordered 10/9/18 = never got scheduled    Snoring/hypersomnia   sleep study to r/o DEIRDRE- sleep consult on 11/20/18 but patient declined sleep study     Cough at night and postnasal drip resolved with  Zyrtec and flonase     DM /metabolic syndrome with peripheral neuropathy  A1c -6.6 on 10/9/18  on janumet 50/1000 and pioglitizone 15mg and glipizide 10mg -   BP wnl  ASA  LDL 73 in 10/24/16 on lipitor -> 112 on 12/4/17  microalbumin wnl 3/29/18   Eye clinic per pt seen at  [facility] or [facility] eye clinic clinic in 8/2018?  - ALEXANDRA signed 10/2018     Periodic arm cramps into Erb's palsy position - occurring less frequently  MRI neck 6/19/18: C5-C6 disc space level there is stable minimal canal compromise  Brain MRI 6/13/18: wnl   Neuro consult with Dr. 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      Vit D deficiency -  12.7 on 7/2/18 -> 14.7 on 11/27/18  Taking ergo       PTSD: sx well controlled with current meds  Taking venlafaxine 225 and trazodone 50 and now on amitriptyline        Morbid obesity -   BMI 44.2 and 226# on 10/3/17 and BMI 40.9 and 209# for the last few months  Last seen at  bariatric clinic 3/27/17 and 5/11/18      Knee and ankle pain and back - polyarthritis - pain is getting better  Meds: gabapentin,naproxen, Amitriptyline, tylenol  rx for lidocaine gel last visit     Polypharmacy -   Home health RN sets up meds  Seen by MTM 9/24/18    Current Outpatient Medications   Medication Sig Note     acetaminophen (Q-PAP EXTRA STRENGTH) 500 MG tablet Take 2 tablets (1,000 mg total) by mouth 2 (two) times a day.      amitriptyline (ELAVIL) 50 MG tablet Take 1 tablet (50 mg total) by mouth at bedtime.      ASPIR-LOW 81 mg EC tablet TAKE 1 PILL BY MOUTH EVERYDAY      atorvastatin (LIPITOR) 20 MG tablet Take 1 tablet (20 mg total) by mouth daily.      blood glucose test strips Use 1 each As Directed 3 (three) times a day. Dispense brand per patient's insurance at pharmacy discretion.      cetirizine (ZYRTEC) 10 MG tablet TAKE 1 TABLET (10 MG TOTAL) BY MOUTH DAILY FOR ALLERGIES      condoms - male Misc Use as needed      ferrous sulfate 325 (65 FE) MG tablet Take 1 tablet by mouth 2 (two) times a day with meals.      glipiZIDE (GLUCOTROL XL) 10 MG 24 hr tablet Take 1 tablet (10 mg total) by mouth daily.      lancets (ONETOUCH DELICA LANCETS) 33 gauge  Misc Use to check blood sugar three times daily.      omeprazole (PRILOSEC) 20 MG capsule TAKE 1 CAPSULE (20 MG TOTAL) BY MOUTH DAILY BEFORE BREAKFAST. FOR STOMACH      pioglitazone (ACTOS) 15 MG tablet Take 1 tablet (15 mg total) by mouth daily.      potassium chloride (K-DUR,KLOR-CON) 20 MEQ tablet Take 1 tablet by mouth daily. 12/13/2018: Provider [facility] Antonio Gaviria. MD     triamcinolone (KENALOG) 0.1 % cream Apply to affected area twice daily as needed.      venlafaxine (EFFEXOR XR) 75 MG 24 hr capsule Take 3 capsules (225 mg total) by mouth daily.      VITAMIN D2 50,000 unit capsule Take 1 capsule by mouth once a week. 7/2/2018: Received from: External Pharmacy Received Sig: TAKE 1 CAPSULE  50,000 UNITS TOTAL  BY MOUTH ONCE A WEEK     ammonium lactate (LAC-HYDRIN) 12 % lotion APPLY TO AFFECTED AREA TWICE DAILY.      artificial tears, hypromellose, (GONIOVISC) 2.5 % ophthalmic solution 2 drops each eye as needed      clotrimazole (LOTRIMIN) 1 % external solution Apply to feet 2 times per day      dimethicone-colloidal oatmeal (AVEENO) 1.3 % Lotn Apply to body twice daily      fluticasone (FLONASE ALLERGY RELIEF) 50 mcg/actuation nasal spray 1 spray into each nostril daily.      gabapentin (NEURONTIN) 300 MG capsule TAKE 1 CAPSULE (300 MG TOTAL) BY MOUTH 2 (TWO) TIMES A DAY.      lanolin-min oil-zinc ox-wh.pet Oint Apply to affected area several times a day      lidocaine (XYLOCAINE) 5 % ointment Apply to areas of pain twice daily as needed      MINERIN CREME Crea  4/23/2018: Received from: External Pharmacy     naproxen (NAPROSYN) 500 MG tablet TAKE 1 PILL BY MOUTH TWICE DAILY WITH MEALS FOR PAIN      sitaGLIPtin-metformin 50-1,000 mg TM24 Take 1 tablet by mouth 2 (two) times a day.      Vitals:    12/13/18 0901   BP: 116/62   Patient Site: Left Arm   Patient Position: Sitting   Cuff Size: Adult Regular   Pulse: 91   Temp: 98  F (36.7  C)   TempSrc: Oral   SpO2: 98%   Weight: 198 lb 6.4 oz (90 kg)    Height: 5' (1.524 m)     OBJECTIVE:  Vitals listed above within normal limits  General appearance: well groomed, pleasant, well hydrated, nontoxic appearing  ENT: PERRL, throat clear  Neck: neck supple, no lymphadenopathy, no thyromegaly  Lungs: lungs clear to auscultation bilaterally, no wheezes or rhonchi  Heart: regular rate and rhythm, no murmurs, rubs or gallops  Abdomen: soft, nontender  Neuro: no focal deficits, CN II-XII grossly intact, alert and oriented  Psych:  mood stable, appears to have good insight and judgment    A/P  Hospital f/u -   No d/c summary available on Care everywhere - ALEXANDRA signed  Reviewed labs and imaging tests on Care Everywhere   Today will check  BMP, CBC, BNP  Referral for CHF clinic and/or rapid access clinic  RTC in 1 week for close f/u    Vit D deficiency -  12.7 on 7/2/18 -> 14.7 on 11/27/18  Taking ergo       PTSD: sx well controlled with current meds  Taking venlafaxine 225 and trazodone 50 and now on amitriptyline        Morbid obesity -   BMI 44.2 and 226# on 10/3/17 and BMI 40.9 and 209# for the last few months  Last seen at [facility] b[facility]ric clinic 3/27/17 and 5/11/18      Knee and ankle pain and back - polyarthritis - pain is getting better  Meds: gabapentin,naproxen, Amitriptyline, tylenol  rx for lidocaine gel last visit     Polypharmacy -   Home health RN sets up meds  Seen by MTM 9/24/18    Spent 40 min face to face with patient with more the 50% spent in counseling, reviewing chart, and coordination of care and discussing problems listed above.

## 2021-06-22 NOTE — PATIENT INSTRUCTIONS - [facility]
Polypharmacy -   Home health RN sets up meds  D/c'd: Venlafaxine, gabapentin, amitriptyline,ASA, pioglitazone  Restart glipizide, atorvastatin  Check CBC and if hgb wnl - stop ferrous sulfate  Continue lasix and KCL - check BMP

## 2021-06-22 NOTE — PROGRESS NOTES
HPI - 58 yo female here to f/u on CHF and ER visit.     Pulm HTN and was referred for CHF clinic and/or rapid access clinic  Seen at rapid access clinic on 12/24/18, per consult note:  1. Chest pain, unspecified type  ECG 12 lead with MUSE   2. Pulmonary hypertension (H)  NM Lung VQ Scan   3. Hypoxia  ECG 12 lead with MUSE     Blood Gases, Arterial     HIV Antigen/Antibody Screening Cascade     Antinuclear Antibody (UDAY) Cascade     Hepatic function panel     Thyroid Stimulating Hormone (TSH)     NM Lung VQ Scan      1. We will call the  service with the test results noted above.  2. She will eventually require both a sleep study and a cardiac MRI before considering right and left heart catheterization for pulmonary hypertension.  3. Return to see myself in 3-4 weeks in my pulmonary hypertension clinic.      Per triage note 12/27/18:  Stopped meds and doubled potassium and advised to go to ER  She never went to there ER       DMT2 -   A1c 8.1 on 7/2018 and 6.6 on 10/9/18   Only taking janumet XR  (stopped glipizide and pioglitazone)    on 12/4/17 on lipitor but she stopped this last month  microalbumin wnl 10/9/18    Vit D deficiency -  14.7 on 11/27/18 and taking ergo       PTSD: sx well controlled with current meds  Taking venlafaxine 225 and trazodone 50 and now on amitriptyline         Morbid obesity -   BMI 44.2 and 226# on 10/3/17 and BMI 40.9 and 209# for the last few months  Last seen at  bariatric clinic 3/27/17 and 5/11/18      [place] - taking ferrous sulfate but wants to stop    Polypharmacy -   Home health RN sets up meds  Patient stopped the following meds (all preadmission meds):  Venlafaxine, gabapentin, amitriptyline,ASA, pioglitazone, glipizide, atorvastatin - b/c these made her feel sick, tired and heavy      Current Outpatient Medications   Medication Sig Note     acetaminophen (Q-PAP EXTRA STRENGTH) 500 MG tablet Take 2 tablets (1,000 mg total) by mouth 2 (two) times a day.       blood glucose test strips Use 1 each As Directed 3 (three) times a day. Dispense brand per patient's insurance at pharmacy discretion.      cetirizine (ZYRTEC) 10 MG tablet TAKE 1 TABLET (10 MG TOTAL) BY MOUTH DAILY FOR ALLERGIES      ferrous sulfate 325 (65 FE) MG tablet Take 1 tablet by mouth 2 (two) times a day with meals.      furosemide (LASIX) 40 MG tablet Take 1 tablet by mouth 2 (two) times a day.      lancets (ONETOUCH DELICA LANCETS) 33 gauge Misc Use to check blood sugar three times daily.      omeprazole (PRILOSEC) 20 MG capsule TAKE 1 CAPSULE (20 MG TOTAL) BY MOUTH DAILY BEFORE BREAKFAST. FOR STOMACH      potassium chloride (K-DUR,KLOR-CON) 20 MEQ tablet Take 1 tablet by mouth daily. 12/13/2018: Provider [facility] Antonio Gaviria. MD brewer (SENOKOT) 8.6 mg tablet Take 1 tablet by mouth daily.      sitaGLIPtin-metformin 50-1,000 mg TM24 Take 1 tablet by mouth 2 (two) times a day.      VITAMIN D2 50,000 unit capsule Take 1 capsule by mouth once a week. 7/2/2018: Received from: External Pharmacy Received Sig: TAKE 1 CAPSULE  50,000 UNITS TOTAL  BY MOUTH ONCE A WEEK     amitriptyline (ELAVIL) 50 MG tablet Take 1 tablet (50 mg total) by mouth at bedtime.      ammonium lactate (LAC-HYDRIN) 12 % lotion APPLY TO AFFECTED AREA TWICE DAILY.      artificial tears, hypromellose, (GONIOVISC) 2.5 % ophthalmic solution 2 drops each eye as needed      ASPIR-LOW 81 mg EC tablet TAKE 1 PILL BY MOUTH EVERYDAY      atorvastatin (LIPITOR) 20 MG tablet Take 1 tablet (20 mg total) by mouth daily.      clotrimazole (LOTRIMIN) 1 % external solution Apply to feet 2 times per day      condoms - male Misc Use as needed      dimethicone-colloidal oatmeal (AVEENO) 1.3 % Lotn Apply to body twice daily      fluticasone (FLONASE ALLERGY RELIEF) 50 mcg/actuation nasal spray 1 spray into each nostril daily.      gabapentin (NEURONTIN) 300 MG capsule TAKE 1 CAPSULE (300 MG TOTAL) BY MOUTH 2 (TWO) TIMES A DAY.      glipiZIDE  (GLUCOTROL XL) 10 MG 24 hr tablet Take 1 tablet (10 mg total) by mouth daily.      lanolin-min oil-zinc ox-wh.pet Oint Apply to affected area several times a day      lidocaine (XYLOCAINE) 5 % ointment Apply to areas of pain twice daily as needed      ADAM Sims  4/23/2018: Received from: External Pharmacy     naproxen (NAPROSYN) 500 MG tablet TAKE 1 PILL BY MOUTH TWICE DAILY WITH MEALS FOR PAIN      pioglitazone (ACTOS) 15 MG tablet Take 1 tablet (15 mg total) by mouth daily.      traZODone (DESYREL) 50 MG tablet Take 50 mg by mouth at bedtime as needed for sleep.      triamcinolone (KENALOG) 0.1 % cream Apply to affected area twice daily as needed.      venlafaxine (EFFEXOR XR) 75 MG 24 hr capsule Take 3 capsules (225 mg total) by mouth daily.      Vitals:    01/03/19 1425   BP: 92/60   Pulse: 83   Resp: 12   Temp: 97.6  F (36.4  C)   TempSrc: Oral   SpO2: 96%   Weight: 197 lb 8 oz (89.6 kg)   Height: 5' (1.524 m)     OBJECTIVE:  Vitals listed above within normal limits  General appearance: well groomed, pleasant, well hydrated, nontoxic appearing  ENT: PERRL, throat clear  Neck: neck supple, no lymphadenopathy, no thyromegaly  Lungs: lungs clear to auscultation bilaterally, no wheezes or rhonchi  Heart: regular rate and rhythm, no murmurs, rubs or gallops  Abdomen: soft, nontender  Neuro: no focal deficits, CN II-XII grossly intact, alert and oriented  Psych:  mood stable, appears to have good insight and judgment    A/P  Pulm HTN and was referred for CHF clinic and/or rapid access clinic  Seen at rapid access clinic on 12/24/18, per consult note:  --She will eventually require both a sleep study and a cardiac MRI before considering right and left heart catheterization for pulmonary hypertension.  --Return to see myself in 3-4 weeks in my pulmonary hypertension clinic.  --lung VQ scan on 1/23/19  --f/u cardiology 2/19/19    Poor compliance and extra potassium -   Check BMP      DMT2 -   A1c 8.1 on 7/2018  and 6.6 on 10/9/18  - check A1c   Only taking janumet XR    Advised to restart glipizide and but stop pioglitazone given CHF and edema   on 12/4/17 on lipitor but she stopped this last month - recheck today  microalbumin wnl 10/9/18    Vit D deficiency -  14.7 on 11/27/18 and taking ergo       PTSD: sx well controlled with current meds  Taking venlafaxine 225 and trazodone 50 and now on amitriptyline           [place] - taking ferrous sulfate but wants to stop  Check CBC and if hgb wnl then will stop iron    Polypharmacy -   Home health RN sets up meds  D/c'd: Venlafaxine, gabapentin, amitriptyline,ASA, pioglitazone  Restart glipizide, atorvastatin  Check CBC and if hgb wnl - stop ferrous sulfate  Continue lasix and KCL - check BMP     Spent 40 min face to face with patient with more the 50% spent in counseling, reviewing chart, and coordination of care and discussing problems listed above.

## 2021-06-23 NOTE — PROGRESS NOTES
Attempted ABG Right radial.  Unable to obtain with first attempt.  Pt refused to allow a second attempt.     Pt refused any further attempts per  x 3.      Pt identified with her ID and .

## 2021-06-23 NOTE — TELEPHONE ENCOUNTER
Medication Request  Medication name: Furosemide 40mg tablet  Pharmacy Name and Location: [facility]  Reason for request: Fax received from pharmacy requesting refill of the medication  When did you use medication last?:  Unknown- Listed as historical med  Okay to leave a detailed message: no-speak to pharmacy staff

## 2021-06-23 NOTE — PROGRESS NOTES
HPI - 60yo female here f/u on labs and echo.       Pulm HTN and was referred for CHF clinic and/or rapid access clinic  Per call from Dr. Reid's office on 1/24/19, he wants pt to have Arterial blood gas, which can only be done at the lab at the hospital, not the clinic. ABG to been at the hospital lab tomorrow (but the temp is supposed to be minus 50 degrees will r/s for next week)  Seen at rapid access clinic on 12/24/18, per consult note:  --She will eventually require both a sleep study and a cardiac MRI before considering right and left heart catheterization for pulmonary hypertension.  --Return to see myself in 3-4 weeks in my pulmonary hypertension clinic.  --lung VQ scan on 1/23/19 = no evidence PE  --f/u cardiology 2/19/19     Poor compliance and extra potassium -   K+ 3.3 on 1/3/19 and 3.4 on 1/11/19        DMT2 -   A1c 6.3 on 1/3/19    Only taking janumet XR    Advised to restart glipizide and but stop pioglitazone given CHF and edema   on 12/4/17 on lipitor but she stopped this last month - recheck today  microalbumin wnl 10/9/18  Eye 3/29/18  And not c/o blurry vision     Vit D deficiency -  14.7 on 11/27/18 and taking ergo       PTSD: sx well controlled with current meds  Taking venlafaxine 225 and trazodone 50 and now on amitriptyline         [place] - taking ferrous sulfate but wants to stop  hgb 12.8 on 1/3/18     Polypharmacy -   Home health RN sets up meds  D/c'd: Venlafaxine, gabapentin, amitriptyline,ASA, pioglitazone  Restarted glipizide, atorvastatin on 1/3/19  She reports stopping some med, not sure which    PHYSICAL EXAM   General Appearance: Awake and alert, in no acute distress  HEENT: neck is supple  CV: regular rate  Resp: No respiratory distress. Breathing comfortably  Musculoskeletal: moving limbs comfortably with not deficits or deformities  Skin: no rashes noted    A/P  Pulm HTN and was referred for CHF clinic and/or rapid access clinic  -ABG to been at the hospital lab next  week   --f/u cardiology 2/19/19     hypokalemia  Recheck BMP at hospital with ABG next week         DMT2 -   A1c 6.3 on 1/3/19    Continue jjanumet XR  And glipizide    on 12/4/17 on lipitor but she stopped - > 145 on 1/3/19 and advised to restart  microalbumin wnl 10/9/18  Eye referral ordered     Vit D deficiency -  14.7 on 11/27/18 and taking ergo       PTSD: sx well controlled with current meds  Taking venlafaxine 225 and trazodone 50 and now on amitriptyline         [place] -hgb wnl and will stop ferrous sulfate     Polypharmacy -   Home health RN sets up meds - will have AVS with new med list faxed to RN  Will call pharmacy with all d/c meds to stop delivery  D/c'd: Venlafaxine, gabapentin, amitriptyline,ASA, pioglitazone  Restarted glipizide, atorvastatin on 1/3/19    Poor compliance  She reports stopping some med, not sure which and poor compliance    Spent 40 min face to face with patient with more the 50% spent in counseling, reviewing chart, and coordination of care and discussing problems listed above.

## 2021-06-23 NOTE — TELEPHONE ENCOUNTER
Pt saw pt the end of December and Furosemide 40 mg one tab twice a day was ordered.  Does PCP still wish pt to take med?  Pt was last seen 1/29/19 for pulmonary hypertension.  Thanks.

## 2021-06-23 NOTE — TELEPHONE ENCOUNTER
Who is calling:  Kamilla with [facility]  Reason for Call:  Kamilla stated patient would like to come to the the [facility] to have Arterial Blood Gas test completed. Kamilla is questioning if this a a service that can be provided. If not, Kamilla is asking that the clinic direct patient to the hospital to have the test completed.  Date of last appointment with primary care: 01/03/19  Has the patient been recently seen:  Yes  Okay to leave a detailed message: No

## 2021-06-23 NOTE — TELEPHONE ENCOUNTER
RN cannot approve Refill Request    RN can NOT refill this medication historical medication requested. Last office visit: 1/3/2019 Mei Carbone MD Last Physical: 12/4/2017 Last MTM visit: Visit date not found Last visit same specialty: 1/3/2019 Mei Carbone MD.  Next visit within 3 mo: Visit date not found  Next physical within 3 mo: Visit date not found      Nirali Turner, [facility] Triage/Med Refill 1/27/2019    Requested Prescriptions   Pending Prescriptions Disp Refills     potassium chloride (K-DUR,KLOR-CON) 20 MEQ tablet  0     Sig: Take 1 tablet (20 mEq total) by mouth daily.    Potassium Supplements Refill Protocol Passed - 1/27/2019  4:48 PM       Passed - PCP or prescribing provider visit in past 12 months      Last office visit with prescriber/PCP: 1/3/2019 Mei Carbone MD OR same dept: 1/3/2019 Mei Carbone MD OR same specialty: 1/3/2019 Mei Carbone MD  Last physical: 12/4/2017 Last MTM visit: Visit date not found   Next visit within 3 mo: Visit date not found  Next physical within 3 mo: Visit date not found  Prescriber OR PCP: Mei Carbone MD  Last diagnosis associated with med order: 1. Hypokalemia  - potassium chloride (K-DUR,KLOR-CON) 20 MEQ tablet; Take 1 tablet (20 mEq total) by mouth daily.; Refill: 0    If protocol passes may refill for 12 months if within 3 months of last provider visit (or a total of 15 months).            Passed - Potassium level in last 12 months    Lab Results   Component Value Date    Potassium 3.4 (L) 01/11/2019

## 2021-06-23 NOTE — TELEPHONE ENCOUNTER
----- Message from Gerardo Reid MD sent at 2/6/2019  7:07 PM CST -----  Regarding: RE: Nabor  Noted; she still needs the sleep study and the cardiac MRI before we consider a heart cath. It is unfortunate that the lab could not accomplish this.     SLB      ----- Message -----  From: Kamilla Galeana RN  Sent: 2/4/2019  12:16 PM  To: Gerardo Reid MD  Subject: Nabor                                            FYI - f/u sched 2-19-19.    ----- Message -----  From: Idania Pryor  Sent: 2/4/2019  11:46 AM  To: IMELDA Gomes CALLING FROM [facility], 2 TRIES TO GET BLOOD GASSES AND PATIENT REFUSED ANY MORE TRIES.  CALL WITH QUESTIONS [phone]  SUSANNE HERNANDEZ

## 2021-06-23 NOTE — TELEPHONE ENCOUNTER
Refill Approved    Rx renewed per Medication Renewal Policy. Medication was last renewed on 1/15/18.    Ov: 1/29/19    Radha Meneses, [facility] Triage/Med Refill 2/8/2019     Requested Prescriptions   Pending Prescriptions Disp Refills     JANUMET XR 50-1,000 mg TM24 [Pharmacy Med Name: JANUMET XR 50-1,000 MG TAB  TAB] 180 tablet 3     Sig: TAKE 1 TABLET BY MOUTH 2 (TWO) TIMES A DAY FOR DIABETES    Metformin Refill Protocol Passed - 2/6/2019 10:56 AM       Passed - Blood pressure in last 12 months    BP Readings from Last 1 Encounters:   01/29/19 110/68            Passed - LFT or AST or ALT in last 12 months    Albumin   Date Value Ref Range Status   01/03/2019 3.9 3.5 - 5.0 g/dL Final   01/03/2019 3.9 3.5 - 5.0 g/dL Final     Bilirubin, Total   Date Value Ref Range Status   01/03/2019 0.4 0.0 - 1.0 mg/dL Final   01/03/2019 0.4 0.0 - 1.0 mg/dL Final     Bilirubin, Direct   Date Value Ref Range Status   01/03/2019 0.2 <=0.5 mg/dL Final     Alkaline Phosphatase   Date Value Ref Range Status   01/03/2019 229 (H) 45 - 120 U/L Final   01/03/2019 229 (H) 45 - 120 U/L Final     AST   Date Value Ref Range Status   01/03/2019 42 (H) 0 - 40 U/L Final   01/03/2019 42 (H) 0 - 40 U/L Final     ALT   Date Value Ref Range Status   01/03/2019 45 0 - 45 U/L Final   01/03/2019 45 0 - 45 U/L Final     Protein, Total   Date Value Ref Range Status   01/03/2019 8.0 6.0 - 8.0 g/dL Final   01/03/2019 8.0 6.0 - 8.0 g/dL Final               Passed - GFR or Serum Creatinine in last 6 months    GFR MDRD Non Af Amer   Date Value Ref Range Status   02/04/2019 >60 >60 mL/min/1.73m2 Final     GFR MDRD Af Amer   Date Value Ref Range Status   02/04/2019 >60 >60 mL/min/1.73m2 Final            Passed - Visit with PCP or prescribing provider visit in last 6 months or next 3 months    Last office visit with prescriber/PCP: 1/29/2019 OR same dept: 1/29/2019 Mei Carbone MD OR same specialty: 1/29/2019 Mei Carbone MD  Last physical: Visit date not found Last MTM visit: Visit date not found         Next appt within 3 mo: Visit date not found  Next physical within 3 mo: Visit date not found  Prescriber OR PCP: Mei Carbone MD  Last diagnosis associated with med order: 1. Type 2 diabetes mellitus with diabetic neuropathy, without long-term current use of insulin (H)  - JANUMET XR 50-1,000 mg TM24 [Pharmacy Med Name: JANUMET XR 50-1,000 MG TAB  TAB]; TAKE 1 TABLET BY MOUTH 2 (TWO) TIMES A DAY FOR DIABETES  Dispense: 180 tablet; Refill: 3     If protocol passes may refill for 12 months if within 3 months of last provider visit (or a total of 15 months).          Passed - A1C in last 6 months    Hemoglobin A1c   Date Value Ref Range Status   01/03/2019 6.3 (H) 3.5 - 6.0 % Final              Passed - Microalbumin in last year     Microalbumin, Random Urine   Date Value Ref Range Status   03/29/2018 <0.50 0.00 - 1.99 mg/dL Final

## 2021-06-23 NOTE — TELEPHONE ENCOUNTER
Please let the heart clinic know that we do not have the capacity to do an arterial blood gas test in clinic.   If they want to order the test, we can help arrange a ride to the hospital for the patient.     Thanks,   Dr. Mei Carbone  1/24/2019

## 2021-06-23 NOTE — TELEPHONE ENCOUNTER
Refill Approved    Rx renewed per Medication Renewal Policy. Medication was last renewed on 1/9/18.    Jackie Gonzalez, [facility] Triage/Med Refill 1/27/2019     Requested Prescriptions   Pending Prescriptions Disp Refills     atorvastatin (LIPITOR) 20 MG tablet [Pharmacy Med Name: ATORVASTATIN 20 MG TABLET 20 TAB] 30 tablet 11     Sig: TAKE 1 PILL BY MOUTH EVERYDAY FOR CHOLESTEROL    Statins Refill Protocol (Hmg CoA Reductase Inhibitors) Passed - 1/24/2019  2:01 PM       Passed - PCP or prescribing provider visit in past 12 months     Last office visit with prescriber/PCP: 1/3/2019 Mei Carbone MD OR same dept: 1/3/2019 Mei Carbone MD OR same specialty: 1/3/2019 Mei Carbone MD  Last physical: 12/4/2017 Last MTM visit: Visit date not found   Next visit within 3 mo: Visit date not found  Next physical within 3 mo: Visit date not found  Prescriber OR PCP: Mei Carbone MD  Last diagnosis associated with med order: 1. Type 2 diabetes mellitus with hyperosmolarity without coma (H)  - atorvastatin (LIPITOR) 20 MG tablet [Pharmacy Med Name: ATORVASTATIN 20 MG TABLET 20 TAB]; TAKE 1 PILL BY MOUTH EVERYDAY FOR CHOLESTEROL  Dispense: 30 tablet; Refill: 11    2. Hyperlipidemia LDL goal <100  - atorvastatin (LIPITOR) 20 MG tablet [Pharmacy Med Name: ATORVASTATIN 20 MG TABLET 20 TAB]; TAKE 1 PILL BY MOUTH EVERYDAY FOR CHOLESTEROL  Dispense: 30 tablet; Refill: 11    If protocol passes may refill for 12 months if within 3 months of last provider visit (or a total of 15 months).

## 2021-06-24 NOTE — TELEPHONE ENCOUNTER
I left a message on Sleep Clinic Voicemail (long wait time) requesting a call back to schedule a sleep study for patient as previously recommended by Dr. Eric Leung in order to proceed with Cardiac Catheterization.     Jovana Freeman  02.26.19 fyi to self      Appnt: 04.15 at 10:05 am [facility]   Appnt: 04.19 at 10 am for PFT    Appnt: 03.11 at 9:30am MR Cardiac Aorta.

## 2021-06-24 NOTE — PATIENT INSTRUCTIONS - HE
Eye clinic appointment  Appointment: Monday 04/15/2019 at 10:05am arrival  [facility]  [address] [place] [id number]   [phone]

## 2021-06-24 NOTE — TELEPHONE ENCOUNTER
Per Sleep Study , patient was to notify them is she wanted the study so that they can send Dr. Leung a message to request the order.  Once they get the order they will contact patient directly to schedule.  I will keep this encounter open as a reminder to check if appointment has been scheduled for transportation reasons.     Jovana Freeman  03.01.19

## 2021-06-24 NOTE — PROGRESS NOTES
"HPI -  60 yo female here to f/u on cardiology consult      She is also c/o \"foggy vision\" and wants eye referral   Referral already ordered on 1/29/19  Appointment: Monday 04/15/2019 at 10:05am arrival  [facility]  [address] [place] [id number]   [phone]      Cardiology consult 2/19/19:  Ordered PFT Complete and MR cardiac aorta  \"schedule the testing noted above as well as follow-up in the sleep clinic to complete her noninvasive evaluation for pulmonary hypertension before considering right and left heart catheterization with possible coronary angiography.  We cannot proceed with cardiac catheterization unless she completes the sleep study as previously recommended by Dr. Eric Leung.  Based on his prior evaluation it is probable that she has obstructive sleep apnea and treatment of this condition would be essential before considering other treatments for pulmonary hypertension.  Notably, she has a normal BNP level which speaks against right heart failure due to pulmonary hypertension.\"      Current Outpatient Medications   Medication Sig Note     acetaminophen (Q-PAP EXTRA STRENGTH) 500 MG tablet Take 2 tablets (1,000 mg total) by mouth 2 (two) times a day.      atorvastatin (LIPITOR) 20 MG tablet TAKE 1 PILL BY MOUTH EVERYDAY FOR CHOLESTEROL      blood glucose test strips Use 1 each As Directed 3 (three) times a day. Dispense brand per patient's insurance at pharmacy discretion.      furosemide (LASIX) 40 MG tablet Take 1 tablet (40 mg total) by mouth 2 (two) times a day.      glipiZIDE (GLUCOTROL XL) 10 MG 24 hr tablet Take 1 tablet (10 mg total) by mouth daily.      JANUMET XR 50-1,000 mg TM24 TAKE 1 TABLET BY MOUTH 2 (TWO) TIMES A DAY FOR DIABETES      lancets (ONETOUCH DELICA LANCETS) 33 gauge Misc Use to check blood sugar three times daily.      omeprazole (PRILOSEC) 20 MG capsule TAKE 1 CAPSULE (20 MG TOTAL) BY MOUTH DAILY BEFORE BREAKFAST. FOR STOMACH      potassium chloride " (K-DUR,KLOR-CON) 20 MEQ tablet TAKE 1 TABLET (20 MEQ TOTAL) BY MOUTH DAILY.      triamcinolone (KENALOG) 0.1 % cream Apply to affected area twice daily as needed.      VITAMIN D2 50,000 unit capsule Take 1 capsule by mouth once a week. 7/2/2018: Received from: External Pharmacy Received Sig: TAKE 1 CAPSULE  50,000 UNITS TOTAL  BY MOUTH ONCE A WEEK     ammonium lactate (LAC-HYDRIN) 12 % lotion Apply topically 2 (two) times a day. Apply to affected area      artificial tears, hypromellose, (GONIOVISC) 2.5 % ophthalmic solution 2 drops each eye as needed      condoms - male Misc Use as needed      fluticasone (FLONASE ALLERGY RELIEF) 50 mcg/actuation nasal spray 1 spray into each nostril daily.      senna (SENOKOT) 8.6 mg tablet Take 1 tablet by mouth daily.      Vitals:    02/21/19 1621   BP: 110/62   Pulse: 100   Resp: 14   Temp: 97.8  F (36.6  C)   TempSrc: Oral   SpO2: 94%   Weight: 195 lb 12.8 oz (88.8 kg)   Height: 5' (1.524 m)     PHYSICAL EXAM   General Appearance: Awake and alert, in no acute distress  HEENT: neck is supple  CV: regular rate  Resp: No respiratory distress. Breathing comfortably  Musculoskeletal: moving limbs comfortably with not deficits or deformities  Skin: no rashes noted      A/P  pulm HTN and CHF  Will ask specialty  to check to see if cardiology has arranged for the tests that they ordered for her:  PFT complete  MR MR cardiac aorta  Sleep study to r/o DEIRDRE  She will RTC in 4 weeks to f/u on tests    Prevention:   RTC for Px with mammo, pap, fobt

## 2021-06-24 NOTE — PROGRESS NOTES
Patient is 59 yr old female came to see [facility] RN today for assessment because she's concerned about her living status.     Patient has pertinent medical diagnosis of depression with anxiety, DM II, PTSD, morbid obesity, HTN, and poor vision.    Patient lives with daughter, son in law, and 4 grand kids (18, 17, 14, and 8 yr old) total of 7 people. Daughter renting single family home with 4 bedrooms.     Patient states she doesn't want to live with her daughter any longer. Patient started crying the minute she talks about her living situation with her daughter and her daughter hasn't been helping her at all.     PCA service 5.45 hr per day. Patient's daughter is her PCA. Patient states her daughter stopped talking the past 3 months and her daughter hasn't been doing her job as her PCA the past 3 months.     States she has been dealing with her daughter not taking care of her as her PCA. Patient states she was hiding it from everyone abou there daughter not taking care her for a long time because she's the only daughter she has and she has no one to go to.     Patient states she has to depend her sister or niece or nephew to take her to her bank once a month, and taking her to grocery shopping.    SSI - $771  SNAP - $190  Rent - $400-$600 per month.     Patient reports her daughter, Emilia Rivera yells/shouting at home almost daily and when patient asks her daughter for assistance. States her son in law also yells at her.     Patient states her daughter goes to [email or web address] or playing cards every week. Every Friday, her daughter takes her children after school and comes back on Sunday. States her daughter goes to [employer] to visit her  who works there.     Patient is [place] citizen.     A1c results:  1/3/18 - 6.3  10/9/18 - 6.6  7/2/18 - 8.1  Reports BG ranging in the 100s-200s. No BG <70 or >300 reported.   Didn't bring in her glucometer today or her medication with  Her.  States has home care nurse coming to set up her  medication every 2 weeks. [facility].    Demetrasarah Chopra Carlie - [phone] - Brittney Culture  to assist with housing if possible.     F/u needed:   1) eye appt - [facility] to left side - check referral status  Appointment: Monday 04/15/2019 at 10:05am arrival  [facility]  [address] [place] [id number]   [phone]  2) housing - want to rent appt, not highrise low in come  3) ENT - declines hearing aids    States main concern is to move out of her daughter's place and renting an apartment. States her niece will be her PCA once she moves out and her sister and niece will take over as her main caregivers.     States she's depressed, unable to sleep at night, lonely, sad, and feeling hopeless because how her daughter treats her. Unable to tell writer if she has a therapist or seeing anyone for her depression. Patient has an appt with PCP on 2/21/19 and f/u appt on with PCP in April, 2019.       RN Recommendations and Referrals  [facility] : housing and her daughter is not taking care of her as her PCA.     Action Plan    RN Will  Schedule [facility]  consult  Will add the patient to [facility] RN tracking list  Be available to the patient as nursing needs arise    Care Guide Will  Within 2 weeks from the RN assessment visit, by   : Help the patient coordinate goal setting visit if not already coordinated.   1) scheduled to see [facility] SW on 3/27/19 to assist with resources for housing, and daughter who's her PCA is not taking care of her.   2) check to see if a Brittney Culture  to assist her finding apartment.   3) Check to see if patient still see by mental health provider at [facility]. Patient unable to tell [facility] RN. It's on her care team list. If not, please add note to [facility] SW schedule to referral patient to mental health providers or therapist.   4) Patient may step down to maintenance once goal met unless there's changes.   5) Patient has eye doctor appt on 4/15/19.  Please remind patient a couple days before and arrange ride for her as needed. Appointment: Monday 04/15/2019 at 10:05am arrival  [facility] [address] [address]   [phone]    Goals  Goals        Patient Stated      I would like resources for housing  and to rent an apartment.  (pt-stated)      Action steps to achieve this goal  1.  I will speak with the [facility] CG at outreach telephone calls.   2.  I will speak with the [facility] SW to identify community resources that might be available to me.   3.  I will provide any necessary paperwork/documentation in a timely manner if needed to assist with this process      Date goal set:  2/22/19         Other      Monitoring:      Test once/day, fasting.  Bring meter to all clinic appointments.              Clinic Care Coordination RN Assessed Needs  Patient Centered Assessment Method-No Data Recorded  Level 2:  A score of 25-36 indicates that the patient has a moderate initial need for RN or SW intervention at the discretion of the .  The RN will add this patient to her panel and follow closely in partnership with the care guide until stable.  She will reach out to the care guide for support in care coordination needs and graduate the patient to standard care guide outreach when appropriate.      PCAM (Patient Centered Assessment Method)          Emergency Plan  Emergency Plan Recommendation:    When to Use the Emergency Department (ED)  An emergency means you could die if you don t get care quickly. Or you could be hurt permanently (disabled). Read below to know when to use -- and when not to use -- an emergency department (also called ED).    Dangers to your life  Here are examples of emergencies. These need immediate care:  A hard time breathing  Severe chest pain  Choking  Severe bleeding  Suddenly not able to move or speak  Blacking out (fainting)`  Poisoning    Dangers of permanent injuries  Here are other emergencies. These also need immediate  care:  Deep cuts or severe burns  Broken bones, or sudden severe pain and swelling in a joint    When it s an emergency  If you have an emergency, follow these steps:    1. Go to the nearest emergency department  If you can, go to the hospital ED closest to you right away.  If you cannot get there right away, or if it is not safe to take yourself, call 911 or your police emergency number.  2. Call your primary care doctor  Tell your doctor about the emergency. Call within 24 hours of going to the ED.  If you cannot call, have someone call for you.  Go to your doctor (not the ED) for any follow-up care.    When it s not an emergency  If a problem is not an emergency, follow these steps:    1. Call your primary care doctor  If you don t know the name of your doctor, call your health plan.  If you cannot call, have someone call for you.  2. Follow instructions  Your doctor will tell you what you should do.  You may be told to see your doctor right away. You may be told to go to the ED. Or you may be told to go to an urgent care center.  Follow your doctor s advice.  Depression  Everyone feels down at times. The blues are a natural part of life. But an unhappy period that s intense or lasts for more than a couple of weeks can be a sign of depression. Depression is a serious illness. It can disrupt the lives of family and friends. If you know someone you think may be depressed, find out what you can do to help.    Know the serious signals  Warning signals for suicide include:    Threats or talk of suicide    Statements such as  I won t be a problem much longer  or  Nothing matters     Giving away possessions or making a will or  arrangements    Buying a gun or other weapon    Sudden, unexplained cheerfulness or calm after a period of depression  If you notice any of these signs, get help right away. Call a health care professional, mental health clinic, or suicide hotline and ask what action to take. In an  emergency, don t hesitate to call the police.    [facility] Mental Health Crisis Lines:  [facility] [phone]  [facility] [phone]  [facility] [phone]  [facility] [phone]  [facility], Adults [phone]  [facility], Children [phone]  [facility], Adults [phone]  [facility], Children [phone]  Long-Term Complications of Diabetes can cause health problems over time. These are called complications. They are more likely to happen if your blood sugar is often too high. Over time, high blood sugar can damage blood vessels in your body. It is important to keep your blood sugar in your target range. This can help prevent or delay complications from diabetes.  Possible complications  Complications of diabetes include:    Eye problems, including damage to the blood vessels in the eyes (retinopathy), pressure in the eye (glaucoma), and clouding of the eye s lens (a cataract). Eye problems can eventually lead to irreversible blindness.     Tooth and gum problems (periodontal disease), causing loss of teeth and bone    Blood vessel (vascular) disease leading to circulation problems, heart attack or stroke, or a need for amputation of a limb     Problems with sexual function leading to erectile dysfunction in men and sexual discomfort in women     Kidney disease (nephropathy) can eventually lead to kidney failure, which may require dialysis or kidney transplant     Nerve problems (neuropathy), causing pain or loss of feeling in your feet and other parts of your body, potentially leading to an amputation of a limb     High blood pressure (hypertension), putting strain on your heart and blood vessels    Serious infections, possibly leading to loss of toes, feet, or limbs  How to avoid complications  The serious consequences of these complications may be avoidable for most people with diabetes by managing your blood glucose, blood pressure, and cholesterol levels.  This can help you feel better and stay healthy. You can manage diabetes by tracking your blood sugar. You can also eat healthy and exercise to avoid gaining weight. And you should take medicine if directed by your healthcare provider.  Call your health care provider if:    You vomit or have diarrhea for more than 6 hours.    Your blood glucose level is higher than usual or over 250 mg/dL after you have taken extra insulin (if recommended in your sick-day plan).    You take oral medicine for diabetes, and your blood sugar is higher than usual or over 250 mg/dL, before a meal and stays that high for more than 24 hours.    Your blood glucose is lower than usual or less than 70 mg/dL    You have moderate to large amounts of ketones in your blood or urine.    You aren t better after 2 days.

## 2021-06-24 NOTE — TELEPHONE ENCOUNTER
Patient is scheduled on 03.22 Message sent to [facility] to set up transportation-done

## 2021-06-24 NOTE — TELEPHONE ENCOUNTER
----- Message from Mei Carbone MD sent at 2/21/2019  5:05 PM CST -----  Regarding: tests recommended by cardiology  Can you see if these have been scheduled and if not help her set up these appts? It think these were already ordered by Dr Franklin CROCKETTT Complete  MR Cardiac Aorta  Sleep study to r/o DEIRDRE    She also needs transportation and Brittney .     Thanks,   Dr. Mei Carbone  2/21/2019

## 2021-06-24 NOTE — PATIENT INSTRUCTIONS - HE
Nerenny,    It was a pleasure to see you today at the [facility].     We will arrange a consultation in the Sleep Clinic as well as breathing tests and a cardiac MRI to evaluate your shortness of breath.    You will see Dr. Reid in the Pulmonary Hypertension Clinic after these tests and consults are completed.    Please call us if you have any questions or concerns about your heart.      Simone Reid M.D.

## 2021-06-24 NOTE — PROGRESS NOTES
My Clinic Care Coordination Care Plan    [facility]  [address] [place]  [address]  [phone]      My Preferred Method of Contact:  Phone: [phone]    My Primary/Preferred Language:  Brittney    Preferred Learning Style:  Face to face discussion, Pictures/Diagrams and Hands on teaching    Emergency Contact: Extended Emergency Contact Information  Primary Emergency Contact: Emilia Rivera   [place]  Mobile Phone: [phone]  Relation: Child     PCP:  Mei Carbone MD  Specialists:    [facility], MTM, diabetic educator and [facility] clinic.    Hospitalizations and/or ED Visits  Most Recent: 12/02/2018     Previous:  12/24/2018  Reason:  Admission     Concerns regarding my health: Diabetes, depression with anxiety, pulmonary HTN and joints pain.    Advanced Directive/Living Will: The patient was given information regarding Adanced Directives/Living Will      Trixie elected to enroll in the [facility] Care Coordination.  Trixie was given a copy of the Clinic Care Coordination brochure and full description of how to access their care team both during clinic hours and after hours.   My Care Guide's Name and Phone Number:  Alexandre Win: [phone]   The Care Guide and myself agreed to be in contact every 2 weeks.      Medication Update  The patient's medication list is up to date per [facility] RN    Health Maintenance and Immunization Update  The patient's preventive health screenings and immunizations are up to date per PCP.    My Emergency Plan:  Emergency Plan Recommendation:     When to Use the Emergency Department (ED)  An emergency means you could die if you don t get care quickly. Or you could be hurt permanently (disabled). Read below to know when to use -- and when not to use -- an emergency department (also called ED).     Dangers to your life  Here are examples of emergencies. These need immediate care:  A hard time breathing  Severe chest  pain  Choking  Severe bleeding  Suddenly not able to move or speak  Blacking out (fainting)`  Poisoning     Dangers of permanent injuries  Here are other emergencies. These also need immediate care:  Deep cuts or severe burns  Broken bones, or sudden severe pain and swelling in a joint     When it s an emergency  If you have an emergency, follow these steps:     1. Go to the nearest emergency department  If you can, go to the hospital ED closest to you right away.  If you cannot get there right away, or if it is not safe to take yourself, call 911 or your police emergency number.  2. Call your primary care doctor  Tell your doctor about the emergency. Call within 24 hours of going to the ED.  If you cannot call, have someone call for you.  Go to your doctor (not the ED) for any follow-up care.     When it s not an emergency  If a problem is not an emergency, follow these steps:     1. Call your primary care doctor  If you don t know the name of your doctor, call your health plan.  If you cannot call, have someone call for you.  2. Follow instructions  Your doctor will tell you what you should do.  You may be told to see your doctor right away. You may be told to go to the ED. Or you may be told to go to an urgent care center.  Follow your doctor s advice.  Depression  Everyone feels down at times. The blues are a natural part of life. But an unhappy period that s intense or lasts for more than a couple of weeks can be a sign of depression. Depression is a serious illness. It can disrupt the lives of family and friends. If you know someone you think may be depressed, find out what you can do to help.     Know the serious signals  Warning signals for suicide include:    Threats or talk of suicide    Statements such as  I won t be a problem much longer  or  Nothing matters     Giving away possessions or making a will or  arrangements    Buying a gun or other weapon    Sudden, unexplained cheerfulness or calm  after a period of depression  If you notice any of these signs, get help right away. Call a health care professional, mental health clinic, or suicide hotline and ask what action to take. In an emergency, don t hesitate to call the police.     [facility] Mental Health Crisis Lines:  [facility] [phone]  [facility] [phone]  [facility] [phone]  [facility] [phone]  [facility], Adults [phone]  [facility], Children [phone]  [facility], Adults [phone]  [facility], Children [phone]  Long-Term Complications of Diabetes can cause health problems over time. These are called complications. They are more likely to happen if your blood sugar is often too high. Over time, high blood sugar can damage blood vessels in your body. It is important to keep your blood sugar in your target range. This can help prevent or delay complications from diabetes.  Possible complications  Complications of diabetes include:    Eye problems, including damage to the blood vessels in the eyes (retinopathy), pressure in the eye (glaucoma), and clouding of the eye s lens (a cataract). Eye problems can eventually lead to irreversible blindness.     Tooth and gum problems (periodontal disease), causing loss of teeth and bone    Blood vessel (vascular) disease leading to circulation problems, heart attack or stroke, or a need for amputation of a limb     Problems with sexual function leading to erectile dysfunction in men and sexual discomfort in women     Kidney disease (nephropathy) can eventually lead to kidney failure, which may require dialysis or kidney transplant     Nerve problems (neuropathy), causing pain or loss of feeling in your feet and other parts of your body, potentially leading to an amputation of a limb     High blood pressure (hypertension), putting strain on your heart and blood vessels    Serious infections, possibly leading to loss of toes, feet, or  limbs  How to avoid complications  The serious consequences of these complications may be avoidable for most people with diabetes by managing your blood glucose, blood pressure, and cholesterol levels. This can help you feel better and stay healthy. You can manage diabetes by tracking your blood sugar. You can also eat healthy and exercise to avoid gaining weight. And you should take medicine if directed by your healthcare provider.  Call your health care provider if:    You vomit or have diarrhea for more than 6 hours.    Your blood glucose level is higher than usual or over 250 mg/dL after you have taken extra insulin (if recommended in your sick-day plan).    You take oral medicine for diabetes, and your blood sugar is higher than usual or over 250 mg/dL, before a meal and stays that high for more than 24 hours.    Your blood glucose is lower than usual or less than 70 mg/dL    You have moderate to large amounts of ketones in your blood or urine.    You aren t better after 2 days.    All [facility] clinic patients have access to a Nurse 24 hours a day, 7 days a week.  If you have questions or want advice from a Nurse, please know [facility] is here for you.  You can call your clinic and they will connect you or you can call Care Connection at [phone].  [facility] also has Walk In Care clinics in multiple locations.  Call the number listed above for more information about our Walk In Care clinics or visit the [facility] website at [email or web address].    Patient Support  I will ask my emergency contact for help in supporting me in these goals.  Relationship to patient: Daughter  Cell # : [phone] , best time to call anytime.

## 2021-06-24 NOTE — TELEPHONE ENCOUNTER
----- Message from Mamta Mohan sent at 3/1/2019 10:04 AM CST -----  Regarding: Order  Dr.Chiang Marcum called and said that she would like to pursue the ss. Would you be able to put an order in for this? Thank you.

## 2021-06-24 NOTE — TELEPHONE ENCOUNTER
Refill Approved    Rx renewed per Medication Renewal Policy. Medication was last renewed on 1/28/19.    Kamilla Renteria, [facility] Triage/Med Refill 2/20/2019     Requested Prescriptions   Pending Prescriptions Disp Refills     potassium chloride (K-DUR,KLOR-CON) 20 MEQ tablet [Pharmacy Med Name: POTASSIUM CL ER 20 MEQ TAB 20 TAB] 30 tablet 0     Sig: TAKE 1 TABLET (20 MEQ TOTAL) BY MOUTH DAILY.    Potassium Supplements Refill Protocol Passed - 2/18/2019  9:46 AM       Passed - PCP or prescribing provider visit in past 12 months      Last office visit with prescriber/PCP: 1/29/2019 Mei Carbone MD OR same dept: 1/29/2019 Mei Carbone MD OR same specialty: 1/29/2019 Mei Carbone MD  Last physical: 12/4/2017 Last MTM visit: Visit date not found   Next visit within 3 mo: Visit date not found  Next physical within 3 mo: Visit date not found  Prescriber OR PCP: Mei Carbone MD  Last diagnosis associated with med order: 1. Hypokalemia  - potassium chloride (K-DUR,KLOR-CON) 20 MEQ tablet [Pharmacy Med Name: POTASSIUM CL ER 20 MEQ TAB 20 TAB]; Take 1 tablet (20 mEq total) by mouth daily.  Dispense: 30 tablet; Refill: 0    If protocol passes may refill for 12 months if within 3 months of last provider visit (or a total of 15 months).            Passed - Potassium level in last 12 months    Lab Results   Component Value Date    Potassium 4.2 02/04/2019

## 2021-06-25 NOTE — PROGRESS NOTES
Assessment & Plan     Severe anemia  She agrees to heme consult. Her PCA will help make appt.   Advised to continue the iron tablets twice a day and iron rich foods.  She has refused transfusion in ER evaluation and is currently asymptomatic.  We will do some more lab work to determine etiology of her drop in hemoglobin over the last several months.  FOBT 1 of 3 positive in Jan 2021 that she associated with constipation. Given new FOBT cards set 5/21/21 to recheck as she declined colonoscopy, but these have not yet been returned.   - Reticulocytes  - Iron and Transferrin Iron Binding Capacity  - Morphology,Smear Review (MORP)  - Hemoglobinopathy/Thalassemia Cascade  - Lactate Dehydrogenase (LDH)  - Ambulatory referral to Oncology/Hematology Adult    Iron deficiency anemia secondary to inadequate dietary iron intake  Checking CBC to see if iron tabs helped.   - HM1(CBC and Differential)    Language barrier affecting health care        Review of external notes as documented in note  Diagnosis or treatment significantly limited by social determinants of health - Language barrier, low health literacy, poverty  Ordering of each unique test  Prescription drug management    Mei Carbone MD  [facility]    Daniel Sofia is 61 y.o. and presents today for the following health issues   HPI   Brittney professional phone  used.     Here to follow-up on severe anemia.   Here hgb dropped to 6.1 last visit on 5/20/21. She refused to go to ER for evaluation and possible transfusion. So we agreed to have her start iron supplements and try iron-rich foods and recheck in a few weeks.   She has no symptoms - no chest pain, shortness of breath, headaches, dizziness  (she has chronic joint and body pain)    hgb today is again 6.1 preliminary and will be sent to [facility] lab for confirmation.         Results for GERARDO SOFIA (MRN [id number]) as of 6/1/2021 09:44   Ref. Range 12/4/2017 14:02 12/13/2018  "09:53 1/3/2019 15:26 1/9/2020 08:09 5/20/2021 10:23   Hemoglobin Latest Ref Range: 12.0 - 16.0 g/dL 12.1 10.1 (L) 12.8 10.1 (L) 6.1 (LL)         Review of Systems   Please see above.  The rest of the review of systems are negative for all systems.     Recent Results (from the past 1008 hour(s))   Glycosylated Hemoglobin A1c    Collection Time: 05/20/21 10:23 AM   Result Value Ref Range    Hemoglobin A1c 6.9 (H) <=5.6 %   Thyroid Cascade    Collection Time: 05/20/21 10:23 AM   Result Value Ref Range    TSH 1.27 0.30 - 5.00 uIU/mL   Vitamin D, Total (25-Hydroxy)    Collection Time: 05/20/21 10:23 AM   Result Value Ref Range    Vitamin D, Total (25-Hydroxy) 37.4 30.0 - 80.0 ng/mL   HM1 (CBC with Diff)    Collection Time: 05/20/21 10:23 AM   Result Value Ref Range    WBC 7.9 4.0 - 11.0 thou/uL    RBC 3.72 (L) 3.80 - 5.40 mill/uL    Hemoglobin 6.1 (LL) 12.0 - 16.0 g/dL    Hematocrit 24.8 (L) 35.0 - 47.0 %    MCV 67 (L) 80 - 100 fL    MCH 16.4 (L) 27.0 - 34.0 pg    MCHC 24.6 (L) 32.0 - 36.0 g/dL    RDW 19.7 (H) 11.0 - 14.5 %    Platelets 179 140 - 440 thou/uL    MPV      Neutrophils % 72 (H) 50 - 70 %    Lymphocytes % 18 (L) 20 - 40 %    Monocytes % 7 2 - 10 %    Eosinophils % 2 0 - 6 %    Basophils % 0 0 - 2 %    Immature Granulocyte % 1 (H) <=0 %    Neutrophils Absolute 5.7 2.0 - 7.7 thou/uL    Lymphocytes Absolute 1.4 0.8 - 4.4 thou/uL    Monocytes Absolute 0.6 0.0 - 0.9 thou/uL    Eosinophils Absolute 0.2 0.0 - 0.4 thou/uL    Basophils Absolute 0.0 0.0 - 0.2 thou/uL    Immature Granulocyte Absolute 0.0 <=0.0 thou/uL   Manual Differential    Collection Time: 05/20/21 10:23 AM   Result Value Ref Range    Platelet Estimate Normal Normal    Ovalocytes 1+ (!) Negative    Polychromasia 1+ (!) Negative    Target Cells 1+ (!) Negative           Objective    /64   Pulse 70   Temp 98  F (36.7  C) (Oral)   Resp 14   Ht 4' 11.72\" (1.517 m)   Wt 196 lb 3.2 oz (89 kg)   LMP 01/30/2013   SpO2 96%   BMI 38.67 kg/m  " "  Body mass index is 38.67 kg/m .  Physical Exam    Vitals:    06/01/21 0937   BP: 100/64   Pulse: 70   Resp: 14   Temp: 98  F (36.7  C)   TempSrc: Oral   SpO2: 96%   Weight: 196 lb 3.2 oz (89 kg)   Height: 4' 11.72\" (1.517 m)     PHYSICAL EXAM   General Appearance: Awake and alert, in no acute distress  HEENT: neck is supple  CV: regular rate  Resp: No respiratory distress. Breathing comfortably  Musculoskeletal: moving limbs comfortably with not deficits or deformities  Skin: no rashes noted          "

## 2021-06-25 NOTE — TELEPHONE ENCOUNTER
Fax received from [facility], they have started the Prior Authorization Process via Cover My Meds    CoverMyMeds Key: [id number]    Medication Name: Atorvastatin 20mg tablets    Insurance Plan:   PBM: [employer]  Patient ID: not provided on fax    Please complete the PA process

## 2021-06-25 NOTE — PROGRESS NOTES
Progress Notes by Patricia Chen at 11/1/2017  9:43 AM     Author: Patricia Chen Service: -- Author Type: Community Health Worker    Filed: 11/28/2017  2:06 PM Encounter Date: 11/1/2017 Status: Sign when Signing Visit    : Patricia Chen (Community Health Worker)       11.01.17 Referral:   SNAP  Received: 2 days ago       Alexandre DANIEL [facility] Financial Resource Guide Pool                     Hi,     Patient called and need helps to apply for food assistant with [facility], please contact patient and assist with request.       Thanks,   -Alexandre.              Will call and screen/set up appointment.     11.09.17 Called patient and set up appointment to meet and complete SNAP application. Patient reports does not have transportation and clinic usually assists her with this, let [facility] know this information.     11/16/17 Met with patient I thought to assist with SNAP application but she has SNAP benefits they just decreased. Patient had notice from [facility] worker stating her utility allowances decreased, [employer] [facility] asked patient if she was assisting with heat and air (electric is the only allowance counted) and she reports yes she does. Had patient sign ALEXANDRA, will fax to [facility] and  to ask what patient needs to verify she assists with all utilities not just electric, this should increase her SNAP benefits again.     11/21/17 Called patient's [facility] worker Karissa and left voicemail requesting information on how patient can update her shelter expense information as she forgot to report that she assists with paying electric/cooling/heat. Will try back next week and let patient know any info available then.     11/28/17 Called patient's worker Karissa again and left another vm. Patient forgot to report she is helping pay utilities on paperwork she provided to [facility] for SNAP. Will need to find out how to correct this. If I do not hear back from worker today will try to contact [facility] General #. Case  [id number]

## 2021-06-25 NOTE — TELEPHONE ENCOUNTER
Central PA team  [phone]  Pool: HE PA MED ([id number])          PA has been initiated.       PA form completed and faxed insurance via Cover My Meds     Key:  [id number]     Medication:  ATORVASTATIN 20MG    Insurance:  [employer]        Response will be received via fax and may take up to 5-10 business days depending on plan

## 2021-06-25 NOTE — TELEPHONE ENCOUNTER
Aunt's PCA is calling and states the Neh was referred to have a blood test due to low iron.  Today PCA states that she will call blood Doctor due to the fact that she has not heard from them yet.  Trixie does not want to go to hospital and does not want to have a transfusion.  Trixie is wanting to know what is causing the low hemoglobin.  Trixie will schedule with Blood Doctor.    COVID 19 Nurse Triage Plan/Patient Instructions    Please be aware that novel coronavirus (COVID-19) may be circulating in the community. If you develop symptoms such as fever, cough, or SOB or if you have concerns about the presence of another infection including coronavirus (COVID-19), please contact your health care provider or visit  [email or web address].    Disposition/Instructions    Home care recommended. Follow home care protocol based instructions.    Thank you for taking steps to prevent the spread of this virus.  o Limit your contact with others.  o Wear a simple mask to cover your cough.  o Wash your hands well and often.    Resources    [facility]: About COVID-19: [email or web address]    CDC: What to Do If You're Sick: www.cdc.gov/coronavirus/2019-ncov/about/steps-when-sick.html    CDC: Ending Home Isolation: www.cdc.gov/coronavirus/2019-ncov/hcp/disposition-in-home-patients.html     CDC: Caring for Someone: www.cdc.gov/coronavirus/2019-ncov/if-you-are-sick/care-for-someone.html     [facility]: Interim Guidance for Hospital Discharge to Home: www.health.[facility].[place]/diseases/coronavirus/hcp/hospdischarge.pdf    [facility] clinical trials (COVID-19 research studies): [email or web address].[email or web address]-clinical-trials     Below are the COVID-19 hotlines at the [place] Department of Health ([facility]). Interpreters are available.   o For health questions: Call 435-117-3837 or 1-729.916.5594 (7 a.m. to 7 p.m.)  o For questions about schools and childcare: Call 151-963-8979 or 1-737.512.1699 (7 a.m. to 7 p.m.)      Reason for Disposition    Information only question and nurse able to answer    Additional Information    Negative: Nursing judgment    Negative: Nursing judgment    Negative: Nursing judgment    Negative: Nursing judgment    Protocols used: INFORMATION ONLY CALL - NO TRIAGE-A-OH

## 2021-06-25 NOTE — TELEPHONE ENCOUNTER
Called pt and left VM to call back to clinic ASAP.  Ok to relay when pt calls back.  CMT will call pt again this afternoon.  Thanks .          ----- Message from Mei Carbone MD sent at 6/14/2021  7:05 PM CDT -----  Please let patient know I am referring her to the blood doctor because her blood is so low. She really should go to the ER for a blood transfusion b/c she is at high risk of having a heart attack or stroke.   She also needs a f/u appt with me.   Thanks,  Mei Carbone  6/14/2021

## 2021-06-25 NOTE — TELEPHONE ENCOUNTER
Called pt and left VM to call back.  Pt does have apt with PCP on 6/29 to follow up.  PCP is not in clinic today.  Thanks .

## 2021-06-25 NOTE — TELEPHONE ENCOUNTER
Please see triage message from today.  Pt has apt with PCP for 6/29/21.  Called pt today for 2nd call but left VM. Thanks .

## 2021-06-25 NOTE — TELEPHONE ENCOUNTER
Called pt back and spoke with PCA.  Unable to get in to see Heme/Oncology until July 6th at 145 pm.  PCA asked to reschedule apt with PCP on 6/29/21 to after pt sees Heme/Oncology.  Apt scheduled for 7/8/21 at 9 am.  PCA will bring pt to appt.  Did explain to PCA why PCP suggested pt should go to the ED for blood transfusion which pt had refused and what part the hemoglobin plays in the blood.  PCA understood and relayed on June 1st when saw PCP, PCP explained this as well via an .  Thanks .    Connor - please cancel [place] transportation for 6/29/21 for pt.  Thanks

## 2021-06-27 NOTE — PROGRESS NOTES
"Progress Notes by Rajni Stevenson RN at 8/14/2019  8:00 AM     Author: Rajni Stevenson RN Service: -- Author Type: Registered Nurse    Filed: 8/20/2019  3:10 PM Encounter Date: 8/14/2019 Status: Signed    : Rajni Stevenson RN (Registered Nurse)       Clinic Care Coordination Contact    Clinic Care Coordination Contact  OUTREACH    Referral Information:  Referral Source: PCP    Primary Diagnosis: Diabetes    Chief Complaint   Patient presents with   ? Clinic Care Coodination - Face To Face   ? Clinic Care Coordination - Initial     Clinic Utilization  Difficulty keeping appointments:: Yes  Compliance Concerns: Yes  No-Show Concerns: Yes  No PCP office visit in Past Year: No  Utilization    Last refreshed: 8/17/2019 11:43 AM:  Hospital Admissions 0           Last refreshed: 8/17/2019 11:43 AM:  ED Visits 0           Last refreshed: 8/16/2019  6:13 PM:  No Show Count (past year) 1              Current as of: 8/16/2019  6:13 PM              Clinical Concerns:  1) leaves her appts with medical doctor - cardiologist and PCP before she could be seen due to uncontrol pain.   2) Need to establish care with new cardiologist due to former cardiologist declined to see her again because patient left her appt without being seen after rooming. Patient states she had to wait almost 2 hours and not being seen and she couldn't stand the pain anymore. States the ride usually picks her up an hour before appt and had to wait another 40 mins- 1 hr to be seen by PCP or MD. States sitting more than 10-15 mins increases her pain.     Current Medical Concerns:     1) Uncontrolled pain - reports chronic pain to lower back, legs, arms and hands. States it goes on for years and the only meds that seems to work for a couple hours when she takes was Tramadol. States they stopped her Tramadol awhile back. Patient signed \"controlled substance agreement\" for Tramadol on 1/21/16.   - states she can't sit for longer than 10-15 mins. Writer know this patient " from [facility] home care and every time writer goes see her for nursing visit, patient always laying position. Patient states she spends most of her time in lying position - reposition herself side to side or on back, but most of the time on her sides. States too much pressure on her coccyx increases pain.   - States PCP restarted back on Gabapentin 300mg prn. Patient states she had try Gabapentin in the past and makes her too sleepy or drowsy through out the day even she takes it at HS. States she doesn't like to take it  - States she told PCP that she was taking medication from her friend but actually she still has some Tramadol which was probably filled a couple years back. Patient didn't bring Tramadol prescription bottle with her today.   - Educated patient not to take any OTC meds, meds from friends or any other meds without consulting with PCP first.   - Patient states no one really listen to me about my pain. I have been living with it for years.   - Patient she would be much happier and able to do ADLs if her pain is under control.  - States she had tried PT in the past and it was helpful. States it made it worse.  - last seen by neurologist on 6/13/18. She's not sure if she needs f/u appt. Writer reviewed neurologist narrative note on 6/13/19 with no specific f/u appt post MRI. Will consult with PCP.   - Was seen spine center back in 2015.     2) DM II - A1c on 7/23/19 was 9.1 worsening from 8.6 on 7/16/19. Patient states sometimes she does forget to take her meds - on Glipizide 10mg daily, Janumet XR 50-1000mg two times a day. Declines to see MTM or diabetic educator at this time. Agrees to work on lowering A1c <7 by taking meds as prescribed and it A1c not getting lower with next check, agrees to see MTM.       BG results the past 7 days:    1) 8/14 - 185    2) 8/7 - 215    3) 8/5 - 189    4) 8/4 - 204    5) 8/3 - 204    6) 8/1 - 196    7) 7/29 - 196    3) Pulmonary hypertension - need to establish care  with a new cardiologist. Will have PCP to put a new referral in. SN will f/u on status.    4)  Had sleep study completed on 4/17/19 - recommended CPAP - declines CPAP and going back - States she understands the consequences of not using CPAP - States they told me it makes my heart works harder when I don't use CPAP or I could die during sleep. Patient states I am not scared of death. If God wants to take me back then I am ready.     5) Cataract surgery on 9/12 and 9/26 - assisted patient to schedule pre-op with PCP on 8/29/19.     ~~~Patient seems to listen her PCA over writer. When PCA explained to her in details when she needs to see a cardiologist, why she needs CPAP etc, she tends to listen and knocking her head with respect.     PCA speaks fluent English and has been accompanied patient to her appts to prevent patient leaving her appt with medical providers due to long wait. PCA took 2 days for patient's cataract surgery.     Evy BERG works at [facility] prefers all patients to schedule on Wed or Thurs before 10am so she can drive and accompany patients.      Current Behavioral Concerns:     Education Provided to patient:    Pain  Pain (GOAL):: Yes  Type: Chronic (>3mo)  Location of chronic pain:: lower back, arms/hands, knees, and legs  Radiating: Yes  Progression: Worsening  Description of pain: Burning, Electrical-like, Numb, Aching  Chronic pain severity:: 7  Limitation of routine activities due to chronic pain:: Yes  Description: Unable to perform most daily activities (chores, hobbies, social activities, driving)  Alleviating Factors: Pain Medication, Rest  Aggravating Factors: Activity, Emotional Stress  Health Maintenance Reviewed: Not assessed       Medication Management:  Manages by home care nurse through [facility] home care - med set up every 2 weeks. States she's okay with a nurse setting up her meds.   PCA will reminds patient daily to take her meds and check BG.     1) Tyelenol - completely  out - need refill    2) Ammonium lactate - still has some at home - no refill needed.     3) ASA 81 - states was on it before but stopped due to side effects of numbness/tingling to legs and arms and tightness to face - states it was restarted back on 7/2319 - States she took it for 1 week and she couldn't handle the side effects anymore so she stopped.     4) Atorvastatin 20mg HS    5) Furosemide 40mg two times a day     6) Gabapentin 300mg HS as needed - states started 1 week ago - states taking as needed 2 times last week and once this week. States makes her too sleepy.     7) Glipizide 10mg daily - AM    8) Janumet 1 tab two times a day     9) Omeprazole 20mg daily -     10) Potassium 20mg daily - AM    11) Senna 8.6 daily as needed - didn't bring it with her today - states she has it at home.     12) Vit D 50, 000 IU weekly       States she stopped taking venlafaxine 225 and trazodone  the past 3-4 weeks. States not helping. States she's happy now after she left her daughter's place but wishes she could see her grand kids. States her daughter refuses to  her phone or not let her talk to her grand kids      Functional Status:  Dependent ADLs:: Ambulation-cane  Dependent IADLs:: Cleaning, Cooking, Laundry, Shopping, Meal Preparation, Medication Management, Transportation, Incontinence  Bed or wheelchair confined:: No  Mobility Status: Independent w/Device  Fallen 2 or more times in the past year?: No  Any fall with injury in the past year?: No    Living Situation:  Current living arrangement:: I live alone  Type of residence:: Apartment    Diet/Exercise/Sleep:  Diet:: Diabetic diet  Inadequate nutrition (GOAL):: No  Food Insecurity: No  Tube Feeding: No  Exercise:: Currently not exercising  Inadequate activity/exercise (GOAL):: No  Significant changes in sleep pattern (GOAL): No    Transportation:  Transportation concerns (GOAL):: No  Transportation means:: Medical transport, Friend, Regular car      Psychosocial:  Moravian or spiritual beliefs that impact treatment:: No  Mental health DX:: Yes  Mental health DX how managed:: None  Mental health management concern (GOAL):: No  Informal Support system:: Friends, Other(PCA)     Financial/Insurance:   Financial/Insurance concerns (GOAL):: No     Resources and Interventions:  Current Resources:   List of home care services:: Skilled Nursing;   Community Resources: [facility], PCA, [facility]  Supplies used at home:: Diabetic Supplies  Equipment Currently Used at Home: cane, quad, walker, rolling    Advance Care Plan/Directive  Advanced Care Plans/Directives on file:: No    Referrals Placed: None     Goals:   Goals        Patient Stated    ? I would like to establish care with a new cardiologist in the next 30 days.  (pt-stated)      Action steps to achieve this goal  1.  I will speak with CHW at outreach calls.  2. I will attend my appt with a cardiologist as schedule  3. I will call CHW with any concerns or questions.   4. Prefer appts on Wednesday or Thursday before 10am so my PCA can attend with me and provide me with transportation.       Date goal set:  8/14/19      ? RN goal: I would like to work on lowering my A1c <7 in the next 6 months.  (pt-stated)      Action steps to achieve this goal  1.  I will speak with CCC RN at outreach calls.  2. I will take all my medications as prescribed.   3. My PCA will remind me to take my meds daily  4. I will check BG daily and prn for s/s of hypo/hyperglycemia.   5. I agree to see MTM if my A1c gets worsen with next A1c check ( around 10/2019)  6. I will comes to see PCP as scheduled on 8/28/19    Date goal set:  8/14/19                       Future Appointments              In 1 week Mei Carbone MD [facility]          Plan:   1) CCC RN will do monthly chart review and outreach call- need to f/u on pain control whether PCP wants patient to go back to spine center, neurologist or  pain clinic.   2) CHW to coordinate care with PCA to set up appts - prefers appt on Wed or Thur before 10am so that PCA can drive patient to appt and accompany her.   3) CHW to see goals

## 2021-06-27 NOTE — PROGRESS NOTES
"Progress Notes by Rajni Stevenson RN at 7/11/2019  5:30 PM     Author: Rajni Stevenson RN Service: -- Author Type: Registered Nurse    Filed: 7/11/2019  5:38 PM Encounter Date: 7/11/2019 Status: Signed    : Rajni Stevenson RN (Registered Nurse)       Mei Carbone MD Dah, Nadia, RN; Alexandre Garcia STEFANY; Josey Slater MD             This patient is now being declined services at cardiology b/c lack of compliance with sleep apnea and f/u with cardiology.   She was also d/c'd from [facility] services b/c she has an English speaking PCA.     Per home RN 7/8/19 ,patient wanting pain med stronger than tylenol   naproxenm gabapentin, amitripytyline  d/c on 1/3/19 after hospital admission per pt request b/c side effectts   h/o acute diastolic HF and poor f/u with cards   h/o pulm HTN     Any thoughts on how to proceed as she has serious heart/lung problems related to her obesity, sleep apnea and poor compliance?     Thanks,   Dr. Mei Carbone   7/11/2019    Previous Messages      ----- Message -----   From: Gerardo Reid MD   Sent: 7/11/2019   1:47 PM   To: Mei Carbone MD, Radha Velazquez, *   Subject: do not reschedule her with me, see rationale*     Dr. Carbone,     [facility] was a no show with me on April 25 and left today, July 11, without being seen.     She came to the office today and was roomed, but while I was reviewing her records she left. The  stated that she told him that her back hurt and she was not coming back.     She has refused the advice of Dr. Leung to have her sleep apnea treated.     She most likely has pulmonary hypertension due to untreated sleep apnea.     This is copied from Dr. Leung.     \"Obstructive Sleep Apnea/Sleep related hypoxia   I reviewed the underlying pathophysiology with the patient.  I also reviewed the results of sleep study with the patient and a  in detail.  I explained that this was a difficult titration study and recommended that we have her return " "to get an in lab titration study with transcutaneous CO2 monitoring.  The patient declined any further testing.  Due to the severity of the patient's sleep disordered breathing I also strongly recommend that if the patient is unwilling to undergo an in lab titration study, that we empirically start her on BPAP therapy.  I explained to her through her niece that if her sleep disordered breathing is left untreated, she is at risk for cardiovascular events.  Despite knowing this the patient declined pressure therapy at this point in time.  I will give her a handout underlying the pathophysiology of obstructive sleep apnea and a list of the durable medical equipment companies that we commonly work with.  If the patient changes her mind, I welcome her to call us.\"     There is no medication treatment for pulmonary hypertension due to untreated sleep apnea, therefore, I would request that she not be rescheduled with me unless she has been on effective BiPAP or CPAP for at least 3 months.     Unfortunately, her actions have prevented me from seeing 4 other patients on a timely basis as we block out a double visit for all patients with an . In my opinion, her behavior is not respectful of the needs of others and while it is not the basis for my decision not to see her, it certainly causes me and my team considerable distress, as no doubt like yourself and Dr. Leung, we have limited resources to help all of those in need and who deserve our timely and compassionate care.     Your colleague,     Simone Reid         Put in a new referral to [facility] today. See note about regarding PCP's concerns.        "

## 2021-06-27 NOTE — PROGRESS NOTES
Progress Notes by Gerardo Reid MD at 2/19/2019  9:10 AM     Author: Gerardo Reid MD Service: -- Author Type: Physician    Filed: 2/19/2019  9:56 AM Encounter Date: 2/19/2019 Status: Signed    : Gerardo Reid MD (Physician)           Click to link to [facility] Pulmonary Hypertension Clinic Note    Assessment:    1. Pulmonary HTN (H)  PFT Complete    MR Cardiac Aorta       Plan:    1. We will schedule the testing noted above as well as follow-up in the sleep clinic to complete her noninvasive evaluation for pulmonary hypertension before considering right and left heart catheterization with possible coronary angiography.  We cannot proceed with cardiac catheterization unless she completes the sleep study as previously recommended by Dr. Eric Leung.  Based on his prior evaluation it is probable that she has obstructive sleep apnea and treatment of this condition would be essential before considering other treatments for pulmonary hypertension.  Notably, she has a normal BNP level which speaks against right heart failure due to pulmonary hypertension.  She will return to see Dr. Reid in the pulmonary hypertension clinic after these tests to plan next steps.    An After Visit Summary was printed and given to the patient.    This visit comprised 40 minutes of time of which more than 50% was spent in counseling and care coordination.    Subjective:    Trixie Sofia returned for a planned follow up visit in the Pulmonary Hypertension Clinic.  She was accompanied by professional Brittney .    Her serological testing was unremarkable.  The staff at the hospital were unable to successfully draw an arterial blood gas; this can be checked if and when she proceeds to heart catheterization.    She continues to describe chronic shortness of breath, cough, snoring, redness, weight gain, joint and muscle pain, daytime sleepiness, dizziness, and nonexertional chronic  chest pain.  Her leg swelling has improved, she states.    Problem List:    Patient Active Problem List   Diagnosis   ? Depression With Anxiety   ? Lichen planus   ? Arthritis   ? Diabetes mellitus, type 2 (H)   ? Controlled substance agreement signed - tramadol 60tabs/mo   ? Polypharmacy   ? Diabetes mellitus with neuropathy (H)   ? PTSD (post-traumatic stress disorder)   ? Morbid obesity, unspecified obesity type (H)   ? Moderate major depression (H)   ? Hypoxia   ? Essential hypertension   ? Pulmonary HTN (H)   ? Iron deficiency anemia   ? Type 2 diabetes mellitus with hyperosmolarity without coma, without long-term current use of insulin (H)       Past Medical History:   Diagnosis Date   ? Depression    ? Diabetes mellitus (H)    ? Essential hypertension    ? Lumbar Disc Degeneration L4 - L5    ? Menopause 05/2014    Mei Carbone: no LMP for over a year as of 5/2014    ? Sciatica    ? Vertigo 4/28/2015       Past Surgical History:   Procedure Laterality Date   ? TONGUE SURGERY Left 2006    surgery in [place] for mass on Tongue       Past Surgical History Pertinent Negatives:   Procedure Date Noted   ? SPLENECTOMY 12/24/2018        reports that she has been smoking pipe.  She has smoked for the past 30.00 years. she has never used smokeless tobacco. She reports that she does not drink alcohol or use drugs.    Family History   Problem Relation Age of Onset   ? Diabetes Mother    ? Hypertension Mother    ? Hyperlipidemia Mother    ? Diabetes Sister    ? Hypertension Sister    ? No Medical Problems Brother    ? Diabetes Sister    ? Hypertension Sister    ? Diabetes Sister    ? Hypertension Sister    ? No Medical Problems Sister    ? No Medical Problems Daughter    ? Breast cancer Neg Hx    ? Congenital heart disease Neg Hx        Outpatient Encounter Medications as of 2/19/2019   Medication Sig Dispense Refill   ? acetaminophen (Q-PAP EXTRA STRENGTH) 500 MG tablet Take 2 tablets (1,000 mg total) by mouth 2 (two)  times a day. 100 tablet 6   ? ammonium lactate (LAC-HYDRIN) 12 % lotion Apply topically 2 (two) times a day. Apply to affected area 400 g 0   ? artificial tears, hypromellose, (GONIOVISC) 2.5 % ophthalmic solution 2 drops each eye as needed 15 mL 12   ? atorvastatin (LIPITOR) 20 MG tablet TAKE 1 PILL BY MOUTH EVERYDAY FOR CHOLESTEROL 30 tablet 11   ? blood glucose test strips Use 1 each As Directed 3 (three) times a day. Dispense brand per patient's insurance at pharmacy discretion. 300 strip 11   ? condoms - male Misc Use as needed 24 each 11   ? fluticasone (FLONASE ALLERGY RELIEF) 50 mcg/actuation nasal spray 1 spray into each nostril daily. 16 g 12   ? furosemide (LASIX) 40 MG tablet Take 1 tablet (40 mg total) by mouth 2 (two) times a day. 60 tablet 3   ? glipiZIDE (GLUCOTROL XL) 10 MG 24 hr tablet Take 1 tablet (10 mg total) by mouth daily. 90 tablet 3   ? JANUMET XR 50-1,000 mg TM24 TAKE 1 TABLET BY MOUTH 2 (TWO) TIMES A DAY FOR DIABETES 180 tablet 3   ? lancets (ONETOUCH DELICA LANCETS) 33 gauge Misc Use to check blood sugar three times daily. 300 each 11   ? omeprazole (PRILOSEC) 20 MG capsule TAKE 1 CAPSULE (20 MG TOTAL) BY MOUTH DAILY BEFORE BREAKFAST. FOR STOMACH 90 capsule 3   ? potassium chloride (K-DUR,KLOR-CON) 20 MEQ tablet Take 1 tablet (20 mEq total) by mouth daily. 30 tablet 0   ? senna (SENOKOT) 8.6 mg tablet Take 1 tablet by mouth daily.     ? triamcinolone (KENALOG) 0.1 % cream Apply to affected area twice daily as needed. 45 g 0   ? VITAMIN D2 50,000 unit capsule Take 1 capsule by mouth once a week.  12     No facility-administered encounter medications on file as of 2/19/2019.        Review of Systems:    General: Weight Gain  Eyes: Visual Distubance  Ears/Nose/Throat: Hearing Loss  Lungs: Cough, Snoring  Heart: Chest Pain, Arm Pain, Shortness of Breath with activity, Leg Swelling  Stomach: WNL  Bladder: WNL  Muscle/Joints: Joint Pain, Muscle Pain  Skin: WNL  Nervous System: Daytime  Sleepiness, Dizziness  Mental Health: WNL     Blood: WNL    Objective:     /72 (Patient Site: Left Arm, Patient Position: Sitting, Cuff Size: Adult Large)   Pulse 68   Resp 16   Ht 5' (1.524 m)   Wt 197 lb (89.4 kg)   LMP 01/30/2013   BMI 38.47 kg/m    Wt Readings from Last 5 Encounters:   02/19/19 197 lb (89.4 kg)   01/29/19 195 lb 4 oz (88.6 kg)   01/03/19 197 lb 8 oz (89.6 kg)   12/24/18 200 lb (90.7 kg)   12/13/18 198 lb 6.4 oz (90 kg)         Physical Exam:      GENERAL APPEARANCE: alert, no apparent distress  EYES: no scleral icterus  NECK: no carotid bruits or adenopathy, jugular venous pressure is difficult to evaluate due to obesity  CHEST: symmetric, the lungs are clear to auscultation  CARDIOVASCULAR: regular rhythm with normal first heart sound and normal second heart sound, no murmur or gallop  EXTREMITIES: no cyanosis, clubbing or edema  SKIN: no xanthelasma  NEUROLOGIC: normal gait and coordination  PSYCHIATRIC: mood and affect are normal      Cardiac Testing:    Echocardiogram:  Done at [facility] and reviewed in CareEverywhere    Pulmonary:    PFT is ordered.    Imaging:    Xr Chest 2 Views    Result Date: 1/23/2019  XR CHEST 2 VIEWS 1/23/2019 1:48 PM INDICATION: Pulmonary hypertension. COMPARISON: VQ scan dated 01/23/2019 at 1329 hours. FINDINGS: No pleural fluid or pneumothorax. No airspace disease or edema. The cardiomediastinal silhouette is mildly enlarged.     Nm Lung Vq Scan    Result Date: 1/23/2019  [facility] RADIOLOGY EXAM: NM LUNG VQ SCAN LOCATION: [facility] DATE/TIME: 1/23/2019 1:57 PM INDICATION: Pulmonary hypertension pulmonary hypertension, exclude cteph COMPARISON: Chest x-ray 01/23/2019. TECHNIQUE: 48.2 mCi technetium 99m DTPA aerosol. 8.6 mCi technetium 99m MAA IV. FINDINGS: Normal ventilation and perfusion to both lungs. No matched or mismatched segmental ventilation-perfusion defects.     CONCLUSION: No evidence of pulmonary embolism.      Lab Results:    Lab  Results   Component Value Date     02/04/2019    K 4.2 02/04/2019    CL 92 (L) 02/04/2019    CO2 34 (H) 02/04/2019    BUN 8 02/04/2019    CREATININE 0.87 02/04/2019    CALCIUM 9.9 02/04/2019     Lab Results   Component Value Date    WBC 9.5 01/03/2019    HGB 12.8 01/03/2019    HCT 41.0 01/03/2019    MCV 78 (L) 01/03/2019     01/03/2019     TSH (uIU/mL)   Date Value   01/03/2019 2.30     UDAY is negative.    HIV Antigen / Antibody   Date Value Ref Range Status   01/03/2019 Negative Negative Final     Lab Results   Component Value Date    ALT 45 01/03/2019    ALT 45 01/03/2019    AST 42 (H) 01/03/2019    AST 42 (H) 01/03/2019    ALKPHOS 229 (H) 01/03/2019    ALKPHOS 229 (H) 01/03/2019    BILITOT 0.4 01/03/2019    BILITOT 0.4 01/03/2019     BNP (pg/mL)   Date Value   12/21/2018 <10           Much or all of the text in this note was generated through the use of the Dragon Dictate voice-to-text software. Errors in spelling or words which seem out of context are unintentional. Sound alike errors, in particular, may have escaped editing.

## 2021-06-27 NOTE — PROGRESS NOTES
Progress Notes by Mei Carbone MD at 8/29/2019 10:00 AM     Author: Mei Carbone MD Service: -- Author Type: Physician    Filed: 8/29/2019 11:13 AM Encounter Date: 8/29/2019 Status: Signed    : Mei Carbone MD (Physician)       Preoperative Exam    Scheduled Procedure: left cataract  Surgery Date:  9/12/2019  Surgery Location: [facility]    Surgeon:  Dr Odell    Assessment/Plan:   Pre-Op Exam  Discussed stopping NSAIDs 7 days prior to surgery and NPO midnight prior and need to have an empty stomach    Hyponatremia -   Recheck BMP today    H/o pulm HTN  Repeat EKG today  Discussed the need to address her DEIRDRE and will schedule f/u apt to discuss DEIRDRE and CPAP machine    Diabete Mellitis Type 2 -  She stopped ASA b/c side effects  Poor med compliance and h/o elevated blood sugars to 200's  Discussed needing to control here blood sugars b/c if it is too high the surgery can gets cancelled      Chronic pain   Advised tylenol and naproxen   Advised to stop naproxen 7 days prior to surgery  rx for tramadol for breakthrough pain only  Will schedule f/u apt with patient with PCA and care coordination team to discuss other treatment options and possible referrals    Surgical Procedure Risk: Low (reported cardiac risk generally < 1%)  Have you had prior anesthesia?: YES  Have you or any family members had a previous anesthesia reaction:  No  Do you or any family members have a history of a clotting or bleeding disorder?: No  Cardiac Risk Assessment: no increased risk for major cardiac complications    APPROVAL GIVEN to proceed with proposed procedure, without further diagnostic evaluation    Please Note:  She has DEIRDRE but does not use CPAP    Functional Status: Independent  Patient plans to recover at home with family.     Subjective:      Trixie Sofia is a 59 y.o. female who presents for a preoperative consultation.    She is having blurry vision and scheduled to have cataract surgery on 9/12 and  9/26/19    All other systems reviewed and are negative, other than those listed in the HPI.    Pertinent History  Do you have difficulty breathing or chest pain after walking up a flight of stairs: No  History of obstructive sleep apnea: No  Steroid use in the last 6 months: No  Frequent Aspirin/NSAID use: Yes: stopped due to side effects   Prior Blood Transfusion: No  Prior Blood Transfusion Reaction: No  If for some reason prior to, during or after the procedure, if it is medically indicated, would you be willing to have a blood transfusion?:  There is no transfusion refusal.    Current Outpatient Medications   Medication Sig   ? acetaminophen (Q-PAP EXTRA STRENGTH) 500 MG tablet Take 2 tablets (1,000 mg total) by mouth every 4 (four) hours as needed for pain.   ? atorvastatin (LIPITOR) 20 MG tablet TAKE 1 PILL BY MOUTH EVERYDAY FOR CHOLESTEROL   ? blood glucose test strips Use 1 each As Directed 3 (three) times a day. Dispense brand per patient's insurance at pharmacy discretion.   ? furosemide (LASIX) 40 MG tablet TAKE 1 TABLET (40 MG TOTAL) BY MOUTH 2 (TWO) TIMES A DAY FOR EDEMA   ? glipiZIDE (GLUCOTROL XL) 10 MG 24 hr tablet Take 1 tablet (10 mg total) by mouth daily.   ? JANUMET XR 50-1,000 mg TM24 TAKE 1 TABLET BY MOUTH 2 (TWO) TIMES A DAY FOR DIABETES   ? lancets (ONETOUCH DELICA LANCETS) 33 gauge Misc USE TO CHECK BLOOD SUGAR THREE TIMES DAILY.   ? omeprazole (PRILOSEC) 20 MG capsule TAKE 1 CAPSULE (20 MG TOTAL) BY MOUTH DAILY BEFORE BREAKFAST. FOR STOMACH   ? potassium chloride (K-DUR,KLOR-CON) 20 MEQ tablet TAKE 1 TABLET (20 MEQ TOTAL) BY MOUTH DAILY.   ? VITAMIN D2 50,000 unit capsule TAKE 1 CAPSULE (50,000 UNITS TOTAL) BY MOUTH ONCE A WEEK.   ? ammonium lactate (LAC-HYDRIN) 12 % lotion Apply topically 2 (two) times a day. Apply to affected area   ? naproxen (NAPROSYN) 500 MG tablet Take 1 tablet (500 mg total) by mouth 2 (two) times a day with meals.   ? senna (SENOKOT) 8.6 mg tablet Take 1 tablet by  mouth daily as needed for constipation.   ? traMADol (ULTRAM) 50 mg tablet Take 1 tablet (50 mg total) by mouth every 6 (six) hours as needed for severe pain (7-10).          Allergies   Allergen Reactions   ? No Known Drug Allergies        Patient Active Problem List   Diagnosis   ? Depression With Anxiety   ? Lichen planus   ? Arthritis   ? Controlled substance agreement signed - tramadol 60tabs/mo   ? Polypharmacy   ? Diabetes mellitus with neuropathy (H)   ? PTSD (post-traumatic stress disorder)   ? Morbid obesity, unspecified obesity type (H)   ? Moderate major depression (H)   ? Hypoxia   ? Essential hypertension   ? Pulmonary hypertension due to sleep-disordered breathing (H)   ? Iron deficiency anemia   ? Poor vision   ? Controlled type 2 diabetes mellitus without complication, without long-term current use of insulin (H)   ? Language barrier affecting health care   ? Poverty   ? Smoker   ? DEIRDRE (obstructive sleep apnea)       Past Medical History:   Diagnosis Date   ? Depression    ? Diabetes mellitus (H)    ? Essential hypertension    ? Lumbar Disc Degeneration L4 - L5    ? Menopause 05/2014    Mei Carbone: no LMP for over a year as of 5/2014    ? DEIRDRE (obstructive sleep apnea) 4/17/2019    See sleep study of Dr. Leung   ? Pulmonary hypertension due to sleep-disordered breathing (H) 01/03/2019    see 4/17/2019 sleep study   ? Sciatica    ? Vertigo 4/28/2015       Past Surgical History:   Procedure Laterality Date   ? TONGUE SURGERY Left 2006    surgery in [place] for mass on Tongue       Social History     Socioeconomic History   ? Marital status:      Spouse name: Not on file   ? Number of children: 1   ? Years of education: Not on file   ? Highest education level: Not on file   Occupational History     Employer: NOT EMPLOYED   Social Needs   ? Financial resource strain: Not on file   ? Food insecurity:     Worry: Not on file     Inability: Not on file   ? Transportation needs:     Medical: Not  "on file     Non-medical: Not on file   Tobacco Use   ? Smoking status: Current Every Day Smoker     Years: 30.00     Types: Pipe   ? Smokeless tobacco: Never Used   Substance and Sexual Activity   ? Alcohol use: No   ? Drug use: No   ? Sexual activity: Not Currently     Partners: Male     Birth control/protection: Post-menopausal   Lifestyle   ? Physical activity:     Days per week: 0 days     Minutes per session: Not on file   ? Stress: Not on file   Relationships   ? Social connections:     Talks on phone: Not on file     Gets together: Not on file     Attends Christianity service: Not on file     Active member of club or organization: Not on file     Attends meetings of clubs or organizations: Not on file     Relationship status: Not on file   ? Intimate partner violence:     Fear of current or ex partner: Not on file     Emotionally abused: Not on file     Physically abused: Not on file     Forced sexual activity: Not on file   Other Topics Concern   ? Not on file   Social History Narrative    Brittney leyva. [place] in [place], directly to [place]. Green card. Lives with daughter, son-in-law and four grandchildren.  passed away when she was pregnant with her daughter.        Patient Care Team:  Mei Carbone MD as PCP - General (Family Medicine)  Jose Guzman as Psychologist (Behavioral Health)  [facility] (Ophthalmology)  Klever as RN, Care Manager  Ryder Medina as Patient Care Assistant          Objective:     Vitals:    08/29/19 0935   BP: 112/68   Pulse: 87   Resp: 14   Temp: 97.8  F (36.6  C)   TempSrc: Oral   SpO2: 98%   Weight: 199 lb (90.3 kg)   Height: 4' 11.53\" (1.512 m)   LMP: 01/30/2013         Physical Exam:  OBJECTIVE:  Vitals listed above within normal limits  General appearance: well groomed, pleasant, well hydrated, nontoxic appearing  ENT: PERRL, throat clear  Neck: neck supple, no lymphadenopathy, no thyromegaly  Lungs: lungs clear to auscultation bilaterally, no wheezes or " rhonchi  Heart: regular rate and rhythm, no murmurs, rubs or gallops  Abdomen: soft, nontender  Neuro: no focal deficits, CN II-XII grossly intact, alert and oriented  Psych:  mood stable, appears to have good insight and judgment      EK/25/2019 at 11:00 AM in Clinical Support ([facility] RN) Dx:  Preop general physical exam     Ref Range & Units 19 1042 18 1201    SYSTOLIC BLOOD PRESSURE mmHg 112      DIASTOLIC BLOOD PRESSURE mmHg 68      VENTRICULAR RATE BPM 77  74     ATRIAL RATE BPM 77  74     P-R INTERVAL ms 158  170     QRS DURATION ms 82  78     Q-T INTERVAL ms 406  410     QTC CALCULATION (BEZET) ms 459  455     P Axis degrees 43  43     R AXIS degrees 54  53     T AXIS degrees 30  20     MUSE DIAGNOSIS  Normal sinus rhythm   Nonspecific T wave abnormality   Abnormal ECG   When compared with ECG of 24-DEC-2018 12:01,   No significant change was found  Normal sinus rhythm   RSR' or QR pattern in V1 suggests right ventricular conduction delay   Nonspecific T wave abnormality   Abnormal ECG   No previous ECGs available   Confirmed by ROSAURA STEIN, LES LOC: ([id number]) on 2018 3:55:06 PM                Labs:  BMP pending from today      Results for GERARDO CHILDS (MRN [id number]) as of 2019 10:01   Ref. Range 2019 08:47   Sodium Latest Ref Range: 136 - 145 mmol/L 134 (L)   Potassium Latest Ref Range: 3.5 - 5.0 mmol/L 4.0   Chloride Latest Ref Range: 98 - 107 mmol/L 92 (L)   CO2 Latest Ref Range: 22 - 31 mmol/L 33 (H)   Anion Gap, Calculation Latest Ref Range: 5 - 18 mmol/L 9   BUN Latest Ref Range: 8 - 22 mg/dL 15   Creatinine Latest Ref Range: 0.60 - 1.10 mg/dL 0.76   GFR MDRD Af Amer Latest Ref Range: >60 mL/min/1.73m2 >60   GFR MDRD Non Af Amer Latest Ref Range: >60 mL/min/1.73m2 >60   Calcium Latest Ref Range: 8.5 - 10.5 mg/dL 9.2   Glucose Latest Ref Range: 70 - 125 mg/dL 241 (H)   Vitamin D, Total (25-Hydroxy) Latest Ref Range: 30.0 - 80.0 ng/mL 18.8 (L)   Hemoglobin A1c Latest  Ref Range: 3.5 - 6.0 % 8.6 (H)       Immunization History   Administered Date(s) Administered   ? Hep A, Adult IM (19yr & older) 10/11/2012, 05/08/2013   ? Hep B, historic 10/16/2014   ? Influenza, inj, historic,unspecified 12/15/2010, 11/16/2011   ? Influenza, seasonal,quad inj 6-35 mos 09/20/2012, 09/13/2013, 09/30/2014   ? Influenza,seasonal quad, PF, =/> 6months 09/11/2015, 12/08/2016   ? Influenza,seasonal,quad inj =/> 6months 08/24/2017, 09/18/2018   ? MMR 12/15/2010, 01/28/2011   ? Pneumo Conj 13-V (2010&after) 02/25/2016   ? Pneumo Polysac 23-V 02/20/2012   ? Td,adult,historic,unspecified 02/20/2012   ? Tdap 12/15/2010, 11/16/2011, 10/16/2014   ? Varicella 10/16/2014           Electronically signed by Mei Carbone MD 08/29/19 9:42 AM

## 2021-06-27 NOTE — PROGRESS NOTES
"Progress Notes by Rajni Stevenson RN at 7/3/2019  7:51 AM     Author: Rajni Stevenson RN Service: -- Author Type: Registered Nurse    Filed: 7/3/2019  8:12 AM Encounter Date: 7/3/2019 Status: Signed    : Rajni Stevenson RN (Registered Nurse)       Chart Review for graduation   Received: Yesterday   Message Contents   Jose, Alexandre  Rajni Stevenson RN             Hi,     This patient has been on maintenance and due for outreach, patient has been doing well and has no new concern. Please review patient's chart and update emergency plan if needed.         Thanks,   -Alexandre.     Patient was with [facility] for \"Patient requesting to see  for housing, CADI waiver, referral to [facility] or [facility]  States daughter is not taking care of her and she wants to move out asap.\"    Patient met up with [facility] SW on 3/27/19 and able to identify support through patient's new PCA.     Patient moved into her new apartment and has a very supportive PCA.     Has home care setting up meds weekly and A1c 6.3 on 1/3/19. She missed her A1c lab in April. Has f/u appt with PCP on 7/16/19 and will notify PCP to f/u on this.     Patient is well supported by her PCA,  seeing therapist at [facility] counseling every two weeks, and has two other group visiting her at home. Alexandre will add care team names to her chart.     Okay to graduate patient.     Emergency Plan Recommendation:    When to Use the Emergency Department (ED)  An emergency means you could die if you dont get care quickly. Or you could be hurt permanently (disabled). Read below to know when to use -- and when not to use -- an emergency department (also called ED).    Dangers to your life  Here are examples of emergencies. These need immediate care:  A hard time breathing  Severe chest pain  Choking  Severe bleeding  Suddenly not able to move or speak  Blacking out (fainting)`  Poisoning    Dangers of permanent injuries  Here are other emergencies. These also need immediate care:  Deep cuts " or severe burns  Broken bones, or sudden severe pain and swelling in a joint    When its an emergency  If you have an emergency, follow these steps:    1. Go to the nearest emergency department  If you can, go to the hospital ED closest to you right away.  If you cannot get there right away, or if it is not safe to take yourself, call 911 or your police emergency number.  2. Call your primary care doctor  Tell your doctor about the emergency. Call within 24 hours of going to the ED.  If you cannot call, have someone call for you.  Go to your doctor (not the ED) for any follow-up care.    When its not an emergency  If a problem is not an emergency, follow these steps:    1. Call your primary care doctor  If you dont know the name of your doctor, call your health plan.  If you cannot call, have someone call for you.  2. Follow instructions  Your doctor will tell you what you should do.  You may be told to see your doctor right away. You may be told to go to the ED. Or you may be told to go to an urgent care center.  Follow your doctors advice.  jLong-Term Complications of Diabetes can cause health problems over time. These are called complications. They are more likely to happen if your blood sugar is often too high. Over time, high blood sugar can damage blood vessels in your body. It is important to keep your blood sugar in your target range. This can help prevent or delay complications from diabetes.  Possible complications  Complications of diabetes include:    Eye problems, including damage to the blood vessels in the eyes (retinopathy), pressure in the eye (glaucoma), and clouding of the eyes lens (a cataract). Eye problems can eventually lead to irreversible blindness.     Tooth and gum problems (periodontal disease), causing loss of teeth and bone    Blood vessel (vascular) disease leading to circulation problems, heart attack or stroke, or a need for amputation of a limb     Problems with sexual function  leading to erectile dysfunction in men and sexual discomfort in women     Kidney disease (nephropathy) can eventually lead to kidney failure, which may require dialysis or kidney transplant     Nerve problems (neuropathy), causing pain or loss of feeling in your feet and other parts of your body, potentially leading to an amputation of a limb     High blood pressure (hypertension), putting strain on your heart and blood vessels    Serious infections, possibly leading to loss of toes, feet, or limbs  How to avoid complications  The serious consequences of these complications may be avoidable for most people with diabetes by managing your blood glucose, blood pressure, and cholesterol levels. This can help you feel better and stay healthy. You can manage diabetes by tracking your blood sugar. You can also eat healthy and exercise to avoid gaining weight. And you should take medicine if directed by your healthcare provider.  Call your health care provider if:    You vomit or have diarrhea for more than 6 hours.    Your blood glucose level is higher than usual or over 250 mg/dL after you have taken extra insulin (if recommended in your sick-day plan).    You take oral medicine for diabetes, and your blood sugar is higher than usual or over 250 mg/dL, before a meal and stays that high for more than 24 hours.    Your blood glucose is lower than usual or less than 70 mg/dL    You have moderate to large amounts of ketones in your blood or urine.    You arent better after 2 days.  Depression  Everyone feels down at times. The blues are a natural part of life. But an unhappy period thats intense or lasts for more than a couple of weeks can be a sign of depression. Depression is a serious illness. It can disrupt the lives of family and friends. If you know someone you think may be depressed, find out what you can do to help.    Know the serious signals  Warning signals for suicide include:    Threats or talk of  suicide    Statements such as I wont be a problem much longer or Nothing matters    Giving away possessions or making a will or  arrangements    Buying a gun or other weapon    Sudden, unexplained cheerfulness or calm after a period of depression  If you notice any of these signs, get help right away. Call a health care professional, mental health clinic, or suicide hotline and ask what action to take. In an emergency, dont hesitate to call the police.    [facility] Mental Health Crisis Lines:  [facility] [phone]  [facility] [phone]  [facility] [phone]  [facility] [phone]  [facility], Adults [phone]  [facility], Children [phone]  [facility], Adults [phone]  [facility], Children [phone]

## 2021-06-27 NOTE — PROGRESS NOTES
Progress Notes by Gerardo Reid MD at 12/24/2018 10:50 AM     Author: Gerardo Reid MD Service: -- Author Type: Physician    Filed: 12/24/2018 12:44 PM Encounter Date: 12/24/2018 Status: Signed    : Gerardo Reid MD (Physician)           Click to link to [facility] Consultation Note    Thank you, Dr. Carbone, for advising [facility] to meet with me in consultation today at the [facility] to evaluate hypoxia with an abnormal echocardiogram.     Assessment:    1. Chest pain, unspecified type  ECG 12 lead with MUSE   2. Pulmonary hypertension (H)  NM Lung VQ Scan   3. Hypoxia  ECG 12 lead with MUSE    Blood Gases, Arterial    HIV Antigen/Antibody Screening Cascade    Antinuclear Antibody (UDAY) Cascade    Hepatic function panel    Thyroid Stimulating Hormone (TSH)    NM Lung VQ Scan       Plan:    1. We will call the  service with the test results noted above.  2. She will eventually require both a sleep study and a cardiac MRI before considering right and left heart catheterization for pulmonary hypertension.  3. Return to see myself in 3-4 weeks in my pulmonary hypertension clinic.    An After Visit Summary was printed and given to the patient.    Current History:    The patient came to see me this morning for a brief [facility] access clinic visit as requested by her personal physician.  She was accompanied by professional Brittney .  She tells me she has been ill for 2 years with back and knee pain, fatigue, daytime sleepiness, nonexertional chest pain, and a remote episode of fainting.  Her other symptoms include fever, weight loss, visual difficulty, nonproductive cough, snoring, leg and facial swelling, palpitations, constipation, heartburn, nocturia, falling, dizziness, loss of balance, confusion, and anxiety.      She is  and lives with her daughter, son-in-law, and 4  "grandchildren.  She states she is unable to do any housework because of her pain.    She was hospitalized at [facility] December 2 of this year and left AGAINST MEDICAL ADVICE.  An echocardiogram from that facility describes evidence of pulmonary hypertension.  She does not believe she has ever seen a cardiologist or had any workup for this condition.    Her past history is negative for splenectomy, use of anorexigenic medications, pulmonary embolism, known lung disease, cirrhosis, but she has refused a workup for sleep apnea per the clinic notes of Dr. Eric Leung.    She was said to have \"diastolic heart failure\" but her current BNP level is normal.  She is also said to have significant hypoxia per the records from [facility].    She has a new anemia of uncertain cause.    Patient Active Problem List   Diagnosis   ? Depression With Anxiety   ? Lichen planus   ? Arthritis   ? Diabetes mellitus, type 2 (H)   ? Controlled substance agreement signed - tramadol 60tabs/mo   ? Polypharmacy   ? Diabetes mellitus with neuropathy (H)   ? PTSD (post-traumatic stress disorder)   ? Morbid obesity, unspecified obesity type (H)   ? Moderate major depression (H)   ? Hypoxia   ? Major depressive disorder, recurrent episode (H)   ? Essential hypertension       Past Medical History:  Past Medical History:   Diagnosis Date   ? Depression    ? Diabetes mellitus (H)    ? Essential hypertension    ? Lumbar Disc Degeneration L4 - L5    ? Menopause 05/2014    Mei Carbone: no LMP for over a year as of 5/2014    ? Sciatica    ? Vertigo 4/28/2015       Past Surgical History:  Past Surgical History:   Procedure Laterality Date   ? TONGUE SURGERY Left 2006    surgery in [place] for mass on Tongue       Family History:  Family History   Problem Relation Age of Onset   ? Diabetes Mother    ? Hypertension Mother    ? Hyperlipidemia Mother    ? Diabetes Sister    ? Hypertension Sister    ? No Medical Problems Brother    ? Diabetes Sister  "   ? Hypertension Sister    ? Diabetes Sister    ? Hypertension Sister    ? No Medical Problems Sister    ? No Medical Problems Daughter    ? Breast cancer Neg Hx    ? Congenital heart disease Neg Hx        Social History:   reports that she has been smoking pipe.  She has smoked for the past 30.00 years. she has never used smokeless tobacco. She reports that she does not drink alcohol or use drugs.    Medications:  Outpatient Encounter Medications as of 12/24/2018   Medication Sig Dispense Refill   ? acetaminophen (Q-PAP EXTRA STRENGTH) 500 MG tablet Take 2 tablets (1,000 mg total) by mouth 2 (two) times a day. 100 tablet 6   ? amitriptyline (ELAVIL) 50 MG tablet Take 1 tablet (50 mg total) by mouth at bedtime. 30 tablet 11   ? ammonium lactate (LAC-HYDRIN) 12 % lotion APPLY TO AFFECTED AREA TWICE DAILY. 400 g 0   ? artificial tears, hypromellose, (GONIOVISC) 2.5 % ophthalmic solution 2 drops each eye as needed 15 mL 12   ? ASPIR-LOW 81 mg EC tablet TAKE 1 PILL BY MOUTH EVERYDAY 30 tablet 10   ? atorvastatin (LIPITOR) 20 MG tablet Take 1 tablet (20 mg total) by mouth daily. 30 tablet 11   ? blood glucose test strips Use 1 each As Directed 3 (three) times a day. Dispense brand per patient's insurance at pharmacy discretion. 300 strip 11   ? cetirizine (ZYRTEC) 10 MG tablet TAKE 1 TABLET (10 MG TOTAL) BY MOUTH DAILY FOR ALLERGIES 30 tablet 10   ? clotrimazole (LOTRIMIN) 1 % external solution Apply to feet 2 times per day 30 mL 0   ? condoms - male Misc Use as needed 24 each 11   ? dimethicone-colloidal oatmeal (AVEENO) 1.3 % Lotn Apply to body twice daily 532 mL 11   ? ferrous sulfate 325 (65 FE) MG tablet Take 1 tablet by mouth 2 (two) times a day with meals.     ? fluticasone (FLONASE ALLERGY RELIEF) 50 mcg/actuation nasal spray 1 spray into each nostril daily. 16 g 12   ? gabapentin (NEURONTIN) 300 MG capsule TAKE 1 CAPSULE (300 MG TOTAL) BY MOUTH 2 (TWO) TIMES A DAY. 180 capsule 3   ? glipiZIDE (GLUCOTROL XL) 10  MG 24 hr tablet Take 1 tablet (10 mg total) by mouth daily. 90 tablet 3   ? lancets (ONETOUCH DELICA LANCETS) 33 gauge Misc Use to check blood sugar three times daily. 300 each 11   ? lanolin-min oil-zinc ox-wh.pet Oint Apply to affected area several times a day 120 g 11   ? lidocaine (XYLOCAINE) 5 % ointment Apply to areas of pain twice daily as needed 120 g 11   ? MINERIN CREME Crea      ? naproxen (NAPROSYN) 500 MG tablet TAKE 1 PILL BY MOUTH TWICE DAILY WITH MEALS FOR PAIN 30 tablet 10   ? omeprazole (PRILOSEC) 20 MG capsule TAKE 1 CAPSULE (20 MG TOTAL) BY MOUTH DAILY BEFORE BREAKFAST. FOR STOMACH 90 capsule 3   ? pioglitazone (ACTOS) 15 MG tablet Take 1 tablet (15 mg total) by mouth daily. 30 tablet 11   ? potassium chloride (K-DUR,KLOR-CON) 20 MEQ tablet Take 1 tablet by mouth daily.     ? senna (SENOKOT) 8.6 mg tablet Take 1 tablet by mouth daily.     ? sitaGLIPtin-metformin 50-1,000 mg TM24 Take 1 tablet by mouth 2 (two) times a day. 180 tablet 3   ? traZODone (DESYREL) 50 MG tablet Take 50 mg by mouth at bedtime as needed for sleep.     ? triamcinolone (KENALOG) 0.1 % cream Apply to affected area twice daily as needed. 45 g 0   ? venlafaxine (EFFEXOR XR) 75 MG 24 hr capsule Take 3 capsules (225 mg total) by mouth daily. 270 capsule 3   ? VITAMIN D2 50,000 unit capsule Take 1 capsule by mouth once a week.  12     No facility-administered encounter medications on file as of 12/24/2018.        Allergies:  No known drug allergies    Review of Systems:     General: Fever, Weight Loss     Ears/Nose/Throat: WNL  Lungs: Cough, Snoring  Heart: Chest Pain, Leg Swelling, Fainting(PALPITATIONS )  Stomach: Constipation, Heartburn  Bladder: Frequent Urination at Night  Muscle/Joints: Joint Pain, Muscle Pain  Skin: WNL  Nervous System: Falls, Daytime Sleepiness, Dizziness, Loss of Balance  Mental Health: Confusion, Anxiety     Blood: WNL    Objective:    Wt Readings from Last 5 Encounters:   12/24/18 200 lb (90.7 kg)    12/13/18 198 lb 6.4 oz (90 kg)   11/27/18 213 lb 8 oz (96.8 kg)   11/20/18 209 lb 6.4 oz (95 kg)   10/09/18 209 lb 4 oz (94.9 kg)      5' (1.524 m)  Body mass index is 39.06 kg/m .  /74 (Patient Site: Right Arm, Patient Position: Sitting, Cuff Size: Adult Large)   Pulse 78   Resp 18   Ht 5' (1.524 m)   Wt 200 lb (90.7 kg)   LMP 01/30/2013   BMI 39.06 kg/m       Physical Exam:    General Appearance: Alert and not in distress   HEENT: No scleral icterus; the tongue is midline and the mucous membranes are pink and moist   Neck: No cervical bruits, adenopathy, or thyromegaly; jugular venous pressure is difficult to evaluate due to obesity   Chest: The spine is straight and the chest is symmetric   Lungs: Respirations are unlabored; the lungs are clear to auscultation   Cardiovasular: Auscultation reveals normal first and second heart sounds with no murmurs, rubs, or gallops; the carotid, radial, femoral, and posterior tibial pulses are intact   Abdomen: No organomegaly, masses, bruits, or tenderness; bowel sounds are present   Extremities: No cyanosis, clubbing or edema   Skin: No xanthelasma   Neurologic: Mood and affect are appropriate       Cardiac testing:    EKG: Sinus rhythm, right ventricular conduction delay, otherwise normal EKG per my personal review.    Echocardiogram:  I personally reviewed the report of the echocardiogram done at [facility]. It suggests pulmonary hypertension.    Imaging:    No results found.    Lab Review:    Lab Results   Component Value Date     12/13/2018    K 3.6 12/13/2018    CL 94 (L) 12/13/2018    CO2 31 12/13/2018    BUN 9 12/13/2018    CREATININE 0.80 12/13/2018    CALCIUM 8.8 12/13/2018     Lab Results   Component Value Date    WBC 9.2 12/13/2018    HGB 10.1 (L) 12/13/2018    HCT 36.9 12/13/2018    MCV 79 (L) 12/13/2018     12/13/2018     Lab Results   Component Value Date    CHOL 181 12/04/2017    TRIG 172 (H) 12/04/2017    HDL 35 (L) 12/04/2017      LDL Calculated (mg/dL)   Date Value   12/04/2017 112   10/24/2016 73   11/05/2015 113     BNP (pg/mL)   Date Value   12/21/2018 <10           Much or all of the text in this note was generated through the use of the Dragon Dictate voice-to-text software. Errors in spelling or words which seem out of context are unintentional. Sound alike errors, in particular, may have escaped editing.

## 2021-06-29 NOTE — PROGRESS NOTES
Progress Notes by Mei Carbone MD at 8/4/2020 10:40 AM     Author: Mei Carbone MD Service: -- Author Type: Physician    Filed: 8/4/2020  6:30 PM Encounter Date: 8/4/2020 Status: Signed    : Mei Carbone MD (Physician)       FEMALE PREVENTATIVE EXAM    Assessment and Plan:   Annual exam    Colon cancer screening - ICT/FOBT ordered  mammo - neg 2/2019 - repeat next yaer  Pap - refuses repeatedly       Chronic pain   Refilled tylenol   Continue naproxen and tramadol prn for breakthrough pain only -  Encouraged movement  She will try massage again when she is able (given the pandemic) as she found this helpful   Encouraged movement and mild/gentle exercise    DMT2 -  A1c 7.3 today - much improved  Continue: janumet XR  50/1000 two times a day, glipizide  Continue  lipitor  microalbumin recheck today - not on lisinopril b/c low BP  Eye exam referral ordered   Smoker   Foot exam improved, calluses softer     h/o acute diastolic HF and h/o pulm HTN d/t DEIRDRE  Continue lasix and K+  Monitor for now, as she is still not interested in treating her DEIRDRE       Vit D deficiency -  recheck and continue ergo       PTSD: sx improved and stable with current regimen     GERD - sx imrpoved with omeprazole       Vision problems  Glaucoma - refill timolol  Cataract left s/p surgery and not interested in doing right  Due for DM eye exam - referral ordered      Polypharmacy -   Suspect poor compliance but home health RN helps set up meds      Next follow up: 11/5/20 for DM f/u  Immunization Review  Adult Imm Review: No immunizations due today  BMI: 38.5       I discussed the following with the patient:   Adult Healthy Living: Importance of regular exercise  Healthy nutrition  Weight loss referral options  Getting adequate sleep  Stress management  Use of seat belts  Contraception options        Subjective:   Chief Complaint: Trixie Sofia is an 60 y.o. female here for a preventative health visit.     HPI:    Cancer  prevention -   Colon - FOBT neg 5/2019 -  repeat today  mammo - neg 2/2019 -   Pap - refuses repeatedly   Menopause for 2-3 years and no     Chronic pain   using tylenol and naproxen   rx for tramadol for breakthrough pain only - not taking daily b/c of constipation so only uses when pain is bad  Reports pain in arms/hands/back, especially left hand, knees and lower back   In the past, seen by rheumatology and PT and spine clinic  Lately she has been getting massage    DMT2 - poorly controlled  A1c 6.3 on 1/3/19 -> 8.6 on 7/16/19 and 9.1 on 7/23/19 -> 8.1 on 12/12/19   taking janumet XR  50/1000 two times a day, glipizide  LDL 113on 1/2020 on lipitor  microalbumin wnl 7/16/19  Eye 6/26/19   Smoker   pt stopped ASA 8/8/19 - see phone note by RN - caused puffy face, numbness, heart palpitations  Dry feet/callouses - used the condoms on her feet b/c lubricant helps          h/o acute diastolic HF and h/o pulm HTN and poor f/u with cards  F/u with cardiology 4/25/19 and then left appt on 7/11/19 and then was fired by cardiologist and does not think pt should see cardiology until DEIRDRE is treated  She is taking lasix and K+      Vit D deficiency -  19.4 on 10/31/19 and taking ergo       PTSD: sx manageable  At prior visits, she had stopped venlafaxine 225 and trazodone 50  PHQ9 - 5 today     GERD - omeprazole     H/o pulm HTN  Discussed the need to address her DEIRDRE and will schedule f/u apt to discuss DEIRDRE and CPAP machine  Cardiologist wanted her to treat DEIRDRE and when she want to see him last year, she left w/o being seeing       Vision problems  Glaucoma and cataract  left cataract -Surgery Date:  9/12/2019 which did not improve vision, so she does not want to have surgery on right eye  But she is due for diabetic eye exam  Using timolol two times a day     Polypharmacy -   Getting home health from [facility]  Questionable compliance b/c she has duplicate full bottles - lipitor, lasix, omeprazole, glipizide  No  bottles for tylenol (ran out) or tramadol (left at home)    Healthy Habits  Are you taking a daily aspirin? No  Do you typically exercising at least 40 min, 3-4 times per week?  NO  Do you usually eat at least 4 servings of fruit and vegetables a day, include whole grains and fiber and avoid regularly eating high fat foods? Yes  Have you had an eye exam in the past two years? Yes  Do you see a dentist twice per year? NO  Do you have any concerns regarding sleep? No    Safety Screen  If you own firearms, are they secured in a locked gun cabinet or with trigger locks? The patient does not own any firearms  Do you feel you are safe where you are living?: Yes (8/4/2020 10:30 AM)  Do you feel you are safe in your relationship(s)?: Yes (8/4/2020 10:30 AM)      Review of Systems:  Please see above.  The rest of the review of systems are negative for all systems.     Pap History:   No - age 30-65 PAP every 3 years recommended  Cancer Screening       Status Date      PAP SMEAR Overdue 5/27/2017      Done 5/27/2014     MAMMOGRAM Next Due 2/26/2021      Done 2/26/2019 MAMMO SCREENING BILATERAL     Patient has more history with this topic...          Patient Care Team:  Mei Carbone MD as PCP - General (Family Medicine)  Jose Guzman as Psychologist (Behavioral Health)  [facility] (Ophthalmology)  Klever as RN, Care Manager  Ryder Medina as Patient Care Assistant  Mei Carbone MD as Assigned PCP        History     Reviewed By Date/Time Sections Reviewed    Arlene Guzman CMA 8/4/2020 10:22 AM Tobacco            Objective:     Vitals:    08/04/20 1021 08/04/20 1036 08/04/20 1121   BP: (!) 86/42 (!) 82/40 102/60   Pulse: (!) 151  70   Resp: 24     Temp: 97.7  F (36.5  C)     TempSrc: Oral     SpO2: 93%     Weight: 197 lb 6 oz (89.5 kg)     Height: 5' (1.524 m)             PHYSICAL EXAM  OBJECTIVE:  Vitals listed above within normal limits  General appearance: well groomed, pleasant, well hydrated, nontoxic  appearing  ENT: PERRL, throat clear  Neck: neck supple, no lymphadenopathy, no thyromegaly  Lungs: lungs clear to auscultation bilaterally, no wheezes or rhonchi  Heart: regular rate and rhythm, no murmurs, rubs or gallops  Abdomen: soft, nontender  Neuro: no focal deficits, CN II-XII grossly intact, alert and oriented  Psych:  mood stable, appears to have good insight and judgment  Pelvic exam: refused  BREAST EXAM: normal without suspicious masses, skin or nipple changes or axillary nodes, self-exam is taught and discussed  DM foot exam: weak pedal pulses, no deformities, good sensation to microfilament, no callouses                 Medication List          Accurate as of August 4, 2020 11:18 AM. If you have any questions, ask your nurse or doctor.            CHANGE how you take these medications    timoloL 0.5 % ophthalmic solution  Also known as:  BETIMOL  INSTRUCTIONS:  Administer 1 drop to both eyes daily.  What changed:      how much to take    how to take this    when to take this  Changed by:  Mei Carbone MD           CONTINUE taking these medications    acetaminophen 500 MG tablet  Also known as:  Q-PAP Extra Strength  INSTRUCTIONS:  Take 2 tablets (1,000 mg total) by mouth every 4 (four) hours as needed for pain.  Doctor's comments:  Please deliver        atorvastatin 20 MG tablet  Also known as:  LIPITOR  INSTRUCTIONS:  TAKE 1 PILL BY MOUTH EVERYDAY FOR CHOLESTEROL        blood glucose test strips  Also known as:  Contour Next Test Strips  INSTRUCTIONS:  Use 1 each As Directed three times daily at 7:30am, 11:30am and 4:30pm.  Doctor's comments:  Diabetes Type 2 with neuropathy:  E11.40        blood-glucose meter Misc  Also known as:  Contour Next Meter  INSTRUCTIONS:  Test blood sugar three times a day.  Doctor's comments:  Diabetes Type 2 with neuropathy:  E11.40        furosemide 40 MG tablet  Also known as:  LASIX  INSTRUCTIONS:  TAKE 1 TABLET (40 MG TOTAL) BY MOUTH 2 (TWO) TIMES A DAY FOR  EDEMA        generic lancets  Also known as:  Fingerstix Lancets  INSTRUCTIONS:  Use 1 application As Directed three times daily at 7:30am, 11:30am and 4:30pm.  Doctor's comments:  Diabetes Type 2 with neuropathy E11.40        glipiZIDE 10 MG 24 hr tablet  Also known as:  GLUCOTROL XL  INSTRUCTIONS:  TAKE 1 PILL BY MOUTH DAILY FOR DIABETES        Janumet XR 50-1,000 mg Tm24  INSTRUCTIONS:  TAKE 1 TABLET BY MOUTH 2 (TWO) TIMES A DAY FOR DIABETES  Generic drug:  SITagliptin-metformin        naproxen 500 MG tablet  Also known as:  NAPROSYN  INSTRUCTIONS:  TAKE 1 TABLET (500 MG TOTAL) BY MOUTH 2 (TWO) TIMES A DAY WITH MEALS.        omeprazole 20 MG capsule  Also known as:  PriLOSEC  INSTRUCTIONS:  TAKE 1 CAPSULE (20 MG TOTAL) BY MOUTH DAILY BEFORE BREAKFAST. FOR STOMACH        potassium chloride 20 MEQ tablet  Also known as:  K-DUR,KLOR-CON  INSTRUCTIONS:  TAKE 1 TABLET (20 MEQ TOTAL) BY MOUTH DAILY.        senna 8.6 mg tablet  Also known as:  SENOKOT  INSTRUCTIONS:  Take 1 tablet by mouth daily as needed for constipation.  Doctor's comments:  For constipation        traMADoL 50 mg tablet  Also known as:  ULTRAM  INSTRUCTIONS:  TAKE 1 TABLET (50 MG TOTAL) BY MOUTH EVERY 6 (SIX) HOURS AS NEEDED FOR SEVERE PAIN (7-10).        Vitamin D2 1,250 mcg (50,000 unit) capsule  INSTRUCTIONS:  TAKE 1 CAPSULE (50,000 UNITS TOTAL) BY MOUTH ONCE A WEEK FOR BONE HEALTH  Generic drug:  ergocalciferol           STOP taking these medications    aspirin 81 MG EC tablet  Stopped by:  Mei Carbone MD     hypromellose Drop  Also known as:  NATURES TEARS  Stopped by:  Mei Carbone MD     latanoprost 0.005 % ophthalmic solution  Also known as:  Xalatan  Stopped by:  Mei Carbone MD           Where to Get Your Medications      These medications were sent to [facility] - [place], [place] - [address]  [address], [place] [address]    Phone:  [phone]     acetaminophen 500 MG  tablet    timoloL 0.5 % ophthalmic solution         Additional Screenings Completed Today:

## 2021-06-30 NOTE — PROGRESS NOTES
Progress Notes by Edson Marks at 4/10/2021 11:25 AM     Author: Edson Marks Service: -- Author Type: Medical Student    Filed: 4/10/2021  5:00 PM Encounter Date: 4/10/2021 Status: Attested    : Edson Marks (Medical Student) Cosigner: Shahnaz Suarez CNP at 4/10/2021  5:00 PM    Attestation signed by Shahnaz Suarez CNP at 4/10/2021  5:00 PM    Preceptor attestation:   Patient was seen in conjunction with Edson Marks, RN, student NP. Joint medical decision making was used.    I discussed the patient's history and physical exam with the student and key elements of the physical exam were performed by myself. I agree with their assessment and plan and above documentation.     WALTER Diggs CNP                   HPI: Trixie Sofia is a 61 y.o. female seen with concern of left bottom molar tooth pain x 2 days.  Accompanied by her niece. Rates the pain as 10/10, achy and throbbing.Tried tylenol 650 mg 2- 3 times daily and also took Pen-V, no relief noted.Endorses left jaw pain, bilateral ear ache and HA. Tylenol helpful with the HA. States had a fever/chills last night but did not check temp, afebrile in the clinic today. Denies any nausea, vomiting or diarrhea.       Chief Complaint:    Dental Pain   Associated symptoms include a fever.     History obtained from niece.    Past Medical History:   Diagnosis Date   ? Depression    ? Diabetes mellitus (H)    ? Essential hypertension    ? Lumbar Disc Degeneration L4 - L5    ? Menopause 05/2014    Mei Carbone: no LMP for over a year as of 5/2014    ? DEIRDRE (obstructive sleep apnea) 4/17/2019    See sleep study of Dr. Leung   ? Pulmonary hypertension due to sleep-disordered breathing (H) 01/03/2019    see 4/17/2019 sleep study   ? Sciatica    ? Vertigo 4/28/2015       Past Surgical History:   Procedure Laterality Date   ? TONGUE SURGERY Left 2006    surgery in [place] for mass on Tongue       Family History   Problem Relation Age of Onset   ? Diabetes Mother     ? Hypertension Mother    ? Hyperlipidemia Mother    ? Diabetes Sister    ? Hypertension Sister    ? No Medical Problems Brother    ? Diabetes Sister    ? Hypertension Sister    ? Diabetes Sister    ? Hypertension Sister    ? No Medical Problems Sister    ? No Medical Problems Daughter    ? Breast cancer Neg Hx    ? Congenital heart disease Neg Hx        Social History     Tobacco Use   ? Smoking status: Current Every Day Smoker     Years: 30.00     Types: Pipe   ? Smokeless tobacco: Never Used   Substance Use Topics   ? Alcohol use: No   ? Drug use: No       Review of Systems   Constitutional: Positive for chills and fever.   HENT: Positive for dental problem, ear pain and facial swelling.    Respiratory: Negative.    Cardiovascular: Negative.    Gastrointestinal: Negative.    Genitourinary: Negative.    Neurological: Positive for headaches.       Objective:     /73 (Patient Site: Right Arm, Patient Position: Sitting, Cuff Size: Adult Regular)   Pulse 80   Temp 98.3  F (36.8  C) (Oral)   Resp 22   Wt 191 lb 3 oz (86.7 kg)   LMP 01/30/2013   SpO2 99%   BMI 37.78 kg/m      Physical Exam  Constitutional:       General: She is in acute distress.      Appearance: She is ill-appearing.   HENT:      Right Ear: Tympanic membrane and external ear normal.      Left Ear: Tympanic membrane and external ear normal.      Nose: Nose normal.      Mouth/Throat:      Mouth: Mucous membranes are dry.   Cardiovascular:      Rate and Rhythm: Normal rate and regular rhythm.      Heart sounds: Murmur present.   Pulmonary:      Effort: Pulmonary effort is normal.      Breath sounds: Normal breath sounds.   Musculoskeletal:         General: Swelling present.      Comments: Left cheek/ jaw swelling and tenderness   Skin:     General: Skin is warm.      Findings: Erythema present.      Comments: Left cheek   Neurological:      Mental Status: She is alert and oriented to person, place, and time.   Psychiatric:         Behavior:  Behavior normal.         Assessment:    Possible soft tissue infection    Differential Diagnoses:Soft tissue infection, tooth abscess, dental cavity, periodontal infection         Plan:     Explained to niece that with concern of possible soft tissue infection, patient was best seen in ED. Niece and patient understood and in agreement. ED notified of patient.      Edson Marks RN, NP Student

## 2021-06-30 NOTE — PROGRESS NOTES
Progress Notes by Mei Carbone MD at 5/20/2021  9:20 AM     Author: Mei Carbone MD Service: -- Author Type: Physician    Filed: 5/21/2021 12:07 AM Encounter Date: 5/20/2021 Status: Signed    : Mei Carbone MD (Physician)       FEMALE PREVENTATIVE EXAM    Addendum:  CBC came back with a hemoglobin of 6.2 after patient left clinic.  I called the patient and her PCA and advise she go to the emergency room is that she likely needed a transfusion.  She refused to go to the hospital even though I discussed the risk of possible heart attack and death.  The best I could do was get her to start iron supplements twice a day, increase foods in her diet with iron and see me in 10 days.  She did agree to this and now has an appointment.    Assessment and Plan:    Annual physical exam  Mammgram completed today  Pap smear-she refuses repeatedly  Colon cancer screening -check FOBT  - Occult Blood(ICT); Future     Screen for colon cancer  - Occult Blood(ICT); Future     Encounter for screening mammogram for breast cancer     Controlled type 2 diabetes mellitus without complication, without long-term current use of insulin (H)  Diabetes well controlled with current regimen  - Glycosylated Hemoglobin A1c    DEIRDRE (obstructive sleep apnea)  Discussed referral to sleep medicine to reassess sleep apnea but she declined    Pulmonary hypertension due to sleep-disordered breathing (H)  Secondary to sleep apnea but refuses CPAP    Fatigue, unspecified type  We will check CBC and thyroid for possible causes of fatigue.  Also discussed that untreated sleep apnea can cause fatigue  - HM1(CBC and Differential)  - Thyroid Cascade    Iron deficiency anemia secondary to inadequate dietary iron intake  History of anemia will recheck CBC  - HM1(CBC and Differential)  - ferrous sulfate 325 (65 FE) MG tablet; Take 1 tablet (325 mg total) by mouth 2 (two) times a day.  Dispense: 60 tablet; Refill: 3    Morbid obesity,  unspecified obesity type (H)  Discussed diet and exercise    Vitamin D insufficient  - Vitamin D, Total (25-Hydroxy)     Polypharmacy  Meds reconciled     Language barrier affecting health care      Atypical mole  Atypical mole on left calf the medial side of her leg that is 8 x 9 mm with irregular borders asymmetric and dark.  She reports that she has had this since birth.  Discussed it has some of the signs concerning for skin cancer.  Offered to schedule for skin biopsy but she declined.      Next follow up:   3 months for DM f/u    Immunization Review  Adult Imm Review: Declines immunizations today      I discussed the following with the patient:   Adult Healthy Living: Importance of regular exercise  Healthy nutrition  Getting adequate sleep  Use of seat belts      Subjective:   Chief Complaint: Trixie Sofia is an 61 y.o. female here for a preventative health visit.    Patient has been advised of split billing requirements and indicates understanding: Yes  HPI:    [employer] professional phone  used.   Here with PCA = Domi Feliz    Annual PX  pap - never done, refuses repeatedly  FOBT - 1 of 3 positive 11/9/2020 (neg x 3 in 2018 & 2019) , h/o constipation  - discussed repeat FOBT vs colonoscopy  mammo - neg 4/2017 - appt today   Vaccines - up to date except - Shingrix and covid   Declines covid vaccine b/c she does not have any help if she gets side effects from vaccine and discussed risk factors - age, DM, obesity     Constipation -   Previously on senna and miralax without much help  Eating more fruits and water is helping     Chronic back and knee pain  Body aches and back pain  Tx:   Tylenol   Tramadol for breakthrough pain about 2 x per week  Massage with tiger balm    Last Vit D 18.2  On 2/18/21 - on weekly  Ergo       Edema -   if she does not watch what she eats, her legs swell    [place] and fatigue   Last hgb 10.1 on 1/9/20 and TSH 2.3 on 1/2019   H/o moderate pulm HTN in 2018 d/t severe CPAP and refuses  CPAP and declines f/u with sleep clinic  Getting tired more easily      DM-   A1c 8.0 on 2/18/21  Home blood sugars - 77 once and highest 245 but most 100-140  eye neg retinopathy 8/27/20  LDL 67 on 2/18/21 - on lipitor  BP wnl  microalbumin wml 8/4/20 - not on lisinopril b/c low BP  smoker    Obesity - 200# with BMI 39.6 on 11/5/20 -> 198# and BMI 39.2 on 2/18/21 -> 192#  With BMI 37.9 today       MDD/ PTSD -  PHQ9 = 6 and not difficult on  2/18/21 (up from 5 on 8/4/20) off psych meds b/c she stopped these several months ago  Feeling better and less sleepy and more aware of her surrounding without meds  No bad dreams, not thinking too much or feeling sad         ER 4/10/21 - dental pain      Healthy Habits  Are you taking a daily aspirin? No  Do you typically exercising at least 40 min, 3-4 times per week?  Yes  Walk   Do you usually eat at least 4 servings of fruit and vegetables a day, include whole grains and fiber and avoid regularly eating high fat foods? Yes  Have you had an eye exam in the past two years? Yes  Do you see a dentist twice per year? NO  Do you have any concerns regarding sleep? No    Safety Screen  If you own firearms, are they secured in a locked gun cabinet or with trigger locks? The patient does not own any firearms  Do you feel you are safe where you are living?: Yes (4/10/2021 11:34 AM)  Do you feel you are safe in your relationship(s)?: Yes (4/10/2021 11:34 AM)      Review of Systems:  Please see above.  The rest of the review of systems are negative for all systems.     Pap History:   No - age 30-65 PAP every 3 years recommended and patient repeatedly refuses  Cancer Screening       Status Date      PAP SMEAR Overdue 5/27/2017      Done 5/27/2014     MAMMOGRAM Overdue 2/26/2021      Done 2/26/2019 MAMMO SCREENING BILATERAL     Patient has more history with this topic...          Patient Care Team:  Mei Carbone MD as PCP - General (Family Medicine)  Jose Guzman as Psychologist  "(Behavioral Health)  [facility] (Ophthalmology)  Klever as RN, Care Manager  Ryder Medina as Patient Care Assistant  Mei Carbone MD as Assigned PCP  Geoffrey Talamantes DPM as Assigned Musculoskeletal Provider        History     Not marked as reviewed during this visit.            Objective:   Vital Signs:   Visit Vitals  LMP 01/30/2013          PHYSICAL EXAM  Vitals:    05/20/21 0904   BP: 106/66   Pulse: 78   Resp: 18   Temp: 98  F (36.7  C)   TempSrc: Oral   SpO2: 98%   Weight: 192 lb 6.4 oz (87.3 kg)   Height: 4' 11.72\" (1.517 m)     OBJECTIVE:  Vitals listed above within normal limits  General appearance: well groomed, pleasant, well hydrated, nontoxic appearing  ENT: PERRL, throat clear  Neck: neck supple, no lymphadenopathy, no thyromegaly  Lungs: lungs clear to auscultation bilaterally, no wheezes or rhonchi  Heart: regular rate and rhythm, no murmurs, rubs or gallops  Abdomen: soft, nontender  Neuro: no focal deficits, CN II-XII grossly intact, alert and oriented  Psych:  mood stable, appears to have good insight and judgment  Bimanual exam: No cervical motion tenderness. No masses  BREAST EXAM: normal without suspicious masses, skin or nipple changes or axillary nodes, self-exam is taught and discussed  Skin: Irregular mole with irregular borders asymmetric and dark on the left medial aspect of her left leg        The 10-year ASCVD risk score (Ariella JAYLIN Jr., et al., 2013) is: 12.9%    Values used to calculate the score:      Age: 61 years      Sex: Female      Is Non- : No      Diabetic: Yes      Tobacco smoker: Yes      Systolic Blood Pressure: 106 mmHg      Is BP treated: Yes      HDL Cholesterol: 37 mg/dL      Total Cholesterol: 156 mg/dL         Medication List          Accurate as of May 20, 2021 10:07 AM. If you have any questions, ask your nurse or doctor.            CONTINUE taking these medications    acetaminophen 500 MG tablet  Also known as: Q-PAP Extra " Strength  INSTRUCTIONS: Take 2 tablets (1,000 mg total) by mouth every 4 (four) hours as needed for pain.  Doctor's comments: Please deliver        atorvastatin 20 MG tablet  Also known as: LIPITOR  INSTRUCTIONS: TAKE 1 PILL BY MOUTH EVERYDAY FOR CHOLESTEROL        blood glucose test strips  Also known as: Contour Next Test Strips  INSTRUCTIONS: Use 1 each As Directed three times daily at 7:30am, 11:30am and 4:30pm.  Doctor's comments: Diabetes Type 2 with neuropathy:  E11.40        blood-glucose meter Misc  Also known as: Contour Next Meter  INSTRUCTIONS: Test blood sugar three times a day.  Doctor's comments: Diabetes Type 2 with neuropathy:  E11.40        ergocalciferol 1,250 mcg (50,000 unit) capsule  Also known as: Vitamin D2  INSTRUCTIONS: TAKE 1 CAPSULE (50,000 UNITS TOTAL) BY MOUTH ONCE A WEEK FOR BONE HEALTH        furosemide 40 MG tablet  Also known as: LASIX  INSTRUCTIONS: TAKE 1 TABLET (40 MG TOTAL) BY MOUTH 2 (TWO) TIMES A DAY FOR EDEMA        generic lancets  Also known as: Fingerstix Lancets  INSTRUCTIONS: Use 1 application As Directed three times daily at 7:30am, 11:30am and 4:30pm.  Doctor's comments: Diabetes Type 2 with neuropathy E11.40        glipiZIDE 10 MG 24 hr tablet  Also known as: GLUCOTROL XL  INSTRUCTIONS: TAKE 1 PILL BY MOUTH DAILY FOR DIABETES        Janumet XR 50-1,000 mg Tm24  INSTRUCTIONS: TAKE 1 TABLET BY MOUTH 2 (TWO) TIMES A DAY FOR DIABETES  Generic drug: SITagliptin-metformin        naproxen 500 MG tablet  Also known as: NAPROSYN  INSTRUCTIONS: TAKE 1 TABLET (500 MG TOTAL) BY MOUTH 2 (TWO) TIMES A DAY WITH MEALS FOR PAIN        omeprazole 20 MG capsule  Also known as: PriLOSEC  INSTRUCTIONS: TAKE 1 CAPSULE (20 MG TOTAL) BY MOUTH DAILY BEFORE BREAKFAST FOR STOMACH        potassium chloride 20 MEQ tablet  Also known as: K-DUR,KLOR-CON  INSTRUCTIONS: TAKE 1 TABLET (20 MEQ TOTAL) BY MOUTH DAILY.        senna 8.6 mg tablet  Also known as: SENOKOT  INSTRUCTIONS: Take 1 tablet by  mouth daily as needed for constipation.  Doctor's comments: For constipation        timoloL 0.5 % ophthalmic solution  Also known as: BETIMOL  INSTRUCTIONS: Administer 1 drop to both eyes daily.        timoloL maleate 0.5 % ophthalmic solution  Also known as: TIMOPTIC        traMADoL 50 mg tablet  Also known as: ULTRAM  INSTRUCTIONS: TAKE 1 TABLET (50 MG TOTAL) BY MOUTH EVERY 6 (SIX) HOURS AS NEEDED FOR SEVERE PAIN (7-10).               Additional Screenings Completed Today:

## 2021-07-03 NOTE — ADDENDUM NOTE
Addendum Note by Vasyl Steve MD at 12/12/2019  8:00 AM     Author: Vasyl Steve MD Service: -- Author Type: Physician    Filed: 12/12/2019  4:04 PM Encounter Date: 12/12/2019 Status: Signed    : Vasyl Steve MD (Physician)    Addended by: VASYL STEVE on: 12/12/2019 04:04 PM        Modules accepted: Orders

## 2021-07-03 NOTE — ADDENDUM NOTE
Addendum Note by Anna Raphael RT (R) at 4/23/2019  9:00 AM     Author: Anna Raphael RT (R) Service: -- Author Type: Radiologic Technologist    Filed: 4/23/2019 11:11 AM Encounter Date: 4/23/2019 Status: Signed    : Anna Raphael RT (R) (Radiologic Technologist)    Addended by: ANNA RAPHAEL on: 4/23/2019 11:11 AM        Modules accepted: Orders

## 2021-07-04 NOTE — ADDENDUM NOTE
Addendum Note by Charisse Crabtree at 6/1/2021  9:40 AM     Author: Charisse Crabtree Service: -- Author Type:     Filed: 6/2/2021 11:54 AM Encounter Date: 6/1/2021 Status: Signed    : Charisse Crabtree ()    Addended by: CHARISSE CRABTREE on: 6/2/2021 11:54 AM        Modules accepted: Orders

## 2021-07-04 NOTE — LETTER
Letter by Jessica Alejandra MD at      Author: Jessica Alejandra MD Service: -- Author Type: --    Filed:  Encounter Date: 6/9/2021 Status: (Other)       Dear Trixie Sofia    Thank you for choosing [facility] for your care.  We are committed to providing you with the highest quality and compassionate healthcare services.  The following information pertains to your first appointment with our clinic.    Date/Time of appointment: 7/16/2021 12:45pm    Note: Please arrive 30 minutes prior to your appointment time.  This allows time to complete forms, possible labs and nursing assessment.     Name of your Physician: Jessica Alejandra MD    What to bring to your appointment:    Completed Patient History/Initial Nursing Assessment and Medication/Allergy List (these forms were sent to you).    Any paperwork or films from your physician that we have asked you to bring.    Your current insurance card(s).    Parking:    Please refer to the map included to direct you.  The [facility] is located at the [address] end of [facility] in [place], [place].      After turning onto [address] from [facility], take a right turn at the first stop sign.  We have designated parking on the left, identified as parking for Cancer Care patients (Lot D).     The Code to Enter Lot D is: 0701. This code changes monthly and will always coincide with the current month followed by 01. For example August will be 0801.  The month will continue to change but the 01 will remain constant.  If lot D is full please use Parking Lot A, directly across the street.    Please enter the [facility] on the north end of the [facility].  You will see a sign on the building.        For Medical Oncology or Hematology appointments, please take the elevator to the second floor to check in.   For Radiation Oncology appointments, please go straight through the double doors and check in.     Also please note  appointments can last 1.5-2 hours.      We hope these instructions are helpful to you.  If you have any questions or concerns, please call us at [phone].  It is our pleasure to assist you.    Warm Regards,    [phone]

## 2021-07-06 ENCOUNTER — OFFICE VISIT - HEALTHEAST (OUTPATIENT)
Dept: ONCOLOGY | Facility: CLINIC | Age: 61
End: 2021-07-06

## 2021-07-06 DIAGNOSIS — D64.9 SEVERE ANEMIA: ICD-10-CM

## 2021-07-06 DIAGNOSIS — D50.9 IRON DEFICIENCY ANEMIA, UNSPECIFIED IRON DEFICIENCY ANEMIA TYPE: ICD-10-CM

## 2021-07-06 LAB
ALBUMIN SERPL-MCNC: 3.4 G/DL (ref 3.5–5)
ALP SERPL-CCNC: 164 U/L (ref 45–120)
ALT SERPL W P-5'-P-CCNC: 21 U/L (ref 0–45)
ANION GAP SERPL CALCULATED.3IONS-SCNC: 8 MMOL/L (ref 5–18)
AST SERPL W P-5'-P-CCNC: 32 U/L (ref 0–40)
BILIRUB SERPL-MCNC: 0.4 MG/DL (ref 0–1)
BUN SERPL-MCNC: 10 MG/DL (ref 8–22)
CALCIUM SERPL-MCNC: 9.2 MG/DL (ref 8.5–10.5)
CHLORIDE BLD-SCNC: 99 MMOL/L (ref 98–107)
CO2 SERPL-SCNC: 36 MMOL/L (ref 22–31)
CREAT SERPL-MCNC: 0.8 MG/DL (ref 0.6–1.1)
ERYTHROCYTE [DISTWIDTH] IN BLOOD BY AUTOMATED COUNT: 26.7 % (ref 11–14.5)
FERRITIN SERPL-MCNC: 38 NG/ML (ref 10–130)
GFR SERPL CREATININE-BSD FRML MDRD: >60 ML/MIN/1.73M2
GLUCOSE BLD-MCNC: 82 MG/DL (ref 70–125)
HAPTOGLOB SERPL-MCNC: 145 MG/DL (ref 33–171)
HCT VFR BLD AUTO: 35.5 % (ref 35–47)
HGB BLD-MCNC: 9.6 G/DL (ref 12–16)
IRON SATN MFR SERPL: 7 % (ref 20–50)
IRON SERPL-MCNC: 32 UG/DL (ref 42–175)
LDH SERPL L TO P-CCNC: 211 U/L (ref 125–220)
MCH RBC QN AUTO: 21.9 PG (ref 27–34)
MCHC RBC AUTO-ENTMCNC: 27 G/DL (ref 32–36)
MCV RBC AUTO: 81 FL (ref 80–100)
PLATELET # BLD AUTO: 168 THOU/UL (ref 140–440)
PMV BLD AUTO: 10.1 FL (ref 8.5–12.5)
POTASSIUM BLD-SCNC: 3.5 MMOL/L (ref 3.5–5)
PROT SERPL-MCNC: 7.4 G/DL (ref 6–8)
RBC # BLD AUTO: 4.39 MILL/UL (ref 3.8–5.4)
RETICS # AUTO: 0.16 MILL/UL (ref 0.01–0.11)
RETICS/RBC NFR AUTO: 3.66 % (ref 0.8–2.7)
SODIUM SERPL-SCNC: 143 MMOL/L (ref 136–145)
TIBC SERPL-MCNC: 437 UG/DL (ref 313–563)
TRANSFERRIN SERPL-MCNC: 350 MG/DL (ref 212–360)
WBC: 5.9 THOU/UL (ref 4–11)

## 2021-07-06 ASSESSMENT — MIFFLIN-ST. JEOR: SCORE: 1370.33

## 2021-07-08 ENCOUNTER — TRANSCRIBE ORDERS (OUTPATIENT)
Dept: FAMILY MEDICINE | Facility: CLINIC | Age: 61
End: 2021-07-08

## 2021-07-08 ENCOUNTER — COMMUNICATION - HEALTHEAST (OUTPATIENT)
Dept: FAMILY MEDICINE | Facility: CLINIC | Age: 61
End: 2021-07-08

## 2021-07-08 DIAGNOSIS — Z23 HIGH PRIORITY FOR 2019-NCOV VACCINE: Primary | ICD-10-CM

## 2021-07-08 NOTE — TELEPHONE ENCOUNTER
Telephone Encounter by Adam Cordon CMA at 7/8/2021 10:17 AM     Author: Adam Cordon CMA Service: -- Author Type: Certified Medical Assistant    Filed: 7/8/2021 10:17 AM Encounter Date: 7/8/2021 Status: Signed    : Adam Cordon CMA (Certified Medical Assistant)       ----- Message from NADINE Horne sent at 7/8/2021 10:05 AM CDT -----  Regarding: FW: med change  Hi,    Unsure why this was sent to [facility] staff. Please see Dr. Carbone's note and follow up with [facility] regarding the med change.    Thanks,    Laya    ----- Message -----  From: Mei Carbone MD  Sent: 7/8/2021   9:37 AM CDT  To: [facility] [place]  Subject: med change                                       [facility]     Stop naproxen.       Thanks,  Dr. Mei Carbone  7/8/2021

## 2021-07-08 NOTE — TELEPHONE ENCOUNTER
Telephone Encounter by Adam Cordon CMA at 7/8/2021 10:18 AM     Author: Adam Cordon CMA Service: -- Author Type: Certified Medical Assistant    Filed: 7/8/2021 10:24 AM Encounter Date: 7/8/2021 Status: Signed    : Adam Cordon CMA (Certified Medical Assistant)       ROSANA contacted IMELDA Coyne @ [facility]. ROSANA left detailed message with MD update for nursing to stop the patient's Naproxen.     Left clinic call back number.

## 2021-07-08 NOTE — TELEPHONE ENCOUNTER
Telephone Encounter by Pat Del Rosario CMA at 7/8/2021 12:00 PM     Author: Pat Del Rosario CMA Service: -- Author Type: Certified Medical Assistant    Filed: 7/8/2021 12:04 PM Encounter Date: 7/8/2021 Status: Signed    : Pat Del Rosario CMA (Certified Medical Assistant)       Left message at Dr Cox's office  [phone].  Patient states she has an appt for cataract surgery but not sure when or when any F/U appts are.    When Dr Cox's office calls back please get the info above      We will need to notify patient and schedule a preop if necessary.

## 2021-07-11 NOTE — PROGRESS NOTES
Progress Notes by Jessica Alejandra MD at 7/6/2021  1:45 PM     Author: Jessica Alejandra MD Service: -- Author Type: Physician    Filed: 7/10/2021  9:38 PM Encounter Date: 7/6/2021 Status: Signed    : Jessica Alejandra MD (Physician)       [facility] Hematology [facility] Consult Note    Patient: Trixie Sofia  MRN: [id number]  Date of Service: 07/06/2021        Reason for Visit    I was consulted by Dr. Carbone regarding severe anemia    Assessment/Plan    #. Severe iron deficiency anemia   I reviewed her labs in detail with the patient. She has normal baseline hemoglobin until 2019. She had mild normocytic anemia with hemoglobin of 10 about a year and a half ago. Most recently in May 2021, her hemoglobin dropped to 6.1 g/dL. 2 weeks later, her hemoglobin was 5.9. She has chronic microcytosis since going back 2016. Recent iron studies from a month ago showed transferrin saturation of 3%, consistent with severe iron deficiency anemia.   I explained to her that she has low in iron which could be one of the three possibilities of inadequate oral intake, inadequate GI absorption or occult blood loss because she does not have any evidence of obvious blood loss. I recommended GI evaluation with colonoscopy. She refused repeatedly. She believed that this was related to her diet.   I repeated hemogram, complete metabolic profile, hemolysis panel today. Her hemoglobin has improved to 9.6 g/dL. She has reticulocytosis. No evidence of hemolysis. Normal liver and renal function. Iron studies showed ferritin of 32 and transferrin saturation of 7%. It appears that her iron is repleted appropriately with oral iron replacement in 3-4 weeks timeframe. Advised her to continue oral iron 1 tablet twice a day. I gave her information about iron rich diet to incorporate.   Follow-up with me in 2 months with repeat labs including hemogram, complete metabolic profile, iron studies and ferritin.    #. Diabetes type 2  #. Chronic  low back pain with sciatica.    ECOG Performance      Distress Assessment  Distress Assessment Score: Unable to rate(stress with higher pain, melany with belief system)        Problem List    1. Iron deficiency anemia, unspecified iron deficiency anemia type  HM2(CBC w/o Differential)    Iron and Transferrin Iron Binding Capacity    Ferritin    Reticulocytes    Haptoglobin    Lactate Dehydrogenase (LDH)    Comprehensive Metabolic Panel    HM2(CBC w/o Differential)    Iron and Transferrin Iron Binding Capacity    Ferritin    Reticulocytes   2. Severe anemia  HM2(CBC w/o Differential)    Iron and Transferrin Iron Binding Capacity    Ferritin    Reticulocytes    Haptoglobin    Lactate Dehydrogenase (LDH)    Comprehensive Metabolic Panel           CC: Mei Carbone MD      ______________________________________________________________________________      History    Ms. [facility] presenters today accompanied by her PCA. Professional current  was used for this encounter.  She is referred here for severe anemia which was found by evaluation of fatigue and tiredness. She reported that she had dental issue and tooth extraction in April 2021. Since then she was not able to eat very well and she had very limited food choices. She denies any bleeding or blood loss. She is postmenopausal. She does not drink coffee or milk on regular basis. She started oral iron 1 tablet twice a day for about a month now since she found out about anemia.    She has chronic weakness and pain in her legs and hands and low back.     Never had colonoscopy or EGD and she was advised for evaluation of anemia but she refused.    She immigrated from [facility]. She lives by herself. She chews betel leaves and nuts.    No family history of cancer.    Past History  Past Medical History:   Diagnosis Date   ? Depression    ? Diabetes mellitus (H)    ? Essential hypertension    ? Lumbar Disc Degeneration L4 - L5    ? Menopause 05/2014     Mei Carbone: no LMP for over a year as of 5/2014    ? DEIRDRE (obstructive sleep apnea) 4/17/2019    See sleep study of Dr. Leung   ? Pulmonary hypertension due to sleep-disordered breathing (H) 01/03/2019    see 4/17/2019 sleep study   ? Sciatica    ? Vertigo 4/28/2015     Past Surgical History:   Procedure Laterality Date   ? TONGUE SURGERY Left 2006    surgery in [place] for mass on Tongue     Family History   Problem Relation Age of Onset   ? Diabetes Mother    ? Hypertension Mother    ? Hyperlipidemia Mother    ? Diabetes Sister    ? Hypertension Sister    ? No Medical Problems Brother    ? Diabetes Sister    ? Hypertension Sister    ? Diabetes Sister    ? Hypertension Sister    ? No Medical Problems Sister    ? No Medical Problems Daughter    ? Breast cancer Neg Hx    ? Congenital heart disease Neg Hx      Social History     Socioeconomic History   ? Marital status:      Spouse name: Not on file   ? Number of children: 1   ? Years of education: Not on file   ? Highest education level: Not on file   Occupational History     Employer: NOT EMPLOYED   Social Needs   ? Financial resource strain: Not on file   ? Food insecurity     Worry: Not on file     Inability: Not on file   ? Transportation needs     Medical: Not on file     Non-medical: Not on file   Tobacco Use   ? Smoking status: Current Every Day Smoker     Years: 30.00     Types: Pipe   ? Smokeless tobacco: Never Used   Substance and Sexual Activity   ? Alcohol use: No   ? Drug use: No   ? Sexual activity: Not Currently     Partners: Male     Birth control/protection: Post-menopausal   Lifestyle   ? Physical activity     Days per week: 0 days     Minutes per session: Not on file   ? Stress: Not on file   Relationships   ? Social connections     Talks on phone: Not on file     Gets together: Not on file     Attends Jewish service: Not on file     Active member of club or organization: Not on file     Attends meetings of clubs or organizations:  "Not on file     Relationship status: Not on file   ? Intimate partner violence     Fear of current or ex partner: Not on file     Emotionally abused: Not on file     Physically abused: Not on file     Forced sexual activity: Not on file   Other Topics Concern   ? Not on file   Social History Narrative    [place]ittney leyva. [place], directly to [place]. Green card. Lives with daughter, son-in-law and four grandchildren.  passed away when she was pregnant with her daughter.      Allergies    Allergies   Allergen Reactions   ? Aspirin       caused puffy face, numbness, heart palpitations         Review of Systems    Apart from describing in history, the remainder of comprehensive ROS was negative.      Physical Exam    Recent Vitals 7/8/2021   Height 4' 11.567\"   Weight 196 lbs 3 oz   BSA (m2) 1.93 m2   /60   Pulse 64   Temp 98.1   Temp src 1   SpO2 95   Some recent data might be hidden       General: alert, awake, not in acute distress  HEENT: Head: Normal, normocephalic, atraumatic.  Eye: Normal external eye, conjunctiva, lids cornea, RACHEL.  Nose: Normal external nose, mucus membranes and septum.  Pharynx: Normal buccal mucosa. Normal pharynx.  Neck / Thyroid: Supple, no masses, nodes, nodules or enlargement.  Lymphatics: No abnormally enlarged lymph nodes.  Chest: Normal chest wall and respirations. Clear to auscultation.  Heart: S1 S2 RRR, no murmur.   Abdomen: abdomen is soft without significant tenderness, masses, organomegaly or guarding  Extremities: normal strength, tone, and muscle mass  Skin: normal. no rash or abnormalities  CNS: non focal.      Lab Results    Recent Results (from the past 168 hour(s))   HM2(CBC w/o Differential)   Result Value Ref Range    WBC 5.9 4.0 - 11.0 thou/uL    RBC 4.39 3.80 - 5.40 mill/uL    Hemoglobin 9.6 (L) 12.0 - 16.0 g/dL    Hematocrit 35.5 35.0 - 47.0 %    MCV 81 80 - 100 fL    MCH 21.9 (L) 27.0 - 34.0 pg    MCHC 27.0 (L) 32.0 - 36.0 g/dL    RDW 26.7 (H) " 11.0 - 14.5 %    Platelets 168 140 - 440 thou/uL    MPV 10.1 8.5 - 12.5 fL   Iron and Transferrin Iron Binding Capacity   Result Value Ref Range    Iron 32 (L) 42 - 175 ug/dL    Transferrin 350 212 - 360 mg/dL    Transferrin Saturation, Calculated 7 (L) 20 - 50 %    Transferrin IBC, Calculated 437 313 - 563 ug/dL   Ferritin   Result Value Ref Range    Ferritin 38 10 - 130 ng/mL   Reticulocytes   Result Value Ref Range    Retic Absolute Count 0.161 (H) 0.010 - 0.110 mill/uL    Retic Ct Pct 3.66 (H) 0.8 - 2.7 %   Haptoglobin   Result Value Ref Range    Haptoglobin 145 33 - 171 mg/dL   Lactate Dehydrogenase (LDH)   Result Value Ref Range    LD (LDH) 211 125 - 220 U/L   Comprehensive Metabolic Panel   Result Value Ref Range    Sodium 143 136 - 145 mmol/L    Potassium 3.5 3.5 - 5.0 mmol/L    Chloride 99 98 - 107 mmol/L    CO2 36 (H) 22 - 31 mmol/L    Anion Gap, Calculation 8 5 - 18 mmol/L    Glucose 82 70 - 125 mg/dL    BUN 10 8 - 22 mg/dL    Creatinine 0.80 0.60 - 1.10 mg/dL    GFR MDRD Af Amer >60 >60 mL/min/1.73m2    GFR MDRD Non Af Amer >60 >60 mL/min/1.73m2    Bilirubin, Total 0.4 0.0 - 1.0 mg/dL    Calcium 9.2 8.5 - 10.5 mg/dL    Protein, Total 7.4 6.0 - 8.0 g/dL    Albumin 3.4 (L) 3.5 - 5.0 g/dL    Alkaline Phosphatase 164 (H) 45 - 120 U/L    AST 32 0 - 40 U/L    ALT 21 0 - 45 U/L       Imaging Results    No results found.    TT: 65 minutes: time consisted of preparing to see the patient (eg. review of tests)-10minutes, obtaining an/or reviewing separately obtained history, review of the plan (45 minutes), ordering medication, test , communicating with other healthcare professionals, documenting clinical information in the electronic health record (10 minutes).    Signed by: Jessica Alejandra MD

## 2021-07-11 NOTE — PROGRESS NOTES
"Progress Notes by Alma Ansari, RN at 7/6/2021  1:45 PM     Author: Alma Ansari RN Service: -- Author Type: Registered Nurse    Filed: 7/10/2021  9:38 PM Encounter Date: 7/6/2021 Status: Signed    : Alma Ansari RN (Registered Nurse)       Oncology Rooming Note    07/06/21 2:16 PM    Trixie Sofia is a 61 y.o. female who presents for:    Chief Complaint   Patient presents with   ? Benign Hematology       Initial Vitals: /62   Pulse 79   Temp 98  F (36.7  C) (Oral)   Ht 4' 11.5\" (1.511 m)   Wt 196 lb 9.6 oz (89.2 kg)   LMP 01/30/2013   SpO2 94%   BMI 39.04 kg/m       Estimated body mass index is 39.04 kg/m  as calculated from the following:    Height as of this encounter: 4' 11.5\" (1.511 m).    Weight as of this encounter: 196 lb 9.6 oz (89.2 kg).     Body surface area is 1.94 meters squared.      Allergies reviewed: Yes  Medications reviewed: No-unable to remember medications as home care sets them up for her (they will bring list to provide next visit)    Refills needed: No    Pharmacy name entered into EPIC: @[email or web address]@      Clinical concerns: concerns - pain in swelling in hands, low back, arms and hand pain      ALMA ANSARI, IMELDA               "

## 2021-07-13 ENCOUNTER — TELEPHONE (OUTPATIENT)
Dept: FAMILY MEDICINE | Facility: CLINIC | Age: 61
End: 2021-07-13

## 2021-07-13 NOTE — TELEPHONE ENCOUNTER
Pat Del Rosario, [facility]         7/8/21 12:03 PM  Note     Left message at Dr Cox's office  [phone].  Patient states she has an appt for cataract surgery but not sure when or when any F/U appts are.     When Dr Cox's office calls back please get the info above        We will need to notify patient and schedule a preop if necessary.

## 2021-07-13 NOTE — TELEPHONE ENCOUNTER
AMD - dry/soft drusen OU -OCT MAC performed today and reviewed w/pt -Explained dry AMD and advised that there are no treatments available at this time.-Continue AREDS 2 MVT. -Continue Amsler grid monitoring daily. Pt is to contact our office if any changes are noted. -Discussed referral to retina or coming back Aug 10 for retest pt opts for retest-order OCT MAC PC IOL OU-stable continue to monitorDES OU-Discussed findings with patient.-recommend Refresh Optive Gel Drops OU [place] with [facility] retirning Pat's call, stating pt has Rt Cataract Surgery scheduled for 8/12 with Dr. Odell at their [place] location. Time has not been confirmed yet.

## 2021-07-14 PROBLEM — E11.00 TYPE 2 DIABETES MELLITUS WITH HYPEROSMOLARITY WITHOUT COMA, WITHOUT LONG-TERM CURRENT USE OF INSULIN (H): Chronic | Status: RESOLVED | Noted: 2019-01-29 | Resolved: 2019-07-11

## 2021-07-15 LAB — COLOGUARD-ABSTRACT: NEGATIVE

## 2021-07-21 ENCOUNTER — RECORDS - HEALTHEAST (OUTPATIENT)
Dept: ADMINISTRATIVE | Facility: CLINIC | Age: 61
End: 2021-07-21

## 2021-08-04 ENCOUNTER — AMBULATORY - HEALTHEAST (OUTPATIENT)
Dept: MULTI SPECIALTY CLINIC | Facility: CLINIC | Age: 61
End: 2021-08-04

## 2021-08-05 ENCOUNTER — TELEPHONE (OUTPATIENT)
Dept: FAMILY MEDICINE | Facility: CLINIC | Age: 61
End: 2021-08-05

## 2021-08-05 ENCOUNTER — IMMUNIZATION (OUTPATIENT)
Dept: NURSING | Facility: CLINIC | Age: 61
End: 2021-08-05
Attending: FAMILY MEDICINE
Payer: COMMERCIAL

## 2021-08-05 DIAGNOSIS — Z23 HIGH PRIORITY FOR 2019-NCOV VACCINE: ICD-10-CM

## 2021-08-05 PROCEDURE — 0012A PR COVID VAC MODERNA 100 MCG/0.5 ML IM: CPT

## 2021-08-05 PROCEDURE — 91301 PR COVID VAC MODERNA 100 MCG/0.5 ML IM: CPT

## 2021-08-05 NOTE — TELEPHONE ENCOUNTER
Called che nice and scheduled at [facility] with Dr. Kimo Francisco at 1255 on 8/9/21.  Transportation to [facility] [address] requested Thanks

## 2021-08-05 NOTE — TELEPHONE ENCOUNTER
Reason for call:  Other   Patient called regarding (reason for call): appointment  Additional comments: Patient needs a preop physical before her surgery on 08/12/21.  It is cataract surgery [facility]    Phone number to reach patient:  Other phone number:  Nikole Osborn at [phone]    Best Time:  Anytime    Can we leave a detailed message on this number?  YES    Travel screening: Not Applicable

## 2021-08-17 DIAGNOSIS — Z53.9 DIAGNOSIS NOT YET DEFINED: Primary | ICD-10-CM

## 2021-08-17 PROCEDURE — G0179 MD RECERTIFICATION HHA PT: HCPCS | Performed by: FAMILY MEDICINE

## 2021-08-30 DIAGNOSIS — G89.4 CHRONIC PAIN SYNDROME: Primary | ICD-10-CM

## 2021-08-30 RX ORDER — ACETAMINOPHEN 500 MG
TABLET ORAL
Qty: 100 TABLET | Refills: 3 | Status: SHIPPED | OUTPATIENT
Start: 2021-08-30 | End: 2021-09-16

## 2021-08-30 NOTE — TELEPHONE ENCOUNTER
"Last Written Prescription Date:  08/04/20  Last Fill Quantity: 100,  # refills: 11   Last office visit provider:  Dr. Carbone    Requested Prescriptions   Pending Prescriptions Disp Refills     acetaminophen (TYLENOL) 500 MG tablet [Pharmacy Med Name: ACETAMINOPHEN 500 MG TABS 500 Tablet] 100 tablet 3     Sig: TAKE 2 TABLETS (1,000 MG TOTAL) BY MOUTH EVERY 4 (FOUR) HOURS AS NEEDED FOR PAIN.       Analgesics (Non-Narcotic Tylenol and ASA Only) Passed - 8/30/2021 11:18 AM        Passed - Recent (12 mo) or future (30 days) visit within the authorizing provider's specialty     Patient has had an office visit with the authorizing provider or a provider within the authorizing providers department within the previous 12 mos or has a future within next 30 days. See \"Patient Info\" tab in inbasket, or \"Choose Columns\" in Meds & Orders section of the refill encounter.              Passed - Patient is 7 months old or older     If patient is a peds patient of the age 7 mos -12 years, ok to refill using weight-based dosing.     If >3g daily and/or sig is not \"prn\", check for liver enzymes. If normal in the last year, ok to refill.  If not, refer to the provider.          Passed - Medication is active on med list             Dalila Lentz RN 08/30/21 1:00 PM  "

## 2021-09-08 ENCOUNTER — LAB (OUTPATIENT)
Dept: INFUSION THERAPY | Facility: CLINIC | Age: 61
End: 2021-09-08
Payer: COMMERCIAL

## 2021-09-08 DIAGNOSIS — D50.9 IRON DEFICIENCY ANEMIA, UNSPECIFIED IRON DEFICIENCY ANEMIA TYPE: ICD-10-CM

## 2021-09-08 LAB
ERYTHROCYTE [DISTWIDTH] IN BLOOD BY AUTOMATED COUNT: 12.8 % (ref 10–15)
FERRITIN SERPL-MCNC: 32 NG/ML (ref 10–130)
HCT VFR BLD AUTO: 45 % (ref 35–47)
HGB BLD-MCNC: 13.3 G/DL (ref 11.7–15.7)
IRON SATN MFR SERPL: 34 % (ref 20–50)
IRON SERPL-MCNC: 138 UG/DL (ref 42–175)
MCH RBC QN AUTO: 25.3 PG (ref 26.5–33)
MCHC RBC AUTO-ENTMCNC: 29.6 G/DL (ref 31.5–36.5)
MCV RBC AUTO: 86 FL (ref 78–100)
PLATELET # BLD AUTO: 146 10E3/UL (ref 150–450)
RBC # BLD AUTO: 5.26 10E6/UL (ref 3.8–5.2)
RETICS # AUTO: 0.07 10E6/UL (ref 0.01–0.11)
RETICS/RBC NFR AUTO: 1.3 % (ref 0.8–2.7)
TIBC SERPL-MCNC: 405 UG/DL (ref 313–563)
TRANSFERRIN SERPL-MCNC: 324 MG/DL (ref 212–360)
WBC # BLD AUTO: 5.7 10E3/UL (ref 4–11)

## 2021-09-08 PROCEDURE — 83540 ASSAY OF IRON: CPT

## 2021-09-08 PROCEDURE — 85045 AUTOMATED RETICULOCYTE COUNT: CPT

## 2021-09-08 PROCEDURE — 82728 ASSAY OF FERRITIN: CPT

## 2021-09-08 PROCEDURE — 36415 COLL VENOUS BLD VENIPUNCTURE: CPT

## 2021-09-08 PROCEDURE — 85027 COMPLETE CBC AUTOMATED: CPT

## 2021-09-09 ENCOUNTER — ONCOLOGY VISIT (OUTPATIENT)
Dept: ONCOLOGY | Facility: CLINIC | Age: 61
End: 2021-09-09
Payer: COMMERCIAL

## 2021-09-09 VITALS
WEIGHT: 197.4 LBS | HEIGHT: 59 IN | DIASTOLIC BLOOD PRESSURE: 68 MMHG | OXYGEN SATURATION: 95 % | BODY MASS INDEX: 39.8 KG/M2 | HEART RATE: 65 BPM | RESPIRATION RATE: 16 BRPM | TEMPERATURE: 98 F | SYSTOLIC BLOOD PRESSURE: 117 MMHG

## 2021-09-09 DIAGNOSIS — D50.9 IRON DEFICIENCY ANEMIA, UNSPECIFIED IRON DEFICIENCY ANEMIA TYPE: Primary | ICD-10-CM

## 2021-09-09 DIAGNOSIS — D69.6 THROMBOCYTOPENIA (H): ICD-10-CM

## 2021-09-09 PROCEDURE — G0463 HOSPITAL OUTPT CLINIC VISIT: HCPCS

## 2021-09-09 PROCEDURE — 99214 OFFICE O/P EST MOD 30 MIN: CPT | Performed by: INTERNAL MEDICINE

## 2021-09-09 RX ORDER — BLOOD-GLUCOSE METER
EACH MISCELLANEOUS
COMMUNITY
Start: 2019-09-30

## 2021-09-09 RX ORDER — SITAGLIPTIN AND METFORMIN HYDROCHLORIDE 1000; 50 MG/1; MG/1
TABLET, FILM COATED, EXTENDED RELEASE ORAL
COMMUNITY
Start: 2021-06-05 | End: 2021-09-16

## 2021-09-09 RX ORDER — FERROUS SULFATE 325(65) MG
TABLET ORAL
COMMUNITY
Start: 2021-08-13 | End: 2021-09-19

## 2021-09-09 RX ORDER — ERGOCALCIFEROL 1.25 MG/1
CAPSULE ORAL
COMMUNITY
Start: 2021-04-05 | End: 2022-03-17

## 2021-09-09 RX ORDER — GLIPIZIDE 10 MG/1
TABLET, FILM COATED, EXTENDED RELEASE ORAL
COMMUNITY
Start: 2021-06-18 | End: 2021-09-16

## 2021-09-09 RX ORDER — LANCETS
1 EACH MISCELLANEOUS
COMMUNITY
Start: 2019-09-30 | End: 2021-10-19

## 2021-09-09 RX ORDER — FUROSEMIDE 40 MG
TABLET ORAL
COMMUNITY
Start: 2020-11-10 | End: 2021-10-26

## 2021-09-09 RX ORDER — TRAMADOL HYDROCHLORIDE 50 MG/1
50 TABLET ORAL
COMMUNITY
Start: 2020-06-23 | End: 2021-09-16 | Stop reason: SINTOL

## 2021-09-09 RX ORDER — POTASSIUM CHLORIDE 1500 MG/1
TABLET, EXTENDED RELEASE ORAL
COMMUNITY
Start: 2021-06-29 | End: 2021-09-25

## 2021-09-09 RX ORDER — NAPROXEN 500 MG/1
TABLET ORAL
COMMUNITY
Start: 2021-07-05 | End: 2021-09-09

## 2021-09-09 ASSESSMENT — PAIN SCALES - GENERAL: PAINLEVEL: MODERATE PAIN (4)

## 2021-09-09 ASSESSMENT — MIFFLIN-ST. JEOR: SCORE: 1378.77

## 2021-09-09 NOTE — LETTER
9/9/2021         RE: [address] [address]  [address]  [place] [address]        Dear Colleague,    Thank you for referring your patient, Trixie Sofia, to the [facility]. Please see a copy of my visit note below.    [facility] Hematology and Oncology Progress Note    Patient: Trixie Sofia  MRN: [id number]  Date of Service: Sep 9, 2021         Reason for Visit    Chief Complaint   Patient presents with     Oncology Clinic Visit       Assessment and Plan    Cancer Staging  No matching staging information was found for the patient.    ECOG Performance    0 - Independent     Pain  Pain Score: Moderate Pain (4)    #.  Iron deficiency anemia, likely inadequate oral intake and possible gastritis, resolved   Reviewed labs in detail.  Informed her that her hemoglobin is completely normal and normalization of her transferrin saturation.  Ferritin has improved to 32.     She wants to defer endoscopic evaluation at this point.   Continue oral iron 1 tablet twice a day (or decreased to 1 tablet once a day if there is GI intolerance), continue on iron rich diet.   Follow-up labs and provider visit in 4 months with labs prior.    #.  Mild thrombocytopenia, unclear etiology, but likely normal considering her baseline level of lower end of normal range.   Will recheck in next visit.    Encounter Diagnoses:    Problem List Items Addressed This Visit     None      Visit Diagnoses     Iron deficiency anemia, unspecified iron deficiency anemia type    -  Primary    Relevant Medications    ferrous sulfate (FEROSUL) 325 (65 Fe) MG tablet    Other Relevant Orders    CBC with platelets    Ferritin    Iron & Iron Binding Capacity    Comprehensive metabolic panel             CC: Mei Carbone MD   ______________________________________________________________________________    History of Present Illness    Ms. Trixie Sofia presenters today accompanied by her caregiver.  They speak Cambodian and Cambodian is my  "native language.    She is doing very well.  She takes oral iron 1 tablet twice a day.  No side effects.  She also been focused on food rich in iron.  No bleeding or bruising.  She chews betel nuts.      Review of systems  Apart from describing in HPI, the remainder of comprehensive ROS was negative.    Past History    Past Medical History:   Diagnosis Date     Degeneration of lumbar or lumbosacral intervertebral disc      Depression      Diabetes mellitus (H)      Essential hypertension      DEIRDRE (obstructive sleep apnea) 4/17/2019    See sleep study of Dr. Leung     Pulmonary hypertension due to sleep-disordered breathing (H) 01/03/2019    see 4/17/2019 sleep study     Sciatica      Symptomatic menopausal or female climacteric states 05/2014    Mei Carbone: no LMP for over a year as of 5/2014      Vertigo 4/28/2015       Past Surgical History:   Procedure Laterality Date     TONGUE SURGERY Left 2006    surgery in [facility] for mass on Tongue         Physical Exam    /68 (BP Location: Left arm, Cuff Size: Adult Regular)   Pulse 65   Temp 98  F (36.7  C) (Oral)   Resp 16   Ht 1.511 m (4' 11.49\")   Wt 89.5 kg (197 lb 6.4 oz)   SpO2 95%   BMI 39.22 kg/m        General: alert, awake, not in acute distress  HEENT: Head: Normal, normocephalic, atraumatic.  Eye: Normal external eye, conjunctiva, lids cornea, RACHEL.  Nose: Normal external nose, mucus membranes and septum.  Pharynx: Normal buccal mucosa. Normal pharynx.  Neck / Thyroid: Supple, no masses, nodes, nodules or enlargement.  Lymphatics: No abnormally enlarged lymph nodes.  Abdomen: abdomen is soft without significant tenderness, masses, organomegaly or guarding  Extremities: normal strength, tone, and muscle mass  Skin: normal. no rash or abnormalities  CNS: non focal.    Lab Results    Recent Results (from the past 168 hour(s))   CBC with platelets   Result Value Ref Range    WBC Count 5.7 4.0 - 11.0 10e3/uL    RBC Count 5.26 (H) 3.80 - 5.20 " "10e6/uL    Hemoglobin 13.3 11.7 - 15.7 g/dL    Hematocrit 45.0 35.0 - 47.0 %    MCV 86 78 - 100 fL    MCH 25.3 (L) 26.5 - 33.0 pg    MCHC 29.6 (L) 31.5 - 36.5 g/dL    RDW 12.8 10.0 - 15.0 %    Platelet Count 146 (L) 150 - 450 10e3/uL   Iron binding panel   Result Value Ref Range    Iron 138 42 - 175 ug/dL    Transferrin 324 212 - 360 mg/dL    Transferrin Saturation, Calculated 34 20 - 50 %    Transferrin IBC, Calculated 405 313 - 563 ug/dL   Ferritin   Result Value Ref Range    Ferritin 32 10 - 130 ng/mL   Reticulocyte count   Result Value Ref Range    % Reticulocyte 1.3 0.8 - 2.7 %    Absolute Reticulocyte 0.067 0.010 - 0.110 10e6/uL       Imaging    No results found.    TT: 30 minutes: time consisted of preparing to see the patient (eg. review of tests/medical records)-5 minutes, obtaining an/or reviewing separately obtained history, focused examination, review of the diagnosis, review of management plan (20 minutes), ordering test or procedure, communicating with other healthcare professionals, and documenting clinical information in the electronic or other health record ( 5 minutes).    Signed by: Jessica Alejandra MD    Oncology Rooming Note    September 9, 2021 8:42 AM   Trixie Sofia is a 61 year old female who presents for:    Chief Complaint   Patient presents with     Oncology Clinic Visit     Initial Vitals: /68 (BP Location: Left arm, Cuff Size: Adult Regular)   Pulse 65   Temp 98  F (36.7  C) (Oral)   Resp 16   Ht 1.511 m (4' 11.49\")   Wt 89.5 kg (197 lb 6.4 oz)   SpO2 95%   BMI 39.22 kg/m   Estimated body mass index is 39.22 kg/m  as calculated from the following:    Height as of this encounter: 1.511 m (4' 11.49\").    Weight as of this encounter: 89.5 kg (197 lb 6.4 oz). Body surface area is 1.94 meters squared.  Moderate Pain (4) Comment: Data Unavailable   No LMP recorded.  Allergies reviewed: Yes  Medications reviewed: Yes    Medications: Medication refills not needed today.  Pharmacy name " entered into [facility]: [facility] - [place], [place] - [facility] [address]    Clinical concerns:Lab results.    Rhoda Mcgee                  Again, thank you for allowing me to participate in the care of your patient.        Sincerely,        Jessica Alejandra MD

## 2021-09-09 NOTE — PROGRESS NOTES
[facility] Hematology and Oncology Progress Note    Patient: Trixie Sofia  MRN: [id number]  Date of Service: Sep 9, 2021         Reason for Visit    Chief Complaint   Patient presents with     Oncology Clinic Visit       Assessment and Plan    Cancer Staging  No matching staging information was found for the patient.    ECOG Performance    0 - Independent     Pain  Pain Score: Moderate Pain (4)    #.  Iron deficiency anemia, likely inadequate oral intake and possible gastritis, resolved   Reviewed labs in detail.  Informed her that her hemoglobin is completely normal and normalization of her transferrin saturation.  Ferritin has improved to 32.     She wants to defer endoscopic evaluation at this point.   Continue oral iron 1 tablet twice a day (or decreased to 1 tablet once a day if there is GI intolerance), continue on iron rich diet.   Follow-up labs and provider visit in 4 months with labs prior.    #.  Mild thrombocytopenia, unclear etiology, but likely normal considering her baseline level of lower end of normal range.   Will recheck in next visit.    Encounter Diagnoses:    Problem List Items Addressed This Visit     None      Visit Diagnoses     Iron deficiency anemia, unspecified iron deficiency anemia type    -  Primary    Relevant Medications    ferrous sulfate (FEROSUL) 325 (65 Fe) MG tablet    Other Relevant Orders    CBC with platelets    Ferritin    Iron & Iron Binding Capacity    Comprehensive metabolic panel             CC: Mei Carbone MD   ______________________________________________________________________________    History of Present Illness    Ms. Trixie Sofia presenters today accompanied by her caregiver.  They speak Spanish and Spanish is my native language.    She is doing very well.  She takes oral iron 1 tablet twice a day.  No side effects.  She also been focused on food rich in iron.  No bleeding or bruising.  She chews betel nuts.      Review of systems  Apart from describing in  "HPI, the remainder of comprehensive ROS was negative.    Past History    Past Medical History:   Diagnosis Date     Degeneration of lumbar or lumbosacral intervertebral disc      Depression      Diabetes mellitus (H)      Essential hypertension      DEIRDRE (obstructive sleep apnea) 4/17/2019    See sleep study of Dr. Leung     Pulmonary hypertension due to sleep-disordered breathing (H) 01/03/2019    see 4/17/2019 sleep study     Sciatica      Symptomatic menopausal or female climacteric states 05/2014    Mei Carbone: no LMP for over a year as of 5/2014      Vertigo 4/28/2015       Past Surgical History:   Procedure Laterality Date     TONGUE SURGERY Left 2006    [facility] on Tongue         Physical Exam    /68 (BP Location: Left arm, Cuff Size: Adult Regular)   Pulse 65   Temp 98  F (36.7  C) (Oral)   Resp 16   Ht 1.511 m (4' 11.49\")   Wt 89.5 kg (197 lb 6.4 oz)   SpO2 95%   BMI 39.22 kg/m        General: alert, awake, not in acute distress  HEENT: Head: Normal, normocephalic, atraumatic.  Eye: Normal external eye, conjunctiva, lids cornea, RACHEL.  Nose: Normal external nose, mucus membranes and septum.  Pharynx: Normal buccal mucosa. Normal pharynx.  Neck / Thyroid: Supple, no masses, nodes, nodules or enlargement.  Lymphatics: No abnormally enlarged lymph nodes.  Abdomen: abdomen is soft without significant tenderness, masses, organomegaly or guarding  Extremities: normal strength, tone, and muscle mass  Skin: normal. no rash or abnormalities  CNS: non focal.    Lab Results    Recent Results (from the past 168 hour(s))   CBC with platelets   Result Value Ref Range    WBC Count 5.7 4.0 - 11.0 10e3/uL    RBC Count 5.26 (H) 3.80 - 5.20 10e6/uL    Hemoglobin 13.3 11.7 - 15.7 g/dL    Hematocrit 45.0 35.0 - 47.0 %    MCV 86 78 - 100 fL    MCH 25.3 (L) 26.5 - 33.0 pg    MCHC 29.6 (L) 31.5 - 36.5 g/dL    RDW 12.8 10.0 - 15.0 %    Platelet Count 146 (L) 150 - 450 10e3/uL   Iron binding " panel   Result Value Ref Range    Iron 138 42 - 175 ug/dL    Transferrin 324 212 - 360 mg/dL    Transferrin Saturation, Calculated 34 20 - 50 %    Transferrin IBC, Calculated 405 313 - 563 ug/dL   Ferritin   Result Value Ref Range    Ferritin 32 10 - 130 ng/mL   Reticulocyte count   Result Value Ref Range    % Reticulocyte 1.3 0.8 - 2.7 %    Absolute Reticulocyte 0.067 0.010 - 0.110 10e6/uL       Imaging    No results found.    TT: 30 minutes: time consisted of preparing to see the patient (eg. review of tests/medical records)-5 minutes, obtaining an/or reviewing separately obtained history, focused examination, review of the diagnosis, review of management plan (20 minutes), ordering test or procedure, communicating with other healthcare professionals, and documenting clinical information in the electronic or other health record ( 5 minutes).    Signed by: Jessica Alejandra MD

## 2021-09-09 NOTE — PROGRESS NOTES
"Oncology Rooming Note    September 9, 2021 8:42 AM   Trixie Sofia is a 61 year old female who presents for:    Chief Complaint   Patient presents with     Oncology Clinic Visit     Initial Vitals: /68 (BP Location: Left arm, Cuff Size: Adult Regular)   Pulse 65   Temp 98  F (36.7  C) (Oral)   Resp 16   Ht 1.511 m (4' 11.49\")   Wt 89.5 kg (197 lb 6.4 oz)   SpO2 95%   BMI 39.22 kg/m   Estimated body mass index is 39.22 kg/m  as calculated from the following:    Height as of this encounter: 1.511 m (4' 11.49\").    Weight as of this encounter: 89.5 kg (197 lb 6.4 oz). Body surface area is 1.94 meters squared.  Moderate Pain (4) Comment: Data Unavailable   No LMP recorded.  Allergies reviewed: Yes  Medications reviewed: Yes    Medications: Medication refills not needed today.  Pharmacy name entered into [employer]: [facility] - [place], [place] - [facility] [address]    Clinical concerns:Lab results.    Rhoda Mcgee              "

## 2021-09-09 NOTE — PROGRESS NOTES
"[facility] Hematology and Oncology Progress Note    Patient: Trixie Sofia  MRN: [id number]  Date of Service: Sep 9, 2021         Reason for Visit    Chief Complaint   Patient presents with     Oncology Clinic Visit       Assessment and Plan    Cancer Staging  No matching staging information was found for the patient.    ECOG Performance    0 - Independent     Pain  Pain Score: Moderate Pain (4)          Encounter Diagnoses:    Problem List Items Addressed This Visit     None             CC: Mei Carbone MD   ______________________________________________________________________________    History of Present Illness    Ms. Trixie Sofia ***    Review of systems  Apart from describing in HPI, the remainder of comprehensive ROS was negative.    Past History    Past Medical History:   Diagnosis Date     Degeneration of lumbar or lumbosacral intervertebral disc      Depression      Diabetes mellitus (H)      Essential hypertension      DEIRDRE (obstructive sleep apnea) 4/17/2019    See sleep study of Dr. Leung     Pulmonary hypertension due to sleep-disordered breathing (H) 01/03/2019    see 4/17/2019 sleep study     Sciatica      Symptomatic menopausal or female climacteric states 05/2014    Mei Carbone: no LMP for over a year as of 5/2014      Vertigo 4/28/2015       Past Surgical History:   Procedure Laterality Date     TONGUE SURGERY Left 2006    surgery in [facility] for mass on Tongue         Physical Exam    /68 (BP Location: Left arm, Cuff Size: Adult Regular)   Pulse 65   Temp 98  F (36.7  C) (Oral)   Resp 16   Ht 1.511 m (4' 11.49\")   Wt 89.5 kg (197 lb 6.4 oz)   SpO2 95%   BMI 39.22 kg/m        General: alert, awake, not in acute distress  HEENT: Head: Normal, normocephalic, atraumatic.  Eye: Normal external eye, conjunctiva, lids cornea, RACHEL.  Nose: Normal external nose, mucus membranes and septum.  Pharynx: Normal buccal mucosa. Normal pharynx.  Neck / Thyroid: Supple, no masses, nodes, nodules " or enlargement.  Lymphatics: No abnormally enlarged lymph nodes.  Chest: Normal chest wall and respirations. Clear to auscultation.  Breasts: ***   Heart: S1 S2 RRR, no murmur.   Abdomen: abdomen is soft without significant tenderness, masses, organomegaly or guarding  Extremities: normal strength, tone, and muscle mass  Skin: normal. no rash or abnormalities  CNS: non focal.    Lab Results    Recent Results (from the past 168 hour(s))   CBC with platelets   Result Value Ref Range    WBC Count 5.7 4.0 - 11.0 10e3/uL    RBC Count 5.26 (H) 3.80 - 5.20 10e6/uL    Hemoglobin 13.3 11.7 - 15.7 g/dL    Hematocrit 45.0 35.0 - 47.0 %    MCV 86 78 - 100 fL    MCH 25.3 (L) 26.5 - 33.0 pg    MCHC 29.6 (L) 31.5 - 36.5 g/dL    RDW 12.8 10.0 - 15.0 %    Platelet Count 146 (L) 150 - 450 10e3/uL   Iron binding panel   Result Value Ref Range    Iron 138 42 - 175 ug/dL    Transferrin 324 212 - 360 mg/dL    Transferrin Saturation, Calculated 34 20 - 50 %    Transferrin IBC, Calculated 405 313 - 563 ug/dL   Ferritin   Result Value Ref Range    Ferritin 32 10 - 130 ng/mL   Reticulocyte count   Result Value Ref Range    % Reticulocyte 1.3 0.8 - 2.7 %    Absolute Reticulocyte 0.067 0.010 - 0.110 10e6/uL       Imaging    No results found.    TT: *** minutes: time consisted of preparing to see the patient (eg. review of tests/medical records)-*** minutes, obtaining an/or reviewing separately obtained history, focused examination, review of the diagnosis, review of management plan (*** minutes), ordering medication, test or procedure, referring and communicating with other healthcare professionals, and documenting clinical information in the electronic or other health record ( *** minutes).    Signed by: Jessica Alejandra MD

## 2021-09-16 ENCOUNTER — OFFICE VISIT (OUTPATIENT)
Dept: FAMILY MEDICINE | Facility: CLINIC | Age: 61
End: 2021-09-16
Payer: COMMERCIAL

## 2021-09-16 VITALS
WEIGHT: 197 LBS | DIASTOLIC BLOOD PRESSURE: 68 MMHG | HEART RATE: 70 BPM | TEMPERATURE: 97.7 F | SYSTOLIC BLOOD PRESSURE: 110 MMHG | HEIGHT: 59 IN | OXYGEN SATURATION: 95 % | BODY MASS INDEX: 39.72 KG/M2

## 2021-09-16 DIAGNOSIS — E08.42 DIABETIC POLYNEUROPATHY ASSOCIATED WITH DIABETES MELLITUS DUE TO UNDERLYING CONDITION (H): Primary | ICD-10-CM

## 2021-09-16 DIAGNOSIS — G89.4 CHRONIC PAIN SYNDROME: ICD-10-CM

## 2021-09-16 DIAGNOSIS — Z23 NEED FOR IMMUNIZATION AGAINST INFLUENZA: ICD-10-CM

## 2021-09-16 DIAGNOSIS — Z75.8 LANGUAGE BARRIER AFFECTING HEALTH CARE: ICD-10-CM

## 2021-09-16 DIAGNOSIS — M47.819 FACET ARTHROPATHY, MULTILEVEL: ICD-10-CM

## 2021-09-16 DIAGNOSIS — I27.20 PULMONARY HTN (H): ICD-10-CM

## 2021-09-16 DIAGNOSIS — D50.8 IRON DEFICIENCY ANEMIA SECONDARY TO INADEQUATE DIETARY IRON INTAKE: ICD-10-CM

## 2021-09-16 DIAGNOSIS — Z79.899 POLYPHARMACY: ICD-10-CM

## 2021-09-16 DIAGNOSIS — E78.5 HYPERLIPIDEMIA LDL GOAL <70: ICD-10-CM

## 2021-09-16 DIAGNOSIS — Z60.3 LANGUAGE BARRIER AFFECTING HEALTH CARE: ICD-10-CM

## 2021-09-16 DIAGNOSIS — F33.1 MODERATE EPISODE OF RECURRENT MAJOR DEPRESSIVE DISORDER (H): ICD-10-CM

## 2021-09-16 DIAGNOSIS — E11.42 TYPE 2 DIABETES MELLITUS WITH DIABETIC POLYNEUROPATHY, WITHOUT LONG-TERM CURRENT USE OF INSULIN (H): Primary | ICD-10-CM

## 2021-09-16 PROBLEM — M12.9 ARTHROPATHY: Status: ACTIVE | Noted: 2021-09-16

## 2021-09-16 PROBLEM — G47.33 OSA (OBSTRUCTIVE SLEEP APNEA): Status: ACTIVE | Noted: 2019-04-17

## 2021-09-16 PROBLEM — F32.1 MODERATE MAJOR DEPRESSION (H): Status: ACTIVE | Noted: 2018-11-27

## 2021-09-16 PROBLEM — D50.9 IRON DEFICIENCY ANEMIA: Status: ACTIVE | Noted: 2019-01-03

## 2021-09-16 PROBLEM — F40.298 OTHER SPECIFIED PHOBIA: Status: ACTIVE | Noted: 2019-09-16

## 2021-09-16 PROBLEM — H54.7 POOR VISION: Status: ACTIVE | Noted: 2019-02-21

## 2021-09-16 LAB
HBA1C MFR BLD: 8.8 % (ref 0–5.6)
HOLD SPECIMEN: NORMAL

## 2021-09-16 PROCEDURE — 90686 IIV4 VACC NO PRSV 0.5 ML IM: CPT | Performed by: FAMILY MEDICINE

## 2021-09-16 PROCEDURE — 36415 COLL VENOUS BLD VENIPUNCTURE: CPT | Performed by: FAMILY MEDICINE

## 2021-09-16 PROCEDURE — 99215 OFFICE O/P EST HI 40 MIN: CPT | Mod: 25 | Performed by: FAMILY MEDICINE

## 2021-09-16 PROCEDURE — 90471 IMMUNIZATION ADMIN: CPT | Performed by: FAMILY MEDICINE

## 2021-09-16 PROCEDURE — 83036 HEMOGLOBIN GLYCOSYLATED A1C: CPT | Performed by: FAMILY MEDICINE

## 2021-09-16 RX ORDER — ACETAMINOPHEN 500 MG
1000 TABLET ORAL 3 TIMES DAILY PRN
Qty: 100 TABLET | Refills: 3 | Status: SHIPPED | OUTPATIENT
Start: 2021-09-16 | End: 2021-12-16

## 2021-09-16 RX ORDER — ATORVASTATIN CALCIUM 20 MG/1
20 TABLET, FILM COATED ORAL DAILY
Qty: 90 TABLET | Refills: 3 | Status: SHIPPED | OUTPATIENT
Start: 2021-09-16 | End: 2022-10-13

## 2021-09-16 RX ORDER — NAPROXEN 500 MG/1
500 TABLET ORAL DAILY PRN
COMMUNITY
End: 2021-12-16

## 2021-09-16 RX ORDER — SITAGLIPTIN AND METFORMIN HYDROCHLORIDE 1000; 50 MG/1; MG/1
TABLET, FILM COATED, EXTENDED RELEASE ORAL
Qty: 180 TABLET | Refills: 3 | Status: CANCELLED | OUTPATIENT
Start: 2021-09-16

## 2021-09-16 RX ORDER — SITAGLIPTIN AND METFORMIN HYDROCHLORIDE 1000; 50 MG/1; MG/1
1 TABLET, FILM COATED ORAL 2 TIMES DAILY WITH MEALS
Qty: 180 TABLET | Refills: 3 | Status: SHIPPED | OUTPATIENT
Start: 2021-09-16 | End: 2022-09-30

## 2021-09-16 SDOH — SOCIAL STABILITY - SOCIAL INSECURITY: ACCULTURATION DIFFICULTY: Z60.3

## 2021-09-16 ASSESSMENT — PATIENT HEALTH QUESTIONNAIRE - PHQ9: SUM OF ALL RESPONSES TO PHQ QUESTIONS 1-9: 12

## 2021-09-16 ASSESSMENT — MIFFLIN-ST. JEOR: SCORE: 1376.96

## 2021-09-16 NOTE — PROGRESS NOTES
Assessment & Plan     Diabetic polyneuropathy associated with diabetes mellitus due to underlying condition (H)  Diabetes not well controlled and her A1c has worsened  As a history of poor compliance with medications and diet  She is resistant to changing medications today    Need for immunization against influenza  - INFLUENZA VACCINE IM >6 MO VALENT IIV4 (ALFURIA/FLUZONE)    Chronic pain syndrome  - acetaminophen (TYLENOL) 500 MG tablet  Dispense: 100 tablet; Refill: 3    Pulmonary HTN (H)  Related to untreated sleep apnea.  Patient continues to refuse CPAP    Moderate episode of recurrent major depressive disorder (H)  PHQ-9 is 12.  She feels that her symptoms are just somewhat difficult and not interested in medications    Iron deficiency anemia secondary to inadequate dietary iron intake  Hemoglobin is improving with iron supplements    Hyperlipidemia LDL goal <70  - atorvastatin (LIPITOR) 20 MG tablet  Dispense: 90 tablet; Refill: 3    Facet arthropathy, multilevel  Continue continuing to have back pain.  She is not been to the spine clinic for many years.  Will refer her back to spine clinic as well as to massage therapy  - Massage Therapy Referral  - Spine Referral    Polypharmacy  Poor med compliance and missing some bottles    Language barrier affecting health care        Review of external notes as documented elsewhere in note  Diagnosis or treatment significantly limited by social determinants of health - Language barrier, low health literacy, poverty  Ordering of each unique test  Prescription drug management  50 minutes spent on the date of the encounter doing chart review, history and exam, documentation and further activities per the note      Tobacco Cessation:   reports that she has been smoking pipe and pipe. She has smoked for the past 30.00 years. She has never used smokeless tobacco.      BMI:   Estimated body mass index is 39.14 kg/m  as calculated from the following:    Height as of this  "encounter: 1.511 m (4' 11.49\").    Weight as of this encounter: 89.4 kg (197 lb).     Mei Carbone MD  [facility] NANCY Lugo professional phone  used.     Trixie Sofia is a 61 year old female who presents today for the following   health issues:      DM-   A1c 8.0 on 2/18/21 -> 8.8 on 9/16/21  Meds: Janumet-XR, glipidizide   eye neg retinopathy 8/27/20 - - eye exam 7/19/21 - needs eye surgery but was given too short of notice to get pre-op  LDL 67 on 2/18/21 - on lipitor  BP wnl  microalbumin wml 8/4/20 - not on lisinopril b/c low BP  smoker     Obesity - 200# with BMI 39.6 on 11/5/20 -> 198# and BMI 39.2 on 2/18/21 -> 192#  With BMI 37.9 -> 197# with BMI 39.1    Iron deficiency anemia -   heme consult note reviewed from 9/9/21 and f/u in 3 months and continue iron supplements once daily   hgb 5.9 on 6/1/21 -> 9.6 -> 13.3 on 9/8/21  cologuard neg 7 /8/21  Advised to stop Naproxen b/c gastritis but patient wants for break through pain (taking about 1-2x per week)    Chronic pain   Stabbing pain - lower back and buttock for a few years  MRI lumbar 8/2015:  1.  Overall, the findings are not significantly changed from the prior lumbar spine MRI from 10/16/2012.  2.  Redemonstration of multilevel facet arthropathy, worst at L4-L5 and L5-S1.  3.  No significant change in the broad-based disc bulges with annular fissures at L4-L5 and L5-S1. No high-grade spinal canal or neuroforaminal narrowings.  MRI cervical MRI 12/2017:  1.  No abnormal cord signal or enhancement.  2.  Mild cervical spondylosis with mild narrowing of the canal.    Pain meds:tyelnol, naproxen -  Tramadol stopped b/c concerns about facial swelling but she is confused about her meds  Reports taking tylenol 1000mg bid and naproxen 500mg x2 at once about 2 times per week   She insists naproxen is the only thing that helps with severe pain.   Previously treated with massage and PT     Pulm HTN from DEIRDRE  Refuses to " "use CPAP  On lasix     Polypharmacy   Reconciled med list with bottles and discussed with PCA    Poor compliance with tx recommendations    Needs flu shot      Review of Systems     Please see above.  The rest of the review of systems are negative for all systems.        Objective   Vitals:    09/16/21 0912   BP: 110/68   BP Location: Right arm   Patient Position: Sitting   Cuff Size: Adult Regular   Pulse: 70   Temp: 97.7  F (36.5  C)   TempSrc: Oral   SpO2: 95%   Weight: 89.4 kg (197 lb)   Height: 1.511 m (4' 11.49\")       Body mass index is 39.14 kg/m .    Physical Exam    OBJECTIVE:  Vitals listed above within normal limits  General appearance: well groomed, pleasant, well hydrated, nontoxic appearing  ENT: PERRL, throat clear  Neck: neck supple, no lymphadenopathy, no thyromegaly  Lungs: lungs clear to auscultation bilaterally, no wheezes or rhonchi  Heart: regular rate and rhythm, no murmurs, rubs or gallops  Abdomen: soft, nontender  Neuro: no focal deficits, CN II-XII grossly intact, alert and oriented  Psych:  mood stable, appears to have good insight and judgment    Recent Results (from the past 1008 hour(s))   CBC with platelets    Collection Time: 09/08/21  8:54 AM   Result Value Ref Range    WBC Count 5.7 4.0 - 11.0 10e3/uL    RBC Count 5.26 (H) 3.80 - 5.20 10e6/uL    Hemoglobin 13.3 11.7 - 15.7 g/dL    Hematocrit 45.0 35.0 - 47.0 %    MCV 86 78 - 100 fL    MCH 25.3 (L) 26.5 - 33.0 pg    MCHC 29.6 (L) 31.5 - 36.5 g/dL    RDW 12.8 10.0 - 15.0 %    Platelet Count 146 (L) 150 - 450 10e3/uL   Iron binding panel    Collection Time: 09/08/21  8:54 AM   Result Value Ref Range    Iron 138 42 - 175 ug/dL    Transferrin 324 212 - 360 mg/dL    Transferrin Saturation, Calculated 34 20 - 50 %    Transferrin IBC, Calculated 405 313 - 563 ug/dL   Ferritin    Collection Time: 09/08/21  8:54 AM   Result Value Ref Range    Ferritin 32 10 - 130 ng/mL   Reticulocyte count    Collection Time: 09/08/21  8:54 AM   Result " Value Ref Range    % Reticulocyte 1.3 0.8 - 2.7 %    Absolute Reticulocyte 0.067 0.010 - 0.110 10e6/uL   Hemoglobin A1c    Collection Time: 09/16/21  9:08 AM   Result Value Ref Range    Hemoglobin A1C 8.8 (H) 0.0 - 5.6 %

## 2021-09-19 DIAGNOSIS — D50.8 IRON DEFICIENCY ANEMIA SECONDARY TO INADEQUATE DIETARY IRON INTAKE: Primary | ICD-10-CM

## 2021-09-19 RX ORDER — FERROUS SULFATE 325(65) MG
TABLET ORAL
Qty: 180 TABLET | Refills: 3 | Status: SHIPPED | OUTPATIENT
Start: 2021-09-19 | End: 2022-09-30

## 2021-09-19 NOTE — TELEPHONE ENCOUNTER
"Last Written Prescription Date:  5/20/2021  Last Fill Quantity: 60,  # refills: 3   Last office visit provider:  9/16/2021     ferrous sulfate 325 (65 FE) MG tablet 60 tablet 3 5/20/2021  --   Sig - Route: Take 1 tablet (325 mg total) by mouth 2 (two) times a day. - Oral   Sent to pharmacy as: ferrous sulfate 325 mg (65 mg iron) tablet   E-Prescribing Status: Receipt confirmed by pharmacy (5/20/2021 11:33 AM CDT         Requested Prescriptions   Pending Prescriptions Disp Refills     ferrous sulfate (FEROSUL) 325 (65 Fe) MG tablet [Pharmacy Med Name: FERROUS SULFATE 325 MG  (65 FE) Tablet] 60 tablet 3     Sig: TAKE 1 TABLET (325 MG TOTAL) BY MOUTH 2 (TWO) TIMES A DAY.       Iron Supplements Passed - 9/19/2021 10:25 AM        Passed - Patient is 12 years of age or older        Passed - Recent (12 mo) or future (30 days) visit within the authorizing provider's specialty     Patient has had an office visit with the authorizing provider or a provider within the authorizing providers department within the previous 12 mos or has a future within next 30 days. See \"Patient Info\" tab in inbasket, or \"Choose Columns\" in Meds & Orders section of the refill encounter.              Passed - Hgb OR Hct on record within the past 12 mos.     Patient need only have had a HGB or HCT on file in the past 12 mos. That result does not need to be normal.    Recent Labs   Lab Test 09/08/21  0854 07/06/21  1443 06/01/21  0958   HGB 13.3 9.6* 5.9*     Recent Labs   Lab Test 09/08/21  0854 07/06/21  1443 06/01/21  0958   HCT 45.0 35.5 23.9*       Please verify a HGB or HCT has been checked SINCE THE LAST DOSE CHANGE.            Passed - Medication is active on med list             Maryellen Dietz RN 09/19/21 6:37 PM  "

## 2021-09-23 PROCEDURE — G0179 MD RECERTIFICATION HHA PT: HCPCS | Performed by: FAMILY MEDICINE

## 2021-09-25 DIAGNOSIS — E87.6 HYPOKALEMIA: Primary | ICD-10-CM

## 2021-09-25 RX ORDER — POTASSIUM CHLORIDE 1500 MG/1
TABLET, EXTENDED RELEASE ORAL
Qty: 90 TABLET | Refills: 3 | Status: SHIPPED | OUTPATIENT
Start: 2021-09-25 | End: 2022-10-28

## 2021-09-26 NOTE — TELEPHONE ENCOUNTER
"Last Written Prescription:        Last office visit provider:  9/16/21     Requested Prescriptions   Pending Prescriptions Disp Refills     potassium chloride ER (KLOR-CON M) 20 MEQ CR tablet [Pharmacy Med Name: POTASSIUM CHLOR 20 MEQ ER 20 Tablet] 90 tablet 2     Sig: TAKE 1 TABLET (20 MEQ TOTAL) BY MOUTH DAILY.       Potassium Supplements Protocol Passed - 9/25/2021 10:08 AM        Passed - Recent (12 mo) or future (30 days) visit within the authorizing provider's department     Patient has had an office visit with the authorizing provider or a provider within the authorizing providers department within the previous 12 mos or has a future within next 30 days. See \"Patient Info\" tab in inbasket, or \"Choose Columns\" in Meds & Orders section of the refill encounter.              Passed - Medication is active on med list        Passed - Patient is age 18 or older        Passed - Normal serum potassium in past 12 months     Recent Labs   Lab Test 07/06/21  1443   POTASSIUM 3.5                         Nuria Wan RN 09/25/21 7:46 PM  "

## 2021-09-27 DIAGNOSIS — K21.00 GASTROESOPHAGEAL REFLUX DISEASE WITH ESOPHAGITIS WITHOUT HEMORRHAGE: Primary | ICD-10-CM

## 2021-09-27 DIAGNOSIS — Z53.9 DIAGNOSIS NOT YET DEFINED: Primary | ICD-10-CM

## 2021-10-05 NOTE — PROGRESS NOTES
ASSESSMENT: Trixie Sofia is a 61 year old female with past medical history significant for type 2 diabetes mellitus with polyneuropathy, chronic pain disorder, obstructive sleep apnea, morbid obesity, vitamin D deficiency, hyperlipidemia, pulmonary hypertension, iron deficiency anemia, depression who presents today for new patient evaluation of chronic low back pain.  She has pain at the midline at the lumbosacral junction and in the right lower lumbar/upper buttock region.  My review of an MRI lumbar spine from 2015 showed moderate to severe facet arthropathy L4-5 and L5-S1.  Pain is most consistent with lumbar facet arthropathy.  She may also have right sacroiliac joint dysfunction.  She has sensory deficit both feet stocking distribution.      PLAN:  A shared decision making model was used.  The patient's values and choices were respected.  The following represents what was discussed and decided upon by the physician assistant and the patient.  A telephone  was present for the visit.  Patient's PCA is also present for the visit and helps provide history.    1.  DIAGNOSTIC TESTS: I reviewed the MRI lumbar spine from 2015.  I ordered an updated MRI lumbar spine for further evaluation.    2.  PHYSICAL THERAPY:  Discussed the importance of core strengthening, ROM, stretching exercises with the patient and how each of these entities is important in decreasing pain.  Explained to the patient that the purpose of physical therapy is to teach the patient a home exercise program.  These exercises need to be performed every day in order to decrease pain and prevent future occurrences of pain.  I entered a referral for physical therapy.    3.  MEDICATIONS:    -I offered duloxetine 20 mg at bedtime.  Patient declined.  She feels like she takes too many pills already.  -Patient has been advised to limit naproxen due to comorbidities.  She takes 2 tabs approximately once or twice per week.  -Tylenol is not  helpful.  -Patient cannot tolerate gabapentin due to swelling and dizziness.  -Patient does not recall her response to amitriptyline.    4.  INTERVENTIONS: No interventions were ordered.  Patient indicated she would like to avoid interventional pain management.  She tried injections years ago and they did not help.    5.  PATIENT EDUCATION: Patient is in agreement the above plan.  All questions were answered.    6.  FOLLOW-UP:   I will see the patient back in the clinic in 2 weeks to review the MRI lumbar spine.  If she has questions or concerns in the meantime, she should not hesitate to call.      SUBJECTIVE:  Trixie Sofia  Is a 61 year old female who presents today in consultation at request of Dr. Carbone For new patient evaluation of low back pain.  Patient reports that she began to have pain 8 or 9 years ago.  She denies any injury or event to cause the pain.  She states that the pain waxes and wanes in intensity.    Patient complains of midline back pain.  She points L5-S1 and S2 point to the pain.  She also complains of pain in the right lower back that radiates into the upper buttock.  She denies any pain radiating further down the legs.  Pain is aggravated with standing and walking more than 15 minutes.  Pain is alleviated with lying on her back.  She has chronic numbness and tingling in both hands and both feet due to neuropathy.  She denies weakness.  She denies loss of bowel or bladder control.  Denies any recent fevers, chills, sweats.    Patient did physical therapy in 2015.  She did not think this was helpful.  She does not do any specific exercises for her back but she does try to walk.  She has had chiropractic treatment which provided relief of her pain, but she has not been back since Covid.  She had injections in her back years ago which were not helpful.  She has not had any spine surgeries.  She takes naproxen 2 tabs about once or twice per week.  She states her primary care provider has  recommended she avoid this medication but it is the only thing that helps her.  Tylenol does not help.  She did not tolerate gabapentin due to swelling and dizziness.  She does not recall her response to amitriptyline.    Current Outpatient Medications   Medication     naproxen (NAPROSYN) 500 MG tablet     acetaminophen (TYLENOL) 500 MG tablet     artificial tears SOLN     atorvastatin (LIPITOR) 20 MG tablet     blood glucose (CONTOUR NEXT TEST) test strip     Blood Glucose Monitoring Suppl (GLUCOCOM BLOOD GLUCOSE MONITOR) LUIS FELIPE     Cholecalciferol (VITAMIN D) 2000 UNITS tablet     ergocalciferol (ERGOCALCIFEROL) 1.25 MG (06911 UT) capsule     ferrous sulfate (FEROSUL) 325 (65 Fe) MG tablet     Fingerstix Lancets MISC     furosemide (LASIX) 40 MG tablet     glipiZIDE (GLUCOTROL XL) 10 MG 24 hr tablet     omeprazole (PRILOSEC) 20 MG DR capsule     polyethylene glycol 3350 POWD     potassium chloride ER (KLOR-CON M) 20 MEQ CR tablet     pravastatin (PRAVACHOL) 10 MG tablet     sitagliptin-metFORMIN (JANUMET)  MG tablet     No current facility-administered medications for this visit.       Allergies   Allergen Reactions     Aspirin Unknown      caused puffy face, numbness, heart palpitations       Past Medical History:   Diagnosis Date     Degeneration of lumbar or lumbosacral intervertebral disc      Depression      Diabetes mellitus (H)      Essential hypertension      DEIRDRE (obstructive sleep apnea) 4/17/2019    See sleep study of Dr. Leung     Pulmonary hypertension due to sleep-disordered breathing (H) 01/03/2019    see 4/17/2019 sleep study     Sciatica      Symptomatic menopausal or female climacteric states 05/2014    Mei Carbone: no LMP for over a year as of 5/2014      Vertigo 4/28/2015        Patient Active Problem List   Diagnosis     Health Care Home     Atypical chest pain     Major depressive disorder, recurrent episode (H)     Colonoscopy ( Fiberoptic)     Constipation     Edema     Fatigue      Gastroenteritis     Hyperlipidemia     Lower back pain     Routine general medical examination at a health care facility     Morbid obesity (H)     Leukoplakia of oral mucosa, including tongue     Pain in limb     Shortness of breath     Vitamin D deficiency     Diabetes mellitus, type 2 (H)     Pulmonary HTN (H)     Diabetic polyneuropathy associated with diabetes mellitus due to underlying condition (H)     Arthropathy     Chronic pain disorder     Polypharmacy     Iron deficiency anemia     Moderate major depression (H)     DEIRDRE (obstructive sleep apnea)     Other specified phobia     Poor vision       Past Surgical History:   Procedure Laterality Date     TONGUE SURGERY Left 2006    surgery in [place] for mass on Tongue       Family History   Problem Relation Age of Onset     Diabetes Mother      Hypertension Mother      Hyperlipidemia Mother      Diabetes Sister      Hypertension Sister      No Known Problems Brother      Diabetes Sister      Hypertension Sister      Diabetes Sister      Hypertension Sister      No Known Problems Sister      No Known Problems Daughter      Breast Cancer No family hx of      Congenital heart disease No family hx of        Social history: Patient is .  She does not work outside the home.  She smokes tobacco.  She denies alcohol use.  Denies illicit drug use.      ROS: Positive for ringing in ears, changes in vision, eye pain, joint pain, muscle pain, muscle fatigue, depression.  Specifically negative for bowel/bladder dysfunction, fevers,chills, appetite changes, unexplained weight loss.   Otherwise 13 systems reviewed are negative.  Please see the patient's intake questionnaire from today for details.      OBJECTIVE:  PHYSICAL EXAMINATION:    CONSTITUTIONAL:  Vital signs as above.  No acute distress.  The patient is well nourished and well groomed.  Patient is obese.  PSYCHIATRIC:  The patient is awake, alert, oriented to person, place, time and answering questions  appropriately with clear speech.    HEENT: Normocephalic, atraumatic.  Sclera clear.  Neck is supple.  SKIN:  Skin over the face, bilateral lower extremities, and posterior torso is clean, dry, intact without rashes.    GAIT:  Gait is non-antalgic.  The patient is able to rise in the toes and heels bilaterally with support.  STANDING EXAMINATION: No significant tenderness palpation bilateral lower lumbar paraspinous muscles.  No tenderness palpation midline lumbar spinous processes.  No tenderness palpation bilateral sacroiliac joints.  Lumbar flexion is intact.  Lumbar extension mildly restricted.  MUSCLE STRENGTH:  The patient has 5/5 strength for the bilateral hip flexors, knee flexors/extensors, ankle dorsiflexors/plantar flexors, great toe extensors, ankle evertors/invertors.  NEUROLOGICAL: Absent patellar, and achilles reflexes bilaterally.  Negative Babinski's bilaterally.  No ankle clonus bilaterally.  Diminished sensation both feet stocking distribution.  VASCULAR:  2/4 dorsalis pedis pulses bilaterally.  Bilateral lower extremities are warm.  There is no pitting edema of the bilateral lower extremities.  ABDOMINAL:  Soft, non-distended, non-tender throughout all quadrants.  No pulsatile mass palpated in the left lower quadrant.  LYMPH NODES:  No palpable or tender inguinal lymph nodes.  MUSCULOSKELETAL: Straight leg raise negative bilaterally.    RESULTS:  I reviewed the MRI lumbar spine from [facility] dated August 7, 2015.  Patient has facet arthropathy throughout the lumbar spine which is mild T12-L1, moderate to severe L4-5, and moderate at all other levels.  At L4-5 there is a disc bulge and annular fissure with minimal central canal stenosis.  At L5-S1 there is a disc bulge and fissure with mild left foraminal stenosis.

## 2021-10-07 ENCOUNTER — OFFICE VISIT (OUTPATIENT)
Dept: PHYSICAL MEDICINE AND REHAB | Facility: CLINIC | Age: 61
End: 2021-10-07
Attending: FAMILY MEDICINE
Payer: COMMERCIAL

## 2021-10-07 VITALS
DIASTOLIC BLOOD PRESSURE: 59 MMHG | BODY MASS INDEX: 40.06 KG/M2 | HEART RATE: 66 BPM | HEIGHT: 59 IN | SYSTOLIC BLOOD PRESSURE: 124 MMHG | WEIGHT: 198.7 LBS

## 2021-10-07 DIAGNOSIS — M47.816 LUMBAR FACET ARTHROPATHY: Primary | ICD-10-CM

## 2021-10-07 DIAGNOSIS — M54.50 LUMBAR SPINE PAIN: ICD-10-CM

## 2021-10-07 PROCEDURE — 99204 OFFICE O/P NEW MOD 45 MIN: CPT | Performed by: PHYSICIAN ASSISTANT

## 2021-10-07 ASSESSMENT — MIFFLIN-ST. JEOR: SCORE: 1384.7

## 2021-10-07 ASSESSMENT — PAIN SCALES - GENERAL: PAINLEVEL: MODERATE PAIN (5)

## 2021-10-07 NOTE — LETTER
10/7/2021         RE: [address]  [address]  [place] [address]        Dear Colleague,    Thank you for referring your patient, Trixie Sofia, to the [facility] [place]. Please see a copy of my visit note below.    ASSESSMENT: Trixie Sofia is a 61 year old female with past medical history significant for type 2 diabetes mellitus with polyneuropathy, chronic pain disorder, obstructive sleep apnea, morbid obesity, vitamin D deficiency, hyperlipidemia, pulmonary hypertension, iron deficiency anemia, depression who presents today for new patient evaluation of chronic low back pain.  She has pain at the midline at the lumbosacral junction and in the right lower lumbar/upper buttock region.  My review of an MRI lumbar spine from 2015 showed moderate to severe facet arthropathy L4-5 and L5-S1.  Pain is most consistent with lumbar facet arthropathy.  She may also have right sacroiliac joint dysfunction.  She has sensory deficit both feet stocking distribution.      PLAN:  A shared decision making model was used.  The patient's values and choices were respected.  The following represents what was discussed and decided upon by the physician assistant and the patient.  A telephone  was present for the visit.  Patient's PCA is also present for the visit and helps provide history.    1.  DIAGNOSTIC TESTS: I reviewed the MRI lumbar spine from 2015.  I ordered an updated MRI lumbar spine for further evaluation.    2.  PHYSICAL THERAPY:  Discussed the importance of core strengthening, ROM, stretching exercises with the patient and how each of these entities is important in decreasing pain.  Explained to the patient that the purpose of physical therapy is to teach the patient a home exercise program.  These exercises need to be performed every day in order to decrease pain and prevent future occurrences of pain.  I entered a referral for physical therapy.    3.  MEDICATIONS:    -I offered  duloxetine 20 mg at bedtime.  Patient declined.  She feels like she takes too many pills already.  -Patient has been advised to limit naproxen due to comorbidities.  She takes 2 tabs approximately once or twice per week.  -Tylenol is not helpful.  -Patient cannot tolerate gabapentin due to swelling and dizziness.  -Patient does not recall her response to amitriptyline.    4.  INTERVENTIONS: No interventions were ordered.  Patient indicated she would like to avoid interventional pain management.  She tried injections years ago and they did not help.    5.  PATIENT EDUCATION: Patient is in agreement the above plan.  All questions were answered.    6.  FOLLOW-UP:   I will see the patient back in the clinic in 2 weeks to review the MRI lumbar spine.  If she has questions or concerns in the meantime, she should not hesitate to call.      SUBJECTIVE:  Trixie Sofia  Is a 61 year old female who presents today in consultation at request of Dr. Carbone For new patient evaluation of low back pain.  Patient reports that she began to have pain 8 or 9 years ago.  She denies any injury or event to cause the pain.  She states that the pain waxes and wanes in intensity.    Patient complains of midline back pain.  She points L5-S1 and S2 point to the pain.  She also complains of pain in the right lower back that radiates into the upper buttock.  She denies any pain radiating further down the legs.  Pain is aggravated with standing and walking more than 15 minutes.  Pain is alleviated with lying on her back.  She has chronic numbness and tingling in both hands and both feet due to neuropathy.  She denies weakness.  She denies loss of bowel or bladder control.  Denies any recent fevers, chills, sweats.    Patient did physical therapy in 2015.  She did not think this was helpful.  She does not do any specific exercises for her back but she does try to walk.  She has had chiropractic treatment which provided relief of her pain, but she has  not been back since Covid.  She had injections in her back years ago which were not helpful.  She has not had any spine surgeries.  She takes naproxen 2 tabs about once or twice per week.  She states her primary care provider has recommended she avoid this medication but it is the only thing that helps her.  Tylenol does not help.  She did not tolerate gabapentin due to swelling and dizziness.  She does not recall her response to amitriptyline.    Current Outpatient Medications   Medication     naproxen (NAPROSYN) 500 MG tablet     acetaminophen (TYLENOL) 500 MG tablet     artificial tears SOLN     atorvastatin (LIPITOR) 20 MG tablet     blood glucose (CONTOUR NEXT TEST) test strip     Blood Glucose Monitoring Suppl (GLUCOCOM BLOOD GLUCOSE MONITOR) LUIS FELIPE     Cholecalciferol (VITAMIN D) 2000 UNITS tablet     ergocalciferol (ERGOCALCIFEROL) 1.25 MG (11538 UT) capsule     ferrous sulfate (FEROSUL) 325 (65 Fe) MG tablet     Fingerstix Lancets MISC     furosemide (LASIX) 40 MG tablet     glipiZIDE (GLUCOTROL XL) 10 MG 24 hr tablet     omeprazole (PRILOSEC) 20 MG DR capsule     polyethylene glycol 3350 POWD     potassium chloride ER (KLOR-CON M) 20 MEQ CR tablet     pravastatin (PRAVACHOL) 10 MG tablet     sitagliptin-metFORMIN (JANUMET)  MG tablet     No current facility-administered medications for this visit.       Allergies   Allergen Reactions     Aspirin Unknown      caused puffy face, numbness, heart palpitations       Past Medical History:   Diagnosis Date     Degeneration of lumbar or lumbosacral intervertebral disc      Depression      Diabetes mellitus (H)      Essential hypertension      DEIRDRE (obstructive sleep apnea) 4/17/2019    See sleep study of Dr. Leung     Pulmonary hypertension due to sleep-disordered breathing (H) 01/03/2019    see 4/17/2019 sleep study     Sciatica      Symptomatic menopausal or female climacteric states 05/2014    Mei Carbone: no LMP for over a year as of 5/2014       Vertigo 4/28/2015        Patient Active Problem List   Diagnosis     Health Care Home     Atypical chest pain     Major depressive disorder, recurrent episode (H)     Colonoscopy ( Fiberoptic)     Constipation     Edema     Fatigue     Gastroenteritis     Hyperlipidemia     Lower back pain     Routine general medical examination at a health care facility     Morbid obesity (H)     Leukoplakia of oral mucosa, including tongue     Pain in limb     Shortness of breath     Vitamin D deficiency     Diabetes mellitus, type 2 (H)     Pulmonary HTN (H)     Diabetic polyneuropathy associated with diabetes mellitus due to underlying condition (H)     Arthropathy     Chronic pain disorder     Polypharmacy     Iron deficiency anemia     Moderate major depression (H)     DEIRDRE (obstructive sleep apnea)     Other specified phobia     Poor vision       Past Surgical History:   Procedure Laterality Date     TONGUE SURGERY Left 2006    surgery in [place] for mass on Tongue       Family History   Problem Relation Age of Onset     Diabetes Mother      Hypertension Mother      Hyperlipidemia Mother      Diabetes Sister      Hypertension Sister      No Known Problems Brother      Diabetes Sister      Hypertension Sister      Diabetes Sister      Hypertension Sister      No Known Problems Sister      No Known Problems Daughter      Breast Cancer No family hx of      Congenital heart disease No family hx of        Social history: Patient is .  She does not work outside the home.  She smokes tobacco.  She denies alcohol use.  Denies illicit drug use.      ROS: Positive for ringing in ears, changes in vision, eye pain, joint pain, muscle pain, muscle fatigue, depression.  Specifically negative for bowel/bladder dysfunction, fevers,chills, appetite changes, unexplained weight loss.   Otherwise 13 systems reviewed are negative.  Please see the patient's intake questionnaire from today for details.      OBJECTIVE:  PHYSICAL  EXAMINATION:    CONSTITUTIONAL:  Vital signs as above.  No acute distress.  The patient is well nourished and well groomed.  Patient is obese.  PSYCHIATRIC:  The patient is awake, alert, oriented to person, place, time and answering questions appropriately with clear speech.    HEENT: Normocephalic, atraumatic.  Sclera clear.  Neck is supple.  SKIN:  Skin over the face, bilateral lower extremities, and posterior torso is clean, dry, intact without rashes.    GAIT:  Gait is non-antalgic.  The patient is able to rise in the toes and heels bilaterally with support.  STANDING EXAMINATION: No significant tenderness palpation bilateral lower lumbar paraspinous muscles.  No tenderness palpation midline lumbar spinous processes.  No tenderness palpation bilateral sacroiliac joints.  Lumbar flexion is intact.  Lumbar extension mildly restricted.  MUSCLE STRENGTH:  The patient has 5/5 strength for the bilateral hip flexors, knee flexors/extensors, ankle dorsiflexors/plantar flexors, great toe extensors, ankle evertors/invertors.  NEUROLOGICAL: Absent patellar, and achilles reflexes bilaterally.  Negative Babinski's bilaterally.  No ankle clonus bilaterally.  Diminished sensation both feet stocking distribution.  VASCULAR:  2/4 dorsalis pedis pulses bilaterally.  Bilateral lower extremities are warm.  There is no pitting edema of the bilateral lower extremities.  ABDOMINAL:  Soft, non-distended, non-tender throughout all quadrants.  No pulsatile mass palpated in the left lower quadrant.  LYMPH NODES:  No palpable or tender inguinal lymph nodes.  MUSCULOSKELETAL: Straight leg raise negative bilaterally.    RESULTS:  I reviewed the MRI lumbar spine from [facility] dated August 7, 2015.  Patient has facet arthropathy throughout the lumbar spine which is mild T12-L1, moderate to severe L4-5, and moderate at all other levels.  At L4-5 there is a disc bulge and annular fissure with minimal central canal stenosis.  At L5-S1 there is  a disc bulge and fissure with mild left foraminal stenosis.                Again, thank you for allowing me to participate in the care of your patient.        Sincerely,        Malika Forman PA-C

## 2021-10-14 DIAGNOSIS — Z76.0 ENCOUNTER FOR MEDICATION REFILL: Primary | ICD-10-CM

## 2021-10-19 PROBLEM — F32.9 MAJOR DEPRESSION: Status: ACTIVE | Noted: 2018-11-27

## 2021-10-19 RX ORDER — LANCETS 30 GAUGE
EACH MISCELLANEOUS
Qty: 100 EACH | Refills: 0 | Status: SHIPPED | OUTPATIENT
Start: 2021-10-19 | End: 2022-08-25

## 2021-10-25 DIAGNOSIS — Z76.0 ENCOUNTER FOR MEDICATION REFILL: Primary | ICD-10-CM

## 2021-10-26 RX ORDER — FUROSEMIDE 40 MG
TABLET ORAL
Qty: 180 TABLET | Refills: 3 | Status: SHIPPED | OUTPATIENT
Start: 2021-10-26 | End: 2022-09-28

## 2021-11-22 DIAGNOSIS — Z53.9 DIAGNOSIS NOT YET DEFINED: Primary | ICD-10-CM

## 2021-11-23 PROCEDURE — G0179 MD RECERTIFICATION HHA PT: HCPCS | Performed by: FAMILY MEDICINE

## 2021-12-16 ENCOUNTER — OFFICE VISIT (OUTPATIENT)
Dept: FAMILY MEDICINE | Facility: CLINIC | Age: 61
End: 2021-12-16
Payer: COMMERCIAL

## 2021-12-16 ENCOUNTER — TELEPHONE (OUTPATIENT)
Dept: FAMILY MEDICINE | Facility: CLINIC | Age: 61
End: 2021-12-16

## 2021-12-16 VITALS
RESPIRATION RATE: 18 BRPM | TEMPERATURE: 98.3 F | HEIGHT: 59 IN | HEART RATE: 91 BPM | DIASTOLIC BLOOD PRESSURE: 66 MMHG | SYSTOLIC BLOOD PRESSURE: 98 MMHG | OXYGEN SATURATION: 96 % | WEIGHT: 195.5 LBS | BODY MASS INDEX: 39.41 KG/M2

## 2021-12-16 DIAGNOSIS — E08.42 DIABETIC POLYNEUROPATHY ASSOCIATED WITH DIABETES MELLITUS DUE TO UNDERLYING CONDITION (H): ICD-10-CM

## 2021-12-16 DIAGNOSIS — M47.819 FACET ARTHROPATHY OF SPINE: ICD-10-CM

## 2021-12-16 DIAGNOSIS — I27.20 PULMONARY HTN (H): ICD-10-CM

## 2021-12-16 DIAGNOSIS — Z79.899 POLYPHARMACY: ICD-10-CM

## 2021-12-16 DIAGNOSIS — Z75.8 LANGUAGE BARRIER AFFECTING HEALTH CARE: ICD-10-CM

## 2021-12-16 DIAGNOSIS — G89.4 CHRONIC PAIN SYNDROME: ICD-10-CM

## 2021-12-16 DIAGNOSIS — D50.8 IRON DEFICIENCY ANEMIA SECONDARY TO INADEQUATE DIETARY IRON INTAKE: ICD-10-CM

## 2021-12-16 DIAGNOSIS — Z60.3 LANGUAGE BARRIER AFFECTING HEALTH CARE: ICD-10-CM

## 2021-12-16 DIAGNOSIS — E87.6 HYPOKALEMIA: ICD-10-CM

## 2021-12-16 DIAGNOSIS — E11.42 TYPE 2 DIABETES MELLITUS WITH DIABETIC POLYNEUROPATHY, WITHOUT LONG-TERM CURRENT USE OF INSULIN (H): Primary | ICD-10-CM

## 2021-12-16 PROBLEM — Z59.6 POVERTY: Status: ACTIVE | Noted: 2019-04-23

## 2021-12-16 PROBLEM — I27.29 PULMONARY HYPERTENSION DUE TO SLEEP-DISORDERED BREATHING (H): Status: ACTIVE | Noted: 2019-01-03

## 2021-12-16 PROBLEM — G47.8 PULMONARY HYPERTENSION DUE TO SLEEP-DISORDERED BREATHING (H): Status: ACTIVE | Noted: 2019-01-03

## 2021-12-16 PROBLEM — L43.9 LICHEN PLANUS: Status: ACTIVE | Noted: 2021-12-16

## 2021-12-16 PROBLEM — F17.200 SMOKER: Status: ACTIVE | Noted: 2019-04-23

## 2021-12-16 LAB
ANION GAP SERPL CALCULATED.3IONS-SCNC: 10 MMOL/L (ref 5–18)
BUN SERPL-MCNC: 10 MG/DL (ref 8–22)
CALCIUM SERPL-MCNC: 9.8 MG/DL (ref 8.5–10.5)
CHLORIDE BLD-SCNC: 92 MMOL/L (ref 98–107)
CO2 SERPL-SCNC: 35 MMOL/L (ref 22–31)
CREAT SERPL-MCNC: 0.89 MG/DL (ref 0.6–1.1)
CREAT UR-MCNC: 12 MG/DL
GFR SERPL CREATININE-BSD FRML MDRD: 71 ML/MIN/1.73M2
GLUCOSE BLD-MCNC: 284 MG/DL (ref 70–125)
HBA1C MFR BLD: 10.5 % (ref 0–5.6)
MICROALBUMIN UR-MCNC: <0.5 MG/DL (ref 0–1.99)
MICROALBUMIN/CREAT UR: NORMAL MG/G{CREAT}
POTASSIUM BLD-SCNC: 4.1 MMOL/L (ref 3.5–5)
SODIUM SERPL-SCNC: 137 MMOL/L (ref 136–145)

## 2021-12-16 PROCEDURE — 99214 OFFICE O/P EST MOD 30 MIN: CPT | Performed by: FAMILY MEDICINE

## 2021-12-16 PROCEDURE — 80048 BASIC METABOLIC PNL TOTAL CA: CPT | Performed by: FAMILY MEDICINE

## 2021-12-16 PROCEDURE — 36415 COLL VENOUS BLD VENIPUNCTURE: CPT | Performed by: FAMILY MEDICINE

## 2021-12-16 PROCEDURE — 83036 HEMOGLOBIN GLYCOSYLATED A1C: CPT | Performed by: FAMILY MEDICINE

## 2021-12-16 PROCEDURE — 82043 UR ALBUMIN QUANTITATIVE: CPT | Performed by: FAMILY MEDICINE

## 2021-12-16 RX ORDER — GLIPIZIDE 10 MG/1
TABLET, FILM COATED, EXTENDED RELEASE ORAL
Qty: 90 TABLET | Refills: 3 | Status: SHIPPED | OUTPATIENT
Start: 2021-12-16 | End: 2023-02-20

## 2021-12-16 RX ORDER — NAPROXEN 500 MG/1
TABLET ORAL
Qty: 30 TABLET | Refills: 3 | Status: SHIPPED | OUTPATIENT
Start: 2021-12-16 | End: 2022-03-04

## 2021-12-16 SDOH — SOCIAL STABILITY - SOCIAL INSECURITY: ACCULTURATION DIFFICULTY: Z60.3

## 2021-12-16 ASSESSMENT — MIFFLIN-ST. JEOR: SCORE: 1362.41

## 2021-12-16 NOTE — TELEPHONE ENCOUNTER
----- Message from Mei Carbone MD sent at 12/16/2021  1:44 PM CST -----  Please notify patient and friend Rishabh at [phone]  Her A1c is 10.5 - much worse than before. She really needs to restart the glipizide.   Thanks,       Dr. Mei Carbone  12/16/2021

## 2021-12-16 NOTE — PROGRESS NOTES
Assessment & Plan     Type 2 diabetes mellitus with diabetic polyneuropathy, without long-term current use of insulin (H)  Diabetic polyneuropathy associated with diabetes mellitus due to underlying condition (H)  Blood sugars poorly controlled likely b/c she has been out of glipizide  Will restart glipizide  Continue other regimen   - glipiZIDE (GLUCOTROL XL) 10 MG 24 hr tablet  Dispense: 90 tablet; Refill: 3  - Albumin Random Urine Quantitative with Creat Ratio  - Hemoglobin A1c    Iron deficiency anemia secondary to inadequate dietary iron intake  Continue iron supplements    Pulmonary HTN (H)  This is a known issue that I take into account for medical decisions but no current exacerbation or new concerns.  She continues to refuse sleep study or CPAP    Chronic pain syndrome  Facet arthropathy of spine  Recheck MRI for possible worsening  We had stopped the naproxen b/c of her gastritis but she insists this is the only med that really helps. She reports only taking 1-3 x per week, so I agreed to give refill since her gastritis has improved  - naproxen (NAPROSYN) 500 MG tablet  Dispense: 30 tablet; Refill: 3  - MR Lumbar Spine w/o Contrast    Hypokalemia  Recheck   - Basic metabolic panel  (Ca, Cl, CO2, Creat, Gluc, K, Na, BUN)    Polypharmacy  Reviewed meds and med bottles     Language barrier affecting health care        Review of external notes as documented elsewhere in note  Ordering of each unique test  Prescription drug management         Tobacco Cessation:   reports that she has been smoking pipe and pipe. She has smoked for the past 30.00 years. She has never used smokeless tobacco.      Return in about 3 months (around 3/16/2022) for Follow up, with me, in person, for DM, for med check.    Mei Carbone MD  [facility] professional phone  used.   Here with PCA Domi Feliz  Trixie Sofia is a 61 year old female who presents today for the following   health  issues: DM     DM-   A1c 8.0 on 2/18/21 -> 8.8 on 9/16/21  Does not check blood sugars regularly   Meds: Janumet-XR, glipidizide   eye neg retinopathy 8/27/20 - - eye exam 7/19/21 - needs eye surgery but was given too short of notice to get pre-op  LDL 67 on 2/18/21 - on lipitor  BP wnl  microalbumin wml 8/4/20 - not on lisinopril b/c low BP  smoker     Obesity - 200# with BMI 39.6 on 11/5/20 -> 198# and BMI 39.2 on 2/18/21 -> 192#  With BMI 37.9 -> 197# with BMI 39.1     Iron deficiency anemia -   heme consult note reviewed from 9/9/21 and f/u in 3 months and continue iron supplements once daily   hgb 5.9 on 6/1/21 -> 9.6 -> 13.3 on 9/8/21  cologuard neg 7 /8/21  Advised to stop Naproxen b/c gastritis but patient wants for break through pain (taking about 1-2x per week)  appt with heme/onc on 1/13/22     Chronic pain   Spine clinic consult on 10/7/21 - offered duloxetine but declined and ordred repeat MRI lumbar spine and offered PT  PT causes more pain so appts cancelled  Starting chiro monthly which is helping  MRI cancelled b/c weather and available appts - per PCA  Stabbing pain - lower back and buttock for a few years  MRI lumbar 8/2015:  1.  Overall, the findings are not significantly changed from the prior lumbar spine MRI from 10/16/2012.  2.  Redemonstration of multilevel facet arthropathy, worst at L4-L5 and L5-S1.  3.  No significant change in the broad-based disc bulges with annular fissures at L4-L5 and L5-S1. No high-grade spinal canal or neuroforaminal narrowings.  MRI cervical MRI 12/2017:  1.  No abnormal cord signal or enhancement.  2.  Mild cervical spondylosis with mild narrowing of the canal.     Pain meds:tyelnol,   naproxen stopped b/c gastritis but she continued to take at least 1-2 x per week b/c otherwise her pain is too much and she has trouble walking    Tramadol stopped b/c concerns about facial swelling but she is confused about her meds  She insists naproxen is the only thing that  helps with severe pain.   Previously treated with massage and PT      Pulm HTN from DEIRDRE  Refuses to use CPAP  On lasix      Constipation -   Improved with diet and stopped mirialax    Polypharmacy   Reconciled med list with bottles and discussed with PCA  Missing bottles: glipidize and miralax     Poor compliance with tx recommendations        Review of Systems     Please see above.  The rest of the review of systems are negative for all systems.    Current Outpatient Medications   Medication Instructions     acetaminophen (TYLENOL) 1,000 mg, Oral, 3 TIMES DAILY PRN     artificial tears SOLN Instill 1-2 drops into affected eye(s) 4 times daily as directed     atorvastatin (LIPITOR) 20 mg, Oral, DAILY     Blood Glucose Monitoring Suppl (GLUCOCOM BLOOD GLUCOSE MONITOR) LUIS FELIPE Test blood sugar three times a day.     Cholecalciferol (VITAMIN D) 2000 UNITS tablet 1 tablet, DAILY     CONTOUR NEXT TEST test strip USE 1 EACH AS DIRECTED THREE TIMES DAILY AT 7:30AM, 11:30AM AND 4:30PM.     ergocalciferol (ERGOCALCIFEROL) 1.25 MG (34252 UT) capsule TAKE 1 CAPSULE (50,000 UNITS TOTAL) BY MOUTH ONCE A WEEK FOR BONE HEALTH     ferrous sulfate (FEROSUL) 325 (65 Fe) MG tablet TAKE 1 TABLET (325 MG TOTAL) BY MOUTH 2 (TWO) TIMES A DAY.     furosemide (LASIX) 40 MG tablet TAKE 1 TABLET (40 MG TOTAL) BY MOUTH 2 (TWO) TIMES A DAY FOR EDEMA     glipiZIDE (GLUCOTROL XL) 10 MG 24 hr tablet TAKE 1 PILL BY MOUTH DAILY FOR DIABETES     Global Inject Ease Lancets 30G MISC USE 1 APPLICATION AS DIRECTED THREE TIMES DAILY AT 7:30AM, 11:30AM AND 4:30PM. **33 DAYS SUPPLY**     naproxen (NAPROSYN) 500 mg, Oral, DAILY PRN     omeprazole (PRILOSEC) 20 MG DR capsule TAKE 1 CAPSULE (20 MG TOTAL) BY MOUTH DAILY BEFORE BREAKFAST FOR STOMACH     polyethylene glycol 3350 POWD Mix 1 capful (17gm) in 8 ounces of water, juice, or tea and drink daily     potassium chloride ER (KLOR-CON M) 20 MEQ CR tablet TAKE 1 TABLET (20 MEQ TOTAL) BY MOUTH DAILY.      "pravastatin (PRAVACHOL) 10 MG tablet Take 2 tablet by mouth every evening     sitagliptin-metFORMIN (JANUMET)  MG tablet 1 tablet, Oral, 2 TIMES DAILY WITH MEALS         Objective   Vitals:    12/16/21 0929   BP: 98/66   Pulse: 91   Resp: 18   Temp: 98.3  F (36.8  C)   TempSrc: Oral   SpO2: 96%   Weight: 88.7 kg (195 lb 8 oz)   Height: 1.499 m (4' 11\")       Body mass index is 39.49 kg/m .    Physical Exam    OBJECTIVE:  Vitals listed above within normal limits  General appearance: well groomed, pleasant, well hydrated, nontoxic appearing  ENT: PERRL, throat clear  Neck: neck supple, no lymphadenopathy, no thyromegaly  Lungs: lungs clear to auscultation bilaterally, no wheezes or rhonchi  Heart: regular rate and rhythm, no murmurs, rubs or gallops  Abdomen: soft, nontender  Neuro: no focal deficits, CN II-XII grossly intact, alert and oriented  Psych:  mood stable, appears to have good insight and judgment    No results found for this or any previous visit (from the past 1008 hour(s)).           "

## 2021-12-16 NOTE — RESULT ENCOUNTER NOTE
Please notify patient and friend Rishabh at [phone]  Her A1c is 10.5 - much worse than before. She really needs to restart the glipizide.   Thanks,       Dr. Mei Carbone  12/16/2021

## 2021-12-20 NOTE — TELEPHONE ENCOUNTER
Rishabh returned call. Caller advised per provider message below. The caller indicates understanding of the result and instructions for follow up and continued care.

## 2022-01-06 ENCOUNTER — HOSPITAL ENCOUNTER (OUTPATIENT)
Dept: MRI IMAGING | Facility: HOSPITAL | Age: 62
Discharge: HOME | End: 2022-01-06
Attending: FAMILY MEDICINE | Admitting: FAMILY MEDICINE
Payer: COMMERCIAL

## 2022-01-06 DIAGNOSIS — M47.819 FACET ARTHROPATHY OF SPINE: ICD-10-CM

## 2022-01-06 PROCEDURE — 72148 MRI LUMBAR SPINE W/O DYE: CPT

## 2022-01-07 ENCOUNTER — TELEPHONE (OUTPATIENT)
Dept: FAMILY MEDICINE | Facility: CLINIC | Age: 62
End: 2022-01-07
Payer: COMMERCIAL

## 2022-01-07 NOTE — TELEPHONE ENCOUNTER
Called pt and left VM to call back.  Ok to relay covering provider's message below should pt call back. Thanks

## 2022-01-07 NOTE — TELEPHONE ENCOUNTER
Please call [facility]  Let her know her back MRI shows progression/worsening of her back condition, which likely is the reason for her pain. She should continue taking naproxen for the pain AND she should do home exercises like what she was shown at physical therapy.    If she wants more or different medication, she can get that when she sees Dr. Carbone in March, or she can ask now - on the phone call- -and when Dr. Carbone is back next week, she can consider prescribing something more.

## 2022-01-10 NOTE — TELEPHONE ENCOUNTER
Patient advised per message below. Patient started chiropractic therapy due to the pain associated with PT, patient refusing PT. Patient seeing chiropractor once monthly.     No concerns about medicine.

## 2022-01-12 ENCOUNTER — LAB (OUTPATIENT)
Dept: INFUSION THERAPY | Facility: CLINIC | Age: 62
End: 2022-01-12
Attending: INTERNAL MEDICINE
Payer: COMMERCIAL

## 2022-01-12 DIAGNOSIS — D50.9 IRON DEFICIENCY ANEMIA, UNSPECIFIED IRON DEFICIENCY ANEMIA TYPE: ICD-10-CM

## 2022-01-12 LAB
ALBUMIN SERPL-MCNC: 3.6 G/DL (ref 3.5–5)
ALP SERPL-CCNC: 163 U/L (ref 45–120)
ALT SERPL W P-5'-P-CCNC: 27 U/L (ref 0–45)
ANION GAP SERPL CALCULATED.3IONS-SCNC: 13 MMOL/L (ref 5–18)
AST SERPL W P-5'-P-CCNC: 22 U/L (ref 0–40)
BILIRUB SERPL-MCNC: 0.7 MG/DL (ref 0–1)
BUN SERPL-MCNC: 8 MG/DL (ref 8–22)
CALCIUM SERPL-MCNC: 9.6 MG/DL (ref 8.5–10.5)
CHLORIDE BLD-SCNC: 94 MMOL/L (ref 98–107)
CO2 SERPL-SCNC: 27 MMOL/L (ref 22–31)
CREAT SERPL-MCNC: 0.93 MG/DL (ref 0.6–1.1)
ERYTHROCYTE [DISTWIDTH] IN BLOOD BY AUTOMATED COUNT: 12.4 % (ref 10–15)
FERRITIN SERPL-MCNC: 104 NG/ML (ref 10–130)
GFR SERPL CREATININE-BSD FRML MDRD: 70 ML/MIN/1.73M2
GLUCOSE BLD-MCNC: 315 MG/DL (ref 70–125)
HCT VFR BLD AUTO: 47.1 % (ref 35–47)
HGB BLD-MCNC: 14.4 G/DL (ref 11.7–15.7)
IRON SATN MFR SERPL: 21 % (ref 15–46)
IRON SERPL-MCNC: 75 UG/DL (ref 35–180)
MCH RBC QN AUTO: 26.3 PG (ref 26.5–33)
MCHC RBC AUTO-ENTMCNC: 30.6 G/DL (ref 31.5–36.5)
MCV RBC AUTO: 86 FL (ref 78–100)
PLATELET # BLD AUTO: 150 10E3/UL (ref 150–450)
POTASSIUM BLD-SCNC: 3.9 MMOL/L (ref 3.5–5)
PROT SERPL-MCNC: 7.9 G/DL (ref 6–8)
RBC # BLD AUTO: 5.48 10E6/UL (ref 3.8–5.2)
SODIUM SERPL-SCNC: 134 MMOL/L (ref 136–145)
TIBC SERPL-MCNC: 356 UG/DL (ref 240–430)
WBC # BLD AUTO: 8.2 10E3/UL (ref 4–11)

## 2022-01-12 PROCEDURE — 80053 COMPREHEN METABOLIC PANEL: CPT

## 2022-01-12 PROCEDURE — 82040 ASSAY OF SERUM ALBUMIN: CPT

## 2022-01-12 PROCEDURE — 82728 ASSAY OF FERRITIN: CPT

## 2022-01-12 PROCEDURE — 85014 HEMATOCRIT: CPT

## 2022-01-12 PROCEDURE — 36415 COLL VENOUS BLD VENIPUNCTURE: CPT

## 2022-01-12 PROCEDURE — 83550 IRON BINDING TEST: CPT

## 2022-01-13 ENCOUNTER — VIRTUAL VISIT (OUTPATIENT)
Dept: ONCOLOGY | Facility: CLINIC | Age: 62
End: 2022-01-13
Attending: INTERNAL MEDICINE
Payer: COMMERCIAL

## 2022-01-13 DIAGNOSIS — D50.8 IRON DEFICIENCY ANEMIA SECONDARY TO INADEQUATE DIETARY IRON INTAKE: Primary | ICD-10-CM

## 2022-01-13 PROCEDURE — 99214 OFFICE O/P EST MOD 30 MIN: CPT | Mod: 95 | Performed by: INTERNAL MEDICINE

## 2022-01-13 RX ORDER — TIMOLOL MALEATE 5 MG/ML
SOLUTION/ DROPS OPHTHALMIC
COMMUNITY
Start: 2021-12-09 | End: 2024-01-10

## 2022-01-13 RX ORDER — PREDNISOLONE ACETATE 10 MG/ML
SUSPENSION/ DROPS OPHTHALMIC
COMMUNITY
Start: 2021-08-05 | End: 2022-12-28

## 2022-01-13 NOTE — PROGRESS NOTES
[facility] Hematology and Oncology Progress Note    Patient: Trixie Sofia  MRN: [id number]  Date of Service: Jan 13, 2022     This is a video visit.    Reason for Visit    Chief Complaint   Patient presents with     Hematology       Assessment and Plan    Cancer Staging  No matching staging information was found for the patient.    ECOG Performance    0 - Independent     Pain       #.  Iron deficiency anemia, likely inadequate oral intake and possible gastritis, resolved  #.  Chronic low back pain secondary to multilevel degenerative spondylolisthesis of the lumbar spine   Clinically well.  I reviewed her labs.  Her hemoglobin continues to improve to 14 g/dL.  Ferritin is now near normal range at over 100.  Iron saturation is normal.     She had 1 of 3 occult blood positive and she deferred endoscopic evaluation.  Her hemoglobin and iron quickly responded to oral iron replacement that suspicious for insufficient oral iron intake.   Advised her to continue oral iron 1 tablet once a day to maintain iron level.     Okay to take naproxen as needed but not on a regular basis.  I also advised them to make sure she takes with food.  I advised her to follow-up with spine clinic as scheduled.   Follow-up labs and provider visit in 6 months with labs prior.    #.  Mild thrombocytopenia, unclear etiology, but likely normal considering her baseline level of lower end of normal range.   Rechecked from yesterday showed normal.    Encounter Diagnoses:    Problem List Items Addressed This Visit     Iron deficiency anemia - Primary    Relevant Orders    CBC with platelets    Ferritin    Iron & Iron Binding Capacity             CC: Mei Carbone MD   ______________________________________________________________________________    History of Present Illness    Ms. Trixie Sofia presenters today accompanied by her caregiver.  They speak [place] and [place] is my native language.    They are quarantining due to COVID exposure in the  family.  She does not have any upper respiratory tract symptoms, cough, fever.    She is having a lot of back pain and she would like to take naproxen about twice a week.  She was taking naproxen about twice a week for back pain.  It was stopped due to concern of GI bleeding when she was found out iron deficiency anemia.  She had MRI done.  She is going to follow-up with spine clinic.    Review of systems  Apart from describing in HPI, the remainder of comprehensive ROS was negative.    Past History    Past Medical History:   Diagnosis Date     Degeneration of lumbar or lumbosacral intervertebral disc      Depression      Diabetes mellitus (H)      Essential hypertension      DEIRDRE (obstructive sleep apnea) 4/17/2019    See sleep study of Dr. Leung     Pulmonary hypertension due to sleep-disordered breathing (H) 01/03/2019    see 4/17/2019 sleep study     Sciatica      Symptomatic menopausal or female climacteric states 05/2014    Mei Carbone: no LMP for over a year as of 5/2014      Vertigo 4/28/2015       Past Surgical History:   Procedure Laterality Date     TONGUE SURGERY Left 2006    surgery in [facility] on Tongue         Physical Exam    There were no vitals taken for this visit.    Limited video exam showed well-appearing female.  Not in acute distress     lab Results    Recent Results (from the past 168 hour(s))   CBC with platelets   Result Value Ref Range    WBC Count 8.2 4.0 - 11.0 10e3/uL    RBC Count 5.48 (H) 3.80 - 5.20 10e6/uL    Hemoglobin 14.4 11.7 - 15.7 g/dL    Hematocrit 47.1 (H) 35.0 - 47.0 %    MCV 86 78 - 100 fL    MCH 26.3 (L) 26.5 - 33.0 pg    MCHC 30.6 (L) 31.5 - 36.5 g/dL    RDW 12.4 10.0 - 15.0 %    Platelet Count 150 150 - 450 10e3/uL   Ferritin   Result Value Ref Range    Ferritin 104 10 - 130 ng/mL   Iron & Iron Binding Capacity   Result Value Ref Range    Iron 75 35 - 180 ug/dL    Iron Binding Capacity 356 240 - 430 ug/dL    Iron Sat Index 21 15 - 46 %   Comprehensive  metabolic panel   Result Value Ref Range    Sodium 134 (L) 136 - 145 mmol/L    Potassium 3.9 3.5 - 5.0 mmol/L    Chloride 94 (L) 98 - 107 mmol/L    Carbon Dioxide (CO2) 27 22 - 31 mmol/L    Anion Gap 13 5 - 18 mmol/L    Urea Nitrogen 8 8 - 22 mg/dL    Creatinine 0.93 0.60 - 1.10 mg/dL    Calcium 9.6 8.5 - 10.5 mg/dL    Glucose 315 (H) 70 - 125 mg/dL    Alkaline Phosphatase 163 (H) 45 - 120 U/L    AST 22 0 - 40 U/L    ALT 27 0 - 45 U/L    Protein Total 7.9 6.0 - 8.0 g/dL    Albumin 3.6 3.5 - 5.0 g/dL    Bilirubin Total 0.7 0.0 - 1.0 mg/dL    GFR Estimate 70 >60 mL/min/1.73m2       Imaging    MR Lumbar Spine w/o Contrast    Result Date: 1/7/2022  EXAM: MR LUMBAR SPINE W/O CONTRAST LOCATION: [facility] DATE/TIME: 1/6/2022 8:08 PM INDICATION: Lumbar radiculopathy, cancer or infection suspected COMPARISON: 08/10/2015 TECHNIQUE: Routine Lumbar Spine MRI without IV contrast. FINDINGS: Nomenclature is based on 5 lumbar type vertebral bodies. Normal vertebral body heights. Mild levoconvex curvature of the lumbar spine centered at L2-L3. There is approximately 3 mm retrolisthesis at L5 on S1. Marrow signal pattern is normal for patient age. A subcentimeter lipid poor hemangioma is identified involving the L2 vertebral body. Normal distal spinal cord. Conus terminates at L1-L2. Normal cauda equina nerve roots.. No extraspinal abnormality. Unremarkable visualized bony pelvis. T12-L1: Disc desiccation and mild disc height loss. No disc herniation. No canal or foraminal stenosis. L1-L2: Mild disc desiccation. Disc height maintained. No disc herniation. Mild left facet arthropathy. No canal stenosis. No foraminal stenosis. L2-L3: Mild disc desiccation. Disc height maintained. Mild facet arthropathy. No disc herniation. No canal stenosis. No foraminal stenosis. L3-L4: Disc desiccation. Disc height maintained. Mild bilateral facet arthropathy. Trace broad-based disc bulge. No no significant canal stenosis.  Minimal left neural foraminal stenosis. No right neural foraminal stenosis. L4-L5: Disc desiccation and disc height loss. Bilateral facet arthropathy. Concentric disc bulge with a small high intensity zone/annular tear noted along the posterior and superior margin of the midline disc. Minimal central spinal canal stenosis. No left neural foraminal stenosis. No right neural foraminal stenosis. L5-S1: Disc desiccation and disc height loss. Minimal broad-based disc bulge with mild asymmetric prominence in the left neural foramen. The disc comes in close proximity to the descending right S1 cauda equina nerve root. Correlation for a right S1 radiculopathy is recommended. There is a tiny left lateral recess annular tear (image 11-4). Bilateral facet arthropathy. No significant central spinal canal stenosis. Mild to moderate left neural foraminal stenosis. No right neural foraminal stenosis.     IMPRESSION: 1.  Multilevel degenerative spondylolisthesis of the lumbar spine, as described above, with mild levoconvex curvature centered at L2-L3.    Video start time: 9:00 AM  Video end time: 9:15 AM  Video platform used: FlaconiShital Castano did not work.    I spent 30 minutes on the date of service, of which greater than 50% of the time was spent on counseling and coordination of care.    Signed by: Jessica Alejandra MD

## 2022-01-13 NOTE — PROGRESS NOTES
Trixie is a 62 year old who is being evaluated via a billable video visit.      How would you like to obtain your AVS? Mail a copy  If the video visit is dropped, the invitation should be resent by: Text to cell phone: [phone]  Will anyone else be joining your video visit? No      Video-Visit Details    Type of service:  Video Visit      Originating Location (pt. Location): Home    Distant Location (provider location):  [facility]     Platform used for Video Visit: [email or web address]

## 2022-01-18 VITALS
WEIGHT: 196.2 LBS | SYSTOLIC BLOOD PRESSURE: 100 MMHG | HEART RATE: 70 BPM | RESPIRATION RATE: 14 BRPM | BODY MASS INDEX: 38.52 KG/M2 | HEIGHT: 60 IN | DIASTOLIC BLOOD PRESSURE: 64 MMHG | TEMPERATURE: 98 F | OXYGEN SATURATION: 96 %

## 2022-01-18 VITALS
WEIGHT: 192.4 LBS | HEART RATE: 78 BPM | HEIGHT: 60 IN | TEMPERATURE: 98 F | RESPIRATION RATE: 18 BRPM | OXYGEN SATURATION: 98 % | DIASTOLIC BLOOD PRESSURE: 66 MMHG | SYSTOLIC BLOOD PRESSURE: 106 MMHG | BODY MASS INDEX: 37.77 KG/M2

## 2022-01-18 VITALS
BODY MASS INDEX: 37.78 KG/M2 | SYSTOLIC BLOOD PRESSURE: 111 MMHG | DIASTOLIC BLOOD PRESSURE: 73 MMHG | RESPIRATION RATE: 22 BRPM | WEIGHT: 191.19 LBS | HEART RATE: 80 BPM | OXYGEN SATURATION: 99 % | TEMPERATURE: 98.3 F

## 2022-01-18 VITALS — WEIGHT: 198 LBS | BODY MASS INDEX: 39.13 KG/M2

## 2022-01-18 VITALS
OXYGEN SATURATION: 94 % | WEIGHT: 196.6 LBS | DIASTOLIC BLOOD PRESSURE: 62 MMHG | BODY MASS INDEX: 38.6 KG/M2 | HEART RATE: 79 BPM | TEMPERATURE: 98 F | HEIGHT: 60 IN | SYSTOLIC BLOOD PRESSURE: 125 MMHG

## 2022-01-18 VITALS — WEIGHT: 200 LBS | BODY MASS INDEX: 39.27 KG/M2 | HEIGHT: 60 IN | OXYGEN SATURATION: 98 % | HEART RATE: 77 BPM

## 2022-01-18 VITALS
BODY MASS INDEX: 39.32 KG/M2 | HEART RATE: 67 BPM | HEIGHT: 60 IN | RESPIRATION RATE: 16 BRPM | DIASTOLIC BLOOD PRESSURE: 70 MMHG | OXYGEN SATURATION: 100 % | WEIGHT: 200.3 LBS | TEMPERATURE: 98.1 F | SYSTOLIC BLOOD PRESSURE: 124 MMHG

## 2022-01-18 VITALS
HEART RATE: 70 BPM | SYSTOLIC BLOOD PRESSURE: 102 MMHG | BODY MASS INDEX: 38.75 KG/M2 | TEMPERATURE: 97.7 F | HEIGHT: 60 IN | OXYGEN SATURATION: 93 % | WEIGHT: 197.38 LBS | RESPIRATION RATE: 24 BRPM | DIASTOLIC BLOOD PRESSURE: 60 MMHG

## 2022-01-18 VITALS
WEIGHT: 198.4 LBS | OXYGEN SATURATION: 97 % | HEIGHT: 60 IN | SYSTOLIC BLOOD PRESSURE: 100 MMHG | DIASTOLIC BLOOD PRESSURE: 62 MMHG | TEMPERATURE: 98.1 F | RESPIRATION RATE: 14 BRPM | BODY MASS INDEX: 38.95 KG/M2 | HEART RATE: 78 BPM

## 2022-01-18 ASSESSMENT — PATIENT HEALTH QUESTIONNAIRE - PHQ9
SUM OF ALL RESPONSES TO PHQ QUESTIONS 1-9: 5
SUM OF ALL RESPONSES TO PHQ QUESTIONS 1-9: 6

## 2022-01-24 DIAGNOSIS — Z53.9 DIAGNOSIS NOT YET DEFINED: Primary | ICD-10-CM

## 2022-01-24 PROCEDURE — G0179 MD RECERTIFICATION HHA PT: HCPCS | Performed by: FAMILY MEDICINE

## 2022-01-25 NOTE — PROGRESS NOTES
Assessment:   Trixie Sofia is a 62 year old y.o. female with past medical history significant for type 2 diabetes mellitus with polyneuropathy, chronic pain disorder, obstructive sleep apnea, morbid obesity, vitamin D deficiency, hyperlipidemia, pulmonary hypertension, iron deficiency anemia, depression  who presents today for follow-up regarding low back pain.  She has pain at the midline and bilaterally at the lumbosacral junction.  She does not have any radicular symptoms.   My review of an MRI lumbar spine shows multilevel disc degeneration and facet arthropathy.  There is no high-grade spinal canal stenosis and at most mild to moderate foraminal stenosis on the left at L5-S1.  Patient is symptomatic from lumbar facet arthropathy, disc degeneration, and possibly sacroiliac joint dysfunction.  Since I last saw the patient she resume taking naproxen as needed which has provided significant relief of her pain.  -Patient also has neuropathy affecting the hands and the feet.       Plan:     A shared decision making plan was used.  The patient's values and choices were respected.  The following represents what was discussed and decided upon by the physician assistant and the patient.  A telephone  was present for the visit.  Patient's PCA is also present for the visit and helps provide history.    1.  DIAGNOSTIC TESTS: I reviewed the MRI lumbar spine.  No further diagnostic tests were ordered.    2.  PHYSICAL THERAPY: I had referred the patient to physical therapy in October 2021 when I saw her in consultation but she never went.  She states when she did physical therapy in the past it made her pain worse so she does not want to try again.    3.  MEDICATIONS:    -I offered duloxetine 20 mg at bedtime.  Patient declined.  -Patient is taking naproxen 500 mg about twice per week which is very helpful.  -She also takes Tylenol as needed which is somewhat helpful.  -Patient cannot tolerate gabapentin due to swelling  and dizziness.  -Patient does not recall her response to amitriptyline.    4.  INTERVENTIONS:  No interventions were ordered.  Patient indicated she would like to avoid interventional pain management.  She tried injections years ago and they did not help.    5.  PATIENT EDUCATION: Patient is in agreement the above plan.  All questions were answered.    6.  FOLLOW-UP: Patient follow-up with me as needed.  If she has questions or concerns, she should not hesitate to call.    Subjective:     Trixie Sofia is a 62 year old female who presents today for follow-up regarding chronic low back pain.  I last saw the patient October 7, 2021.  Since I saw the patient she was put back on naproxen which had previously been discontinued by her primary care provider.  Since resuming naproxen her pain is under much better control.  She takes 500 mg about 2 times per week.    Patient complains of bilateral and midline low back pain.  Pain is located the lumbosacral junction.  It extends into the upper buttocks.  She denies any pain down the legs.  She has numbness and tingling in both hands and both feet from neuropathy.  She rates her pain today as a 7 out of 10.  At its worst it is a 10 out of 10.  At its best it is a 4-10.  Pain is aggravated with standing, walking, exercising.  Pain is alleviated with Tylenol, naproxen, getting massage.  She is getting massages twice per month which are helpful.  She denies any new symptoms and she was last seen.    Patient went to physical therapy in 2015.  She states this made her pain worse.  As mentioned above, she takes naproxen 5 and a milligrams about twice per week.  She states this is very helpful.  She takes Tylenol as needed which is somewhat helpful.  She gets massage twice per month which is helpful.    Review of Systems:  Positive for numbness/tingling, headache.  Negative for weakness, loss of bowel/bladder control, inability to urinate, pain much worse at night, trip/stumble/falls,  difficulty swallowing, difficulty with hand skills, fevers, unintentional weight loss.     Objective:   CONSTITUTIONAL:  Vital signs as above.  No acute distress.  The patient is well nourished and well groomed.  Patient is obese.  PSYCHIATRIC:  The patient is awake, alert, oriented to person, place and time.  The patient is answering questions appropriately with clear speech.  Normal affect.  HEENT: Normocephalic, atraumatic.  Sclera clear.    SKIN:  Skin over the face, posterior torso, bilateral upper and lower extremities is clean, dry, intact without rashes.  VASCULAR: No significant lower extremity edema.  MUSCULOSKELETAL:  Gait is guarded but non-antalgic.  The patient is able to rise onto toes and heels bilaterally with support.  No tenderness over the bilateral lower lumbar paraspinal muscles.  No tenderness palpation sacroiliac joints.  Patient reports pain is deeper than what I can touch on the surface.    The patient has 5/5 strength for the bilateral hip flexors, knee flexors/extensors, ankle dorsiflexors/plantar flexors.  NEUROLOGICAL: 1+ patellar reflexes which are symmetric bilaterally.  No ankle clonus bilaterally.  Diminished sensation both feet stocking distribution.     RESULTS: I reviewed the MRI lumbar spine from [facility] dated January 6, 2022.  This shows mild levocurvature centered at L2-3.  At L5-S1 there is a broad-based disc bulge asymmetric to the left and bilateral facet arthropathy with mild left lateral recess and also mild to moderate left foraminal stenosis.  At L4-5 there is a disc bulge and facet arthropathy causing minimal spinal canal stenosis but no foraminal stenosis.  There is also minimal left foraminal stenosis at L3-4.  Please see report for further details.

## 2022-01-27 ENCOUNTER — OFFICE VISIT (OUTPATIENT)
Dept: PHYSICAL MEDICINE AND REHAB | Facility: CLINIC | Age: 62
End: 2022-01-27
Payer: COMMERCIAL

## 2022-01-27 VITALS
HEART RATE: 84 BPM | SYSTOLIC BLOOD PRESSURE: 133 MMHG | HEIGHT: 59 IN | BODY MASS INDEX: 39.03 KG/M2 | DIASTOLIC BLOOD PRESSURE: 79 MMHG | WEIGHT: 193.6 LBS

## 2022-01-27 DIAGNOSIS — M47.816 LUMBAR FACET ARTHROPATHY: Primary | ICD-10-CM

## 2022-01-27 DIAGNOSIS — M51.369 DDD (DEGENERATIVE DISC DISEASE), LUMBAR: ICD-10-CM

## 2022-01-27 PROCEDURE — 99214 OFFICE O/P EST MOD 30 MIN: CPT | Performed by: PHYSICIAN ASSISTANT

## 2022-01-27 ASSESSMENT — PAIN SCALES - GENERAL: PAINLEVEL: SEVERE PAIN (7)

## 2022-01-27 ASSESSMENT — MIFFLIN-ST. JEOR: SCORE: 1348.79

## 2022-01-27 NOTE — PATIENT INSTRUCTIONS
We talked about next options for your back pain today:    1.  Physical therapy to stretch and strengthen you back  2.  A new medication called duloxetine to help with pain    If you would like to move forward with either options please call us at [phone].    Your MRI shows wear and tear to the cartilage discs and joints in your spine which comes with aging of the spine.  There is nothing that requires surgery.

## 2022-01-27 NOTE — LETTER
1/27/2022         RE: Trixie Sofia  [address] [place] [address]        Dear Colleague,    Thank you for referring your patient, Trixie Sofia, to the [facility] [place]. Please see a copy of my visit note below.    Assessment:   Trixie Sofia is a 62 year old y.o. female with past medical history significant for type 2 diabetes mellitus with polyneuropathy, chronic pain disorder, obstructive sleep apnea, morbid obesity, vitamin D deficiency, hyperlipidemia, pulmonary hypertension, iron deficiency anemia, depression  who presents today for follow-up regarding low back pain.  She has pain at the midline and bilaterally at the lumbosacral junction.  She does not have any radicular symptoms.   My review of an MRI lumbar spine shows multilevel disc degeneration and facet arthropathy.  There is no high-grade spinal canal stenosis and at most mild to moderate foraminal stenosis on the left at L5-S1.  Patient is symptomatic from lumbar facet arthropathy, disc degeneration, and possibly sacroiliac joint dysfunction.  Since I last saw the patient she resume taking naproxen as needed which has provided significant relief of her pain.  -Patient also has neuropathy affecting the hands and the feet.       Plan:     A shared decision making plan was used.  The patient's values and choices were respected.  The following represents what was discussed and decided upon by the physician assistant and the patient.  A telephone  was present for the visit.  Patient's PCA is also present for the visit and helps provide history.    1.  DIAGNOSTIC TESTS: I reviewed the MRI lumbar spine.  No further diagnostic tests were ordered.    2.  PHYSICAL THERAPY: I had referred the patient to physical therapy in October 2021 when I saw her in consultation but she never went.  She states when she did physical therapy in the past it made her pain worse so she does not want to try again.    3.  MEDICATIONS:    -I  offered duloxetine 20 mg at bedtime.  Patient declined.  -Patient is taking naproxen 500 mg about twice per week which is very helpful.  -She also takes Tylenol as needed which is somewhat helpful.  -Patient cannot tolerate gabapentin due to swelling and dizziness.  -Patient does not recall her response to amitriptyline.    4.  INTERVENTIONS:  No interventions were ordered.  Patient indicated she would like to avoid interventional pain management.  She tried injections years ago and they did not help.    5.  PATIENT EDUCATION: Patient is in agreement the above plan.  All questions were answered.    6.  FOLLOW-UP: Patient follow-up with me as needed.  If she has questions or concerns, she should not hesitate to call.    Subjective:     Trixie Sofia is a 62 year old female who presents today for follow-up regarding chronic low back pain.  I last saw the patient October 7, 2021.  Since I saw the patient she was put back on naproxen which had previously been discontinued by her primary care provider.  Since resuming naproxen her pain is under much better control.  She takes 500 mg about 2 times per week.    Patient complains of bilateral and midline low back pain.  Pain is located the lumbosacral junction.  It extends into the upper buttocks.  She denies any pain down the legs.  She has numbness and tingling in both hands and both feet from neuropathy.  She rates her pain today as a 7 out of 10.  At its worst it is a 10 out of 10.  At its best it is a 4-10.  Pain is aggravated with standing, walking, exercising.  Pain is alleviated with Tylenol, naproxen, getting massage.  She is getting massages twice per month which are helpful.  She denies any new symptoms and she was last seen.    Patient went to physical therapy in 2015.  She states this made her pain worse.  As mentioned above, she takes naproxen 5 and a milligrams about twice per week.  She states this is very helpful.  She takes Tylenol as needed which is somewhat  helpful.  She gets massage twice per month which is helpful.    Review of Systems:  Positive for numbness/tingling, headache.  Negative for weakness, loss of bowel/bladder control, inability to urinate, pain much worse at night, trip/stumble/falls, difficulty swallowing, difficulty with hand skills, fevers, unintentional weight loss.     Objective:   CONSTITUTIONAL:  Vital signs as above.  No acute distress.  The patient is well nourished and well groomed.  Patient is obese.  PSYCHIATRIC:  The patient is awake, alert, oriented to person, place and time.  The patient is answering questions appropriately with clear speech.  Normal affect.  HEENT: Normocephalic, atraumatic.  Sclera clear.    SKIN:  Skin over the face, posterior torso, bilateral upper and lower extremities is clean, dry, intact without rashes.  VASCULAR: No significant lower extremity edema.  MUSCULOSKELETAL:  Gait is guarded but non-antalgic.  The patient is able to rise onto toes and heels bilaterally with support.  No tenderness over the bilateral lower lumbar paraspinal muscles.  No tenderness palpation sacroiliac joints.  Patient reports pain is deeper than what I can touch on the surface.    The patient has 5/5 strength for the bilateral hip flexors, knee flexors/extensors, ankle dorsiflexors/plantar flexors.  NEUROLOGICAL: 1+ patellar reflexes which are symmetric bilaterally.  No ankle clonus bilaterally.  Diminished sensation both feet stocking distribution.     RESULTS: I reviewed the MRI lumbar spine from [facility] dated January 6, 2022.  This shows mild levocurvature centered at L2-3.  At L5-S1 there is a broad-based disc bulge asymmetric to the left and bilateral facet arthropathy with mild left lateral recess and also mild to moderate left foraminal stenosis.  At L4-5 there is a disc bulge and facet arthropathy causing minimal spinal canal stenosis but no foraminal stenosis.  There is also minimal left foraminal stenosis at L3-4.  Please  see report for further details.          Again, thank you for allowing me to participate in the care of your patient.        Sincerely,        Malika Forman PA-C

## 2022-03-04 DIAGNOSIS — M47.819 FACET ARTHROPATHY OF SPINE: ICD-10-CM

## 2022-03-04 DIAGNOSIS — Z76.0 ENCOUNTER FOR MEDICATION REFILL: Primary | ICD-10-CM

## 2022-03-04 RX ORDER — NAPROXEN 500 MG/1
TABLET ORAL
Qty: 180 TABLET | Refills: 3 | Status: SHIPPED | OUTPATIENT
Start: 2022-03-04 | End: 2022-12-09

## 2022-03-17 ENCOUNTER — OFFICE VISIT (OUTPATIENT)
Dept: FAMILY MEDICINE | Facility: CLINIC | Age: 62
End: 2022-03-17
Payer: COMMERCIAL

## 2022-03-17 VITALS
RESPIRATION RATE: 14 BRPM | TEMPERATURE: 98.5 F | SYSTOLIC BLOOD PRESSURE: 106 MMHG | HEART RATE: 83 BPM | WEIGHT: 195.8 LBS | BODY MASS INDEX: 38.44 KG/M2 | HEIGHT: 60 IN | DIASTOLIC BLOOD PRESSURE: 70 MMHG | OXYGEN SATURATION: 98 %

## 2022-03-17 DIAGNOSIS — M54.50 CHRONIC BILATERAL LOW BACK PAIN WITHOUT SCIATICA: ICD-10-CM

## 2022-03-17 DIAGNOSIS — F33.1 MODERATE EPISODE OF RECURRENT MAJOR DEPRESSIVE DISORDER (H): ICD-10-CM

## 2022-03-17 DIAGNOSIS — D69.6 THROMBOCYTOPENIA (H): ICD-10-CM

## 2022-03-17 DIAGNOSIS — Z79.899 POLYPHARMACY: ICD-10-CM

## 2022-03-17 DIAGNOSIS — E11.618 TYPE 2 DIABETES MELLITUS WITH OTHER DIABETIC ARTHROPATHY, WITHOUT LONG-TERM CURRENT USE OF INSULIN (H): Primary | ICD-10-CM

## 2022-03-17 DIAGNOSIS — D50.8 IRON DEFICIENCY ANEMIA SECONDARY TO INADEQUATE DIETARY IRON INTAKE: ICD-10-CM

## 2022-03-17 DIAGNOSIS — M67.441 GANGLION CYST OF FINGER OF RIGHT HAND: ICD-10-CM

## 2022-03-17 DIAGNOSIS — Z00.00 PREVENTATIVE HEALTH CARE: ICD-10-CM

## 2022-03-17 DIAGNOSIS — I27.29 PULMONARY HYPERTENSION DUE TO SLEEP-DISORDERED BREATHING (H): ICD-10-CM

## 2022-03-17 DIAGNOSIS — E55.9 VITAMIN D DEFICIENCY: ICD-10-CM

## 2022-03-17 DIAGNOSIS — H40.003 GLAUCOMA SUSPECT, BILATERAL: ICD-10-CM

## 2022-03-17 DIAGNOSIS — G47.8 PULMONARY HYPERTENSION DUE TO SLEEP-DISORDERED BREATHING (H): ICD-10-CM

## 2022-03-17 DIAGNOSIS — H25.9 AGE-RELATED CATARACT OF BOTH EYES, UNSPECIFIED AGE-RELATED CATARACT TYPE: ICD-10-CM

## 2022-03-17 DIAGNOSIS — G89.29 CHRONIC BILATERAL LOW BACK PAIN WITHOUT SCIATICA: ICD-10-CM

## 2022-03-17 DIAGNOSIS — E66.01 MORBID OBESITY (H): ICD-10-CM

## 2022-03-17 DIAGNOSIS — Z71.89 COUNSELING REGARDING ADVANCE DIRECTIVES AND GOALS OF CARE: ICD-10-CM

## 2022-03-17 DIAGNOSIS — E78.2 MIXED HYPERLIPIDEMIA: ICD-10-CM

## 2022-03-17 DIAGNOSIS — G89.4 CHRONIC PAIN DISORDER: ICD-10-CM

## 2022-03-17 LAB
CHOLEST SERPL-MCNC: 215 MG/DL
FASTING STATUS PATIENT QL REPORTED: NO
HBA1C MFR BLD: 10.9 % (ref 0–5.6)
HDLC SERPL-MCNC: 43 MG/DL
HOLD SPECIMEN: NORMAL
HOLD SPECIMEN: NORMAL
LDLC SERPL CALC-MCNC: 123 MG/DL
TRIGL SERPL-MCNC: 243 MG/DL

## 2022-03-17 PROCEDURE — 36415 COLL VENOUS BLD VENIPUNCTURE: CPT | Performed by: FAMILY MEDICINE

## 2022-03-17 PROCEDURE — 83036 HEMOGLOBIN GLYCOSYLATED A1C: CPT | Performed by: FAMILY MEDICINE

## 2022-03-17 PROCEDURE — 99215 OFFICE O/P EST HI 40 MIN: CPT | Performed by: FAMILY MEDICINE

## 2022-03-17 PROCEDURE — 99207 PR FOOT EXAM NO CHARGE: CPT | Performed by: FAMILY MEDICINE

## 2022-03-17 PROCEDURE — 80061 LIPID PANEL: CPT | Performed by: FAMILY MEDICINE

## 2022-03-17 RX ORDER — ACETAMINOPHEN 500 MG
500-1000 TABLET ORAL EVERY 6 HOURS PRN
COMMUNITY
End: 2022-12-28

## 2022-03-17 RX ORDER — ERGOCALCIFEROL 1.25 MG/1
CAPSULE ORAL
Qty: 4 CAPSULE | Refills: 11 | Status: SHIPPED | OUTPATIENT
Start: 2022-03-17 | End: 2023-03-02

## 2022-03-17 ASSESSMENT — PATIENT HEALTH QUESTIONNAIRE - PHQ9: SUM OF ALL RESPONSES TO PHQ QUESTIONS 1-9: 6

## 2022-03-17 NOTE — PROGRESS NOTES
Assessment & Plan     Type 2 diabetes mellitus with other diabetic arthropathy, without long-term current use of insulin (H)  Poorly controlled diabetes b/c patient is not interested in working on diet or making any significant changes  - FOOT EXAM  - Hemoglobin A1c    Mixed hyperlipidemia  Continue lipitor and recheck lipids  - Lipid panel    Ganglion cyst of finger of right hand  Teaching with pictures and tx options  Monitor for new    Vitamin D deficiency  Continue weekly ergo b/c low vitamin in diet   - ergocalciferol (ERGOCALCIFEROL) 1.25 MG (86928 UT) capsule  Dispense: 4 capsule; Refill: 11    Morbid obesity (H)  Patient not interested in diet or exercise    Pulmonary hypertension due to sleep-disordered breathing (H)  Patient refuses CPAP    Glaucoma suspect, bilateral  Age-related cataract of both eyes, unspecified age-related cataract type  Reviewed ophthalmology consult note  Patient refusing surgery    Chronic pain disorder  Chronic bilateral low back pain without sciatica  Continue naproxen and tylenol prn  Reviewed MRI and spine consult  Patient declines changes in meds or PT    Iron deficiency anemia secondary to inadequate dietary iron intake  Continue iron    Preventative health care  - REVIEW OF HEALTH MAINTENANCE PROTOCOL ORDERS    Polypharmacy  Reconciled meds and pill bottles  Reviewed with PCA    Counseling regarding advance directives and goals of care  Discussed code status, POLST with patient and PCA  Discussed that she may want to be DNR/DNI given medical issues and not wanting a lot of interventions. Her PCA will discuss further given the cultural differences and we can readdress at next visit.     Thrombocytopenia (H)  Now resolved     Moderate episode of recurrent major depressive disorder (H)  Sx stable and denies current depressive sx and not on meds       Ordering of each unique test  50 minutes spent on the date of the encounter doing chart review, history and exam, documentation  "and further activities per the note    Tobacco Cessation:   reports that she has been smoking pipe and pipe. She has smoked for the past 30.00 years. She has never used smokeless tobacco.  Tobacco Cessation Action Plan: Information offered: Patient not interested at this time    BMI:   Estimated body mass index is 38.69 kg/m  as calculated from the following:    Height as of this encounter: 1.515 m (4' 11.65\").    Weight as of this encounter: 88.8 kg (195 lb 12.8 oz).   Weight management plan: pt not interested in info or making changes        Return in about 3 months (around 6/17/2022) for Follow up, with me, in person, for DM and code status/POLST.    Mei Carbone MD  [facility] professional phone  used.     Trixie Sofia is a 62 year old female who presents today for the following   health issues: DM      DM T2  A1c 6.9 in 5/2/21 -> 8.0 on 2/18/21 -> 8.8 on 9/16/21-> 10.5 on 12/16/21 -> 1.0.9 on 3/17/21  Does not check blood sugars regularly and does not want to modify diet \"because you live only one life\"   PCA is trying to support her and encourage better diet  Meds: Janumet-XR, glipidizide   eye neg retinopathy 8/27/20 - - eye exam 7/19/21 with glaucoma and cataract- needs eye surgery but was given too short of notice to get pre-op  She did not notice difference with one eye cataract surgery so she does not want any surgery  Her PCA has been trying to teach her the condition and encourage surgery but pt continues to decline  LDL 67 on 2/18/21 - on lipitor  BP wnl  microalbumin wml 12/16/21 - not on lisinopril b/c low BP  Smoker       Obesity - 200# with BMI 39.6 on 11/5/20 -> 198# and BMI 39.2 on 2/18/21 -> 192#  With BMI 37.9 -> 197# with BMI 39.1 -> 195# with BMI 38.7 today 3/17/22      Iron deficiency anemia -   heme consult note reviewed from 9/9/21 and f/u in 3 months and continue iron supplements once daily   hgb 5.9 on 6/1/21 -> 9.6 -> 13.3 on 9/8/21 " -> 14.4 on 1/12/22   cologuard neg 7 /8/21  Advised to stop Naproxen b/c gastritis but patient wants for break through pain (taking about 1-2x per week)  appt with heme/onc on 1/13/22     Chronic pain   Spine clinic consult on 10/7/21 - offered duloxetine but declined and ordred repeat MRI lumbar spine and offered PT  Spine consult 1/27/22 reviewed:   There is no high-grade spinal canal stenosis and at most mild to moderate foraminal stenosis on the left at L5-S1.  Patient is symptomatic from lumbar facet arthropathy, disc degeneration, and possibly sacroiliac joint dysfunction.  declined PT, declined cymbalta  PT causes more pain so appts cancelled  Starting chiro monthly which is helping  Pain meds:tyelnol and naproxen     MRI lumbar 1/6/22:  Multilevel degenerative spondylolisthesis of the lumbar spine, as described above, with mild levoconvex curvature centered at L2-L3.  Stabbing pain - lower back and buttock for a few years  MRI lumbar 8/2015:  1.  Overall, the findings are not significantly changed from the prior lumbar spine MRI from 10/16/2012.  2.  Redemonstration of multilevel facet arthropathy, worst at L4-L5 and L5-S1.  3.  No significant change in the broad-based disc bulges with annular fissures at L4-L5 and L5-S1. No high-grade spinal canal or neuroforaminal narrowings.  MRI cervical MRI 12/2017:  1.  No abnormal cord signal or enhancement.  2.  Mild cervical spondylosis with mild narrowing of the canal.    Pulm HTN from DEIRDRE  Refuses to use CPAP  On lasix      Vit D deficiency  On weekly ergo  Last level 37.4 on 5/20/21    H/o war trauma/PTSD  PHQ9 = 6 today, no SI and not difficult, score mostly related to fatigue,sleep,energy, appetite  Not on depression meds  estranged from her daughter and grandchildren but denies depression     Constipation -   Improved with diet with more fruits and vegetables    Lump on right index finger  Not tender and not getting bigger for the last 2 years     Polypharmacy    Reconciled med list with bottles and discussed with PCA  Poor compliance with tx recommendations         Review of Systems     Please see above.  The rest of the review of systems are negative for all systems.    Current Outpatient Medications   Medication Instructions     acetaminophen (TYLENOL) 500-1,000 mg, Oral, EVERY 6 HOURS PRN     artificial tears SOLN Instill 1-2 drops into affected eye(s) 4 times daily as directed     atorvastatin (LIPITOR) 20 mg, Oral, DAILY     Blood Glucose Monitoring Suppl (GLUCOCOM BLOOD GLUCOSE MONITOR) LUIS FELIPE Test blood sugar three times a day.     CONTOUR NEXT TEST test strip USE 1 EACH AS DIRECTED THREE TIMES DAILY AT 7:30AM, 11:30AM AND 4:30PM.     ergocalciferol (ERGOCALCIFEROL) 1.25 MG (73749 UT) capsule TAKE 1 CAPSULE (50,000 UNITS TOTAL) BY MOUTH ONCE A WEEK FOR BONE HEALTH     ferrous sulfate (FEROSUL) 325 (65 Fe) MG tablet TAKE 1 TABLET (325 MG TOTAL) BY MOUTH 2 (TWO) TIMES A DAY.     furosemide (LASIX) 40 MG tablet TAKE 1 TABLET (40 MG TOTAL) BY MOUTH 2 (TWO) TIMES A DAY FOR EDEMA     glipiZIDE (GLUCOTROL XL) 10 MG 24 hr tablet TAKE 1 PILL BY MOUTH DAILY FOR DIABETES     Global Inject Ease Lancets 30G MISC USE 1 APPLICATION AS DIRECTED THREE TIMES DAILY AT 7:30AM, 11:30AM AND 4:30PM. **33 DAYS SUPPLY**     naproxen (NAPROSYN) 500 MG tablet TAKE 1 TABLET (500 MG TOTAL) BY MOUTH 2 (TWO) TIMES A DAY WITH MEALS FOR PAIN     omeprazole (PRILOSEC) 20 MG DR capsule TAKE 1 CAPSULE (20 MG TOTAL) BY MOUTH DAILY BEFORE BREAKFAST FOR STOMACH     potassium chloride ER (KLOR-CON M) 20 MEQ CR tablet TAKE 1 TABLET (20 MEQ TOTAL) BY MOUTH DAILY.     prednisoLONE acetate (PRED FORTE) 1 % ophthalmic suspension No dose, route, or frequency recorded.     sitagliptin-metFORMIN (JANUMET)  MG tablet 1 tablet, Oral, 2 TIMES DAILY WITH MEALS     timolol maleate (TIMOPTIC) 0.5 % ophthalmic solution No dose, route, or frequency recorded.         Objective   Vitals:    03/17/22 0849   BP: 106/70  "  Pulse: 83   Resp: 14   Temp: 98.5  F (36.9  C)   TempSrc: Oral   SpO2: 98%   Weight: 88.8 kg (195 lb 12.8 oz)   Height: 1.515 m (4' 11.65\")       Body mass index is 38.69 kg/m .    Physical Exam    OBJECTIVE:  Vitals listed above within normal limits  General appearance: well groomed, pleasant, well hydrated, nontoxic appearing  ENT: PERRL, throat clear  Neck: neck supple, no lymphadenopathy, no thyromegaly  Lungs: lungs clear to auscultation bilaterally, no wheezes or rhonchi  Heart: regular rate and rhythm, no murmurs, rubs or gallops  Abdomen: soft, nontender  Neuro: no focal deficits, CN II-XII grossly intact, alert and oriented  Psych:  mood stable, appears to have good insight and judgment  DM foot exam: weak pedal pulses, no deformities, good sensation to microfilament, mild nonulcerative callouses  MSK: cyst on dorsal side of right index finger at DIP joint           "

## 2022-05-12 ENCOUNTER — TRANSFERRED RECORDS (OUTPATIENT)
Dept: HEALTH INFORMATION MANAGEMENT | Facility: CLINIC | Age: 62
End: 2022-05-12
Payer: COMMERCIAL

## 2022-05-12 LAB — RETINOPATHY: NEGATIVE

## 2022-06-23 ENCOUNTER — OFFICE VISIT (OUTPATIENT)
Dept: FAMILY MEDICINE | Facility: CLINIC | Age: 62
End: 2022-06-23
Payer: COMMERCIAL

## 2022-06-23 VITALS
DIASTOLIC BLOOD PRESSURE: 70 MMHG | HEIGHT: 60 IN | RESPIRATION RATE: 16 BRPM | OXYGEN SATURATION: 95 % | BODY MASS INDEX: 37.5 KG/M2 | TEMPERATURE: 97.6 F | HEART RATE: 75 BPM | WEIGHT: 191 LBS | SYSTOLIC BLOOD PRESSURE: 102 MMHG

## 2022-06-23 DIAGNOSIS — Z60.3 LANGUAGE BARRIER AFFECTING HEALTH CARE: ICD-10-CM

## 2022-06-23 DIAGNOSIS — G47.8 PULMONARY HYPERTENSION DUE TO SLEEP-DISORDERED BREATHING (H): ICD-10-CM

## 2022-06-23 DIAGNOSIS — G47.33 OSA (OBSTRUCTIVE SLEEP APNEA): ICD-10-CM

## 2022-06-23 DIAGNOSIS — F33.1 MODERATE EPISODE OF RECURRENT MAJOR DEPRESSIVE DISORDER (H): ICD-10-CM

## 2022-06-23 DIAGNOSIS — Z66 DNR (DO NOT RESUSCITATE): ICD-10-CM

## 2022-06-23 DIAGNOSIS — E08.42 DIABETIC POLYNEUROPATHY ASSOCIATED WITH DIABETES MELLITUS DUE TO UNDERLYING CONDITION (H): ICD-10-CM

## 2022-06-23 DIAGNOSIS — I27.29 PULMONARY HYPERTENSION DUE TO SLEEP-DISORDERED BREATHING (H): ICD-10-CM

## 2022-06-23 DIAGNOSIS — Z71.89 ADVANCED CARE PLANNING/COUNSELING DISCUSSION: ICD-10-CM

## 2022-06-23 DIAGNOSIS — Z78.9 POLST (PHYSICIAN ORDERS FOR LIFE-SUSTAINING TREATMENT): ICD-10-CM

## 2022-06-23 DIAGNOSIS — E11.618 TYPE 2 DIABETES MELLITUS WITH OTHER DIABETIC ARTHROPATHY, WITHOUT LONG-TERM CURRENT USE OF INSULIN (H): Primary | ICD-10-CM

## 2022-06-23 DIAGNOSIS — E66.01 MORBID OBESITY (H): ICD-10-CM

## 2022-06-23 DIAGNOSIS — Z79.899 POLYPHARMACY: ICD-10-CM

## 2022-06-23 DIAGNOSIS — E78.2 MIXED HYPERLIPIDEMIA: ICD-10-CM

## 2022-06-23 DIAGNOSIS — Z75.8 LANGUAGE BARRIER AFFECTING HEALTH CARE: ICD-10-CM

## 2022-06-23 DIAGNOSIS — E55.9 VITAMIN D DEFICIENCY: ICD-10-CM

## 2022-06-23 PROBLEM — D69.6 THROMBOCYTOPENIA (H): Status: RESOLVED | Noted: 2022-03-17 | Resolved: 2022-06-23

## 2022-06-23 LAB
HBA1C MFR BLD: 10.3 % (ref 0–5.6)
HOLD SPECIMEN: NORMAL

## 2022-06-23 PROCEDURE — 83036 HEMOGLOBIN GLYCOSYLATED A1C: CPT | Performed by: FAMILY MEDICINE

## 2022-06-23 PROCEDURE — 99214 OFFICE O/P EST MOD 30 MIN: CPT | Performed by: FAMILY MEDICINE

## 2022-06-23 PROCEDURE — 36415 COLL VENOUS BLD VENIPUNCTURE: CPT | Performed by: FAMILY MEDICINE

## 2022-06-23 SDOH — SOCIAL STABILITY - SOCIAL INSECURITY: ACCULTURATION DIFFICULTY: Z60.3

## 2022-06-23 ASSESSMENT — PATIENT HEALTH QUESTIONNAIRE - PHQ9
SUM OF ALL RESPONSES TO PHQ QUESTIONS 1-9: 6
SUM OF ALL RESPONSES TO PHQ QUESTIONS 1-9: 6
10. IF YOU CHECKED OFF ANY PROBLEMS, HOW DIFFICULT HAVE THESE PROBLEMS MADE IT FOR YOU TO DO YOUR WORK, TAKE CARE OF THINGS AT HOME, OR GET ALONG WITH OTHER PEOPLE: SOMEWHAT DIFFICULT

## 2022-06-23 NOTE — PROGRESS NOTES
Assessment & Plan     Type 2 diabetes mellitus with other diabetic arthropathy, without long-term current use of insulin (H)  Diabetic polyneuropathy associated with diabetes mellitus due to underlying condition (H)  Continue current meds  PCA encouraging better med compliance and healthy food choices  A1c slight improvement. Recommend goal of A1c <8 given medical complexity  - HEMOGLOBIN A1C    Moderate episode of recurrent major depressive disorder (H)  Sx and stress worsened by decreased in PCA hours  Letter written for patient to give to PCA agency     Mixed hyperlipidemia  Continue lipitor    Vitamin D deficiency  Continue weekly ergo     Pulmonary hypertension due to sleep-disordered breathing (H)  Related to untreated DEIRDRE    DEIRDRE (obstructive sleep apnea)  Refuses CPAP    Morbid obesity (H)  Losing weight and BMI improving    Polypharmacy      Language barrier affecting health care      POLST (Physician Orders for Life-Sustaining Treatment)   Advanced care planning/counseling discussion  DNR (do not resuscitate)  - DNR with selective treatment, POLST signed 6/23/22        31 minutes spent on the date of the encounter doing chart review, history and exam, documentation and further activities per the note      Return in about 3 months (around 9/23/2022) for Follow up, for DM.    Mei Carbone MD  [facility] professional phone  unavailable after 20min wait and patient requested PCA to  for her.     Trixie is a 62 year old accompanied by her PCA., presenting for the following health issues:  follow up  and Diabetes  and f/u on discussion of POLST    History of Present Illness       Diabetes:   She presents for follow up of diabetes.  She is checking home blood glucose a few times a week. She checks blood glucose before meals.  Blood glucose is sometimes over 200 and never under 70. She is aware of hypoglycemia symptoms including shakiness,  "dizziness, weakness and confusion. She has no concerns regarding her diabetes at this time.  She is having numbness in feet, burning in feet and excessive thirst.         She eats 0-1 servings of fruits and vegetables daily.She consumes 1 sweetened beverage(s) daily.She exercises with enough effort to increase her heart rate 9 or less minutes per day.  She exercises with enough effort to increase her heart rate 3 or less days per week.   She is taking medications regularly.    Today's PHQ-9         PHQ-9 Total Score: 6    PHQ-9 Q9 Thoughts of better off dead/self-harm past 2 weeks :   Not at all    How difficult have these problems made it for you to do your work, take care of things at home, or get along with other people: Somewhat difficult            DM T2  A1c 6.9 in 5/2/21 -> 8.0 on 2/18/21 -> 8.8 on 9/16/21-> 10.5 on 12/16/21 -> 10.9 on 3/17/21 -> 10.3 today   Goal to have A1c under 8 given medical complexity  Does not check blood sugars regularly and does not want to modify diet \"because you live only one life\"   PCA is trying to support her and encourage better diet  Meds: Janumet-XR, glipidizide   eye neg retinopathy 8/27/20 - - eye exam 7/19/21 with glaucoma and cataract- needs eye surgery but was given too short of notice to get pre-op  She did not notice difference with one eye cataract surgery so she does not want any surgery  Her PCA has been trying to teach her the condition and encourage surgery but pt continues to decline  LDL 67 on 2/18/21 - > 123 on 3/17/21 on lipitor  BP wnl  microalbumin wml 12/16/21 - not on lisinopril b/c low BP  Smoker  Foot exam 3/17/22        Obesity - 200# with BMI 39.6 on 11/5/20 -> 198# and BMI 39.2 on 2/18/21 -> 192#  With BMI 37.9 -> 197# with BMI 39.1 -> 195# with BMI 38.7 on 3/17/22 -wt  BMI 37.74 today      Iron deficiency anemia -   heme consult note reviewed from 9/9/21 and f/u in 3 months and continue iron supplements once daily   hgb 5.9 on 6/1/21 -> 9.6 -> 13.3 " on 9/8/21 -> 14.4 on 1/12/22   cologuard neg 7 /8/21  Advised to stop Naproxen b/c gastritis but patient wants for break through pain (taking about 1-2x per week)  appt with heme/onc on 1/13/22     Chronic pain   Spine clinic consult on 10/7/21 - offered duloxetine but declined and ordred repeat MRI lumbar spine and offered PT  Spine consult 1/27/22 reviewed:   There is no high-grade spinal canal stenosis and at most mild to moderate foraminal stenosis on the left at L5-S1.  Patient is symptomatic from lumbar facet arthropathy, disc degeneration, and possibly sacroiliac joint dysfunction.  declined PT, declined cymbalta  PT causes more pain so appts cancelled   Starting chiro monthly which is helping  Pain meds:tyelnol and naproxen     MRI lumbar 1/6/22:  Multilevel degenerative spondylolisthesis of the lumbar spine, as described above, with mild levoconvex curvature centered at L2-L3.  Stabbing pain - lower back and buttock for a few years  MRI lumbar 8/2015:  1.  Overall, the findings are not significantly changed from the prior lumbar spine MRI from 10/16/2012.  2.  Redemonstration of multilevel facet arthropathy, worst at L4-L5 and L5-S1.  3.  No significant change in the broad-based disc bulges with annular fissures at L4-L5 and L5-S1. No high-grade spinal canal or neuroforaminal narrowings.  MRI cervical MRI 12/2017:  1.  No abnormal cord signal or enhancement.  2.  Mild cervical spondylosis with mild narrowing of the canal.     Pulm HTN from DEIRDRE  Refuses to use CPAP  On lasix      Vit D deficiency  On weekly ergo  Last level 37.4 on 5/20/21     H/o war trauma/PTSD  PHQ9 = 6 today, no SI and not difficult, score mostly related to fatigue,sleep,energy, appetite  Not on depression meds  estranged from her daughter and grandchildren but denies depression   Stress worsened with decrease in PCA hours      Constipation -   Improved with diet with more fruits and vegetables     Lump on right index finger  Not tender  "and not getting bigger for the last 2 years     Polypharmacy   Reconciled med list with bottles and discussed with PCA  Poor compliance with tx recommendations     Advanced Care Directive and code status discussed again  She wants to be DNR with selective treatment       Review of Systems    Please see above.  The rest of the review of systems are negative for all systems.        Objective    Physical Exam   Vitals:    06/23/22 0852   BP: 102/70   BP Location: Right arm   Patient Position: Sitting   Cuff Size: Adult Large   Pulse: 75   Resp: 16   Temp: 97.6  F (36.4  C)   TempSrc: Oral   SpO2: 95%   Weight: 86.6 kg (191 lb)   Height: 1.515 m (4' 11.65\")           PHYSICAL EXAM   General Appearance: Awake and alert, in no acute distress  HEENT: neck is supple  CV: regular rate  Resp: No respiratory distress. Breathing comfortably  Musculoskeletal: moving limbs comfortably with not deficits or deformities  Skin: no rashes noted            .  ..  "

## 2022-06-23 NOTE — LETTER
22      RE: Trixie Sofia   1920      To Whom It May Concern:      I have the primary care provider for Trixie Sofia since . Per patient, her PCA hours were reduced for no clear reason. The PCA provides significant amount of care that keeps her out of the hospital. Please consider resuming her prior number of daily PCA hours.     Below is her problem list:    Patient Active Problem List   Diagnosis     Health Care Home     Atypical chest pain     Major depressive disorder, recurrent episode (H)     Colonoscopy ( Fiberoptic)     Constipation     Edema     Fatigue     Gastroenteritis     Hyperlipidemia     Lower back pain     Routine general medical examination at a health care facility     Morbid obesity (H)     Leukoplakia of oral mucosa, including tongue     Pain in limb     Shortness of breath     Vitamin D deficiency     Diabetes mellitus, type 2 (H)     Pulmonary HTN (H)     Diabetic polyneuropathy associated with diabetes mellitus due to underlying condition (H)     Arthropathy     Chronic pain disorder     Polypharmacy     Iron deficiency anemia     Moderate major depression (H)     DEIRDRE (obstructive sleep apnea)     Other specified phobia     Poor vision     Lichen planus     Poverty     PTSD (post-traumatic stress disorder)     Pulmonary hypertension due to sleep-disordered breathing (H)     Smoker     Ganglion cyst of finger of right hand     Counseling regarding advance directives and goals of care     Advanced care planning/counseling discussion     POLST (Physician Orders for Life-Sustaining Treatment) - DNR with selective treatment, POLST signed 22     Language barrier affecting health care     DNR (do not resuscitate)         Sincerely,          Dr. Mei Carbone  2022

## 2022-06-28 ENCOUNTER — DOCUMENTATION ONLY (OUTPATIENT)
Dept: OTHER | Facility: CLINIC | Age: 62
End: 2022-06-28

## 2022-07-11 ENCOUNTER — TRANSFERRED RECORDS (OUTPATIENT)
Dept: HEALTH INFORMATION MANAGEMENT | Facility: CLINIC | Age: 62
End: 2022-07-11

## 2022-08-04 DIAGNOSIS — K21.00 GASTROESOPHAGEAL REFLUX DISEASE WITH ESOPHAGITIS WITHOUT HEMORRHAGE: ICD-10-CM

## 2022-08-04 DIAGNOSIS — Z76.0 ENCOUNTER FOR MEDICATION REFILL: Primary | ICD-10-CM

## 2022-08-10 ENCOUNTER — PATIENT OUTREACH (OUTPATIENT)
Dept: ONCOLOGY | Facility: CLINIC | Age: 62
End: 2022-08-10

## 2022-08-10 ENCOUNTER — LAB (OUTPATIENT)
Dept: INFUSION THERAPY | Facility: CLINIC | Age: 62
End: 2022-08-10
Attending: INTERNAL MEDICINE
Payer: COMMERCIAL

## 2022-08-10 DIAGNOSIS — D50.8 IRON DEFICIENCY ANEMIA SECONDARY TO INADEQUATE DIETARY IRON INTAKE: ICD-10-CM

## 2022-08-10 LAB
ERYTHROCYTE [DISTWIDTH] IN BLOOD BY AUTOMATED COUNT: 13 % (ref 10–15)
FERRITIN SERPL-MCNC: 105 NG/ML (ref 11–328)
HCT VFR BLD AUTO: 42.3 % (ref 35–47)
HGB BLD-MCNC: 13 G/DL (ref 11.7–15.7)
IRON BINDING CAPACITY (ROCHE): 354 UG/DL (ref 240–430)
IRON SATN MFR SERPL: 22 % (ref 15–46)
IRON SERPL-MCNC: 77 UG/DL (ref 37–145)
MCH RBC QN AUTO: 26.9 PG (ref 26.5–33)
MCHC RBC AUTO-ENTMCNC: 30.7 G/DL (ref 31.5–36.5)
MCV RBC AUTO: 88 FL (ref 78–100)
PLATELET # BLD AUTO: 122 10E3/UL (ref 150–450)
RBC # BLD AUTO: 4.83 10E6/UL (ref 3.8–5.2)
WBC # BLD AUTO: 6.5 10E3/UL (ref 4–11)

## 2022-08-10 PROCEDURE — 85027 COMPLETE CBC AUTOMATED: CPT

## 2022-08-10 PROCEDURE — 82728 ASSAY OF FERRITIN: CPT

## 2022-08-10 PROCEDURE — 83550 IRON BINDING TEST: CPT

## 2022-08-10 PROCEDURE — 36415 COLL VENOUS BLD VENIPUNCTURE: CPT

## 2022-08-10 NOTE — PROGRESS NOTES
[facility]: Cancer Care Short Note                                    Discussion with Patient:                                                    Trixie had lab draw this morning at clinic (8/10/22). Family member called subsequently and relayed that the follow up appointment that is scheduled on 8/11 qwith Dr. Alejandra will need to be rescheduled as they are unable to bring her to her apt         Intervention/Education provided during outreach:                                                       Trixie was rescheduled from 8/11 to 8/17 for her follow up with Dr. Alejandra.    Signature:  Alma Ansari RN

## 2022-08-17 ENCOUNTER — ONCOLOGY VISIT (OUTPATIENT)
Dept: ONCOLOGY | Facility: CLINIC | Age: 62
End: 2022-08-17
Attending: INTERNAL MEDICINE
Payer: COMMERCIAL

## 2022-08-17 VITALS
SYSTOLIC BLOOD PRESSURE: 100 MMHG | HEART RATE: 77 BPM | WEIGHT: 198.4 LBS | DIASTOLIC BLOOD PRESSURE: 70 MMHG | BODY MASS INDEX: 39.2 KG/M2 | TEMPERATURE: 98.2 F | OXYGEN SATURATION: 93 % | RESPIRATION RATE: 16 BRPM

## 2022-08-17 DIAGNOSIS — D69.6 THROMBOCYTOPENIA (H): ICD-10-CM

## 2022-08-17 DIAGNOSIS — D50.8 IRON DEFICIENCY ANEMIA SECONDARY TO INADEQUATE DIETARY IRON INTAKE: Primary | ICD-10-CM

## 2022-08-17 PROCEDURE — 99214 OFFICE O/P EST MOD 30 MIN: CPT | Performed by: INTERNAL MEDICINE

## 2022-08-17 PROCEDURE — G0463 HOSPITAL OUTPT CLINIC VISIT: HCPCS

## 2022-08-17 ASSESSMENT — PAIN SCALES - GENERAL: PAINLEVEL: WORST PAIN (10)

## 2022-08-17 NOTE — PROGRESS NOTES
"Oncology Rooming Note    August 17, 2022 10:21 AM   Trixie Sofia is a 62 year old female who presents for:    Chief Complaint   Patient presents with     Oncology Clinic Visit     Initial Vitals: /70 (Patient Position: Sitting)   Pulse 77   Temp 98.2  F (36.8  C) (Oral)   Resp 16   Wt 90 kg (198 lb 6.4 oz)   SpO2 93%   BMI 39.20 kg/m   Estimated body mass index is 39.2 kg/m  as calculated from the following:    Height as of 6/23/22: 1.515 m (4' 11.65\").    Weight as of this encounter: 90 kg (198 lb 6.4 oz). Body surface area is 1.95 meters squared.  Worst Pain (10) Comment: constant. With medication @ 5   No LMP recorded. Patient is postmenopausal.  Allergies reviewed: Yes  Medications reviewed: Yes    Medications: MEDICATION REFILLS NEEDED TODAY. Provider was notified.  Pharmacy name entered into [facility]: [facility] - [place], [place] - [address]    Clinical concerns:  Pain medication.       Mohini Beavers LPN            "

## 2022-08-17 NOTE — LETTER
"    8/17/2022         RE: Trixie Sofia  [address] [place] [address]        Dear Colleague,    Thank you for referring your patient, Trixie Sofia, to the [facility] [place]. Please see a copy of my visit note below.    Oncology Rooming Note    August 17, 2022 10:21 AM   Trixie Sofia is a 62 year old female who presents for:    Chief Complaint   Patient presents with     Oncology Clinic Visit     Initial Vitals: /70 (Patient Position: Sitting)   Pulse 77   Temp 98.2  F (36.8  C) (Oral)   Resp 16   Wt 90 kg (198 lb 6.4 oz)   SpO2 93%   BMI 39.20 kg/m   Estimated body mass index is 39.2 kg/m  as calculated from the following:    Height as of 6/23/22: 1.515 m (4' 11.65\").    Weight as of this encounter: 90 kg (198 lb 6.4 oz). Body surface area is 1.95 meters squared.  Worst Pain (10) Comment: constant. With medication @ 5   No LMP recorded. Patient is postmenopausal.  Allergies reviewed: Yes  Medications reviewed: Yes    Medications: MEDICATION REFILLS NEEDED TODAY. Provider was notified.  Pharmacy name entered into [employer]: [facility] - [place], [place] - [id number]    Clinical concerns:  Pain medication.       Mohini Beavers LPN              [facility] Hematology and Oncology Progress Note    Patient: Trixie Sofia  MRN: [id number]  Date of Service: Aug 17, 2022     Reason for Visit    Chief Complaint   Patient presents with     Oncology Clinic Visit       Assessment and Plan    Cancer Staging  No matching staging information was found for the patient.    ECOG Performance    0 - Independent     Pain  Pain Score: Worst Pain (10) (constant. With medication @ 5)  Pain Loc: Low Back (Coccyx. )    #.  Iron deficiency anemia, likely inadequate oral intake and possible gastritis from NSAIDs use, resolved.  She had 1 of 3 occult blood test positive, however her hemoglobin responded very quickly to oral iron supplement and she deferred endoscopic evaluation.    #.  Chronic low " back pain secondary to multilevel degenerative spondylolisthesis of the lumbar spine   Clinically well.  I reviewed her labs.  Her hemoglobin remains normal range.  She maintain her ferritin at 105 and iron saturation of 22% with oral iron supplementation 1 tablet once daily.  I recommended her to continue ferrous sulfate 1 tablet once daily.  Advised limited use of NSAIDs.   Follow-up labs and provider visit in 1 year with labs prior.   She is advised to call sooner with any concern about anemia or other blood issues.    #.  Mild thrombocytopenia, unclear etiology, but likely normal considering her baseline level of lower end of normal range.   Her platelet count is slightly low again today and it appears to be fluctuating.  Discussed about transient thrombocytopenia from infections, medications, platelet clumping etc.  The degree of thrombocytopenia is no clinical significance at this point.  Continue to monitor.    #.  Diabetes type 2, uncontrolled.   Hemoglobin A1c from June 2022 was 10.3.  I advised her to increase activity and watch on her diet.    Encounter Diagnoses:    Problem List Items Addressed This Visit     Iron deficiency anemia - Primary    Relevant Orders    CBC with platelets    Ferritin    Iron & Iron Binding Capacity    Comprehensive metabolic panel      Other Visit Diagnoses     Thrombocytopenia (H)               CC: Mei Carbone MD   ______________________________________________________________________________    History of Present Illness    Ms. Marcum Net presenters today accompanied by her caregiver.  They speak [place] which is my native language.    She is currently on oral iron 1 about once daily.  No GI side effects or intolerance.  She does not notice any bleeding or blood loss.  She is not very active but she has energy to do things.  She reported chronic back pain which was evaluated by MRI recently.  She is doing therapy exercises.    Review of systems  Apart from describing in  HPI, the remainder of comprehensive ROS was negative.    Past History    Past Medical History:   Diagnosis Date     Degeneration of lumbar or lumbosacral intervertebral disc      Depression      Diabetes mellitus (H)      Essential hypertension      DEIRDRE (obstructive sleep apnea) 4/17/2019    See sleep study of Dr. Leung     Pulmonary hypertension due to sleep-disordered breathing (H) 01/03/2019    see 4/17/2019 sleep study     Sciatica      Symptomatic menopausal or female climacteric states 05/2014    Mei Carbone: no LMP for over a year as of 5/2014      Vertigo 4/28/2015       Past Surgical History:   Procedure Laterality Date     TONGUE SURGERY Left 2006    surgery in [facility] for mass on Tongue         Physical Exam    /70 (Patient Position: Sitting)   Pulse 77   Temp 98.2  F (36.8  C) (Oral)   Resp 16   Wt 90 kg (198 lb 6.4 oz)   SpO2 93%   BMI 39.20 kg/m      General: alert, awake, not in acute distress.  Overweight female.  HEENT: Head: Normal, normocephalic, atraumatic.  Eye: Normal external eye, conjunctiva, lids cornea, RACHEL.  Pharynx: Normal buccal mucosa. Normal pharynx.  Neck / Thyroid: Supple, no masses, nodes, nodules or enlargement.  Lymphatics: No abnormally enlarged lymph nodes.  Abdomen: abdomen is soft without significant tenderness, masses, organomegaly or guarding  Extremities: normal strength, tone, and muscle mass  Skin: normal. no rash or abnormalities  CNS: non focal.    lab Results    No results found for this or any previous visit (from the past 168 hour(s)).    Imaging    No results found.    30 minutes spent on the date of the encounter doing chart review, history and exam, documentation and further activities as noted above.    Signed by: Jessica Alejandra MD      Again, thank you for allowing me to participate in the care of your patient.        Sincerely,        Jessica Alejandra MD

## 2022-08-18 NOTE — PROGRESS NOTES
[facility] Hematology and Oncology Progress Note    Patient: Trixie Sofia  MRN: [id number]  Date of Service: Aug 17, 2022     Reason for Visit    Chief Complaint   Patient presents with     Oncology Clinic Visit       Assessment and Plan    Cancer Staging  No matching staging information was found for the patient.    ECOG Performance    0 - Independent     Pain  Pain Score: Worst Pain (10) (constant. With medication @ 5)  Pain Loc: Low Back (Coccyx. )    #.  Iron deficiency anemia, likely inadequate oral intake and possible gastritis from NSAIDs use, resolved.  She had 1 of 3 occult blood test positive, however her hemoglobin responded very quickly to oral iron supplement and she deferred endoscopic evaluation.    #.  Chronic low back pain secondary to multilevel degenerative spondylolisthesis of the lumbar spine   Clinically well.  I reviewed her labs.  Her hemoglobin remains normal range.  She maintain her ferritin at 105 and iron saturation of 22% with oral iron supplementation 1 tablet once daily.  I recommended her to continue ferrous sulfate 1 tablet once daily.  Advised limited use of NSAIDs.   Follow-up labs and provider visit in 1 year with labs prior.   She is advised to call sooner with any concern about anemia or other blood issues.    #.  Mild thrombocytopenia, unclear etiology, but likely normal considering her baseline level of lower end of normal range.   Her platelet count is slightly low again today and it appears to be fluctuating.  Discussed about transient thrombocytopenia from infections, medications, platelet clumping etc.  The degree of thrombocytopenia is no clinical significance at this point.  Continue to monitor.    #.  Diabetes type 2, uncontrolled.   Hemoglobin A1c from June 2022 was 10.3.  I advised her to increase activity and watch on her diet.    Encounter Diagnoses:    Problem List Items Addressed This Visit     Iron deficiency anemia - Primary    Relevant Orders    CBC with  platelets    Ferritin    Iron & Iron Binding Capacity    Comprehensive metabolic panel      Other Visit Diagnoses     Thrombocytopenia (H)               CC: Mei Carbone MD   ______________________________________________________________________________    History of Present Illness    Ms. Marcum Net presenters today accompanied by her caregiver.  They speak [place] which is my native language.    She is currently on oral iron 1 about once daily.  No GI side effects or intolerance.  She does not notice any bleeding or blood loss.  She is not very active but she has energy to do things.  She reported chronic back pain which was evaluated by MRI recently.  She is doing therapy exercises.    Review of systems  Apart from describing in HPI, the remainder of comprehensive ROS was negative.    Past History    Past Medical History:   Diagnosis Date     Degeneration of lumbar or lumbosacral intervertebral disc      Depression      Diabetes mellitus (H)      Essential hypertension      DEIRDRE (obstructive sleep apnea) 4/17/2019    See sleep study of Dr. Leung     Pulmonary hypertension due to sleep-disordered breathing (H) 01/03/2019    see 4/17/2019 sleep study     Sciatica      Symptomatic menopausal or female climacteric states 05/2014    Mei Carbone: no LMP for over a year as of 5/2014      Vertigo 4/28/2015       Past Surgical History:   Procedure Laterality Date     TONGUE SURGERY Left 2006    surgery in [facility] for mass on Tongue         Physical Exam    /70 (Patient Position: Sitting)   Pulse 77   Temp 98.2  F (36.8  C) (Oral)   Resp 16   Wt 90 kg (198 lb 6.4 oz)   SpO2 93%   BMI 39.20 kg/m      General: alert, awake, not in acute distress.  Overweight female.  HEENT: Head: Normal, normocephalic, atraumatic.  Eye: Normal external eye, conjunctiva, lids cornea, RACHEL.  Pharynx: Normal buccal mucosa. Normal pharynx.  Neck / Thyroid: Supple, no masses, nodes, nodules or enlargement.  Lymphatics: No  abnormally enlarged lymph nodes.  Abdomen: abdomen is soft without significant tenderness, masses, organomegaly or guarding  Extremities: normal strength, tone, and muscle mass  Skin: normal. no rash or abnormalities  CNS: non focal.    lab Results    No results found for this or any previous visit (from the past 168 hour(s)).    Imaging    No results found.    30 minutes spent on the date of the encounter doing chart review, history and exam, documentation and further activities as noted above.    Signed by: Jessica Alejandra MD

## 2022-08-25 DIAGNOSIS — Z76.0 ENCOUNTER FOR MEDICATION REFILL: Primary | ICD-10-CM

## 2022-08-25 DIAGNOSIS — E11.9 DIABETES MELLITUS, TYPE 2 (H): ICD-10-CM

## 2022-08-25 RX ORDER — LANCETS 30 GAUGE
EACH MISCELLANEOUS
Qty: 100 EACH | Refills: 3 | Status: SHIPPED | OUTPATIENT
Start: 2022-08-25

## 2022-09-28 ENCOUNTER — OFFICE VISIT (OUTPATIENT)
Dept: FAMILY MEDICINE | Facility: CLINIC | Age: 62
End: 2022-09-28
Payer: COMMERCIAL

## 2022-09-28 VITALS
HEART RATE: 71 BPM | WEIGHT: 200.8 LBS | DIASTOLIC BLOOD PRESSURE: 70 MMHG | HEIGHT: 59 IN | OXYGEN SATURATION: 97 % | TEMPERATURE: 98.1 F | RESPIRATION RATE: 14 BRPM | BODY MASS INDEX: 40.48 KG/M2 | SYSTOLIC BLOOD PRESSURE: 100 MMHG

## 2022-09-28 DIAGNOSIS — I27.20 PULMONARY HTN (H): ICD-10-CM

## 2022-09-28 DIAGNOSIS — E78.5 HYPERLIPIDEMIA LDL GOAL <70: ICD-10-CM

## 2022-09-28 DIAGNOSIS — F33.1 MODERATE EPISODE OF RECURRENT MAJOR DEPRESSIVE DISORDER (H): ICD-10-CM

## 2022-09-28 DIAGNOSIS — Z76.89 ENCOUNTER TO ESTABLISH CARE: Primary | ICD-10-CM

## 2022-09-28 DIAGNOSIS — Z23 NEED FOR IMMUNIZATION AGAINST INFLUENZA: ICD-10-CM

## 2022-09-28 DIAGNOSIS — E11.42 TYPE 2 DIABETES MELLITUS WITH DIABETIC POLYNEUROPATHY, WITHOUT LONG-TERM CURRENT USE OF INSULIN (H): ICD-10-CM

## 2022-09-28 PROBLEM — E11.00 TYPE 2 DIABETES MELLITUS WITH HYPEROSMOLARITY WITHOUT COMA, WITHOUT LONG-TERM CURRENT USE OF INSULIN (H): Chronic | Status: ACTIVE | Noted: 2019-01-29

## 2022-09-28 LAB
ALBUMIN SERPL BCG-MCNC: 3.6 G/DL (ref 3.5–5.2)
ALP SERPL-CCNC: 175 U/L (ref 35–104)
ALT SERPL W P-5'-P-CCNC: 21 U/L (ref 10–35)
ANION GAP SERPL CALCULATED.3IONS-SCNC: 9 MMOL/L (ref 7–15)
AST SERPL W P-5'-P-CCNC: 26 U/L (ref 10–35)
BILIRUB SERPL-MCNC: 0.3 MG/DL
BUN SERPL-MCNC: 10 MG/DL (ref 8–23)
CALCIUM SERPL-MCNC: 9 MG/DL (ref 8.8–10.2)
CHLORIDE SERPL-SCNC: 99 MMOL/L (ref 98–107)
CREAT SERPL-MCNC: 0.7 MG/DL (ref 0.51–0.95)
DEPRECATED HCO3 PLAS-SCNC: 30 MMOL/L (ref 22–29)
GFR SERPL CREATININE-BSD FRML MDRD: >90 ML/MIN/1.73M2
GLUCOSE SERPL-MCNC: 323 MG/DL (ref 70–99)
HBA1C MFR BLD: 9.1 % (ref 0–5.6)
POTASSIUM SERPL-SCNC: 4.3 MMOL/L (ref 3.4–5.3)
PROT SERPL-MCNC: 6.8 G/DL (ref 6.4–8.3)
SODIUM SERPL-SCNC: 138 MMOL/L (ref 136–145)

## 2022-09-28 PROCEDURE — 80053 COMPREHEN METABOLIC PANEL: CPT | Performed by: FAMILY MEDICINE

## 2022-09-28 PROCEDURE — 83036 HEMOGLOBIN GLYCOSYLATED A1C: CPT | Performed by: FAMILY MEDICINE

## 2022-09-28 PROCEDURE — 99214 OFFICE O/P EST MOD 30 MIN: CPT | Mod: 25 | Performed by: FAMILY MEDICINE

## 2022-09-28 PROCEDURE — 36415 COLL VENOUS BLD VENIPUNCTURE: CPT | Performed by: FAMILY MEDICINE

## 2022-09-28 PROCEDURE — 90471 IMMUNIZATION ADMIN: CPT | Performed by: FAMILY MEDICINE

## 2022-09-28 PROCEDURE — 90682 RIV4 VACC RECOMBINANT DNA IM: CPT | Performed by: FAMILY MEDICINE

## 2022-09-28 RX ORDER — FUROSEMIDE 40 MG
40 TABLET ORAL DAILY
Qty: 90 TABLET | Refills: 3 | Status: SHIPPED | OUTPATIENT
Start: 2022-09-28 | End: 2023-12-14

## 2022-09-28 ASSESSMENT — PATIENT HEALTH QUESTIONNAIRE - PHQ9
SUM OF ALL RESPONSES TO PHQ QUESTIONS 1-9: 4
10. IF YOU CHECKED OFF ANY PROBLEMS, HOW DIFFICULT HAVE THESE PROBLEMS MADE IT FOR YOU TO DO YOUR WORK, TAKE CARE OF THINGS AT HOME, OR GET ALONG WITH OTHER PEOPLE: SOMEWHAT DIFFICULT
SUM OF ALL RESPONSES TO PHQ QUESTIONS 1-9: 4

## 2022-09-28 NOTE — PROGRESS NOTES
"  Assessment & Plan     Encounter to establish care  Previous pcp no longer at this clinic    Type 2 diabetes mellitus with diabetic polyneuropathy, without long-term current use of insulin (H)  a1c continues to improve, no symptomatic lows or highs. a1c goal <8%, she is at 9.1% down from 10.9% which is very good! Congratulated her on her progress, discussed ongoing goals. She would like to keep everything the same and work on her lifestyle modifications. Will follow every 3 months.   - Hemoglobin A1c  - Comprehensive metabolic panel (BMP + Alb, Alk Phos, ALT, AST, Total. Bili, TP)  - continue glipizide XL 10mg, janumet  BID  - continue statin    Pulmonary HTN (H)  Refuses cpap. Continue lasix.   Watch BP - softer but still stable.   - furosemide (LASIX) 40 MG tablet  Dispense: 90 tablet; Refill: 3    Moderate episode of recurrent major depressive disorder (H)  Stable, declines any further intervention at this time.     Need for immunization against influenza  - INFLUENZA QUAD, PF (RIV4) (FLUBLOK)         BMI:   Estimated body mass index is 39.95 kg/m  as calculated from the following:    Height as of this encounter: 1.51 m (4' 11.45\").    Weight as of this encounter: 91.1 kg (200 lb 12.8 oz).   Weight management plan: Patient was referred to their PCP to discuss a diet and exercise plan.        Return in about 3 months (around 12/28/2022) for diabetes follow up.    40 minutes spent on the date of the encounter doing chart review, history and exam, documentation and further activities per the note. Patient is new to me, significant amount of time spent reviewing chart, updating problem list to include appropriate updated HCC codes.     Visit was completed along with Brittney phone     Options for treatment and follow-up care were reviewed with the patient. Neh Net and/or guardian was engaged and actively involved in the decision making process. Neh Net and/or guardian verbalized understanding of the " "options discussed and was satisfied with the final plan.    Devin Earl MD  [facility] NANCY Marcum is a 62 year old, presenting for the following health issues:  Diabetes      HPI       Diabetes  Glipizide XR 10, sitagliptin 50 BID, metformin 1000 BID  She is on a statin    Furosemide?   Potassium?     She has no new concerns.   She declined pap today.    She wants to know how her DM is doing.     She did not bring her medications, but says she is taking everything.   Med rec is not always correct, but I think she is missing a lot of medications? Will have her bring everythign with her next time to review.     Per med rec, she is possibly missing:   - potassium  - atorvastatin    Review of Systems   Constitutional, HEENT, cardiovascular, pulmonary, gi and gu systems are negative, except as otherwise noted.      Objective    /70   Pulse 71   Temp 98.1  F (36.7  C) (Oral)   Resp 14   Ht 1.51 m (4' 11.45\")   Wt 91.1 kg (200 lb 12.8 oz)   SpO2 97%   BMI 39.95 kg/m    Body mass index is 39.95 kg/m .  Physical Exam   GENERAL: healthy, alert and no distress  EYES: Eyes grossly normal to inspection, PERRL and conjunctivae and sclerae normal  HENT: ear canals and TM's normal, nose and mouth without ulcers or lesions  NECK: no adenopathy, no asymmetry, masses, or scars and thyroid normal to palpation  RESP: lungs clear to auscultation - no rales, rhonchi or wheezes  CV: regular rate and rhythm, normal S1 S2, no S3 or S4, no murmur, click or rub, no peripheral edema and peripheral pulses strong  ABDOMEN: soft, nontender, no hepatosplenomegaly, no masses and bowel sounds normal  MS: no gross musculoskeletal defects noted, no edema  SKIN: no suspicious lesions or rashes  NEURO: Normal strength and tone, mentation intact and speech normal  PSYCH: mentation appears normal, affect normal/bright    Recent Results (from the past 504 hour(s))   Hemoglobin A1c    Collection Time: 09/28/22  " 8:14 AM   Result Value Ref Range    Hemoglobin A1C 9.1 (H) 0.0 - 5.6 %   Comprehensive metabolic panel (BMP + Alb, Alk Phos, ALT, AST, Total. Bili, TP)    Collection Time: 09/28/22  8:14 AM   Result Value Ref Range    Sodium 138 136 - 145 mmol/L    Potassium 4.3 3.4 - 5.3 mmol/L    Chloride 99 98 - 107 mmol/L    Carbon Dioxide (CO2) 30 (H) 22 - 29 mmol/L    Anion Gap 9 7 - 15 mmol/L    Urea Nitrogen 10.0 8.0 - 23.0 mg/dL    Creatinine 0.70 0.51 - 0.95 mg/dL    Calcium 9.0 8.8 - 10.2 mg/dL    Glucose 323 (H) 70 - 99 mg/dL    Alkaline Phosphatase 175 (H) 35 - 104 U/L    AST 26 10 - 35 U/L    ALT 21 10 - 35 U/L    Protein Total 6.8 6.4 - 8.3 g/dL    Albumin 3.6 3.5 - 5.2 g/dL    Bilirubin Total 0.3 <=1.2 mg/dL    GFR Estimate >90 >60 mL/min/1.73m2       Answers for HPI/ROS submitted by the patient on 9/28/2022  If you checked off any problems, how difficult have these problems made it for you to do your work, take care of things at home, or get along with other people?: Somewhat difficult  PHQ9 TOTAL SCORE: 4

## 2022-09-30 DIAGNOSIS — E11.42 TYPE 2 DIABETES MELLITUS WITH DIABETIC POLYNEUROPATHY, WITHOUT LONG-TERM CURRENT USE OF INSULIN (H): ICD-10-CM

## 2022-09-30 DIAGNOSIS — D50.8 IRON DEFICIENCY ANEMIA SECONDARY TO INADEQUATE DIETARY IRON INTAKE: ICD-10-CM

## 2022-09-30 RX ORDER — FERROUS SULFATE 325(65) MG
TABLET ORAL
Qty: 180 TABLET | Refills: 3 | Status: SHIPPED | OUTPATIENT
Start: 2022-09-30 | End: 2023-11-16

## 2022-09-30 RX ORDER — SITAGLIPTIN AND METFORMIN HYDROCHLORIDE 1000; 50 MG/1; MG/1
TABLET, FILM COATED ORAL
Qty: 180 TABLET | Refills: 0 | Status: SHIPPED | OUTPATIENT
Start: 2022-09-30 | End: 2022-12-28

## 2022-10-01 NOTE — TELEPHONE ENCOUNTER
"   Requested Prescriptions   Pending Prescriptions Disp Refills     ferrous sulfate (FEROSUL) 325 (65 Fe) MG tablet [Pharmacy Med Name: FERROUS SULFATE 325 MG  (65 FE) Tablet] 60 tablet 3     Sig: TAKE 1 TABLET (325 MG TOTAL) BY MOUTH 2 (TWO) TIMES A DAY FOR IRON     Last Written Prescription Date:  09/19/21  Last Fill Quantity: 180,  # refills: 3   Last office visit provider:  09/28/22     JANUMET  MG tablet [Pharmacy Med Name: JANUMET 50-1,000 MG TABLET  Tablet] 60 tablet 3    Sig: TAKE 1 TABLET BY MOUTH 2 TIMES DAILY (WITH MEALS) FOR DIABETES   Last Written Prescription Date:  09/16/21  Last Fill Quantity: 180,  # refills: 3   Last office visit provider:  09/28/22     Requested Prescriptions   Pending Prescriptions Disp Refills     ferrous sulfate (FEROSUL) 325 (65 Fe) MG tablet [Pharmacy Med Name: FERROUS SULFATE 325 MG  (65 FE) Tablet] 60 tablet 3     Sig: TAKE 1 TABLET (325 MG TOTAL) BY MOUTH 2 (TWO) TIMES A DAY FOR IRON       Iron Supplements Passed - 9/30/2022 10:09 AM        Passed - Patient is 12 years of age or older        Passed - Recent (12 mo) or future (30 days) visit within the authorizing provider's specialty     Patient has had an office visit with the authorizing provider or a provider within the authorizing providers department within the previous 12 mos or has a future within next 30 days. See \"Patient Info\" tab in inbasket, or \"Choose Columns\" in Meds & Orders section of the refill encounter.              Passed - Hgb OR Hct on record within the past 12 mos.     Patient need only have had a HGB or HCT on file in the past 12 mos. That result does not need to be normal.    Recent Labs   Lab Test 08/10/22  0858 01/12/22  0845 09/08/21  0854   HGB 13.0 14.4 13.3     Recent Labs   Lab Test 08/10/22  0858 01/12/22  0845 09/08/21  0854   HCT 42.3 47.1* 45.0       Please verify a HGB or HCT has been checked SINCE THE LAST DOSE CHANGE.            Passed - Medication is " "active on med list           JANUMET  MG tablet [Pharmacy Med Name: JANUMET 50-1,000 MG TABLET  Tablet] 60 tablet 3     Sig: TAKE 1 TABLET BY MOUTH 2 TIMES DAILY (WITH MEALS) FOR DIABETES       Combination Oral Antihyperglycemic Agents Passed - 9/30/2022 10:09 AM        Passed - Patient has documented A1c within the specified period of time.     If HgbA1C is 8 or greater, it needs to be on file within the past 3 months.  If less than 8, must be on file within the past 6 months.     Recent Labs   Lab Test 09/28/22 0814   A1C 9.1*             Passed - Patient's CR is NOT>1.4 OR Patient's EGFR is NOT<45 within past 12 mos.     Recent Labs   Lab Test 09/28/22  0814 12/16/21  1040 07/06/21  1443   GFRESTIMATED >90   < > >60   GFRESTBLACK  --   --  >60    < > = values in this interval not displayed.       Recent Labs   Lab Test 09/28/22 0814   CR 0.70             Passed - Patient does not have a diagnosis of CHF.        Passed - Medication is active on med list        Passed - Patient is 18 years old or older.        Passed - Patient is not pregnant        Passed - Patient has not had a positive pregnancy test within the past 12 mos.        Passed - Recent (6 mo) or future (30 days) visit within the authorizing provider's specialty     Patient had office visit in the last 6 months or has a visit in the next 30 days with authorizing provider or within the authorizing provider's specialty.  See \"Patient Info\" tab in inbasket, or \"Choose Columns\" in Meds & Orders section of the refill encounter.                 Lasha Ramsey RN 09/30/22 8:34 PM  "

## 2022-10-13 RX ORDER — ATORVASTATIN CALCIUM 20 MG/1
20 TABLET, FILM COATED ORAL DAILY
Qty: 90 TABLET | Refills: 3 | Status: SHIPPED | OUTPATIENT
Start: 2022-10-13 | End: 2023-11-16

## 2022-10-28 DIAGNOSIS — Z76.0 ENCOUNTER FOR MEDICATION REFILL: Primary | ICD-10-CM

## 2022-10-28 DIAGNOSIS — E87.6 HYPOKALEMIA: ICD-10-CM

## 2022-10-28 RX ORDER — POTASSIUM CHLORIDE 1500 MG/1
TABLET, EXTENDED RELEASE ORAL
Qty: 90 TABLET | Refills: 3 | Status: SHIPPED | OUTPATIENT
Start: 2022-10-28 | End: 2023-08-02

## 2022-12-09 DIAGNOSIS — Z76.0 ENCOUNTER FOR MEDICATION REFILL: Primary | ICD-10-CM

## 2022-12-09 DIAGNOSIS — M47.819 FACET ARTHROPATHY OF SPINE: ICD-10-CM

## 2022-12-09 RX ORDER — NAPROXEN 500 MG/1
TABLET ORAL
Qty: 90 TABLET | Refills: 3 | Status: SHIPPED | OUTPATIENT
Start: 2022-12-09 | End: 2022-12-28

## 2022-12-22 ENCOUNTER — TELEPHONE (OUTPATIENT)
Dept: FAMILY MEDICINE | Facility: CLINIC | Age: 62
End: 2022-12-22

## 2022-12-22 NOTE — TELEPHONE ENCOUNTER
Reason for Call:  Appointment Request    Patient requesting this type of appt:  OV    Requested provider: Devin Earl    Reason patient unable to be scheduled: already scheduled for 12/28/22 at 820 am    Okay to leave a detailed message?: No Sheree will relay to pt.  Task completed    Call taken on 12/22/2022 at 3:24 PM by BONIFACIO Javed

## 2022-12-28 ENCOUNTER — OFFICE VISIT (OUTPATIENT)
Dept: FAMILY MEDICINE | Facility: CLINIC | Age: 62
End: 2022-12-28
Payer: COMMERCIAL

## 2022-12-28 VITALS
SYSTOLIC BLOOD PRESSURE: 114 MMHG | WEIGHT: 194 LBS | HEIGHT: 59 IN | HEART RATE: 74 BPM | RESPIRATION RATE: 16 BRPM | BODY MASS INDEX: 39.11 KG/M2 | DIASTOLIC BLOOD PRESSURE: 70 MMHG | OXYGEN SATURATION: 97 % | TEMPERATURE: 98 F

## 2022-12-28 DIAGNOSIS — E11.42 TYPE 2 DIABETES MELLITUS WITH DIABETIC POLYNEUROPATHY, WITHOUT LONG-TERM CURRENT USE OF INSULIN (H): ICD-10-CM

## 2022-12-28 DIAGNOSIS — M47.819 FACET ARTHROPATHY OF SPINE: ICD-10-CM

## 2022-12-28 DIAGNOSIS — Z12.4 CERVICAL CANCER SCREENING: ICD-10-CM

## 2022-12-28 DIAGNOSIS — E08.42 DIABETIC POLYNEUROPATHY ASSOCIATED WITH DIABETES MELLITUS DUE TO UNDERLYING CONDITION (H): Primary | ICD-10-CM

## 2022-12-28 DIAGNOSIS — G89.4 CHRONIC PAIN DISORDER: ICD-10-CM

## 2022-12-28 DIAGNOSIS — Z76.0 ENCOUNTER FOR MEDICATION REFILL: ICD-10-CM

## 2022-12-28 LAB
CREAT UR-MCNC: 100 MG/DL
HBA1C MFR BLD: 11.4 % (ref 0–5.6)
MICROALBUMIN UR-MCNC: <12 MG/L
MICROALBUMIN/CREAT UR: NORMAL MG/G{CREAT}

## 2022-12-28 PROCEDURE — 36415 COLL VENOUS BLD VENIPUNCTURE: CPT | Performed by: FAMILY MEDICINE

## 2022-12-28 PROCEDURE — 82043 UR ALBUMIN QUANTITATIVE: CPT | Performed by: FAMILY MEDICINE

## 2022-12-28 PROCEDURE — 83036 HEMOGLOBIN GLYCOSYLATED A1C: CPT | Performed by: FAMILY MEDICINE

## 2022-12-28 PROCEDURE — 82570 ASSAY OF URINE CREATININE: CPT | Performed by: FAMILY MEDICINE

## 2022-12-28 PROCEDURE — 99214 OFFICE O/P EST MOD 30 MIN: CPT | Performed by: FAMILY MEDICINE

## 2022-12-28 RX ORDER — SITAGLIPTIN AND METFORMIN HYDROCHLORIDE 1000; 50 MG/1; MG/1
TABLET, FILM COATED ORAL
Qty: 180 TABLET | Refills: 3 | Status: SHIPPED | OUTPATIENT
Start: 2022-12-28 | End: 2024-02-20

## 2022-12-28 RX ORDER — CAPSAICIN 0.025 %
4 CREAM (GRAM) TOPICAL 3 TIMES DAILY PRN
Qty: 120 G | Refills: 3 | Status: SHIPPED | OUTPATIENT
Start: 2022-12-28 | End: 2024-01-10

## 2022-12-28 RX ORDER — NAPROXEN 500 MG/1
500 TABLET ORAL 2 TIMES DAILY PRN
Qty: 60 TABLET | Refills: 3 | Status: SHIPPED | OUTPATIENT
Start: 2022-12-28 | End: 2023-11-16

## 2022-12-28 RX ORDER — ACETAMINOPHEN 500 MG
1000 TABLET ORAL 3 TIMES DAILY PRN
Qty: 100 TABLET | Refills: 3 | Status: SHIPPED | OUTPATIENT
Start: 2022-12-28

## 2022-12-28 NOTE — PROGRESS NOTES
Assessment & Plan     Diabetic polyneuropathy associated with diabetes mellitus due to underlying condition (H)  Refused intervention, refused any more medications, absolutely refused any injections. a1c went back up to >11, goal is <<7.5%. she still wants to do diet and exercise, which is what she said last time. Continue janumet, glipizide. Significant risks discussed, she still refused any changes.   - Hemoglobin A1c  - Hemoglobin A1c  - Albumin Random Urine Quantitative with Creat Ratio  - diclofenac (VOLTAREN) 1 % topical gel  Dispense: 350 g; Refill: 4  - sitagliptin-metFORMIN (JANUMET)  MG tablet  Dispense: 180 tablet; Refill: 3    Cervical cancer screening  Declined.     Chronic pain disorder  Facet arthropathy of spine  Would like to go back to chiropractor, it's the thing that helped the most. Referral sent for the place she preferred.   - naproxen (NAPROSYN) 500 MG tablet  Dispense: 60 tablet; Refill: 3  - acetaminophen (TYLENOL) 500 MG tablet  Dispense: 100 tablet; Refill: 3  - Chiropractic Referral  - diclofenac (VOLTAREN) 1 % topical gel  Dispense: 350 g; Refill: 4    Type 2 diabetes mellitus with diabetic polyneuropathy, without long-term current use of insulin (H)  Continue medications.         No follow-ups on file.    Devin Earl MD  [facility]    [facility] is a 62 year old, presenting for the following health issues:  Diabetes (Back pain)      History of Present Illness       Back Pain:  She presents for follow up of back pain. Patient's back pain is a chronic problem.  Location of back pain:  Right lower back, left lower back, right middle of back and left middle of back  Description of back pain: burning, sharp and stabbing  Back pain spreads: right buttocks, left buttocks, right thigh, left thigh, right knee, left knee, right foot, left foot, right shoulder and left shoulder    Since patient first noticed back pain, pain is: always present, but gets  "better and worse  Does back pain interfere with her job:  Yes      Diabetes:   She presents for follow up of diabetes.  She is checking home blood glucose a few times a week. She checks blood glucose before meals.  Blood glucose is sometimes over 200 and never under 70. She is aware of hypoglycemia symptoms including weakness. She has no concerns regarding her diabetes at this time.  She is having numbness in feet, burning in feet and excessive thirst.         She eats 2-3 servings of fruits and vegetables daily.She consumes 0 sweetened beverage(s) daily.She exercises with enough effort to increase her heart rate 9 or less minutes per day.  She exercises with enough effort to increase her heart rate 3 or less days per week.   She is taking medications regularly.       Last visit:   a1c goal <8%, she is at 9.1% down from 10.9%  She would like to keep everything the same and work on her lifestyle modifications.  - continue glipizide XL 10mg, janumet  BID    Her a1c continues to increase.   She refuses any new medicines.   She says it's too cold to do anything new and will only consider seeing DM educator maybe when the weather is better.     She says absolutely no injections. Absolutely no new pills.     Her back hurts, really stiff. She feels like a ball in the back, like a lump.     Review of Systems   Constitutional, HEENT, cardiovascular, pulmonary, gi and gu systems are negative, except as otherwise noted.      Objective    /70   Pulse 74   Temp 98  F (36.7  C) (Oral)   Resp 16   Ht 1.51 m (4' 11.45\")   Wt 88 kg (194 lb)   SpO2 97%   BMI 38.59 kg/m    Body mass index is 38.59 kg/m .  Physical Exam   GENERAL: healthy, alert and no distress  EYES: Eyes grossly normal to inspection, PERRL and conjunctivae and sclerae normal  HENT: ear canals and TM's normal, nose and mouth without ulcers or lesions  NECK: no adenopathy, no asymmetry, masses, or scars and thyroid normal to palpation  RESP: lungs " clear to auscultation - no rales, rhonchi or wheezes  CV: regular rate and rhythm, normal S1 S2, no S3 or S4, no murmur, click or rub, no peripheral edema and peripheral pulses strong  ABDOMEN: soft, nontender, no hepatosplenomegaly, no masses and bowel sounds normal  MS: no gross musculoskeletal defects noted, no edema. Paraspinous tenderness in left buttock with palpable muscle spasm  SKIN: no suspicious lesions or rashes  NEURO: Normal strength and tone, mentation intact and speech normal  PSYCH: mentation appears normal, affect normal/bright    Results for orders placed or performed in visit on 12/28/22   Hemoglobin A1c     Status: Abnormal   Result Value Ref Range    Hemoglobin A1C 11.4 (H) 0.0 - 5.6 %     Results for orders placed or performed in visit on 12/28/22 (from the past 24 hour(s))   Hemoglobin A1c   Result Value Ref Range    Hemoglobin A1C 11.4 (H) 0.0 - 5.6 %

## 2023-01-18 DIAGNOSIS — Z53.9 DIAGNOSIS NOT YET DEFINED: Primary | ICD-10-CM

## 2023-01-18 PROCEDURE — G0179 MD RECERTIFICATION HHA PT: HCPCS | Performed by: FAMILY MEDICINE

## 2023-02-17 DIAGNOSIS — E11.42 TYPE 2 DIABETES MELLITUS WITH DIABETIC POLYNEUROPATHY, WITHOUT LONG-TERM CURRENT USE OF INSULIN (H): ICD-10-CM

## 2023-02-19 NOTE — TELEPHONE ENCOUNTER
"Routing refill request to provider for review/approval because:  A break in medication  Should have been out December 2022    Last Written Prescription Date:  12/16/21  Last Fill Quantity: 90,  # refills: 3   Last office visit provider:   12/28/22    Requested Prescriptions   Pending Prescriptions Disp Refills     glipiZIDE (GLUCOTROL XL) 10 MG 24 hr tablet [Pharmacy Med Name: GLIPIZIDE ER 10 MG TB24 10 Tablet] 90 tablet 3     Sig: TAKE 1 PILL BY MOUTH DAILY FOR DIABETES       Sulfonylurea Agents Failed - 2/17/2023 10:31 AM        Failed - Recent (6 mo) or future (30 days) visit within the authorizing provider's specialty     Patient had office visit in the last 6 months or has a visit in the next 30 days with authorizing provider or within the authorizing provider's specialty.  See \"Patient Info\" tab in inbasket, or \"Choose Columns\" in Meds & Orders section of the refill encounter.            Passed - Patient has documented A1c within the specified period of time.     If HgbA1C is 8 or greater, it needs to be on file within the past 3 months.  If less than 8, must be on file within the past 6 months.     Recent Labs   Lab Test 12/28/22  0816   A1C 11.4*             Passed - Medication is active on med list        Passed - Patient is age 18 or older        Passed - No active pregnancy on record        Passed - Patient has a recent creatinine (normal) within the past 12 mos.     Recent Labs   Lab Test 09/28/22  0814   CR 0.70       Ok to refill medication if creatinine is low          Passed - Patient has not had a positive pregnancy test within the past 12 mos.             Dayana Singh RN 02/19/23 2:06 AM  "

## 2023-02-20 RX ORDER — GLIPIZIDE 10 MG/1
TABLET, FILM COATED, EXTENDED RELEASE ORAL
Qty: 90 TABLET | Refills: 3 | Status: SHIPPED | OUTPATIENT
Start: 2023-02-20 | End: 2023-06-29

## 2023-03-02 DIAGNOSIS — E55.9 VITAMIN D DEFICIENCY: ICD-10-CM

## 2023-03-02 RX ORDER — ERGOCALCIFEROL 1.25 MG/1
CAPSULE, LIQUID FILLED ORAL
Qty: 4 CAPSULE | Refills: 11 | Status: SHIPPED | OUTPATIENT
Start: 2023-03-02 | End: 2024-01-23

## 2023-03-22 DIAGNOSIS — Z53.9 DIAGNOSIS NOT YET DEFINED: Primary | ICD-10-CM

## 2023-03-22 PROCEDURE — G0179 MD RECERTIFICATION HHA PT: HCPCS | Performed by: FAMILY MEDICINE

## 2023-03-29 ENCOUNTER — OFFICE VISIT (OUTPATIENT)
Dept: FAMILY MEDICINE | Facility: CLINIC | Age: 63
End: 2023-03-29
Payer: COMMERCIAL

## 2023-03-29 VITALS
SYSTOLIC BLOOD PRESSURE: 110 MMHG | TEMPERATURE: 98 F | OXYGEN SATURATION: 98 % | BODY MASS INDEX: 37.69 KG/M2 | HEIGHT: 60 IN | HEART RATE: 76 BPM | RESPIRATION RATE: 16 BRPM | WEIGHT: 192 LBS | DIASTOLIC BLOOD PRESSURE: 70 MMHG

## 2023-03-29 DIAGNOSIS — I27.20 PULMONARY HTN (H): ICD-10-CM

## 2023-03-29 DIAGNOSIS — E08.42 DIABETIC POLYNEUROPATHY ASSOCIATED WITH DIABETES MELLITUS DUE TO UNDERLYING CONDITION (H): ICD-10-CM

## 2023-03-29 DIAGNOSIS — E11.00 TYPE 2 DIABETES MELLITUS WITH HYPEROSMOLARITY WITHOUT COMA, WITHOUT LONG-TERM CURRENT USE OF INSULIN (H): Primary | Chronic | ICD-10-CM

## 2023-03-29 DIAGNOSIS — K13.21 ORAL LEUKOPLAKIA: ICD-10-CM

## 2023-03-29 DIAGNOSIS — D69.6 THROMBOCYTOPENIA (H): ICD-10-CM

## 2023-03-29 DIAGNOSIS — F33.1 MODERATE EPISODE OF RECURRENT MAJOR DEPRESSIVE DISORDER (H): ICD-10-CM

## 2023-03-29 DIAGNOSIS — K14.8 TONGUE LESION: ICD-10-CM

## 2023-03-29 DIAGNOSIS — Z12.4 CERVICAL CANCER SCREENING: ICD-10-CM

## 2023-03-29 DIAGNOSIS — E66.01 MORBID OBESITY (H): ICD-10-CM

## 2023-03-29 LAB
CHOLEST SERPL-MCNC: 229 MG/DL
HBA1C MFR BLD: 10 % (ref 0–5.6)
HDLC SERPL-MCNC: 44 MG/DL
LDLC SERPL CALC-MCNC: 132 MG/DL
NONHDLC SERPL-MCNC: 185 MG/DL
TRIGL SERPL-MCNC: 267 MG/DL

## 2023-03-29 PROCEDURE — 36415 COLL VENOUS BLD VENIPUNCTURE: CPT | Performed by: FAMILY MEDICINE

## 2023-03-29 PROCEDURE — 99214 OFFICE O/P EST MOD 30 MIN: CPT | Performed by: FAMILY MEDICINE

## 2023-03-29 PROCEDURE — 80061 LIPID PANEL: CPT | Performed by: FAMILY MEDICINE

## 2023-03-29 PROCEDURE — 99207 PR FOOT EXAM NO CHARGE: CPT | Performed by: FAMILY MEDICINE

## 2023-03-29 PROCEDURE — 83036 HEMOGLOBIN GLYCOSYLATED A1C: CPT | Performed by: FAMILY MEDICINE

## 2023-03-29 ASSESSMENT — PATIENT HEALTH QUESTIONNAIRE - PHQ9
SUM OF ALL RESPONSES TO PHQ QUESTIONS 1-9: 7
SUM OF ALL RESPONSES TO PHQ QUESTIONS 1-9: 7
10. IF YOU CHECKED OFF ANY PROBLEMS, HOW DIFFICULT HAVE THESE PROBLEMS MADE IT FOR YOU TO DO YOUR WORK, TAKE CARE OF THINGS AT HOME, OR GET ALONG WITH OTHER PEOPLE: SOMEWHAT DIFFICULT

## 2023-03-29 NOTE — PROGRESS NOTES
"  Assessment & Plan     Type 2 diabetes mellitus with hyperosmolarity without coma, without long-term current use of insulin (H)  Diabetic polyneuropathy associated with diabetes mellitus due to underlying condition (H)  Morbid obesity (H)    Patient refuses any medication changes. My recommendation would be to either increase the glipizide or add jardiance. Actually, would recommend insulin but patient adamantly refuses that as well. She has neuropathy that comes and goes, we discussed that this could become permanent. A1c improved from >11 to 10. Continue lifestyle modifications, she did lose 8lbs in the past 6 months, declined revisiting DM educator  - Hemoglobin A1c  - Lipid panel reflex to direct LDL Fasting  - FOOT EXAM  - Hemoglobin A1c  - Lipid panel reflex to direct LDL Fasting    Cervical cancer screening  Refused. Risk discussed (again)    Pulmonary HTN (H)  Continue furosemide. Currently asymptomatic.     Moderate episode of recurrent major depressive disorder (H)  Stable, continue therapy. Declined medications.     Thrombocytopenia (H)  Followed by heme. Normal last check. Has future labs ordered for august.     Tongue lesion  Oral leukoplakia  White, raised, thick lesion about 1cm on left side under tongue. Chart reviewed, pathology showing no malignancy - only inflammation. Lesion is bothersome and has returned, will refer to ENT. Pathology   - Adult ENT  Referral     Nicotine/Tobacco Cessation:  She reports that she has been smoking pipe. She has never been exposed to tobacco smoke. She has never used smokeless tobacco.  Nicotine/Tobacco Cessation Plan:   Information offered: Patient not interested at this time      BMI:   Estimated body mass index is 37.69 kg/m  as calculated from the following:    Height as of this encounter: 1.52 m (4' 11.84\").    Weight as of this encounter: 87.1 kg (192 lb).       Return in about 3 months (around 6/29/2023) for diabetes follow up.    30 minutes spent on " the date of the encounter doing chart review, history and exam, documentation and further activities per the note      Devin Earl MD  [facility] NANCY Marcum is a 63 year old, presenting for the following health issues:  Diabetes      History of Present Illness       Back Pain:  She presents for follow up of back pain. Patient's back pain is a chronic problem.  Location of back pain:  Right lower back, left lower back, right buttock and left buttock  Description of back pain: burning, sharp and stabbing  Back pain spreads: right thigh, left thigh, right knee, left knee, right shoulder, left shoulder, right side of neck and left side of neck    Since patient first noticed back pain, pain is: always present, but gets better and worse  Does back pain interfere with her job:  Yes      Diabetes:   She presents for follow up of diabetes.  She is checking home blood glucose a few times a month. She checks blood glucose before meals.  Blood glucose is sometimes over 200 and never under 70. She is aware of hypoglycemia symptoms including shakiness and weakness. She has no concerns regarding her diabetes at this time.  She is having numbness in feet and burning in feet.         She eats 2-3 servings of fruits and vegetables daily.She consumes 0 sweetened beverage(s) daily.She exercises with enough effort to increase her heart rate 9 or less minutes per day.  She exercises with enough effort to increase her heart rate 3 or less days per week.   She is taking medications regularly.    Today's PHQ-9         PHQ-9 Total Score: 7    PHQ-9 Q9 Thoughts of better off dead/self-harm past 2 weeks :   Not at all    How difficult have these problems made it for you to do your work, take care of things at home, or get along with other people: Somewhat difficult       Diabetic polyneuropathy associated with diabetes mellitus due to underlying condition (H)  Refused intervention, refused any more  "medications, absolutely refused any injections. a1c went back up to >11, goal is <<7.5%. she still wants to do diet and exercise, which is what she said last time. Continue janumet, glipizide. Significant risks discussed, she still refused any changes. Today she is improved! But still not at goal. She continues to be symptomatic from hyperglycemia - we discussed that it may become permanent. Discussed adding medications, considering insulin - she refused and upset even mentioning insulin. Does not want any oral meds either, she actually wants less pills.     Cervical cancer screening  Declined.      Chronic pain disorder  Facet arthropathy of spine  Would like to go back to chiropractor, it's the thing that helped the most. Referral sent for the place she preferred.        Wt Readings from Last 4 Encounters:   03/29/23 87.1 kg (192 lb)   12/28/22 88 kg (194 lb)   09/28/22 91.1 kg (200 lb 12.8 oz)   08/17/22 90 kg (198 lb 6.4 oz)       Review of Systems   Constitutional, HEENT, cardiovascular, pulmonary, gi and gu systems are negative, except as otherwise noted.      Objective    /70   Pulse 76   Temp 98  F (36.7  C) (Oral)   Resp 16   Ht 1.52 m (4' 11.84\")   Wt 87.1 kg (192 lb)   SpO2 98%   BMI 37.69 kg/m    Body mass index is 37.69 kg/m .  Physical Exam   GENERAL: healthy, alert and no distress  NECK: no adenopathy, no asymmetry, masses, or scars and thyroid normal to palpation  MOUTH: White, raised, thick lesion about 1cm on left side under tongue.   RESP: lungs clear to auscultation - no rales, rhonchi or wheezes  CV: regular rate and rhythm, normal S1 S2, no S3 or S4, no murmur, click or rub, no peripheral edema and peripheral pulses strong  Diabetic foot exam: normal DP and PT pulses, no trophic changes or ulcerative lesions and normal sensory exam    ABDOMEN: soft, nontender, no hepatosplenomegaly, no masses and bowel sounds normal  MS: no gross musculoskeletal defects noted, no edema    Results " for orders placed or performed in visit on 03/29/23   Hemoglobin A1c     Status: Abnormal   Result Value Ref Range    Hemoglobin A1C 10.0 (H) 0.0 - 5.6 %     Results for orders placed or performed in visit on 03/29/23 (from the past 24 hour(s))   Hemoglobin A1c   Result Value Ref Range    Hemoglobin A1C 10.0 (H) 0.0 - 5.6 %

## 2023-03-31 NOTE — TELEPHONE ENCOUNTER
FUTURE VISIT INFORMATION      FUTURE VISIT INFORMATION:    Date: 5/3/23    Time: 8:20am    Location: [facility]  REFERRAL INFORMATION:    Referring provider:  Devin Earl MD    Referring providers clinic:  [facility],    Reason for visit/diagnosis  Tongue lesion [K14.8]Oral leukoplakia [K13.21] ref by Devin Earl MD in [facility], recs in epic - sched per pt    RECORDS REQUESTED FROM:       Clinic name Comments Records Status Imaging Status   [facility], 3/29/23- note with Devin Earl MD [facility] 10/5/16- note with Bebeto Kruger MD Epic

## 2023-05-03 ENCOUNTER — PRE VISIT (OUTPATIENT)
Dept: OTOLARYNGOLOGY | Facility: CLINIC | Age: 63
End: 2023-05-03

## 2023-05-03 ENCOUNTER — OFFICE VISIT (OUTPATIENT)
Dept: OTOLARYNGOLOGY | Facility: CLINIC | Age: 63
End: 2023-05-03
Attending: FAMILY MEDICINE
Payer: COMMERCIAL

## 2023-05-03 ENCOUNTER — TELEPHONE (OUTPATIENT)
Dept: OTOLARYNGOLOGY | Facility: CLINIC | Age: 63
End: 2023-05-03

## 2023-05-03 VITALS — HEIGHT: 59 IN | WEIGHT: 193 LBS | BODY MASS INDEX: 38.91 KG/M2

## 2023-05-03 DIAGNOSIS — K13.21 ORAL LEUKOPLAKIA: ICD-10-CM

## 2023-05-03 DIAGNOSIS — K14.8 TONGUE LESION: ICD-10-CM

## 2023-05-03 PROCEDURE — 99203 OFFICE O/P NEW LOW 30 MIN: CPT | Performed by: REGISTERED NURSE

## 2023-05-03 ASSESSMENT — PAIN SCALES - GENERAL: PAINLEVEL: SEVERE PAIN (6)

## 2023-05-03 NOTE — NURSING NOTE
"Chief Complaint   Patient presents with     Consult     Tongue lesion, leukoplakia      .  Jamel Orona LPN\"    "

## 2023-05-03 NOTE — PROGRESS NOTES
[facility]   Head and Neck Surgery  May 3, 2023    Referring Provider: Devin Earl MD    HPI: Trixie Sofia is a 63 year old female who presents today for evaluation and management of tongue lesion. Patient is accompanied by friend. Patient's history is obtained with assistance of a Brittney Ipad .  Patient reports a longstanding history of tongue and mouth lesions.  Reports a surgery in [place] prior to immigrating to the [place] to remove it lesion of the left tongue.  More recently, patient had a wide local excision of a left tongue lesion by Dr. Hoffmann in 2013.  Pathology demonstrated hyperkeratosis without dysplasia or malignancy.  Patient reports that, since that time, tongue lesion has slowly returned.  Patient denies any pain or bleeding of the lesion.  Denies any dysphagia, ear pain, or lumps or bumps of the neck.  Patient does have a history significant of tobacco use and chewing betel nuts.    Past Medical History:  Past Medical History:   Diagnosis Date     Degeneration of lumbar or lumbosacral intervertebral disc      Depression      Diabetes mellitus (H)      Essential hypertension      DEIRDRE (obstructive sleep apnea) 4/17/2019    See sleep study of Dr. Leung     Pulmonary hypertension due to sleep-disordered breathing (H) 01/03/2019    see 4/17/2019 sleep study     Sciatica      Symptomatic menopausal or female climacteric states 05/2014    Mei Carbone: no LMP for over a year as of 5/2014      Vertigo 4/28/2015     Past Surgical History:  Past Surgical History:   Procedure Laterality Date     TONGUE SURGERY Left 2006    surgery in [place] for mass on Tongue     Medications:  Current Outpatient Medications   Medication Sig Dispense Refill     acetaminophen (TYLENOL) 500 MG tablet Take 2 tablets (1,000 mg) by mouth 3 times daily as needed for mild pain 100 tablet 3     atorvastatin (LIPITOR) 20 MG tablet Take 1 tablet (20 mg) by mouth daily 90 tablet 3     Blood  Glucose Monitoring Suppl (GLUCOCOM BLOOD GLUCOSE MONITOR) LUIS FELIPE Test blood sugar three times a day.       capsaicin (ZOSTRIX) 0.025 % external cream Apply 4 g topically 3 times daily as needed (back pain) 120 g 3     CONTOUR NEXT TEST test strip USE 1 EACH AS DIRECTED THREE TIMES DAILY AT 7:30AM, 11:30AM AND 4:30PM. 50 strip 3     diclofenac (VOLTAREN) 1 % topical gel Apply 4 g topically 4 times daily 350 g 4     ferrous sulfate (FEROSUL) 325 (65 Fe) MG tablet TAKE 1 TABLET (325 MG TOTAL) BY MOUTH 2 (TWO) TIMES A DAY FOR IRON 180 tablet 3     furosemide (LASIX) 40 MG tablet Take 1 tablet (40 mg) by mouth daily 90 tablet 3     glipiZIDE (GLUCOTROL XL) 10 MG 24 hr tablet TAKE 1 PILL BY MOUTH DAILY FOR DIABETES 90 tablet 3     Global Inject Ease Lancets 30G MISC USE 1 APPLICATION THREE TIMES DAILY AT 7:30AM, 11:30AM, AND 4:30PM. 100 each 3     naproxen (NAPROSYN) 500 MG tablet Take 1 tablet (500 mg) by mouth 2 times daily as needed for moderate pain (4-6) (with meals) TAKE 1 TABLET BY MOUTH WITH FOOD UP TO 2-3 TIMES PER WEEK FOR BREAK THROUGH PAIN 60 tablet 3     omeprazole (PRILOSEC) 20 MG DR capsule TAKE 1 CAPSULE (20 MG TOTAL) BY MOUTH DAILY BEFORE BREAKFAST FOR STOMACH *NO SUB* 90 capsule 3     potassium chloride ER (KLOR-CON M) 20 MEQ CR tablet TAKE 1 TABLET (20 MEQ TOTAL) BY MOUTH DAILY. 90 tablet 3     sitagliptin-metFORMIN (JANUMET)  MG tablet TAKE 1 TABLET BY MOUTH 2 TIMES DAILY (WITH MEALS) FOR DIABETES 180 tablet 3     timolol maleate (TIMOPTIC) 0.5 % ophthalmic solution        vitamin D2 (ERGOCALCIFEROL) 41083 units (1250 mcg) capsule TAKE 1 CAPSULE (50,000 UNITS TOTAL) BY MOUTH ONCE A WEEK FOR BONE HEALTH 4 capsule 11     Allergies:  Allergies   Allergen Reactions     Aspirin Unknown      caused puffy face, numbness, heart palpitations      Social History:  Social History     Tobacco Use     Smoking status: Every Day     Types: Pipe     Passive exposure: Never     Smokeless tobacco: Never   Vaping  Use     Vaping status: Never Used   Substance Use Topics     Alcohol use: No     Drug use: No     ROS: 10 point ROS neg other than the symptoms noted above in the HPI.    Physical Exam:    There were no vitals taken for this visit.     Constitutional:  The patient was well-groomed and in no acute distress.     Skin: Normal:  warm and pink without rash   Neurologic: Alert and oriented x 3.    Psychiatric: The patient's affect was calm, cooperative, and appropriate.    Communication:  Normal; communicates verbally, normal voice quality.   Respiratory: Breathing comfortably without stridor or exertion of accessory muscles.    Head/Face:  Normocephalic and atraumatic.  No lesions or scars.   Salivary glands -  Normal size, no tenderness, swelling, or palpable masses   Ears: Pinnae and tragus non-tender.  EAC's and TM's were clear.   Oral Cavity: Very poor dentition with a loose lower left molar.    1 cm thick, hyperkeratotic lesion of the anterior left lateral tongue without pain or induration with palpation.    Multiple subcentimeter leukoplakia lesions along posterior lateral tongue and floor of mouth    Separate 1 cm spotty hyperkeratotic lesion of the left buccal mucosa without pain or induration with palpation.   Oropharynx: Normal mucosa, palate symmetric with normal elevation. No abnormal lymph tissue in the oropharynx.    Neck: Supple with normal laryngeal and tracheal landmarks.  The parotid beds were without masses.  No palpable thyroid.  Normal range of motion.   Lymphatic: There is no palpable lymphadenopathy in the neck.     Labs Reviewed:  Surgical pathology  12/3/13  MICRO/DIAGNOSIS:   A)   BIOPSY OF LEFT VENTRAL TONGUE:        1-   HYPERKERATOTIC EPITHELIUM WITH NO DYSPLASIA OR             MALIGNANCY.        2-   MILD CHRONIC INFLAMMATION IN UNDERLYING STROMA.         B)   BIOPSY OF LEFT SOFT PALATE:        1-   LICHENOID CHRONIC INFLAMMATION, SUGGESTIVE BUT NOT             DIAGNOSTIC OF ORAL LICHEN  PLANUS.        2-   HYPERPLASTIC EPITHELIUM WITH HYPERKERATOSIS,             PARAKERATOSIS, AND FOCAL ATYPIA, FAVOR REACTION TO             INFLAMMATION.        3-   NEGATIVE FOR MALIGNANCY.     Assessment/Plan:  Patient with multiple lesions of the oral cavity, particularly on the left lateral tongue, floor of mouth, and buccal mucosa. History of multiple excisions without any evidence of cancer or dysplasia. Patient is requesting excision of most anterior tongue lesion because of continued growth and thickening. Will refer patient to head and neck surgery for further evaluation. There are no lesions today that look concerning for a malignancy but, given patient's significant tobacco history, I would recommend further evaluation of lesions with a biopsy. Will hold off on biopsy until patient sees head and neck surgeon so that they can determine exact lesions to biopsy prior to excision.     Discussed with patient the importance of refraining from tobacco and betel nut use. Also recommend patient follow up with dentist due to poor condition of all teeth and loose teeth on exam.       Lorna Sequeira DNP, APRN, CNP  Otolaryngology  Head & Neck Surgery  [phone]    30 minutes spent on the date of the encounter doing chart review, history and exam, documentation and further activities per the note.

## 2023-05-03 NOTE — LETTER
5/3/2023       RE: Trixie Sofia  [address] [place] [address]     Dear Colleague,    Thank you for referring your patient, Trixie Sofia, to the [facility] [place] at [facility]. Please see a copy of my visit note below.    [facility]   Head and Neck Surgery  May 3, 2023    Referring Provider: Devin Earl MD    HPI: Trixie Sofia is a 63 year old female who presents today for evaluation and management of tongue lesion. Patient is accompanied by friend. Patient's history is obtained with assistance of a Brittney Ipad .  Patient reports a longstanding history of tongue and mouth lesions.  Reports a surgery in [place] prior to immigrating to the [place] to remove it lesion of the left tongue.  More recently, patient had a wide local excision of a left tongue lesion by Dr. Hoffmann in 2013.  Pathology demonstrated hyperkeratosis without dysplasia or malignancy.  Patient reports that, since that time, tongue lesion has slowly returned.  Patient denies any pain or bleeding of the lesion.  Denies any dysphagia, ear pain, or lumps or bumps of the neck.  Patient does have a history significant of tobacco use and chewing betel nuts.    Past Medical History:  Past Medical History:   Diagnosis Date    Degeneration of lumbar or lumbosacral intervertebral disc     Depression     Diabetes mellitus (H)     Essential hypertension     DEIRDRE (obstructive sleep apnea) 4/17/2019    See sleep study of Dr. Leung    Pulmonary hypertension due to sleep-disordered breathing (H) 01/03/2019    see 4/17/2019 sleep study    Sciatica     Symptomatic menopausal or female climacteric states 05/2014    Mei Carbone: no LMP for over a year as of 5/2014     Vertigo 4/28/2015     Past Surgical History:  Past Surgical History:   Procedure Laterality Date    TONGUE SURGERY Left 2006    surgery in [place] for mass on Tongue      Medications:  Current Outpatient Medications   Medication Sig Dispense Refill    acetaminophen (TYLENOL) 500 MG tablet Take 2 tablets (1,000 mg) by mouth 3 times daily as needed for mild pain 100 tablet 3    atorvastatin (LIPITOR) 20 MG tablet Take 1 tablet (20 mg) by mouth daily 90 tablet 3    Blood Glucose Monitoring Suppl (GLUCOCOM BLOOD GLUCOSE MONITOR) LUIS FELIPE Test blood sugar three times a day.      capsaicin (ZOSTRIX) 0.025 % external cream Apply 4 g topically 3 times daily as needed (back pain) 120 g 3    CONTOUR NEXT TEST test strip USE 1 EACH AS DIRECTED THREE TIMES DAILY AT 7:30AM, 11:30AM AND 4:30PM. 50 strip 3    diclofenac (VOLTAREN) 1 % topical gel Apply 4 g topically 4 times daily 350 g 4    ferrous sulfate (FEROSUL) 325 (65 Fe) MG tablet TAKE 1 TABLET (325 MG TOTAL) BY MOUTH 2 (TWO) TIMES A DAY FOR IRON 180 tablet 3    furosemide (LASIX) 40 MG tablet Take 1 tablet (40 mg) by mouth daily 90 tablet 3    glipiZIDE (GLUCOTROL XL) 10 MG 24 hr tablet TAKE 1 PILL BY MOUTH DAILY FOR DIABETES 90 tablet 3    Global Inject Ease Lancets 30G MISC USE 1 APPLICATION THREE TIMES DAILY AT 7:30AM, 11:30AM, AND 4:30PM. 100 each 3    naproxen (NAPROSYN) 500 MG tablet Take 1 tablet (500 mg) by mouth 2 times daily as needed for moderate pain (4-6) (with meals) TAKE 1 TABLET BY MOUTH WITH FOOD UP TO 2-3 TIMES PER WEEK FOR BREAK THROUGH PAIN 60 tablet 3    omeprazole (PRILOSEC) 20 MG DR capsule TAKE 1 CAPSULE (20 MG TOTAL) BY MOUTH DAILY BEFORE BREAKFAST FOR STOMACH *NO SUB* 90 capsule 3    potassium chloride ER (KLOR-CON M) 20 MEQ CR tablet TAKE 1 TABLET (20 MEQ TOTAL) BY MOUTH DAILY. 90 tablet 3    sitagliptin-metFORMIN (JANUMET)  MG tablet TAKE 1 TABLET BY MOUTH 2 TIMES DAILY (WITH MEALS) FOR DIABETES 180 tablet 3    timolol maleate (TIMOPTIC) 0.5 % ophthalmic solution       vitamin D2 (ERGOCALCIFEROL) 94907 units (1250 mcg) capsule TAKE 1 CAPSULE (50,000 UNITS TOTAL) BY MOUTH ONCE A WEEK FOR BONE HEALTH 4  capsule 11     Allergies:  Allergies   Allergen Reactions    Aspirin Unknown      caused puffy face, numbness, heart palpitations      Social History:  Social History     Tobacco Use    Smoking status: Every Day     Types: Pipe     Passive exposure: Never    Smokeless tobacco: Never   Vaping Use    Vaping status: Never Used   Substance Use Topics    Alcohol use: No    Drug use: No     ROS: 10 point ROS neg other than the symptoms noted above in the HPI.    Physical Exam:    There were no vitals taken for this visit.     Constitutional:  The patient was well-groomed and in no acute distress.     Skin: Normal:  warm and pink without rash   Neurologic: Alert and oriented x 3.    Psychiatric: The patient's affect was calm, cooperative, and appropriate.    Communication:  Normal; communicates verbally, normal voice quality.   Respiratory: Breathing comfortably without stridor or exertion of accessory muscles.    Head/Face:  Normocephalic and atraumatic.  No lesions or scars.   Salivary glands -  Normal size, no tenderness, swelling, or palpable masses   Ears: Pinnae and tragus non-tender.  EAC's and TM's were clear.   Oral Cavity: Very poor dentition with a loose lower left molar.    1 cm thick, hyperkeratotic lesion of the anterior left lateral tongue without pain or induration with palpation.    Multiple subcentimeter leukoplakia lesions along posterior lateral tongue and floor of mouth    Separate 1 cm spotty hyperkeratotic lesion of the left buccal mucosa without pain or induration with palpation.   Oropharynx: Normal mucosa, palate symmetric with normal elevation. No abnormal lymph tissue in the oropharynx.    Neck: Supple with normal laryngeal and tracheal landmarks.  The parotid beds were without masses.  No palpable thyroid.  Normal range of motion.   Lymphatic: There is no palpable lymphadenopathy in the neck.     Labs Reviewed:  Surgical pathology  12/3/13  MICRO/DIAGNOSIS:   A)   BIOPSY OF LEFT VENTRAL TONGUE:         1-   HYPERKERATOTIC EPITHELIUM WITH NO DYSPLASIA OR             MALIGNANCY.        2-   MILD CHRONIC INFLAMMATION IN UNDERLYING STROMA.         B)   BIOPSY OF LEFT SOFT PALATE:        1-   LICHENOID CHRONIC INFLAMMATION, SUGGESTIVE BUT NOT             DIAGNOSTIC OF ORAL LICHEN PLANUS.        2-   HYPERPLASTIC EPITHELIUM WITH HYPERKERATOSIS,             PARAKERATOSIS, AND FOCAL ATYPIA, FAVOR REACTION TO             INFLAMMATION.        3-   NEGATIVE FOR MALIGNANCY.     Assessment/Plan:  Patient with multiple lesions of the oral cavity, particularly on the left lateral tongue, floor of mouth, and buccal mucosa. History of multiple excisions without any evidence of cancer or dysplasia. Patient is requesting excision of most anterior tongue lesion because of continued growth and thickening. Will refer patient to head and neck surgery for further evaluation. There are no lesions today that look concerning for a malignancy but, given patient's significant tobacco history, I would recommend further evaluation of lesions with a biopsy. Will hold off on biopsy until patient sees head and neck surgeon so that they can determine exact lesions to biopsy prior to excision.     Discussed with patient the importance of refraining from tobacco and betel nut use. Also recommend patient follow up with dentist due to poor condition of all teeth and loose teeth on exam.       Lorna Sequeira DNP, APRN, CNP  Otolaryngology  Head & Neck Surgery  [phone]    30 minutes spent on the date of the encounter doing chart review, history and exam, documentation and further activities per the note.        Again, thank you for allowing me to participate in the care of your patient.      Sincerely,    Tammie Sequeira, NP

## 2023-05-09 DIAGNOSIS — Z76.0 ENCOUNTER FOR MEDICATION REFILL: ICD-10-CM

## 2023-05-09 DIAGNOSIS — K21.00 GASTROESOPHAGEAL REFLUX DISEASE WITH ESOPHAGITIS WITHOUT HEMORRHAGE: ICD-10-CM

## 2023-05-10 NOTE — TELEPHONE ENCOUNTER
"Last Written Prescription Date:  8/4/2022  Last Fill Quantity: 90,  # refills: 3   Last office visit provider:  3/29/2023     Requested Prescriptions   Pending Prescriptions Disp Refills     omeprazole (PRILOSEC) 20 MG DR capsule [Pharmacy Med Name: OMEPRAZOLE DR 20 MG CAPSULE 20 Capsule] 90 capsule 3     Sig: TAKE 1 CAPSULE (20 MG TOTAL) BY MOUTH DAILY BEFORE BREAKFAST FOR STOMACH *NO SUB*       PPI Protocol Passed - 5/10/2023 12:15 PM        Passed - Not on Clopidogrel (unless Pantoprazole ordered)        Passed - No diagnosis of osteoporosis on record        Passed - Recent (12 mo) or future (30 days) visit within the authorizing provider's specialty     Patient has had an office visit with the authorizing provider or a provider within the authorizing providers department within the previous 12 mos or has a future within next 30 days. See \"Patient Info\" tab in inbasket, or \"Choose Columns\" in Meds & Orders section of the refill encounter.              Passed - Medication is active on med list        Passed - Patient is age 18 or older        Passed - No active pregnacy on record        Passed - No positive pregnancy test in past 12 months             Janneth Steinberg RN 05/10/23 12:15 PM  "

## 2023-05-18 DIAGNOSIS — Z53.9 DIAGNOSIS NOT YET DEFINED: Primary | ICD-10-CM

## 2023-05-18 PROCEDURE — G0179 MD RECERTIFICATION HHA PT: HCPCS | Performed by: FAMILY MEDICINE

## 2023-05-18 NOTE — TELEPHONE ENCOUNTER
FUTURE VISIT INFORMATION      FUTURE VISIT INFORMATION:    Date: 7/25/23    Time: 7:35AM    Location: [facility]  REFERRAL INFORMATION:    Referring provider:  Tammie Sequeira     Referring providers clinic:   ENT    Reason for visit/diagnosis  appt per caregiver(Ryder), tongue lesions, ref by Tammie Sequeira per chart notes, recs in [facility], confirmed [facility] location    RECORDS REQUESTED FROM:       Clinic name Comments Records Status Imaging Status    ENT 5/3/23- [facility]  3/29/23- note with Devin Earl MD [facility] 10/5/16- NOTE WITH DR. GUIDO  [facility]

## 2023-05-20 NOTE — PROGRESS NOTES
FOOT AND ANKLE SURGERY/PODIATRY CONSULT NOTE         ASSESSMENT:   Diabetic neuropathy  Pronation deformity bilateral feet      TREATMENT:  I have recommended extra-depth diabetic shoes with molded insoles.  The patient is to return to the clinic as needed.        HPI: I was asked to see Trixie Sofia today to evaluate and treat bilateral foot pain.  The patient stated she is diabetic and she has had burning on the bottom of both feet.  In addition to the burning she has tingling and pins and needle sensation that can radiate towards her knees.  The pain is aggravated with activity.  She has some pain even while resting.  She has tried gabapentin in the past but was unable to tolerate the medication.  She is currently taking tramadol 50 mg tablet as needed.  She has a history of wearing diabetic shoes with insoles.  She stated that this does give her some relief.  She has not had a  new pair of diabetic shoes with orthotics for several years.  There are no factors which give her complete relief.  The patient  was seen in consultation at the request of Mei Carbone MD for evaluation and treatment of bilateral foot pain.     Past Medical History:   Diagnosis Date     Depression      Diabetes mellitus (H)      Essential hypertension      Lumbar Disc Degeneration L4 - L5      Menopause 05/2014    Mei Carbone: no LMP for over a year as of 5/2014      DEIRDRE (obstructive sleep apnea) 4/17/2019    See sleep study of Dr. Leung     Pulmonary hypertension due to sleep-disordered breathing (H) 01/03/2019    see 4/17/2019 sleep study     Sciatica      Vertigo 4/28/2015       Past Surgical History:   Procedure Laterality Date     TONGUE SURGERY Left 2006    surgery in [place] for mass on Tongue       Allergies   Allergen Reactions     Aspirin       caused puffy face, numbness, heart palpitations       No Known Drug Allergies          Current Outpatient Medications:      acetaminophen (Q-PAP EXTRA STRENGTH) 500 MG tablet, Take 2  Patient states she is having trouble sleeping due to the nerve pain she is experiencing.     Spoke with Luiza Temple NP who states: \"I told her to contact her PCP if her pain was not controlled because Gabapentin would be an option if she needed it. She already had it for a couple days so I didn't think she would need it. You can give er gabapentin 300 mg #30 - Start: 300 mg PO qd x1 day, then 300 mg PO bid x1 day, then 300 mg PO tid; Josh may need to sign for it because it is scheduled and needs PDMP approval. If she is not getting relief at 3x/day dosing she will need to speak to PCP.\"    Advised patient of above. Rx sent to preferred pharmacy. Patient verbalized understanding of above.   tablets (1,000 mg total) by mouth every 4 (four) hours as needed for pain., Disp: 100 tablet, Rfl: 11     atorvastatin (LIPITOR) 20 MG tablet, TAKE 1 PILL BY MOUTH EVERYDAY FOR CHOLESTEROL, Disp: 90 tablet, Rfl: 3     blood glucose test (CONTOUR NEXT TEST STRIPS) strips, Use 1 each As Directed three times daily at 7:30am, 11:30am and 4:30pm., Disp: 200 strip, Rfl: 3     blood-glucose meter (CONTOUR NEXT METER) Misc, Test blood sugar three times a day., Disp: 1 each, Rfl: 0     furosemide (LASIX) 40 MG tablet, TAKE 1 TABLET (40 MG TOTAL) BY MOUTH 2 (TWO) TIMES A DAY FOR EDEMA, Disp: 180 tablet, Rfl: 3     generic lancets (FINGERSTIX LANCETS), Use 1 application As Directed three times daily at 7:30am, 11:30am and 4:30pm., Disp: 200 each, Rfl: 0     glipiZIDE (GLUCOTROL XL) 10 MG 24 hr tablet, TAKE 1 PILL BY MOUTH DAILY FOR DIABETES, Disp: 90 tablet, Rfl: 3     JANUMET XR 50-1,000 mg TM24, TAKE 1 TABLET BY MOUTH 2 (TWO) TIMES A DAY FOR DIABETES, Disp: 180 tablet, Rfl: 3     naproxen (NAPROSYN) 500 MG tablet, TAKE 1 TABLET (500 MG TOTAL) BY MOUTH 2 (TWO) TIMES A DAY WITH MEALS., Disp: 30 tablet, Rfl: 2     omeprazole (PRILOSEC) 20 MG capsule, TAKE 1 CAPSULE (20 MG TOTAL) BY MOUTH DAILY BEFORE BREAKFAST FOR STOMACH, Disp: 30 capsule, Rfl: 11     potassium chloride (K-DUR,KLOR-CON) 20 MEQ tablet, TAKE 1 TABLET (20 MEQ TOTAL) BY MOUTH DAILY., Disp: 90 tablet, Rfl: 3     senna (SENOKOT) 8.6 mg tablet, Take 1 tablet by mouth daily as needed for constipation., Disp: 100 tablet, Rfl: 3     timoloL (BETIMOL) 0.5 % ophthalmic solution, Administer 1 drop to both eyes daily., Disp: 10 mL, Rfl: 12     timoloL maleate (TIMOPTIC) 0.5 % ophthalmic solution, , Disp: , Rfl:      traMADoL (ULTRAM) 50 mg tablet, TAKE 1 TABLET (50 MG TOTAL) BY MOUTH EVERY 6 (SIX) HOURS AS NEEDED FOR SEVERE PAIN (7-10)., Disp: 10 tablet, Rfl: 2     VITAMIN D2 1,250 mcg (50,000 unit) capsule, TAKE 1 CAPSULE (50,000 UNITS TOTAL) BY MOUTH ONCE A WEEK FOR BONE  HEALTH, Disp: 4 capsule, Rfl: 12    Family History   Problem Relation Age of Onset     Diabetes Mother      Hypertension Mother      Hyperlipidemia Mother      Diabetes Sister      Hypertension Sister      No Medical Problems Brother      Diabetes Sister      Hypertension Sister      Diabetes Sister      Hypertension Sister      No Medical Problems Sister      No Medical Problems Daughter      Breast cancer Neg Hx      Congenital heart disease Neg Hx        Social History     Socioeconomic History     Marital status:      Spouse name: Not on file     Number of children: 1     Years of education: Not on file     Highest education level: Not on file   Occupational History     Employer: NOT EMPLOYED   Social Needs     Financial resource strain: Not on file     Food insecurity     Worry: Not on file     Inability: Not on file     Transportation needs     Medical: Not on file     Non-medical: Not on file   Tobacco Use     Smoking status: Current Every Day Smoker     Years: 30.00     Types: Pipe     Smokeless tobacco: Never Used   Substance and Sexual Activity     Alcohol use: No     Drug use: No     Sexual activity: Not Currently     Partners: Male     Birth control/protection: Post-menopausal   Lifestyle     Physical activity     Days per week: 0 days     Minutes per session: Not on file     Stress: Not on file   Relationships     Social connections     Talks on phone: Not on file     Gets together: Not on file     Attends Congregational service: Not on file     Active member of club or organization: Not on file     Attends meetings of clubs or organizations: Not on file     Relationship status: Not on file     Intimate partner violence     Fear of current or ex partner: Not on file     Emotionally abused: Not on file     Physically abused: Not on file     Forced sexual activity: Not on file   Other Topics Concern     Not on file   Social History Narrative    Brittney brennere. [place]rived in [place] in [place], directly to [place].  Green card. Lives with daughter, son-in-law and four grandchildren.  passed away when she was pregnant with her daughter.        Review of Systems - Patient denies fever, chills, rash, wound, stiffness, limping, numbness, weakness, heart burn, blood in stool, chest pain with activity, calf pain when walking, shortness of breath with activity, chronic cough, easy bleeding/bruising, swelling of ankles, excessive thirst, fatigue, depression, anxiety.  Patient admits to bilateral foot pain.      OBJECTIVE:  Appearance: alert, well appearing, and in no distress.    Vitals:    12/10/20 0900   Pulse: 77   SpO2: 98%       BMI= Body mass index is 39.52 kg/m .    General appearance: Patient is alert and fully cooperative with history & exam.  No sign of distress is noted during the visit.  Psychiatric: Affect is pleasant & appropriate.  Patient appears motivated to improve health.  Respiratory: Breathing is regular & unlabored while sitting.  HEENT: Hearing is intact to spoken word.  Speech is clear.  No gross evidence of visual impairment that would impact ambulation.    Vascular: Dorsalis pedis and posterior tibial pulses are palpable. There is no pedal hair growth bilaterally.  CFT < 3 sec from anterior tibial surface to distal digits bilaterally. There is no appreciable edema noted.  Dermatologic: Turgor and texture are within normal limits. No coloration or temperature changes. No primary or secondary lesions noted.  Neurologic: All epicritic and proprioceptive sensations are grossly intact bilaterally.  The patient exhibits a positive Tinel sign when percussing the common, superficial, deep peroneal nerves as well as the tarsal tunnel bilaterally.  Musculoskeletal: All active and passive ankle, subtalar, midtarsal, and 1st MPJ range of motion are grossly intact without pain or crepitus, with the exception of none. Manual muscle strength is within normal limits bilaterally. All dorsiflexors, plantarflexors,  invertors, evertors are intact bilaterally.  No tenderness present to bilateral feet on palpation.  No tenderness to bilateral feet with range of motion.  There is flattening of the medial longitudinal arch noted bilaterally upon weightbearing.  Calf is soft/non-tender without warmth/induration    Imaging:         No results found.       Geoffrey Talamantes; DEON  [facility] Surgery/Podiatry

## 2023-05-23 ENCOUNTER — PATIENT OUTREACH (OUTPATIENT)
Dept: CARE COORDINATION | Facility: CLINIC | Age: 63
End: 2023-05-23
Payer: COMMERCIAL

## 2023-06-28 NOTE — PROGRESS NOTES
Assessment & Plan     Type 2 diabetes mellitus with hyperosmolarity without coma, without long-term current use of insulin (H)  SIGNIFICANT increase from 10 to 13.5% due to diet (fili fruit, jacinta, durian season). Patient says she will cut down, she does not want any more medicines. Even before this change, her a1c was very high and she was unable to make the appropriate modifications. Increased glipizide, she refused insulin.  - Hemoglobin A1c  - Comprehensive metabolic panel (BMP + Alb, Alk Phos, ALT, AST, Total. Bili, TP)  - Hemoglobin A1c  - Comprehensive metabolic panel (BMP + Alb, Alk Phos, ALT, AST, Total. Bili, TP)  - glipiZIDE (GLUCOTROL XL) 10 MG 24 hr tablet  Dispense: 180 tablet; Refill: 3    Screening for malignant neoplasm of cervix  Absolutely refused and requested it removed from her recommendations. Risks discussed at length. She refused/declined    Encounter for screening mammogram for breast cancer  - *MA Screening Digital Bilateral    Lichen planus  Leukoplakia of oral mucosa, including tongue  Following with ENT, has appt in July to see head and neck first for evaluation before they can consider excision.        Return in about 2 months (around 8/29/2023) for diabetes follow up.    30 minutes spent on the date of the encounter doing chart review, history and exam, documentation and further activities per the note      Devin Earl MD  [facility] NANCY Marcum is a 63 year old, presenting for the following health issues:  Diabetes      History of Present Illness       Back Pain:  She presents for follow up of back pain. Patient's back pain is a chronic problem.  Location of back pain:  Right lower back, left lower back, right middle of back, left middle of back, right upper back, left upper back, right side of neck, left side of neck, right shoulder, right buttock, left buttock, right hip, left hip, right side of waist and left side of waist  Description of back  pain: burning, dull ache, gnawing, sharp and stabbing  Back pain spreads: right buttocks, left buttocks, right thigh, left thigh, right knee, left knee, right shoulder, left shoulder, right side of neck and left side of neck    Since patient first noticed back pain, pain is: always present, but gets better and worse  Does back pain interfere with her job:  Yes      Diabetes:   She presents for follow up of diabetes.  She is checking home blood glucose a few times a month. She checks blood glucose before meals.  Blood glucose is sometimes over 200 and never under 70. When her blood glucose is low, the patient is asymptomatic for confusion, blurred vision, lethargy and reports not feeling dizzy, shaky, or weak.  She has no concerns regarding her diabetes at this time.  She is having numbness in feet, burning in feet, excessive thirst and blurry vision. The patient has not had a diabetic eye exam in the last 12 months.         She eats 2-3 servings of fruits and vegetables daily.She consumes 0 sweetened beverage(s) daily.She exercises with enough effort to increase her heart rate 9 or less minutes per day.  She exercises with enough effort to increase her heart rate 3 or less days per week.   She is taking medications regularly.    Today's PHQ-9         PHQ-9 Total Score: 5    PHQ-9 Q9 Thoughts of better off dead/self-harm past 2 weeks :   Not at all    How difficult have these problems made it for you to do your work, take care of things at home, or get along with other people: Not difficult at all     Seen by ENT 5/3/23   BIOPSY OF LEFT SOFT PALATE:        1-   LICHENOID CHRONIC INFLAMMATION, SUGGESTIVE BUT NOT             DIAGNOSTIC OF ORAL LICHEN PLANUS.        2-   HYPERPLASTIC EPITHELIUM WITH HYPERKERATOSIS,             PARAKERATOSIS, AND FOCAL ATYPIA, FAVOR REACTION TO             INFLAMMATION.        3-   NEGATIVE FOR MALIGNANCY.     Assessment/Plan:  Patient with multiple lesions of the oral cavity,  "particularly on the left lateral tongue, floor of mouth, and buccal mucosa. History of multiple excisions without any evidence of cancer or dysplasia. Patient is requesting excision of most anterior tongue lesion because of continued growth and thickening. Will refer patient to head and neck surgery for further evaluation. There are no lesions today that look concerning for a malignancy but, given patient's significant tobacco history, I would recommend further evaluation of lesions with a biopsy. Will hold off on biopsy until patient sees head and neck surgeon so that they can determine exact lesions to biopsy prior to excision.          Review of Systems   Constitutional, HEENT, cardiovascular, pulmonary, gi and gu systems are negative, except as otherwise noted.      Objective    /66   Pulse 84   Temp 97.8  F (36.6  C) (Oral)   Resp 16   Ht 1.499 m (4' 11\")   Wt 84.9 kg (187 lb 1.3 oz)   SpO2 95%   BMI 37.79 kg/m    Body mass index is 37.79 kg/m .  Physical Exam   GENERAL: healthy, alert and no distress  NECK: no adenopathy, no asymmetry, masses, or scars and thyroid normal to palpation  ENT: 1 cm thick, hyperkeratotic lesion of the anterior left lateral tongue without pain or induration with palpation.   Multiple subcentimeter leukoplakia lesions along posterior lateral tongue and floor of mouth   Separate 1 cm spotty hyperkeratotic lesion of the left buccal mucosa without pain or induration with palpation.  RESP: lungs clear to auscultation - no rales, rhonchi or wheezes  CV: regular rate and rhythm, normal S1 S2, no S3 or S4, no murmur, click or rub, no peripheral edema and peripheral pulses strong  ABDOMEN: soft, nontender, no hepatosplenomegaly, no masses and bowel sounds normal  MS: no gross musculoskeletal defects noted, no edema    Results for orders placed or performed in visit on 06/29/23 (from the past 24 hour(s))   Hemoglobin A1c   Result Value Ref Range    Hemoglobin A1C 13.5 (H) 0.0 - " 5.6 %

## 2023-06-29 ENCOUNTER — OFFICE VISIT (OUTPATIENT)
Dept: FAMILY MEDICINE | Facility: CLINIC | Age: 63
End: 2023-06-29
Payer: COMMERCIAL

## 2023-06-29 VITALS
SYSTOLIC BLOOD PRESSURE: 102 MMHG | WEIGHT: 187.08 LBS | HEART RATE: 84 BPM | BODY MASS INDEX: 37.72 KG/M2 | RESPIRATION RATE: 16 BRPM | DIASTOLIC BLOOD PRESSURE: 66 MMHG | TEMPERATURE: 97.8 F | HEIGHT: 59 IN | OXYGEN SATURATION: 95 %

## 2023-06-29 DIAGNOSIS — E11.00 TYPE 2 DIABETES MELLITUS WITH HYPEROSMOLARITY WITHOUT COMA, WITHOUT LONG-TERM CURRENT USE OF INSULIN (H): Primary | Chronic | ICD-10-CM

## 2023-06-29 DIAGNOSIS — Z12.31 ENCOUNTER FOR SCREENING MAMMOGRAM FOR BREAST CANCER: ICD-10-CM

## 2023-06-29 DIAGNOSIS — L43.9 LICHEN PLANUS: ICD-10-CM

## 2023-06-29 DIAGNOSIS — Z12.4 SCREENING FOR MALIGNANT NEOPLASM OF CERVIX: ICD-10-CM

## 2023-06-29 DIAGNOSIS — K13.21 LEUKOPLAKIA OF ORAL MUCOSA, INCLUDING TONGUE: ICD-10-CM

## 2023-06-29 PROBLEM — F32.9 MAJOR DEPRESSION: Status: RESOLVED | Noted: 2018-11-27 | Resolved: 2023-06-29

## 2023-06-29 LAB
ALBUMIN SERPL BCG-MCNC: 4.2 G/DL (ref 3.5–5.2)
ALP SERPL-CCNC: 246 U/L (ref 35–104)
ALT SERPL W P-5'-P-CCNC: 26 U/L (ref 0–50)
ANION GAP SERPL CALCULATED.3IONS-SCNC: 12 MMOL/L (ref 7–15)
AST SERPL W P-5'-P-CCNC: 20 U/L (ref 0–45)
BILIRUB SERPL-MCNC: 0.7 MG/DL
BUN SERPL-MCNC: 11.7 MG/DL (ref 8–23)
CALCIUM SERPL-MCNC: 9.5 MG/DL (ref 8.8–10.2)
CHLORIDE SERPL-SCNC: 90 MMOL/L (ref 98–107)
CREAT SERPL-MCNC: 0.67 MG/DL (ref 0.51–0.95)
DEPRECATED HCO3 PLAS-SCNC: 32 MMOL/L (ref 22–29)
GFR SERPL CREATININE-BSD FRML MDRD: >90 ML/MIN/1.73M2
GLUCOSE SERPL-MCNC: 464 MG/DL (ref 70–99)
HBA1C MFR BLD: 13.5 % (ref 0–5.6)
POTASSIUM SERPL-SCNC: 3.9 MMOL/L (ref 3.4–5.3)
PROT SERPL-MCNC: 8.1 G/DL (ref 6.4–8.3)
SODIUM SERPL-SCNC: 134 MMOL/L (ref 136–145)

## 2023-06-29 PROCEDURE — 83036 HEMOGLOBIN GLYCOSYLATED A1C: CPT | Performed by: FAMILY MEDICINE

## 2023-06-29 PROCEDURE — 80053 COMPREHEN METABOLIC PANEL: CPT | Performed by: FAMILY MEDICINE

## 2023-06-29 PROCEDURE — 99214 OFFICE O/P EST MOD 30 MIN: CPT | Performed by: FAMILY MEDICINE

## 2023-06-29 PROCEDURE — 36415 COLL VENOUS BLD VENIPUNCTURE: CPT | Performed by: FAMILY MEDICINE

## 2023-06-29 RX ORDER — GLIPIZIDE 10 MG/1
20 TABLET, FILM COATED, EXTENDED RELEASE ORAL DAILY
Qty: 180 TABLET | Refills: 3 | Status: SHIPPED | OUTPATIENT
Start: 2023-06-29 | End: 2024-07-09

## 2023-06-29 ASSESSMENT — PATIENT HEALTH QUESTIONNAIRE - PHQ9
10. IF YOU CHECKED OFF ANY PROBLEMS, HOW DIFFICULT HAVE THESE PROBLEMS MADE IT FOR YOU TO DO YOUR WORK, TAKE CARE OF THINGS AT HOME, OR GET ALONG WITH OTHER PEOPLE: NOT DIFFICULT AT ALL
SUM OF ALL RESPONSES TO PHQ QUESTIONS 1-9: 5
SUM OF ALL RESPONSES TO PHQ QUESTIONS 1-9: 5

## 2023-06-29 NOTE — PROGRESS NOTES
{PROVIDER CHARTING PREFERENCE:206450}    Daniel Marcum is a 63 year old, presenting for the following health issues:  Diabetes        6/29/2023     9:18 AM   Additional Questions   Roomed by Florian BERRY   Accompanied by PCA Domi Chaudhari     History of Present Illness       Back Pain:  She presents for follow up of back pain. Patient's back pain is a chronic problem.  Location of back pain:  Right lower back, left lower back, right middle of back, left middle of back, right upper back, left upper back, right side of neck, left side of neck, right shoulder, right buttock, left buttock, right hip, left hip, right side of waist and left side of waist  Description of back pain: burning, dull ache, gnawing, sharp and stabbing  Back pain spreads: right buttocks, left buttocks, right thigh, left thigh, right knee, left knee, right shoulder, left shoulder, right side of neck and left side of neck    Since patient first noticed back pain, pain is: always present, but gets better and worse  Does back pain interfere with her job:  Yes      Diabetes:   She presents for follow up of diabetes.  She is checking home blood glucose a few times a month. She checks blood glucose before meals.  Blood glucose is sometimes over 200 and never under 70. When her blood glucose is low, the patient is asymptomatic for confusion, blurred vision, lethargy and reports not feeling dizzy, shaky, or weak.  She has no concerns regarding her diabetes at this time.  She is having numbness in feet, burning in feet, excessive thirst and blurry vision. The patient has not had a diabetic eye exam in the last 12 months.         She eats 2-3 servings of fruits and vegetables daily.She consumes 0 sweetened beverage(s) daily.She exercises with enough effort to increase her heart rate 9 or less minutes per day.  She exercises with enough effort to increase her heart rate 3 or less days per week.   She is taking medications regularly.    Today's PHQ-9        "  PHQ-9 Total Score: 5    PHQ-9 Q9 Thoughts of better off dead/self-harm past 2 weeks :   Not at all    How difficult have these problems made it for you to do your work, take care of things at home, or get along with other people: Not difficult at all       {SUPERLIST (Optional):516629}  {additonal problems for provider to add (Optional):060151}      Review of Systems   {ROS COMP (Optional):471799}      Objective    /66   Pulse 84   Temp 97.8  F (36.6  C) (Oral)   Resp 16   Ht 1.499 m (4' 11\")   Wt 84.9 kg (187 lb 1.3 oz)   SpO2 95%   BMI 37.79 kg/m    Body mass index is 37.79 kg/m .  Physical Exam   {Exam List (Optional):437525}    {Diagnostic Test Results (Optional):873946}    {AMBULATORY ATTESTATION (Optional):647354}            "

## 2023-06-30 ENCOUNTER — TRANSFERRED RECORDS (OUTPATIENT)
Dept: HEALTH INFORMATION MANAGEMENT | Facility: CLINIC | Age: 63
End: 2023-06-30
Payer: COMMERCIAL

## 2023-07-06 DIAGNOSIS — Z76.0 ENCOUNTER FOR MEDICATION REFILL: ICD-10-CM

## 2023-07-06 NOTE — TELEPHONE ENCOUNTER
"Routing refill request to provider for review/approval because:  A break in medication    Last Written Prescription Date:  10/19/2021  Last Fill Quantity: 50,  # refills: 3   Last office visit provider:  6/29/2023     Requested Prescriptions   Pending Prescriptions Disp Refills     CONTOUR NEXT TEST test strip [Pharmacy Med Name: CONTOUR NEXT STRIPS 50 Strip] 50 strip 3     Sig: USE 1 EACH AS DIRECTED THREE TIMES DAILY AT 7:30AM, 11:30AM AND 4:30PM.       Diabetic Supplies Protocol Failed - 7/6/2023  9:22 AM        Failed - Recent (6 mo) or future (30 days) visit within the authorizing provider's specialty     Patient had office visit in the last 6 months or has a visit in the next 30 days with authorizing provider.  See \"Patient Info\" tab in inbasket, or \"Choose Columns\" in Meds & Orders section of the refill encounter.            Passed - Medication is active on med list        Passed - Patient is 18 years of age or older             Brittney Carroll RN 07/06/23 1:40 PM  "

## 2023-07-13 DIAGNOSIS — Z53.9 DIAGNOSIS NOT YET DEFINED: Primary | ICD-10-CM

## 2023-07-13 PROCEDURE — G0179 MD RECERTIFICATION HHA PT: HCPCS | Performed by: FAMILY MEDICINE

## 2023-07-25 ENCOUNTER — OFFICE VISIT (OUTPATIENT)
Dept: OTOLARYNGOLOGY | Facility: CLINIC | Age: 63
End: 2023-07-25
Payer: COMMERCIAL

## 2023-07-25 ENCOUNTER — PRE VISIT (OUTPATIENT)
Dept: OTOLARYNGOLOGY | Facility: CLINIC | Age: 63
End: 2023-07-25

## 2023-07-25 VITALS
WEIGHT: 188.9 LBS | HEIGHT: 59 IN | SYSTOLIC BLOOD PRESSURE: 119 MMHG | BODY MASS INDEX: 38.08 KG/M2 | DIASTOLIC BLOOD PRESSURE: 71 MMHG | HEART RATE: 75 BPM

## 2023-07-25 DIAGNOSIS — K14.8 TONGUE LESION: Primary | ICD-10-CM

## 2023-07-25 PROCEDURE — 99204 OFFICE O/P NEW MOD 45 MIN: CPT | Performed by: OTOLARYNGOLOGY

## 2023-07-25 RX ORDER — CLOBETASOL PROPIONATE 0.5 MG/G
OINTMENT TOPICAL 2 TIMES DAILY
Qty: 15 G | Refills: 1 | Status: SHIPPED | OUTPATIENT
Start: 2023-07-25

## 2023-07-25 ASSESSMENT — PAIN SCALES - GENERAL: PAINLEVEL: NO PAIN (0)

## 2023-07-25 NOTE — NURSING NOTE
"Chief Complaint   Patient presents with    Consult     Tongue lesions       Blood pressure 119/71, pulse 75, height 1.499 m (4' 11\"), weight 85.7 kg (188 lb 14.4 oz).    Ondina Summers, EMT      "

## 2023-07-25 NOTE — PATIENT INSTRUCTIONS
"You were seen in the clinic today by Dr. Burns. If you have any questions or concerns after your appointment, please call the clinic at [phone]. Press \"1\" for scheduling, press \"3\" for nurse advice.    2.   The following has been recommended for you based upon your appointment today:     Clobetasol ointment has been prescribed for you.  Please apply a small amount  to the affected area for 20 minutes twice daily to treat your oral leukoplakia.   -Plan to return to the clinic in 6 weeks for follow-up.  Gunjan CANTU, RN  [facility]  Department of Otolaryngology  [phone]      "

## 2023-07-25 NOTE — PROGRESS NOTES
HISTORY OF PRESENT ILLNESS: Trixie Sofia is a 63 year old female with a history With a history of oral leukoplakia this is present on the left side tongue was cut off before was shown to be without any significant cancer and is now come back the patient does use both betel nuts as well as pipes and pipe smoking and be on the smoking of causes and have done so in the past there is not much in the way of pain with any of this    Physical examination today left side of tongue shows a raised leukoplakia lesion may be some lichenoid changes ventrolateral tongue on the left side they are not erythematous they are not tender there is the other abnormalities noted no adenopathy is present within the neck assessment patient with a history of left-sided oral leukoplakia is a dense leukoplakia I have not got a biopsy today but the patient is good to go on to clobetasol times steroid and come back in about 6 weeks to see where things stand thank you            Last 2 Scores for Patient-Answered VHI Questionnaire       No data to display                Last 2 Scores for Patient-Answered CSI Questionnaire       No data to display                  Last 2 Scores for Patient-Answered EAT Questionnaire       No data to display                    PAST MEDICAL HISTORY:   Past Medical History:   Diagnosis Date    Degeneration of lumbar or lumbosacral intervertebral disc     Depression     Diabetes mellitus (H)     Essential hypertension     DEIRDRE (obstructive sleep apnea) 4/17/2019    See sleep study of Dr. Leung    Pulmonary hypertension due to sleep-disordered breathing (H) 01/03/2019    see 4/17/2019 sleep study    Sciatica     Symptomatic menopausal or female climacteric states 05/2014    Mei Carbone: no LMP for over a year as of 5/2014     Vertigo 4/28/2015       PAST SURGICAL HISTORY:   Past Surgical History:   Procedure Laterality Date    TONGUE SURGERY Left 2006    surgery in [place] for mass on Tongue       FAMILY HISTORY:    Family History   Problem Relation Age of Onset    Diabetes Mother     Hypertension Mother     Hyperlipidemia Mother     Diabetes Sister     Hypertension Sister     No Known Problems Brother     Diabetes Sister     Hypertension Sister     Diabetes Sister     Hypertension Sister     No Known Problems Sister     No Known Problems Daughter     Breast Cancer No family hx of     Congenital heart disease No family hx of        SOCIAL HISTORY:   Social History     Tobacco Use    Smoking status: Every Day     Types: Pipe     Passive exposure: Never    Smokeless tobacco: Never   Substance Use Topics    Alcohol use: No       REVIEW OF SYSTEMS: Ten point review of systems was performed and is negative except for:       5/3/2023     8:18 AM    ENT ROS   Neurology Dizzy spells    Headache   Musculoskeletal Sore or stiff joints    Back pain        ALLERGIES: Aspirin    MEDICATIONS:   Current Outpatient Medications   Medication Sig Dispense Refill    acetaminophen (TYLENOL) 500 MG tablet Take 2 tablets (1,000 mg) by mouth 3 times daily as needed for mild pain 100 tablet 3    atorvastatin (LIPITOR) 20 MG tablet Take 1 tablet (20 mg) by mouth daily 90 tablet 3    Blood Glucose Monitoring Suppl (GLUCOCOM BLOOD GLUCOSE MONITOR) LUIS FELIPE Test blood sugar three times a day.      capsaicin (ZOSTRIX) 0.025 % external cream Apply 4 g topically 3 times daily as needed (back pain) 120 g 3    clobetasol (TEMOVATE) 0.05 % external ointment Apply topically 2 times daily 15 g 1    CONTOUR NEXT TEST test strip USE 1 EACH AS DIRECTED THREE TIMES DAILY AT 7:30AM, 11:30AM AND 4:30PM. 50 strip 3    diclofenac (VOLTAREN) 1 % topical gel Apply 4 g topically 4 times daily 350 g 4    ferrous sulfate (FEROSUL) 325 (65 Fe) MG tablet TAKE 1 TABLET (325 MG TOTAL) BY MOUTH 2 (TWO) TIMES A DAY FOR IRON 180 tablet 3    furosemide (LASIX) 40 MG tablet Take 1 tablet (40 mg) by mouth daily 90 tablet 3    glipiZIDE (GLUCOTROL XL) 10 MG 24 hr tablet Take 2 tablets  (20 mg) by mouth daily 180 tablet 3    Global Inject Ease Lancets 30G MISC USE 1 APPLICATION THREE TIMES DAILY AT 7:30AM, 11:30AM, AND 4:30PM. 100 each 3    naproxen (NAPROSYN) 500 MG tablet Take 1 tablet (500 mg) by mouth 2 times daily as needed for moderate pain (4-6) (with meals) TAKE 1 TABLET BY MOUTH WITH FOOD UP TO 2-3 TIMES PER WEEK FOR BREAK THROUGH PAIN 60 tablet 3    omeprazole (PRILOSEC) 20 MG DR capsule TAKE 1 CAPSULE (20 MG TOTAL) BY MOUTH DAILY BEFORE BREAKFAST FOR STOMACH *NO SUB* 90 capsule 3    potassium chloride ER (KLOR-CON M) 20 MEQ CR tablet TAKE 1 TABLET (20 MEQ TOTAL) BY MOUTH DAILY. 90 tablet 3    sitagliptin-metFORMIN (JANUMET)  MG tablet TAKE 1 TABLET BY MOUTH 2 TIMES DAILY (WITH MEALS) FOR DIABETES 180 tablet 3    timolol maleate (TIMOPTIC) 0.5 % ophthalmic solution       vitamin D2 (ERGOCALCIFEROL) 55876 units (1250 mcg) capsule TAKE 1 CAPSULE (50,000 UNITS TOTAL) BY MOUTH ONCE A WEEK FOR BONE HEALTH 4 capsule 11         PHYSICAL EXAMINATION:  She  is awake, alert and in no apparent distress.    Her tympanic membranes are clear and intact bilaterally. External auditory canals are clear.  Nasal exam shows a mild septal deviation without obstruction.  Examination of the oral cavity shows no suspicious lesions.  There is symmetric movement of the tongue and soft palate.    The oropharynx is clear.  Her neck is supple without significant adenopathy.  Pulse is regular.  Upper airway is clear.  Cranial nerves II-XII are grossly intact.       P  IMPRESSION/PLAN:

## 2023-07-25 NOTE — LETTER
7/25/2023       RE: Trixie Sofia  [address] [place] [address]     Dear Colleague,    Thank you for referring your patient, Trixie Sofia, to the [facility] [place] at [facility]. Please see a copy of my visit note below.    HISTORY OF PRESENT ILLNESS: Trixie Sofia is a 63 year old female with a history With a history of oral leukoplakia this is present on the left side tongue was cut off before was shown to be without any significant cancer and is now come back the patient does use both betel nuts as well as pipes and pipe smoking and be on the smoking of causes and have done so in the past there is not much in the way of pain with any of this    Physical examination today left side of tongue shows a raised leukoplakia lesion may be some lichenoid changes ventrolateral tongue on the left side they are not erythematous they are not tender there is the other abnormalities noted no adenopathy is present within the neck assessment patient with a history of left-sided oral leukoplakia is a dense leukoplakia I have not got a biopsy today but the patient is good to go on to clobetasol times steroid and come back in about 6 weeks to see where things stand thank you            Last 2 Scores for Patient-Answered VHI Questionnaire       No data to display                Last 2 Scores for Patient-Answered CSI Questionnaire       No data to display                  Last 2 Scores for Patient-Answered EAT Questionnaire       No data to display                    PAST MEDICAL HISTORY:   Past Medical History:   Diagnosis Date     Degeneration of lumbar or lumbosacral intervertebral disc      Depression      Diabetes mellitus (H)      Essential hypertension      DEIRDRE (obstructive sleep apnea) 4/17/2019    See sleep study of Dr. Leung     Pulmonary hypertension due to sleep-disordered breathing (H) 01/03/2019    see 4/17/2019 sleep study      Sciatica      Symptomatic menopausal or female climacteric states 05/2014    Mei Carbone: no LMP for over a year as of 5/2014      Vertigo 4/28/2015       PAST SURGICAL HISTORY:   Past Surgical History:   Procedure Laterality Date     TONGUE SURGERY Left 2006    surgery in [place] for mass on Tongue       FAMILY HISTORY:   Family History   Problem Relation Age of Onset     Diabetes Mother      Hypertension Mother      Hyperlipidemia Mother      Diabetes Sister      Hypertension Sister      No Known Problems Brother      Diabetes Sister      Hypertension Sister      Diabetes Sister      Hypertension Sister      No Known Problems Sister      No Known Problems Daughter      Breast Cancer No family hx of      Congenital heart disease No family hx of        SOCIAL HISTORY:   Social History     Tobacco Use     Smoking status: Every Day     Types: Pipe     Passive exposure: Never     Smokeless tobacco: Never   Substance Use Topics     Alcohol use: No       REVIEW OF SYSTEMS: Ten point review of systems was performed and is negative except for:       5/3/2023     8:18 AM    ENT ROS   Neurology Dizzy spells    Headache   Musculoskeletal Sore or stiff joints    Back pain        ALLERGIES: Aspirin    MEDICATIONS:   Current Outpatient Medications   Medication Sig Dispense Refill     acetaminophen (TYLENOL) 500 MG tablet Take 2 tablets (1,000 mg) by mouth 3 times daily as needed for mild pain 100 tablet 3     atorvastatin (LIPITOR) 20 MG tablet Take 1 tablet (20 mg) by mouth daily 90 tablet 3     Blood Glucose Monitoring Suppl (GLUCOCOM BLOOD GLUCOSE MONITOR) LUIS FELIPE Test blood sugar three times a day.       capsaicin (ZOSTRIX) 0.025 % external cream Apply 4 g topically 3 times daily as needed (back pain) 120 g 3     clobetasol (TEMOVATE) 0.05 % external ointment Apply topically 2 times daily 15 g 1     CONTOUR NEXT TEST test strip USE 1 EACH AS DIRECTED THREE TIMES DAILY AT 7:30AM, 11:30AM AND 4:30PM. 50 strip 3      diclofenac (VOLTAREN) 1 % topical gel Apply 4 g topically 4 times daily 350 g 4     ferrous sulfate (FEROSUL) 325 (65 Fe) MG tablet TAKE 1 TABLET (325 MG TOTAL) BY MOUTH 2 (TWO) TIMES A DAY FOR IRON 180 tablet 3     furosemide (LASIX) 40 MG tablet Take 1 tablet (40 mg) by mouth daily 90 tablet 3     glipiZIDE (GLUCOTROL XL) 10 MG 24 hr tablet Take 2 tablets (20 mg) by mouth daily 180 tablet 3     Global Inject Ease Lancets 30G MISC USE 1 APPLICATION THREE TIMES DAILY AT 7:30AM, 11:30AM, AND 4:30PM. 100 each 3     naproxen (NAPROSYN) 500 MG tablet Take 1 tablet (500 mg) by mouth 2 times daily as needed for moderate pain (4-6) (with meals) TAKE 1 TABLET BY MOUTH WITH FOOD UP TO 2-3 TIMES PER WEEK FOR BREAK THROUGH PAIN 60 tablet 3     omeprazole (PRILOSEC) 20 MG DR capsule TAKE 1 CAPSULE (20 MG TOTAL) BY MOUTH DAILY BEFORE BREAKFAST FOR STOMACH *NO SUB* 90 capsule 3     potassium chloride ER (KLOR-CON M) 20 MEQ CR tablet TAKE 1 TABLET (20 MEQ TOTAL) BY MOUTH DAILY. 90 tablet 3     sitagliptin-metFORMIN (JANUMET)  MG tablet TAKE 1 TABLET BY MOUTH 2 TIMES DAILY (WITH MEALS) FOR DIABETES 180 tablet 3     timolol maleate (TIMOPTIC) 0.5 % ophthalmic solution        vitamin D2 (ERGOCALCIFEROL) 49317 units (1250 mcg) capsule TAKE 1 CAPSULE (50,000 UNITS TOTAL) BY MOUTH ONCE A WEEK FOR BONE HEALTH 4 capsule 11         PHYSICAL EXAMINATION:  She  is awake, alert and in no apparent distress.    Her tympanic membranes are clear and intact bilaterally. External auditory canals are clear.  Nasal exam shows a mild septal deviation without obstruction.  Examination of the oral cavity shows no suspicious lesions.  There is symmetric movement of the tongue and soft palate.    The oropharynx is clear.  Her neck is supple without significant adenopathy.  Pulse is regular.  Upper airway is clear.  Cranial nerves II-XII are grossly intact.       P  IMPRESSION/PLAN:              Again, thank you for allowing me to participate in the  care of your patient.      Sincerely,    Tano Burns MD

## 2023-08-01 ENCOUNTER — TELEPHONE (OUTPATIENT)
Dept: FAMILY MEDICINE | Facility: CLINIC | Age: 63
End: 2023-08-01
Payer: COMMERCIAL

## 2023-08-02 ENCOUNTER — OFFICE VISIT (OUTPATIENT)
Dept: FAMILY MEDICINE | Facility: CLINIC | Age: 63
End: 2023-08-02
Attending: FAMILY MEDICINE
Payer: COMMERCIAL

## 2023-08-02 VITALS
OXYGEN SATURATION: 95 % | DIASTOLIC BLOOD PRESSURE: 68 MMHG | TEMPERATURE: 97.9 F | WEIGHT: 186.75 LBS | HEIGHT: 59 IN | HEART RATE: 72 BPM | RESPIRATION RATE: 20 BRPM | BODY MASS INDEX: 37.65 KG/M2 | SYSTOLIC BLOOD PRESSURE: 101 MMHG

## 2023-08-02 DIAGNOSIS — E08.42 DIABETIC POLYNEUROPATHY ASSOCIATED WITH DIABETES MELLITUS DUE TO UNDERLYING CONDITION (H): ICD-10-CM

## 2023-08-02 DIAGNOSIS — K13.21 LEUKOPLAKIA OF ORAL MUCOSA, INCLUDING TONGUE: Primary | ICD-10-CM

## 2023-08-02 PROBLEM — Z71.89 ADVANCED CARE PLANNING/COUNSELING DISCUSSION: Status: RESOLVED | Noted: 2022-06-23 | Resolved: 2023-08-02

## 2023-08-02 PROBLEM — G89.4 CHRONIC PAIN DISORDER: Status: RESOLVED | Noted: 2019-08-29 | Resolved: 2023-08-02

## 2023-08-02 PROBLEM — F40.298 OTHER SPECIFIED PHOBIA: Status: RESOLVED | Noted: 2019-09-16 | Resolved: 2023-08-02

## 2023-08-02 PROBLEM — M12.9 ARTHROPATHY: Status: RESOLVED | Noted: 2021-09-16 | Resolved: 2023-08-02

## 2023-08-02 PROBLEM — H54.7 POOR VISION: Status: RESOLVED | Noted: 2019-02-21 | Resolved: 2023-08-02

## 2023-08-02 PROCEDURE — 99214 OFFICE O/P EST MOD 30 MIN: CPT | Performed by: FAMILY MEDICINE

## 2023-08-02 NOTE — PATIENT INSTRUCTIONS
The new medication is called jardiance.   If you get any vaginal irritation, stop the medication.     HOLD potassium until your next visit. We will check labs then to see if you still need it

## 2023-08-02 NOTE — PROGRESS NOTES
Assessment & Plan     Leukoplakia of oral mucosa, including tongue  Topical steroids, has follow up with ENT    Diabetic polyneuropathy associated with diabetes mellitus due to underlying condition (H)  Long discussion, patient says she will not change the way she eats because she enjoys food and wants to continue enjoying it. She will not do any injection medications. Will add jardiance and increase if tolerated, recheck A1c next visit. Risks discussed.   - empagliflozin (JARDIANCE) 10 MG TABS tablet  Dispense: 90 tablet; Refill: 1          Devin Earl MD  [facility] NANCY Marcum is a 63 year old, presenting for the following health issues:  Diabetes      History of Present Illness       Diabetes:   She presents for follow up of diabetes.  She is checking home blood glucose a few times a week.   She checks blood glucose before meals.  Blood glucose is sometimes over 200 and never under 70. She is aware of hypoglycemia symptoms including shakiness and weakness.    She has no concerns regarding her diabetes at this time.  She is having numbness in feet and burning in feet.  The patient has not had a diabetic eye exam in the last 12 months.            Last A1c significantly elevated to 13.5 due to dietary changes.Patient says she will cut down, she does not want any more medicines. Even before this change, her a1c was very high and she was unable to make the appropriate modifications. Increased glipizide, she refused insulin.     Tongue lesion  raised leukoplakia lesion may be some lichenoid changes ventrolateral tongue on the left side they are not erythematous they are not tender there is the other abnormalities noted no adenopathy is present within the neck assessment patient with a history of left-sided oral leukoplakia is a dense leukoplakia I have not got a biopsy today but the patient is good to go on to clobetasol times steroid and come back in about 6 weeks to see where  "things stand     Review of Systems   Constitutional, HEENT, cardiovascular, pulmonary, gi and gu systems are negative, except as otherwise noted.      Objective    /68   Pulse 72   Temp 97.9  F (36.6  C) (Oral)   Resp 20   Ht 1.499 m (4' 11\")   Wt 84.7 kg (186 lb 12 oz)   LMP  (LMP Unknown)   SpO2 95%   Breastfeeding No   BMI 37.72 kg/m    Body mass index is 37.72 kg/m .  Physical Exam   GENERAL: healthy, alert and no distress  NECK: no adenopathy, no asymmetry, masses, or scars and thyroid normal to palpation  RESP: lungs clear to auscultation - no rales, rhonchi or wheezes  CV: regular rate and rhythm, normal S1 S2, no S3 or S4, no murmur, click or rub, no peripheral edema and peripheral pulses strong  ABDOMEN: soft, nontender, no hepatosplenomegaly, no masses and bowel sounds normal  MS: no gross musculoskeletal defects noted, no edema    Office Visit on 06/29/2023   Component Date Value Ref Range Status    Hemoglobin A1C 06/29/2023 13.5 (H)  0.0 - 5.6 % Final    Normal <5.7%   Prediabetes 5.7-6.4%    Diabetes 6.5% or higher     Note: Adopted from ADA consensus guidelines.    Sodium 06/29/2023 134 (L)  136 - 145 mmol/L Final    Potassium 06/29/2023 3.9  3.4 - 5.3 mmol/L Final    Chloride 06/29/2023 90 (L)  98 - 107 mmol/L Final    Carbon Dioxide (CO2) 06/29/2023 32 (H)  22 - 29 mmol/L Final    Anion Gap 06/29/2023 12  7 - 15 mmol/L Final    Urea Nitrogen 06/29/2023 11.7  8.0 - 23.0 mg/dL Final    Creatinine 06/29/2023 0.67  0.51 - 0.95 mg/dL Final    Calcium 06/29/2023 9.5  8.8 - 10.2 mg/dL Final    Glucose 06/29/2023 464 (H)  70 - 99 mg/dL Final    Alkaline Phosphatase 06/29/2023 246 (H)  35 - 104 U/L Final    AST 06/29/2023 20  0 - 45 U/L Final    Reference intervals for this test were updated on 6/12/2023 to more accurately reflect our healthy population. There may be differences in the flagging of prior results with similar values performed with this method. Interpretation of those prior " results can be made in the context of the updated reference intervals.    ALT 06/29/2023 26  0 - 50 U/L Final    Reference intervals for this test were updated on 6/12/2023 to more accurately reflect our healthy population. There may be differences in the flagging of prior results with similar values performed with this method. Interpretation of those prior results can be made in the context of the updated reference intervals.      Protein Total 06/29/2023 8.1  6.4 - 8.3 g/dL Final    Albumin 06/29/2023 4.2  3.5 - 5.2 g/dL Final    Bilirubin Total 06/29/2023 0.7  <=1.2 mg/dL Final    GFR Estimate 06/29/2023 >90  >60 mL/min/1.73m2 Final

## 2023-09-05 ENCOUNTER — OFFICE VISIT (OUTPATIENT)
Dept: OTOLARYNGOLOGY | Facility: CLINIC | Age: 63
End: 2023-09-05
Payer: COMMERCIAL

## 2023-09-05 VITALS
BODY MASS INDEX: 38.3 KG/M2 | OXYGEN SATURATION: 98 % | WEIGHT: 190 LBS | HEART RATE: 64 BPM | TEMPERATURE: 96.8 F | HEIGHT: 59 IN | SYSTOLIC BLOOD PRESSURE: 118 MMHG | DIASTOLIC BLOOD PRESSURE: 76 MMHG

## 2023-09-05 DIAGNOSIS — K14.8 TONGUE LESION: Primary | ICD-10-CM

## 2023-09-05 PROCEDURE — 88305 TISSUE EXAM BY PATHOLOGIST: CPT | Mod: TC | Performed by: OTOLARYNGOLOGY

## 2023-09-05 PROCEDURE — 88305 TISSUE EXAM BY PATHOLOGIST: CPT | Mod: 26 | Performed by: PATHOLOGY

## 2023-09-05 PROCEDURE — 41100 BIOPSY OF TONGUE: CPT | Performed by: OTOLARYNGOLOGY

## 2023-09-05 ASSESSMENT — PAIN SCALES - GENERAL: PAINLEVEL: NO PAIN (0)

## 2023-09-05 NOTE — PROGRESS NOTES
The patient is here for follow-up today and the topical agents that were used on the tongue did not do her very much good at the present time.  She has no new complaints at the present time either though.    On physical examination there is broad and thick leukoplakia along the left side of the tongue this is really unchanged from the time of the last visit and there is no erythema or inflammation around this area of the tongue as well.  Otherwise exam is quite normal cranial nerves are intact skin of the face lips and neck is normal she is breathing normally at rest and she comes in for evaluation with her family member who speaks very good English and acts as her  today.    Procedure tongue biopsy we decided that we should do an incisional biopsy of the left tongue lesion today because 1 has not been done for several years went ahead and got consent for this today they went ahead and did an injection of the area of the left tongue lesion then used a 3 to 4 mm punch biopsy to remove a segment of the mucosa of the tongue in the center of the lesion that was the thickest.  At the conclusion of this we used silver nitrate cautery on the area of 6 punctate bleeding.    Assessment and plan patient with a history of longstanding leukoplakia we need to reinterrogate this at the present time to see if there is any cancer or similar I do not think it is over cancer presently but it could represent dysplasia.  We will call her with the results and proceed however we need to at that point in time the patient would like to have the lesion removed potentially thouigh.

## 2023-09-05 NOTE — LETTER
9/5/2023       RE: Trixie Sofia  [address]  [place] [address]     Dear Colleague,    Thank you for referring your patient, Trixie Sofia, to the [facility] [place] at [facility]. Please see a copy of my visit note below.    The patient is here for follow-up today and the topical agents that were used on the tongue did not do her very much good at the present time.  She has no new complaints at the present time either though.    On physical examination there is broad and thick leukoplakia along the left side of the tongue this is really unchanged from the time of the last visit and there is no erythema or inflammation around this area of the tongue as well.  Otherwise exam is quite normal cranial nerves are intact skin of the face lips and neck is normal she is breathing normally at rest and she comes in for evaluation with her family member who speaks very good English and acts as her  today.    Procedure tongue biopsy we decided that we should do an incisional biopsy of the left tongue lesion today because 1 has not been done for several years went ahead and got consent for this today they went ahead and did an injection of the area of the left tongue lesion then used a 3 to 4 mm punch biopsy to remove a segment of the mucosa of the tongue in the center of the lesion that was the thickest.  At the conclusion of this we used silver nitrate cautery on the area of 6 punctate bleeding.    Assessment and plan patient with a history of longstanding leukoplakia we need to reinterrogate this at the present time to see if there is any cancer or similar I do not think it is over cancer presently but it could represent dysplasia.  We will call her with the results and proceed however we need to at that point in time the patient would like to have the lesion removed potentially thouigh.       Again, thank you for allowing me  to participate in the care of your patient.      Sincerely,    Tano Burns MD

## 2023-09-05 NOTE — PATIENT INSTRUCTIONS
"You were seen in the clinic today by Dr. Burns. If you have any questions or concerns after your appointment, please call the clinic at [phone]. Press \"1\" for scheduling, press \"3\" for nurse advice.    2.   The following has been recommended for you based upon your appointment today:   -We will call you with the results of your biopsy.    Gunjan CANTU, RN  [facility]  Department of Otolaryngology  [phone]      "

## 2023-09-05 NOTE — NURSING NOTE
"No chief complaint on file.      Blood pressure 118/76, pulse 64, temperature 96.8  F (36  C), temperature source Temporal, height 1.499 m (4' 11\"), weight 86.2 kg (190 lb), SpO2 98 %, not currently breastfeeding.    Ondina Summers, EMT    "

## 2023-09-06 LAB
PATH REPORT.COMMENTS IMP SPEC: NORMAL
PATH REPORT.COMMENTS IMP SPEC: NORMAL
PATH REPORT.FINAL DX SPEC: NORMAL
PATH REPORT.GROSS SPEC: NORMAL
PATH REPORT.MICROSCOPIC SPEC OTHER STN: NORMAL
PATH REPORT.RELEVANT HX SPEC: NORMAL
PHOTO IMAGE: NORMAL

## 2023-09-07 ENCOUNTER — PATIENT OUTREACH (OUTPATIENT)
Dept: OTOLARYNGOLOGY | Facility: CLINIC | Age: 63
End: 2023-09-07
Payer: COMMERCIAL

## 2023-09-07 NOTE — TELEPHONE ENCOUNTER
Spoke to patient's caregiver to review results from biopsy.    Final Diagnosis   TONGUE, LOWER LEFT, BIOPSY:  -Small fragment of benign squamous mucosa with verrucous hyperkeratosis.  -No evidence of dysplasia or malignancy identified in this biopsy.     Caregiver discussed that patient would like to have lesion completely removed. Will review with Dr. Burns to see if this could be an in clinic procedure or OR. Caregiver verbalized understanding. Will call back with Dr. Burns's recommendations.

## 2023-09-12 ENCOUNTER — PREP FOR PROCEDURE (OUTPATIENT)
Dept: OTOLARYNGOLOGY | Facility: CLINIC | Age: 63
End: 2023-09-12
Payer: COMMERCIAL

## 2023-09-12 DIAGNOSIS — K14.8 TONGUE LESION: Primary | ICD-10-CM

## 2023-09-13 NOTE — TELEPHONE ENCOUNTER
Called patient's friend to review that Dr. Burns would like to bring patient to the OR to remove tongue lesion. Friend will communicate with patient. Discussed pre and post-op instructions. Will have surgery scheduling team reach out to contact patient.

## 2023-09-15 ENCOUNTER — TELEPHONE (OUTPATIENT)
Dept: OTOLARYNGOLOGY | Facility: CLINIC | Age: 63
End: 2023-09-15
Payer: COMMERCIAL

## 2023-09-15 NOTE — TELEPHONE ENCOUNTER
Patient is scheduled for surgery with Dr. uBrns     Spoke with: Patients friend, Ryder.    Date of Surgery: 10/16/2023    Location: [facility] OR     Pre op with Provider: HATTIE     H&P: Patient will be scheduling at [facility]. Informed patients friend pre op will need to be done within 30 days of scheduled surgery date.     Additional imaging/appointments: Patient is scheduled for a 2 week post op with Dr. Burns on 10/31 at 9:55.     Surgery packet: Will mail packet, confirmed with patients friend address in chart works best. Soap will be sent as well.      Additional comments: Informed patients friend a pre op nurse will be calling a few days prior to surgery to go over further details/give arrival time.         Marcela Villalta on 9/15/2023 at 12:47 PM

## 2023-09-20 DIAGNOSIS — Z53.9 DIAGNOSIS NOT YET DEFINED: Primary | ICD-10-CM

## 2023-09-20 PROCEDURE — G0179 MD RECERTIFICATION HHA PT: HCPCS | Performed by: FAMILY MEDICINE

## 2023-09-25 ENCOUNTER — TELEPHONE (OUTPATIENT)
Dept: MAMMOGRAPHY | Facility: CLINIC | Age: 63
End: 2023-09-25
Payer: COMMERCIAL

## 2023-09-25 NOTE — TELEPHONE ENCOUNTER
Patient Quality Outreach    Patient is due for the following:   Breast Cancer Screening - Mammogram    Next Steps:   Patient declined follow up at this time.    Type of outreach:    Phone, spoke to patient/parent. Patient declined exam. She does not want us to call again.    Next Steps:  Reach out within 90 days via Phone.    Max number of attempts reached: No. Will try again in 90 days if patient still on fail list.    Questions for provider review:    None           SUJATA Tavarez  Chart routed to Care Team.

## 2023-10-04 ENCOUNTER — PATIENT OUTREACH (OUTPATIENT)
Dept: CARE COORDINATION | Facility: CLINIC | Age: 63
End: 2023-10-04

## 2023-10-04 ENCOUNTER — OFFICE VISIT (OUTPATIENT)
Dept: FAMILY MEDICINE | Facility: CLINIC | Age: 63
End: 2023-10-04
Attending: FAMILY MEDICINE
Payer: COMMERCIAL

## 2023-10-04 VITALS
WEIGHT: 186.25 LBS | RESPIRATION RATE: 16 BRPM | HEART RATE: 68 BPM | HEIGHT: 59 IN | BODY MASS INDEX: 37.55 KG/M2 | SYSTOLIC BLOOD PRESSURE: 109 MMHG | DIASTOLIC BLOOD PRESSURE: 71 MMHG | OXYGEN SATURATION: 96 % | TEMPERATURE: 97.9 F

## 2023-10-04 DIAGNOSIS — E66.01 MORBID OBESITY (H): ICD-10-CM

## 2023-10-04 DIAGNOSIS — Z01.818 PREOP EXAMINATION: Primary | ICD-10-CM

## 2023-10-04 DIAGNOSIS — E11.00 TYPE 2 DIABETES MELLITUS WITH HYPEROSMOLARITY WITHOUT COMA, WITHOUT LONG-TERM CURRENT USE OF INSULIN (H): Chronic | ICD-10-CM

## 2023-10-04 DIAGNOSIS — Z12.31 ENCOUNTER FOR SCREENING MAMMOGRAM FOR BREAST CANCER: ICD-10-CM

## 2023-10-04 DIAGNOSIS — Z23 NEED FOR IMMUNIZATION AGAINST INFLUENZA: ICD-10-CM

## 2023-10-04 DIAGNOSIS — K13.21 LEUKOPLAKIA OF ORAL MUCOSA, INCLUDING TONGUE: ICD-10-CM

## 2023-10-04 LAB
ALBUMIN SERPL BCG-MCNC: 3.9 G/DL (ref 3.5–5.2)
ALP SERPL-CCNC: 243 U/L (ref 35–104)
ALT SERPL W P-5'-P-CCNC: 26 U/L (ref 0–50)
ANION GAP SERPL CALCULATED.3IONS-SCNC: 9 MMOL/L (ref 7–15)
AST SERPL W P-5'-P-CCNC: 28 U/L (ref 0–45)
BILIRUB SERPL-MCNC: 0.7 MG/DL
BUN SERPL-MCNC: 12.4 MG/DL (ref 8–23)
CALCIUM SERPL-MCNC: 9.3 MG/DL (ref 8.8–10.2)
CHLORIDE SERPL-SCNC: 95 MMOL/L (ref 98–107)
CREAT SERPL-MCNC: 0.67 MG/DL (ref 0.51–0.95)
DEPRECATED HCO3 PLAS-SCNC: 31 MMOL/L (ref 22–29)
EGFRCR SERPLBLD CKD-EPI 2021: >90 ML/MIN/1.73M2
ERYTHROCYTE [DISTWIDTH] IN BLOOD BY AUTOMATED COUNT: 12.9 % (ref 10–15)
GLUCOSE SERPL-MCNC: 339 MG/DL (ref 70–99)
HBA1C MFR BLD: 12.7 % (ref 0–5.6)
HCT VFR BLD AUTO: 43.7 % (ref 35–47)
HGB BLD-MCNC: 13.6 G/DL (ref 11.7–15.7)
MCH RBC QN AUTO: 26.7 PG (ref 26.5–33)
MCHC RBC AUTO-ENTMCNC: 31.1 G/DL (ref 31.5–36.5)
MCV RBC AUTO: 86 FL (ref 78–100)
PLATELET # BLD AUTO: 87 10E3/UL (ref 150–450)
POTASSIUM SERPL-SCNC: 4.3 MMOL/L (ref 3.4–5.3)
PROT SERPL-MCNC: 7.5 G/DL (ref 6.4–8.3)
RBC # BLD AUTO: 5.1 10E6/UL (ref 3.8–5.2)
SODIUM SERPL-SCNC: 135 MMOL/L (ref 135–145)
WBC # BLD AUTO: 5.6 10E3/UL (ref 4–11)

## 2023-10-04 PROCEDURE — 90682 RIV4 VACC RECOMBINANT DNA IM: CPT | Performed by: FAMILY MEDICINE

## 2023-10-04 PROCEDURE — 36415 COLL VENOUS BLD VENIPUNCTURE: CPT | Performed by: FAMILY MEDICINE

## 2023-10-04 PROCEDURE — 83036 HEMOGLOBIN GLYCOSYLATED A1C: CPT | Performed by: FAMILY MEDICINE

## 2023-10-04 PROCEDURE — 99215 OFFICE O/P EST HI 40 MIN: CPT | Mod: 25 | Performed by: FAMILY MEDICINE

## 2023-10-04 PROCEDURE — 80053 COMPREHEN METABOLIC PANEL: CPT | Performed by: FAMILY MEDICINE

## 2023-10-04 PROCEDURE — 85027 COMPLETE CBC AUTOMATED: CPT | Performed by: FAMILY MEDICINE

## 2023-10-04 PROCEDURE — 90471 IMMUNIZATION ADMIN: CPT | Performed by: FAMILY MEDICINE

## 2023-10-04 NOTE — PATIENT INSTRUCTIONS
Preparing for Your Surgery  Getting started  A nurse will call you to review your health history and instructions. They will give you an arrival time based on your scheduled surgery time. Please be ready to share:  Your doctor's clinic name and phone number  Your medical, surgical, and anesthesia history  A list of allergies and sensitivities  A list of medicines, including herbal treatments and over-the-counter drugs  Whether the patient has a legal guardian (ask how to send us the papers in advance)  Please tell us if you're pregnant--or if there's any chance you might be pregnant. Some surgeries may injure a fetus (unborn baby), so they require a pregnancy test. Surgeries that are safe for a fetus don't always need a test, and you can choose whether to have one.   If you have a child who's having surgery, please ask for a copy of Preparing for Your Child's Surgery.    Preparing for surgery  Within 10 to 30 days of surgery: Have a pre-op exam (sometimes called an H&P, or History and Physical). This can be done at a clinic or pre-operative center.  If you're having a , you may not need this exam. Talk to your care team.  At your pre-op exam, talk to your care team about all medicines you take. If you need to stop any medicines before surgery, ask when to start taking them again.  We do this for your safety. Many medicines can make you bleed too much during surgery. Some change how well surgery (anesthesia) drugs work.  Call your insurance company to let them know you're having surgery. (If you don't have insurance, call [phone].)  Call your clinic if there's any change in your health. This includes signs of a cold or flu (sore throat, runny nose, cough, rash, fever). It also includes a scrape or scratch near the surgery site.  If you have questions on the day of surgery, call your hospital or surgery center.  Eating and drinking guidelines  For your safety: Unless your surgeon tells you otherwise,  follow the guidelines below.  Eat and drink as usual until 8 hours before you arrive for surgery. After that, no food or milk.  Drink clear liquids until 2 hours before you arrive. These are liquids you can see through, like water, Gatorade, and Propel Water. They also include plain black coffee and tea (no cream or milk), candy, and breath mints. You can spit out gum when you arrive.  If you drink alcohol: Stop drinking it the night before surgery.  If your care team tells you to take medicine on the morning of surgery, it's okay to take it with a sip of water.  Preventing infection  Shower or bathe the night before and morning of your surgery. Follow the instructions your clinic gave you. (If no instructions, use regular soap.)  Don't shave or clip hair near your surgery site. We'll remove the hair if needed.  Don't smoke or vape the morning of surgery. You may chew nicotine gum up to 2 hours before surgery. A nicotine patch is okay.  Note: Some surgeries require you to completely quit smoking and nicotine. Check with your surgeon.  Your care team will make every effort to keep you safe from infection. We will:  Clean our hands often with soap and water (or an alcohol-based hand rub).  Clean the skin at your surgery site with a special soap that kills germs.  Give you a special gown to keep you warm. (Cold raises the risk of infection.)  Wear special hair covers, masks, gowns and gloves during surgery.  Give antibiotic medicine, if prescribed. Not all surgeries need antibiotics.  What to bring on the day of surgery  Photo ID and insurance card  Copy of your health care directive, if you have one  Glasses and hearing aids (bring cases)  You can't wear contacts during surgery  Inhaler and eye drops, if you use them (tell us about these when you arrive)  CPAP machine or breathing device, if you use them  A few personal items, if spending the night  If you have . . .  A pacemaker, ICD (cardiac defibrillator) or other  implant: Bring the ID card.  An implanted stimulator: Bring the remote control.  A legal guardian: Bring a copy of the certified (court-stamped) guardianship papers.  Please remove any jewelry, including body piercings. Leave jewelry and other valuables at home.  If you're going home the day of surgery  You must have a responsible adult drive you home. They should stay with you overnight as well.  If you don't have someone to stay with you, and you aren't safe to go home alone, we may keep you overnight. Insurance often won't pay for this.  After surgery  If it's hard to control your pain or you need more pain medicine, please call your surgeon's office.  Questions?   If you have any questions for your care team, list them here: _________________________________________________________________________________________________________________________________________________________________________ ____________________________________ ____________________________________ ____________________________________  For informational purposes only. Not to replace the advice of your health care provider. Copyright   2003, 2019 [facility]. All rights reserved. Clinically reviewed by Arlyn Zurita MD. [employer] 355732 - REV 12/22.    How to Take Your Medication Before Surgery  - HOLD (do not take) your METFORMIN on the morning of surgery.

## 2023-10-04 NOTE — Clinical Note
HEMANTH, I have not yet cleared her for her surgery but will update you next week after follow up. Her glucose is well into the 400-500s but she agreed to do insulin.

## 2023-10-04 NOTE — PROGRESS NOTES
[address]  [place] [address]  Phone: [phone]  Fax: [phone]  Primary Provider: Vasiliy Meredith  Pre-op Performing Provider:    VASILIY MEREDITH  PHONE,       PREOPERATIVE EVALUATION:  Today's date: 10/4/2023    Trixie is a 63 year old female who presents for a preoperative evaluation.      Surgical Information:  Surgery/Procedure: Excision of tongue lesion  Surgery Location: [facility]   Surgeon: Dr. Burns  Surgery Date: 10/16/2023  Time of Surgery: 3:20PM  Where patient plans to recover: At home with family  Fax number for surgical facility: Note does not need to be faxed, will be available electronically in Epic.    Assessment & Plan     The proposed surgical procedure is considered LOW risk.    RECOMMENDATION:  ADDENDUM 10/12/23  Patient started insulin and glucose significantly improved into the 200s instead of 400s. Patient also agreed to continue insulin throughout healing/recovery process.  APPROVAL GIVEN TO PROCEED WITH SURGERY      From previous visit on 10/4/23:   With glucose in 400s, this would be a setup for poor healing. Patient has agreed to start insulin and at least continue throughout the surgery and healing process, but will follow up in 1 week to see if she follows through. Her PCA is very reliable and willing to assist.       Preop examination  Leukoplakia of oral mucosa, including tongue  Plans is for excision, see recommendation above.   - CBC with platelets  - Comprehensive metabolic panel (BMP + Alb, Alk Phos, ALT, AST, Total. Bili, TP)  - CBC with platelets  - Comprehensive metabolic panel (BMP + Alb, Alk Phos, ALT, AST, Total. Bili, TP)      Morbid obesity (H)  Type 2 diabetes mellitus with hyperosmolarity without coma, without long-term current use of insulin (H)  Glucose has never been controlled and today A1c slighlty improved from >13 to 12.7 with doubling her glipizide. This is mostly due to diet and she says she will  consider cutting down on fruit, but I still don't think this will be sufficient. Discussed high risk for poor healing from surgery and she will need to get her glucose under 200 for me to clear her for surgery. She is upset about this decision and wants to proceed regardless of her glucose. Will start insulin 10u daily and follow closely. Will consider clearing for surgery if she starts and continues the insulin.   - Hemoglobin A1c  - Comprehensive metabolic panel (BMP + Alb, Alk Phos, ALT, AST, Total. Bili, TP)  - Hemoglobin A1c  - Comprehensive metabolic panel (BMP + Alb, Alk Phos, ALT, AST, Total. Bili, TP)  - insulin glargine (LANTUS PEN) 100 UNIT/ML pen  Dispense: 15 mL; Refill: 0  - insulin pen needle (32G X 4 MM) 32G X 4 MM miscellaneous  Dispense: 100 each; Refill: 0    Need for immunization against influenza  - INFLUENZA VACCINE 18-64Y (FLUBLOK)    Encounter for screening mammogram for breast cancer  Refused.               - No identified additional risk factors other than previously addressed    Antiplatelet or Anticoagulation Medication Instructions:   - Patient is on no antiplatelet or anticoagulation medications.    Additional Medication Instructions:  Patient is to take all scheduled medications on the day of surgery EXCEPT for modifications listed below:   - metformin: HOLD day of surgery.      Return in about 1 week (around 10/11/2023) for diabetes follow up.    50 minutes spent on the date of the encounter doing chart review, history and exam, documentation and further activities per the note    Devin Earl MD      Subjective       HPI related to upcoming procedure:   Patient has history of oral tongue lesion, has had it scraped before. Painful and bothersome, evaluated by dental and ENT, diagnosed with oral leukoplakia, biopsies negative for malignancy. Patient requesting excision due to discomfort and recurrence.           10/4/2023     7:48 AM   Preop Questions   1. Have you ever had a heart  attack or stroke? No   2. Have you ever had surgery on your heart or blood vessels, such as a stent placement, a coronary artery bypass, or surgery on an artery in your head, neck, heart, or legs? No   3. Do you have chest pain with activity? No   4. Do you have a history of  heart failure? No   5. Do you currently have a cold, bronchitis or symptoms of other infection? No   6. Do you have a cough, shortness of breath, or wheezing? No   7. Do you or anyone in your family have previous history of blood clots? No   8. Do you or does anyone in your family have a serious bleeding problem such as prolonged bleeding following surgeries or cuts? No   9. Have you ever had problems with anemia or been told to take iron pills? YES - recheck cbc today, last hgb normal, not on iron currently   10. Have you had any abnormal blood loss such as black, tarry or bloody stools, or abnormal vaginal bleeding? No   11. Have you ever had a blood transfusion? No   12. Are you willing to have a blood transfusion if it is medically needed before, during, or after your surgery? Yes   13. Have you or any of your relatives ever had problems with anesthesia? YES - not that she knows of? She was unsure.    14. Do you have sleep apnea, excessive snoring or daytime drowsiness? YES - no apnea   14a. Do you have a CPAP machine? No   15. Do you have any artifical heart valves or other implanted medical devices like a pacemaker, defibrillator, or continuous glucose monitor? No   16. Do you have artificial joints? No   17. Are you allergic to latex? No     Health Care Directive:  Patient has a Health Care Directive on file      Preoperative Review of :   reviewed - no record of controlled substances prescribed.          Review of Systems  CONSTITUTIONAL: NEGATIVE for fever, chills, change in weight  ENT/MOUTH: NEGATIVE for ear, mouth and throat problems  RESP: NEGATIVE for significant cough or SOB  CV: NEGATIVE for chest pain, palpitations or  peripheral edema    Patient Active Problem List    Diagnosis Date Noted    POLST (Physician Orders for Life-Sustaining Treatment) - DNR with selective treatment, POLST signed 6/23/22 06/23/2022     Priority: Medium    Language barrier affecting health care 06/23/2022     Priority: Medium    DNR (do not resuscitate) 06/23/2022     Priority: Medium    Ganglion cyst of finger of right hand 03/17/2022     Priority: Medium    Counseling regarding advance directives and goals of care 03/17/2022     Priority: Medium    Lichen planus 12/16/2021     Priority: Medium     Formatting of this note might be different from the original.  Created by Conversion      Pulmonary HTN (H) 09/16/2021     Priority: Medium    Diabetic polyneuropathy associated with diabetes mellitus due to underlying condition (H) 09/16/2021     Priority: Medium    Poverty 04/23/2019     Priority: Medium    Smoker 04/23/2019     Priority: Medium    DEIRDRE (obstructive sleep apnea) 04/17/2019     Priority: Medium     Formatting of this note might be different from the original.  See sleep study of Dr. Leung      Type 2 diabetes mellitus with hyperosmolarity without coma, without long-term current use of insulin (H) 01/29/2019     Priority: Medium    Iron deficiency anemia 01/03/2019     Priority: Medium    Pulmonary hypertension due to sleep-disordered breathing (H) 01/03/2019     Priority: Medium    Polypharmacy 12/08/2016     Priority: Medium    PTSD (post-traumatic stress disorder) 12/08/2016     Priority: Medium    Colonoscopy ( Fiberoptic) 01/03/2013     Priority: Medium     Onsetdate: 4/9/12      Other constipation 01/03/2013     Priority: Medium    Other hyperlipidemia 01/03/2013     Priority: Medium    Morbid obesity (H) 01/03/2013     Priority: Medium    Leukoplakia of oral mucosa, including tongue 01/03/2013     Priority: Medium    Vitamin D deficiency 01/03/2013     Priority: Medium     Problem list name updated by automated process. Provider to  review      Major depressive disorder, recurrent episode (H24) 01/03/2013     Priority: Medium     Overview:   Problem list name updated by automated process. Provider to review          Past Medical History:   Diagnosis Date    Degeneration of lumbar or lumbosacral intervertebral disc     Depression     Diabetes mellitus (H)     Essential hypertension     DEIRDRE (obstructive sleep apnea) 4/17/2019    See sleep study of Dr. Leung    Pulmonary hypertension due to sleep-disordered breathing (H) 01/03/2019    see 4/17/2019 sleep study    Sciatica     Symptomatic menopausal or female climacteric states 05/2014    Mei Carbone: no LMP for over a year as of 5/2014     Vertigo 4/28/2015     Past Surgical History:   Procedure Laterality Date    TONGUE SURGERY Left 2006    surgery in [place] for mass on Tongue     Current Outpatient Medications   Medication Sig Dispense Refill    acetaminophen (TYLENOL) 500 MG tablet Take 2 tablets (1,000 mg) by mouth 3 times daily as needed for mild pain 100 tablet 3    atorvastatin (LIPITOR) 20 MG tablet Take 1 tablet (20 mg) by mouth daily 90 tablet 3    Blood Glucose Monitoring Suppl (GLUCOCOM BLOOD GLUCOSE MONITOR) LUIS FELIPE Test blood sugar three times a day.      capsaicin (ZOSTRIX) 0.025 % external cream Apply 4 g topically 3 times daily as needed (back pain) 120 g 3    clobetasol (TEMOVATE) 0.05 % external ointment Apply topically 2 times daily 15 g 1    CONTOUR NEXT TEST test strip USE 1 EACH AS DIRECTED THREE TIMES DAILY AT 7:30AM, 11:30AM AND 4:30PM. 50 strip 3    diclofenac (VOLTAREN) 1 % topical gel Apply 4 g topically 4 times daily 350 g 4    empagliflozin (JARDIANCE) 10 MG TABS tablet Take 1 tablet (10 mg) by mouth daily 90 tablet 1    ferrous sulfate (FEROSUL) 325 (65 Fe) MG tablet TAKE 1 TABLET (325 MG TOTAL) BY MOUTH 2 (TWO) TIMES A DAY FOR IRON 180 tablet 3    furosemide (LASIX) 40 MG tablet Take 1 tablet (40 mg) by mouth daily 90 tablet 3    glipiZIDE (GLUCOTROL XL) 10 MG 24  "hr tablet Take 2 tablets (20 mg) by mouth daily 180 tablet 3    Global Inject Ease Lancets 30G MISC USE 1 APPLICATION THREE TIMES DAILY AT 7:30AM, 11:30AM, AND 4:30PM. 100 each 3    insulin glargine (LANTUS PEN) 100 UNIT/ML pen Inject 10 Units Subcutaneous At Bedtime 15 mL 0    insulin pen needle (32G X 4 MM) 32G X 4 MM miscellaneous Use 1 pen needles daily or as directed. 100 each 0    naproxen (NAPROSYN) 500 MG tablet Take 1 tablet (500 mg) by mouth 2 times daily as needed for moderate pain (4-6) (with meals) TAKE 1 TABLET BY MOUTH WITH FOOD UP TO 2-3 TIMES PER WEEK FOR BREAK THROUGH PAIN 60 tablet 3    omeprazole (PRILOSEC) 20 MG DR capsule TAKE 1 CAPSULE (20 MG TOTAL) BY MOUTH DAILY BEFORE BREAKFAST FOR STOMACH *NO SUB* 90 capsule 3    sitagliptin-metFORMIN (JANUMET)  MG tablet TAKE 1 TABLET BY MOUTH 2 TIMES DAILY (WITH MEALS) FOR DIABETES 180 tablet 3    timolol maleate (TIMOPTIC) 0.5 % ophthalmic solution       vitamin D2 (ERGOCALCIFEROL) 61709 units (1250 mcg) capsule TAKE 1 CAPSULE (50,000 UNITS TOTAL) BY MOUTH ONCE A WEEK FOR BONE HEALTH 4 capsule 11       Allergies   Allergen Reactions    Aspirin Unknown      caused puffy face, numbness, heart palpitations        Social History     Tobacco Use    Smoking status: Every Day     Types: Pipe     Passive exposure: Never    Smokeless tobacco: Never   Substance Use Topics    Alcohol use: No       History   Drug Use No         Objective     /71   Pulse 68   Temp 97.9  F (36.6  C) (Oral)   Resp 16   Ht 1.499 m (4' 11\")   Wt 84.5 kg (186 lb 4 oz)   LMP  (LMP Unknown)   SpO2 96%   Breastfeeding No   BMI 37.62 kg/m      Physical Exam    GENERAL APPEARANCE: healthy, alert and no distress     EYES: EOMI, PERRL     HENT: ear canals and TM's normal and nose and mouth without ulcers or lesions     NECK: no adenopathy, no asymmetry, masses, or scars and thyroid normal to palpation     RESP: lungs clear to auscultation - no rales, rhonchi or wheezes     " CV: regular rates and rhythm, normal S1 S2, no S3 or S4 and no murmur, click or rub     ABDOMEN:  soft, nontender, no HSM or masses and bowel sounds normal     MS: extremities normal- no gross deformities noted, no evidence of inflammation in joints, FROM in all extremities.     SKIN: no suspicious lesions or rashes     NEURO: Normal strength and tone, sensory exam grossly normal, mentation intact and speech normal     PSYCH: mentation appears normal. and affect normal/bright     LYMPHATICS: No cervical adenopathy    Recent Labs   Lab Test 06/29/23  0905 03/29/23  0809 12/28/22  0816 09/28/22  0814 08/10/22  0858 03/17/22  0901 01/12/22  0845   HGB  --   --   --   --  13.0  --  14.4   PLT  --   --   --   --  122*  --  150   *  --   --  138  --   --  134*   POTASSIUM 3.9  --   --  4.3  --   --  3.9   CR 0.67  --   --  0.70  --   --  0.93   A1C 13.5* 10.0*   < > 9.1*  --    < >  --     < > = values in this interval not displayed.        Diagnostics:  Recent Results (from the past 24 hour(s))   Hemoglobin A1c    Collection Time: 10/04/23  7:59 AM   Result Value Ref Range    Hemoglobin A1C 12.7 (H) 0.0 - 5.6 %     Recent Results (from the past 48 hour(s))   Hemoglobin A1c    Collection Time: 10/04/23  7:59 AM   Result Value Ref Range    Hemoglobin A1C 12.7 (H) 0.0 - 5.6 %      No EKG required for low risk surgery (cataract, skin procedure, breast biopsy, etc).  No EKG required, no history of coronary heart disease, significant arrhythmia, peripheral arterial disease or other structural heart disease.    Revised Cardiac Risk Index (RCRI):  The patient has the following serious cardiovascular risks for perioperative complications:   - No serious cardiac risks = 0 points     RCRI Interpretation: 0 points: Class I (very low risk - 0.4% complication rate)         Signed Electronically by: Devin Earl MD  Copy of this evaluation report is provided to requesting physician.    Answers submitted by the patient for this  visit:  Diabetes Visit (Submitted on 10/4/2023)  Chief Complaint: Chronic problems general questions HPI Form  Frequency of checking blood sugars:: a few times a week  What time of day are you checking your blood sugars : before meals  Have you had any blood sugars above 200?: Yes  Have you had any blood sugars below 70?: No  Hypoglycemia symptoms:: lethargy  Diabetic concerns:: none  Paraesthesia present:: burning in feet  Have you had a diabetic eye exam within the last year?: No

## 2023-10-04 NOTE — PROGRESS NOTES
Clinic Care Coordination Contact  Community Health Worker Initial Outreach    CHW Initial Information Gathering:  Referral Source: PCP  Preferred Hospital: [facility]  [phone]  Preferred Urgent Care: [facility], [phone]  Current living arrangement:: I live in a private home with family  Type of residence:: Apartment  Community Resources: PCA  Supplies Currently Used at Home: Diabetic Supplies  Equipment Currently Used at Home: cane, quad  Informal Support system:: Family, Friends  No PCP office visit in Past Year: No  Transportation means:: Friend, Medical transport  CHW Additional Questions  If ED/Hospital discharge, follow-up appointment scheduled as recommended?: N/A  Medication changes made following ED/Hospital discharge?: N/A  MyChart active?: No  Patient agreeable to assistance with activating MyChart?: No    Patient accepts CC: Yes. Patient scheduled for assessment with CCC RN on 10/05/2023 at 8:30 AM. Patient noted desire to discuss RN assessment. Insulin teaching and med check.

## 2023-10-04 NOTE — COMMUNITY RESOURCES LIST (ENGLISH)
10/04/2023   [facility]  N/A  For questions about this resource list or additional care needs, please contact your primary care clinic or care manager.  Phone: [phone]   Email: N/A   Address: [address] [place] [address]   Hours: N/A        Financial Stability       Rent and mortgage payment assistance  1  [facility] Distance: 2.49 miles      Phone/Virtual   [address] [place] [address]  Language: English  Hours: Mon - Fri 9:00 AM - 4:00 PM  Fees: Free   Phone: (538) 713-3367 Email: [email or web address] Website: [email or web address]     2  [employer]) [facility] - Supportive Housing Assistance Program (SHAP) Distance: 2.76 miles      Phone/Virtual   [address] [place] [address]  Language: English, Hmong, Brittney, Mongolian  Hours: Mon - Fri 8:30 AM - 5:00 PM  Fees: Free   Phone: (296) 461-9043 Email: ralf@[employer].org Website: http://www.hmong.org/hap-impact-areas/          Important Numbers & Websites       Emergency Services   911  [facility]   311  Poison Control   (755) 903-6943  Suicide Prevention Lifeline   (155) 962-4966 (TALK)  Child Abuse Hotline   (167) 369-2121 (4-A-Child)  Sexual Assault Hotline   (429) 198-6373 (HOPE)  National Runaway Safeline   (280) 497-7464 (RUNAWAY)  All-Options Talkline   (225) 251-6680  Substance Abuse Referral   (311) 152-5229 (HELP)

## 2023-10-04 NOTE — Clinical Note
Ok to proceed with surgery.  (Glucose significantly improved on insulin and she agrees to continue.) Thank you!

## 2023-10-05 ENCOUNTER — TRANSFERRED RECORDS (OUTPATIENT)
Dept: HEALTH INFORMATION MANAGEMENT | Facility: CLINIC | Age: 63
End: 2023-10-05

## 2023-10-05 ENCOUNTER — PATIENT OUTREACH (OUTPATIENT)
Dept: NURSING | Facility: CLINIC | Age: 63
End: 2023-10-05
Payer: COMMERCIAL

## 2023-10-05 LAB — RETINOPATHY: NEGATIVE

## 2023-10-05 NOTE — PROGRESS NOTES
Clinic Care Coordination Contact    Assessment: Patient was referral to CCC to assist patient with med check and insulin teaching. CCRN spoke with patient and Janey - MORENA this morning. They were on their way to patient's Eye appointment in [place]. Insulin will be delivered today otherwise PCA plans to pick it up after work today. Instructed PCA to ask a pharmacist at [facility] to show how to administer/dial the insulin 10 units and to be administer at HS. Educated patient on the important of checking BG Tid as instructed by PCP but patient states she's only checking 4 times per month. Not enough time to complete initial assessment today. CCRN plans to meet with patient and PCA next week on Thursday when they come to see PCP for follow up. Patient wasn't clear to proceed with excision of tongue lesion as yet due to elevated A1c 12.7 yesterday. Patient was instructed to work on lowering her BG reading < 200s and to follow up with PCP next week. Patient reluctant to start Lantus but agreed so that she can proceed with the surgery scheduled on 10/16/2023.     Plan:   1) PCA will call CCRN with with questions re: how to adm Lantus   2) CCRN plans to meet with patient and PCA on 10/12/2023 at 8:30am.

## 2023-10-12 ENCOUNTER — OFFICE VISIT (OUTPATIENT)
Dept: FAMILY MEDICINE | Facility: CLINIC | Age: 63
End: 2023-10-12
Payer: COMMERCIAL

## 2023-10-12 ENCOUNTER — ALLIED HEALTH/NURSE VISIT (OUTPATIENT)
Dept: NURSING | Facility: CLINIC | Age: 63
End: 2023-10-12
Payer: COMMERCIAL

## 2023-10-12 ENCOUNTER — TELEPHONE (OUTPATIENT)
Dept: FAMILY MEDICINE | Facility: CLINIC | Age: 63
End: 2023-10-12

## 2023-10-12 VITALS
SYSTOLIC BLOOD PRESSURE: 104 MMHG | OXYGEN SATURATION: 98 % | BODY MASS INDEX: 37.14 KG/M2 | TEMPERATURE: 98 F | HEIGHT: 59 IN | WEIGHT: 184.25 LBS | DIASTOLIC BLOOD PRESSURE: 68 MMHG | RESPIRATION RATE: 16 BRPM | HEART RATE: 77 BPM

## 2023-10-12 DIAGNOSIS — E11.65 TYPE 2 DIABETES MELLITUS WITH HYPERGLYCEMIA, WITH LONG-TERM CURRENT USE OF INSULIN (H): ICD-10-CM

## 2023-10-12 DIAGNOSIS — Z79.4 TYPE 2 DIABETES MELLITUS WITH HYPERGLYCEMIA, WITH LONG-TERM CURRENT USE OF INSULIN (H): ICD-10-CM

## 2023-10-12 DIAGNOSIS — F32.9 MAJOR DEPRESSION: Primary | ICD-10-CM

## 2023-10-12 DIAGNOSIS — R73.9 HYPERGLYCEMIA: Primary | ICD-10-CM

## 2023-10-12 DIAGNOSIS — L43.9 LICHEN PLANUS: ICD-10-CM

## 2023-10-12 DIAGNOSIS — Z12.31 VISIT FOR SCREENING MAMMOGRAM: ICD-10-CM

## 2023-10-12 PROBLEM — E08.42 DIABETIC POLYNEUROPATHY ASSOCIATED WITH DIABETES MELLITUS DUE TO UNDERLYING CONDITION (H): Status: RESOLVED | Noted: 2021-09-16 | Resolved: 2023-10-12

## 2023-10-12 PROCEDURE — 99207 PR NO CHARGE LOS: CPT

## 2023-10-12 PROCEDURE — 99213 OFFICE O/P EST LOW 20 MIN: CPT | Performed by: FAMILY MEDICINE

## 2023-10-12 NOTE — PROGRESS NOTES
Clinic Care Coordination Medication Education Initial Visit    Patient presents for:  Medication education, Compliance monitoring, and Medication reconciliation    Language: Brittney  : Yes    Communication: Literate in other languages    Accompanied by:      Patient Living Situation:      Primary Care Provider:  Devin Earl    Barriers:  Language, Cultural, Poor insight into disease process, Inadequate support at home, Non-compliance of medications, Multiple uncontrolled disease states, and Chronic persistent mental illness    Medication List (see cited below): Patient presents with all medications EXCEPT Jardiance     Current Outpatient Medications   Medication Sig    acetaminophen (TYLENOL) 500 MG tablet Take 2 tablets (1,000 mg) by mouth 3 times daily as needed for mild pain    atorvastatin (LIPITOR) 20 MG tablet Take 1 tablet (20 mg) by mouth daily    Blood Glucose Monitoring Suppl (GLUCOCOM BLOOD GLUCOSE MONITOR) LUIS FELIPE Test blood sugar three times a day.    capsaicin (ZOSTRIX) 0.025 % external cream Apply 4 g topically 3 times daily as needed (back pain)    clobetasol (TEMOVATE) 0.05 % external ointment Apply topically 2 times daily    CONTOUR NEXT TEST test strip USE 1 EACH AS DIRECTED THREE TIMES DAILY AT 7:30AM, 11:30AM AND 4:30PM.    diclofenac (VOLTAREN) 1 % topical gel Apply 4 g topically 4 times daily    empagliflozin (JARDIANCE) 10 MG TABS tablet Take 1 tablet (10 mg) by mouth daily    ferrous sulfate (FEROSUL) 325 (65 Fe) MG tablet TAKE 1 TABLET (325 MG TOTAL) BY MOUTH 2 (TWO) TIMES A DAY FOR IRON    furosemide (LASIX) 40 MG tablet Take 1 tablet (40 mg) by mouth daily    glipiZIDE (GLUCOTROL XL) 10 MG 24 hr tablet Take 2 tablets (20 mg) by mouth daily    Global Inject Ease Lancets 30G MISC USE 1 APPLICATION THREE TIMES DAILY AT 7:30AM, 11:30AM, AND 4:30PM.    insulin glargine (LANTUS PEN) 100 UNIT/ML pen Inject 10 Units Subcutaneous At Bedtime    insulin pen needle (32G X 4 MM) 32G X 4 MM  miscellaneous Use 1 pen needles daily or as directed.    naproxen (NAPROSYN) 500 MG tablet Take 1 tablet (500 mg) by mouth 2 times daily as needed for moderate pain (4-6) (with meals) TAKE 1 TABLET BY MOUTH WITH FOOD UP TO 2-3 TIMES PER WEEK FOR BREAK THROUGH PAIN    omeprazole (PRILOSEC) 20 MG DR capsule TAKE 1 CAPSULE (20 MG TOTAL) BY MOUTH DAILY BEFORE BREAKFAST FOR STOMACH *NO SUB*    sitagliptin-metFORMIN (JANUMET)  MG tablet TAKE 1 TABLET BY MOUTH 2 TIMES DAILY (WITH MEALS) FOR DIABETES    timolol maleate (TIMOPTIC) 0.5 % ophthalmic solution     vitamin D2 (ERGOCALCIFEROL) 18385 units (1250 mcg) capsule TAKE 1 CAPSULE (50,000 UNITS TOTAL) BY MOUTH ONCE A WEEK FOR BONE HEALTH     No current facility-administered medications for this visit.       Compliance: 90%    Future Appointments   Date Time Provider Department Center   10/16/2023  1:20 PM  NEEDED [facility]   10/16/2023  5:00 PM  NEEDED [facility]   10/31/2023  9:55 AM Tano Burns MD [facility]       Action Plan  RN Will:  PRN      Nursing Notes:  CCRN met with patient and her PCA - Mark Anthony today. Reviewed meds with patient and Ludoh. Has home care nurse set up meds every 2 weeks. Missing Jardiance. Spoke with [facility] pharmacy tech today and requested jardiance to be filled. PCA plans to pick it up later today and add to AM slots 1 tab. Reports only checking BG daily and refuses to check it TID as instructed. Declined CGM monitor/sensor stating that not interested. Patient was not pleasant to talk to today. Grumpy and not open to recommendations. Declined further assist from CCRN.       10/12/23  7:43am - 246    10/10 /23  6:53am - 249    10/9/23  4:53pm - 458    10/9/23  4:51pm - 419    10/8/23   6:10am - 284    10/7/23  7:19am - 296    10/4/23   7:01am - 254    Has home care nurse setting up meds every 2 weeks.

## 2023-10-12 NOTE — TELEPHONE ENCOUNTER
Joann from [facility] called to clarify today's provider note if patient is clear for surgery on Monday, 10/16.  Discussed with provider in person who verbalized patient is clear for surgery and will complete the note now.     ROSE PhillipN, RN  [facility]

## 2023-10-12 NOTE — PROGRESS NOTES
"  Assessment & Plan     Hyperglycemia  Type 2 diabetes mellitus with hyperglycemia, with long-term current use of insulin (H)  Patient agreed to start and continue insulin throughout her recovery. A1c poorly controlled at 12.7% per patient decision to make no dietary or medication changes and is only willing to do insulin for this surgery. Risks discussed in detail including poor healing, risk of infection/sepsis, possibly leading to death or significant complications. I would like her to continue insulin beyond her surgery, but she is reluctant. Recommend considering increase in insulin for better control, but with her upcoming surgery she might be eating less anyway for a bit.   - continue insulin langus 10 unit(s) at bedtime     Lichen planus  Approval given to proceed with surgery.              BMI:   Estimated body mass index is 37.21 kg/m  as calculated from the following:    Height as of this encounter: 1.499 m (4' 11\").    Weight as of this encounter: 83.6 kg (184 lb 4 oz).   Weight management plan: Discussed healthy diet and exercise guidelines    Return in about 1 month (around 11/12/2023) for diabetes follow up.      Devin Earl MD  [facility] NANCY Marcum is a 63 year old, presenting for the following health issues:  No chief complaint on file.      History of Present Illness             Last visit, we did patient's preop but I did not clear her due to significant hyperglycemia (chronic). She agreed to start insulin if this means she will get her surgery. Sent to pharmacy. Patient refused to check glucose multiple times per day.     She has been taking her insulin. No pain, just some pinching/bruising at injection site. She does not want to continue it, she feels no different.       Review of Systems   Constitutional, HEENT, cardiovascular, pulmonary, gi and gu systems are negative, except as otherwise noted.      Objective    /68   Pulse 77   Temp 98  F (36.7  C) " "(Oral)   Resp 16   Ht 1.499 m (4' 11\")   Wt 83.6 kg (184 lb 4 oz)   LMP  (LMP Unknown)   SpO2 98%   BMI 37.21 kg/m    Body mass index is 37.21 kg/m .  Physical Exam   GENERAL: healthy, alert and no distress  NECK: no adenopathy, no asymmetry, masses, or scars and thyroid normal to palpation  RESP: lungs clear to auscultation - no rales, rhonchi or wheezes  CV: regular rate and rhythm, normal S1 S2, no S3 or S4, no murmur, click or rub, no peripheral edema and peripheral pulses strong  ABDOMEN: soft, nontender, no hepatosplenomegaly, no masses and bowel sounds normal  MS: no gross musculoskeletal defects noted, no edema    Office Visit on 10/04/2023   Component Date Value Ref Range Status    Hemoglobin A1C 10/04/2023 12.7 (H)  0.0 - 5.6 % Final    Normal <5.7%   Prediabetes 5.7-6.4%    Diabetes 6.5% or higher     Note: Adopted from ADA consensus guidelines.    WBC Count 10/04/2023 5.6  4.0 - 11.0 10e3/uL Final    RBC Count 10/04/2023 5.10  3.80 - 5.20 10e6/uL Final    Hemoglobin 10/04/2023 13.6  11.7 - 15.7 g/dL Final    Hematocrit 10/04/2023 43.7  35.0 - 47.0 % Final    MCV 10/04/2023 86  78 - 100 fL Final    MCH 10/04/2023 26.7  26.5 - 33.0 pg Final    MCHC 10/04/2023 31.1 (L)  31.5 - 36.5 g/dL Final    RDW 10/04/2023 12.9  10.0 - 15.0 % Final    Platelet Count 10/04/2023 87 (L)  150 - 450 10e3/uL Final    Sodium 10/04/2023 135  135 - 145 mmol/L Final    Reference intervals for this test were updated on 09/26/2023 to more accurately reflect our healthy population. There may be differences in the flagging of prior results with similar values performed with this method. Interpretation of those prior results can be made in the context of the updated reference intervals.     Potassium 10/04/2023 4.3  3.4 - 5.3 mmol/L Final    Carbon Dioxide (CO2) 10/04/2023 31 (H)  22 - 29 mmol/L Final    Anion Gap 10/04/2023 9  7 - 15 mmol/L Final    Urea Nitrogen 10/04/2023 12.4  8.0 - 23.0 mg/dL Final    Creatinine 10/04/2023 " 0.67  0.51 - 0.95 mg/dL Final    GFR Estimate 10/04/2023 >90  >60 mL/min/1.73m2 Final    Calcium 10/04/2023 9.3  8.8 - 10.2 mg/dL Final    Chloride 10/04/2023 95 (L)  98 - 107 mmol/L Final    Glucose 10/04/2023 339 (H)  70 - 99 mg/dL Final    Alkaline Phosphatase 10/04/2023 243 (H)  35 - 104 U/L Final    AST 10/04/2023 28  0 - 45 U/L Final    Reference intervals for this test were updated on 6/12/2023 to more accurately reflect our healthy population. There may be differences in the flagging of prior results with similar values performed with this method. Interpretation of those prior results can be made in the context of the updated reference intervals.    ALT 10/04/2023 26  0 - 50 U/L Final    Reference intervals for this test were updated on 6/12/2023 to more accurately reflect our healthy population. There may be differences in the flagging of prior results with similar values performed with this method. Interpretation of those prior results can be made in the context of the updated reference intervals.      Protein Total 10/04/2023 7.5  6.4 - 8.3 g/dL Final    Albumin 10/04/2023 3.9  3.5 - 5.2 g/dL Final    Bilirubin Total 10/04/2023 0.7  <=1.2 mg/dL Final     No results found for any visits on 10/12/23.  No results found for this or any previous visit (from the past 24 hour(s)).

## 2023-10-13 ENCOUNTER — ANESTHESIA EVENT (OUTPATIENT)
Dept: SURGERY | Facility: CLINIC | Age: 63
End: 2023-10-13
Payer: COMMERCIAL

## 2023-10-13 ASSESSMENT — LIFESTYLE VARIABLES: TOBACCO_USE: 1

## 2023-10-13 NOTE — ANESTHESIA PREPROCEDURE EVALUATION
Anesthesia Pre-Procedure Evaluation    Patient: Trixie Sofia   MRN: [id number] : 1960        Procedure : Procedure(s):  Excision of Tongue Lesion          Past Medical History:   Diagnosis Date    Degeneration of lumbar or lumbosacral intervertebral disc     Depression     Diabetes mellitus (H)     Essential hypertension     DEIRDRE (obstructive sleep apnea) 2019    See sleep study of Dr. Leung    Pulmonary hypertension due to sleep-disordered breathing (H) 2019    see 2019 sleep study    Sciatica     Symptomatic menopausal or female climacteric states 2014    Mei Carbone: no LMP for over a year as of 2014     Vertigo 2015      Past Surgical History:   Procedure Laterality Date    TONGUE SURGERY Left     surgery in [facility] for mass on Tongue      Allergies   Allergen Reactions    Aspirin Unknown      caused puffy face, numbness, heart palpitations      Social History     Tobacco Use    Smoking status: Every Day     Types: Pipe     Passive exposure: Never    Smokeless tobacco: Never   Substance Use Topics    Alcohol use: No      Wt Readings from Last 1 Encounters:   10/12/23 83.6 kg (184 lb 4 oz)        Anesthesia Evaluation   Pt has had prior anesthetic. Type: General and MAC.        ROS/MED HX  ENT/Pulmonary:     (+) sleep apnea, doesn't use CPAP,              tobacco use,                       Neurologic:       Cardiovascular:     (+)  hypertension- -   -  - -                                pulmonary hypertension,      METS/Exercise Tolerance:     Hematologic:       Musculoskeletal:   (+)  arthritis,             GI/Hepatic:     (+) GERD,                   Renal/Genitourinary:       Endo:     (+)  type II DM,   Using insulin,                 Psychiatric/Substance Use:     (+) psychiatric history depression       Infectious Disease:       Malignancy:       Other:            Physical Exam    Airway  airway exam normal      Mallampati: I   TM distance: > 3 FB   Neck ROM: full   Mouth  "opening: > 3 cm    Respiratory Devices and Support         Dental       (+) Minor Abnormalities - some fillings, tiny chips      Cardiovascular   cardiovascular exam normal       Rhythm and rate: regular and normal     Pulmonary                   OUTSIDE LABS:  CBC:   Lab Results   Component Value Date    WBC 5.6 10/04/2023    WBC 6.5 08/10/2022    HGB 13.6 10/04/2023    HGB 13.0 08/10/2022    HCT 43.7 10/04/2023    HCT 42.3 08/10/2022    PLT 87 (L) 10/04/2023     (L) 08/10/2022     BMP:   Lab Results   Component Value Date     10/04/2023     (L) 06/29/2023    POTASSIUM 4.3 10/04/2023    POTASSIUM 3.9 06/29/2023    CHLORIDE 95 (L) 10/04/2023    CHLORIDE 90 (L) 06/29/2023    CO2 31 (H) 10/04/2023    CO2 32 (H) 06/29/2023    BUN 12.4 10/04/2023    BUN 11.7 06/29/2023    CR 0.67 10/04/2023    CR 0.67 06/29/2023     (H) 10/04/2023     (H) 06/29/2023     COAGS:   Lab Results   Component Value Date    PTT 40 (H) 01/20/2011    INR 1.03 01/20/2011     POC: No results found for: \"BGM\", \"HCG\", \"HCGS\"  HEPATIC:   Lab Results   Component Value Date    ALBUMIN 3.9 10/04/2023    PROTTOTAL 7.5 10/04/2023    ALT 26 10/04/2023    AST 28 10/04/2023    ALKPHOS 243 (H) 10/04/2023    BILITOTAL 0.7 10/04/2023    BILIDIRECT 0.1 05/26/2011     OTHER:   Lab Results   Component Value Date    A1C 12.7 (H) 10/04/2023    EDDI 9.3 10/04/2023    MAG 1.9 11/05/2020    TSH 1.27 05/20/2021       Anesthesia Plan    ASA Status:  3       Anesthesia Type: MAC.     - Reason for MAC: straight local not clinically adequate   Induction: Intravenous, Propofol.   Maintenance: TIVA.   Techniques and Equipment:     - Lines/Monitors: 2nd IV     Consents            Postoperative Care    Pain management: IV analgesics, Oral pain medications, Multi-modal analgesia.   PONV prophylaxis: Ondansetron (or other 5HT-3), Dexamethasone or Solumedrol, Background Propofol Infusion     Comments:    Other Comments: The material risks, benefits, " and alternatives were discussed in detail.  The patient agrees to proceed.  The patient has no other complaints at this time.            Thi Velasquez MD

## 2023-10-16 ENCOUNTER — HOSPITAL ENCOUNTER (OUTPATIENT)
Facility: CLINIC | Age: 63
Discharge: HOME | End: 2023-10-16
Attending: OTOLARYNGOLOGY | Admitting: OTOLARYNGOLOGY
Payer: COMMERCIAL

## 2023-10-16 ENCOUNTER — ANESTHESIA (OUTPATIENT)
Dept: SURGERY | Facility: CLINIC | Age: 63
End: 2023-10-16
Payer: COMMERCIAL

## 2023-10-16 VITALS
TEMPERATURE: 97.9 F | BODY MASS INDEX: 37.56 KG/M2 | HEIGHT: 59 IN | OXYGEN SATURATION: 95 % | SYSTOLIC BLOOD PRESSURE: 114 MMHG | DIASTOLIC BLOOD PRESSURE: 75 MMHG | RESPIRATION RATE: 16 BRPM | WEIGHT: 186.29 LBS | HEART RATE: 73 BPM

## 2023-10-16 DIAGNOSIS — L43.9 LICHEN PLANUS: Primary | ICD-10-CM

## 2023-10-16 LAB
GLUCOSE BLDC GLUCOMTR-MCNC: 150 MG/DL (ref 70–99)
GLUCOSE BLDC GLUCOMTR-MCNC: 188 MG/DL (ref 70–99)

## 2023-10-16 PROCEDURE — 82962 GLUCOSE BLOOD TEST: CPT

## 2023-10-16 PROCEDURE — 88307 TISSUE EXAM BY PATHOLOGIST: CPT | Mod: TC | Performed by: OTOLARYNGOLOGY

## 2023-10-16 PROCEDURE — 250N000009 HC RX 250: Performed by: OTOLARYNGOLOGY

## 2023-10-16 PROCEDURE — 88307 TISSUE EXAM BY PATHOLOGIST: CPT | Mod: 26 | Performed by: STUDENT IN AN ORGANIZED HEALTH CARE EDUCATION/TRAINING PROGRAM

## 2023-10-16 PROCEDURE — 250N000013 HC RX MED GY IP 250 OP 250 PS 637

## 2023-10-16 PROCEDURE — 258N000003 HC RX IP 258 OP 636

## 2023-10-16 PROCEDURE — 250N000013 HC RX MED GY IP 250 OP 250 PS 637: Performed by: OTOLARYNGOLOGY

## 2023-10-16 PROCEDURE — 250N000011 HC RX IP 250 OP 636

## 2023-10-16 PROCEDURE — 999N000141 HC STATISTIC PRE-PROCEDURE NURSING ASSESSMENT: Performed by: OTOLARYNGOLOGY

## 2023-10-16 PROCEDURE — 710N000012 HC RECOVERY PHASE 2, PER MINUTE: Performed by: OTOLARYNGOLOGY

## 2023-10-16 PROCEDURE — 250N000009 HC RX 250

## 2023-10-16 PROCEDURE — 360N000077 HC SURGERY LEVEL 4, PER MIN: Performed by: OTOLARYNGOLOGY

## 2023-10-16 PROCEDURE — 370N000017 HC ANESTHESIA TECHNICAL FEE, PER MIN: Performed by: OTOLARYNGOLOGY

## 2023-10-16 PROCEDURE — 41112 EXCISION OF TONGUE LESION: CPT | Mod: GC | Performed by: OTOLARYNGOLOGY

## 2023-10-16 PROCEDURE — 272N000001 HC OR GENERAL SUPPLY STERILE: Performed by: OTOLARYNGOLOGY

## 2023-10-16 RX ORDER — DEXTROSE MONOHYDRATE 25 G/50ML
25-50 INJECTION, SOLUTION INTRAVENOUS
Status: DISCONTINUED | OUTPATIENT
Start: 2023-10-16 | End: 2023-10-16 | Stop reason: HOSPADM

## 2023-10-16 RX ORDER — HYDROMORPHONE HCL IN WATER/PF 6 MG/30 ML
0.2 PATIENT CONTROLLED ANALGESIA SYRINGE INTRAVENOUS EVERY 5 MIN PRN
Status: DISCONTINUED | OUTPATIENT
Start: 2023-10-16 | End: 2023-10-16 | Stop reason: HOSPADM

## 2023-10-16 RX ORDER — LIDOCAINE HYDROCHLORIDE AND EPINEPHRINE 10; 10 MG/ML; UG/ML
INJECTION, SOLUTION INFILTRATION; PERINEURAL PRN
Status: DISCONTINUED | OUTPATIENT
Start: 2023-10-16 | End: 2023-10-16 | Stop reason: HOSPADM

## 2023-10-16 RX ORDER — FENTANYL CITRATE 50 UG/ML
50 INJECTION, SOLUTION INTRAMUSCULAR; INTRAVENOUS EVERY 5 MIN PRN
Status: DISCONTINUED | OUTPATIENT
Start: 2023-10-16 | End: 2023-10-16 | Stop reason: HOSPADM

## 2023-10-16 RX ORDER — DIMENHYDRINATE 50 MG/ML
25 INJECTION, SOLUTION INTRAMUSCULAR; INTRAVENOUS
Status: DISCONTINUED | OUTPATIENT
Start: 2023-10-16 | End: 2023-10-16 | Stop reason: HOSPADM

## 2023-10-16 RX ORDER — ACETAMINOPHEN 325 MG/1
975 TABLET ORAL ONCE
Status: DISCONTINUED | OUTPATIENT
Start: 2023-10-16 | End: 2023-10-16 | Stop reason: HOSPADM

## 2023-10-16 RX ORDER — ONDANSETRON 2 MG/ML
4 INJECTION INTRAMUSCULAR; INTRAVENOUS EVERY 30 MIN PRN
Status: DISCONTINUED | OUTPATIENT
Start: 2023-10-16 | End: 2023-10-16 | Stop reason: HOSPADM

## 2023-10-16 RX ORDER — CHLORHEXIDINE GLUCONATE ORAL RINSE 1.2 MG/ML
SOLUTION DENTAL PRN
Status: DISCONTINUED | OUTPATIENT
Start: 2023-10-16 | End: 2023-10-16 | Stop reason: HOSPADM

## 2023-10-16 RX ORDER — OXYCODONE HYDROCHLORIDE 5 MG/1
5 TABLET ORAL
Status: DISCONTINUED | OUTPATIENT
Start: 2023-10-16 | End: 2023-10-16 | Stop reason: HOSPADM

## 2023-10-16 RX ORDER — DIPHENHYDRAMINE HYDROCHLORIDE 50 MG/ML
25 INJECTION INTRAMUSCULAR; INTRAVENOUS EVERY 6 HOURS PRN
Status: DISCONTINUED | OUTPATIENT
Start: 2023-10-16 | End: 2023-10-16 | Stop reason: HOSPADM

## 2023-10-16 RX ORDER — FENTANYL CITRATE 50 UG/ML
INJECTION, SOLUTION INTRAMUSCULAR; INTRAVENOUS PRN
Status: DISCONTINUED | OUTPATIENT
Start: 2023-10-16 | End: 2023-10-16

## 2023-10-16 RX ORDER — SODIUM CHLORIDE, SODIUM LACTATE, POTASSIUM CHLORIDE, CALCIUM CHLORIDE 600; 310; 30; 20 MG/100ML; MG/100ML; MG/100ML; MG/100ML
INJECTION, SOLUTION INTRAVENOUS CONTINUOUS
Status: DISCONTINUED | OUTPATIENT
Start: 2023-10-16 | End: 2023-10-16 | Stop reason: HOSPADM

## 2023-10-16 RX ORDER — ONDANSETRON 2 MG/ML
INJECTION INTRAMUSCULAR; INTRAVENOUS PRN
Status: DISCONTINUED | OUTPATIENT
Start: 2023-10-16 | End: 2023-10-16

## 2023-10-16 RX ORDER — ONDANSETRON 4 MG/1
4 TABLET, ORALLY DISINTEGRATING ORAL EVERY 30 MIN PRN
Status: DISCONTINUED | OUTPATIENT
Start: 2023-10-16 | End: 2023-10-16 | Stop reason: HOSPADM

## 2023-10-16 RX ORDER — LIDOCAINE HYDROCHLORIDE 20 MG/ML
INJECTION, SOLUTION INFILTRATION; PERINEURAL PRN
Status: DISCONTINUED | OUTPATIENT
Start: 2023-10-16 | End: 2023-10-16

## 2023-10-16 RX ORDER — PROPOFOL 10 MG/ML
INJECTION, EMULSION INTRAVENOUS CONTINUOUS PRN
Status: DISCONTINUED | OUTPATIENT
Start: 2023-10-16 | End: 2023-10-16

## 2023-10-16 RX ORDER — OXYCODONE HYDROCHLORIDE 10 MG/1
10 TABLET ORAL
Status: DISCONTINUED | OUTPATIENT
Start: 2023-10-16 | End: 2023-10-16 | Stop reason: HOSPADM

## 2023-10-16 RX ORDER — DIPHENHYDRAMINE HCL 25 MG
25 CAPSULE ORAL EVERY 6 HOURS PRN
Status: DISCONTINUED | OUTPATIENT
Start: 2023-10-16 | End: 2023-10-16 | Stop reason: HOSPADM

## 2023-10-16 RX ORDER — ACETAMINOPHEN 325 MG/1
975 TABLET ORAL ONCE
Status: COMPLETED | OUTPATIENT
Start: 2023-10-16 | End: 2023-10-16

## 2023-10-16 RX ORDER — SODIUM CHLORIDE, SODIUM LACTATE, POTASSIUM CHLORIDE, CALCIUM CHLORIDE 600; 310; 30; 20 MG/100ML; MG/100ML; MG/100ML; MG/100ML
INJECTION, SOLUTION INTRAVENOUS CONTINUOUS PRN
Status: DISCONTINUED | OUTPATIENT
Start: 2023-10-16 | End: 2023-10-16

## 2023-10-16 RX ORDER — HYDROMORPHONE HCL IN WATER/PF 6 MG/30 ML
0.4 PATIENT CONTROLLED ANALGESIA SYRINGE INTRAVENOUS EVERY 5 MIN PRN
Status: DISCONTINUED | OUTPATIENT
Start: 2023-10-16 | End: 2023-10-16 | Stop reason: HOSPADM

## 2023-10-16 RX ORDER — HALOPERIDOL 5 MG/ML
1 INJECTION INTRAMUSCULAR
Status: DISCONTINUED | OUTPATIENT
Start: 2023-10-16 | End: 2023-10-16 | Stop reason: HOSPADM

## 2023-10-16 RX ORDER — FENTANYL CITRATE 50 UG/ML
25 INJECTION, SOLUTION INTRAMUSCULAR; INTRAVENOUS EVERY 5 MIN PRN
Status: DISCONTINUED | OUTPATIENT
Start: 2023-10-16 | End: 2023-10-16 | Stop reason: HOSPADM

## 2023-10-16 RX ORDER — LIDOCAINE 40 MG/G
CREAM TOPICAL
Status: DISCONTINUED | OUTPATIENT
Start: 2023-10-16 | End: 2023-10-16 | Stop reason: HOSPADM

## 2023-10-16 RX ORDER — NICOTINE POLACRILEX 4 MG
15-30 LOZENGE BUCCAL
Status: DISCONTINUED | OUTPATIENT
Start: 2023-10-16 | End: 2023-10-16 | Stop reason: HOSPADM

## 2023-10-16 RX ADMIN — PROPOFOL 150 MCG/KG/MIN: 10 INJECTION, EMULSION INTRAVENOUS at 16:33

## 2023-10-16 RX ADMIN — FENTANYL CITRATE 50 MCG: 50 INJECTION INTRAMUSCULAR; INTRAVENOUS at 16:46

## 2023-10-16 RX ADMIN — ACETAMINOPHEN 975 MG: 325 TABLET, FILM COATED ORAL at 14:15

## 2023-10-16 RX ADMIN — SODIUM CHLORIDE, POTASSIUM CHLORIDE, SODIUM LACTATE AND CALCIUM CHLORIDE: 600; 310; 30; 20 INJECTION, SOLUTION INTRAVENOUS at 16:28

## 2023-10-16 RX ADMIN — MIDAZOLAM 1 MG: 1 INJECTION INTRAMUSCULAR; INTRAVENOUS at 16:23

## 2023-10-16 RX ADMIN — LIDOCAINE HYDROCHLORIDE 60 MG: 20 INJECTION, SOLUTION INFILTRATION; PERINEURAL at 16:33

## 2023-10-16 RX ADMIN — ONDANSETRON 4 MG: 2 INJECTION INTRAMUSCULAR; INTRAVENOUS at 16:50

## 2023-10-16 ASSESSMENT — ACTIVITIES OF DAILY LIVING (ADL)
ADLS_ACUITY_SCORE: 37
ADLS_ACUITY_SCORE: 33
ADLS_ACUITY_SCORE: 37

## 2023-10-16 NOTE — OR NURSING
Patient and guardian updated on delay. All questions asked and answered. No needs or requests at this time.

## 2023-10-16 NOTE — DISCHARGE INSTRUCTIONS
"Please use tylenol and ibuprofen for pain. Please use magic mouthwash swishes for pain as well, particularly before eating.     Please notify your doctor if you experience wound breakdown, sustained bleeding from the wound site, or increasing redness, swelling, and/or purulent malorodorous discharge from the wound site which may indicate infection. If you feel it is acute, or experience sudden changes in breathing, chest pain, or excessive sleepiness/somnolence please return to the emergency department or call 911. If you have questions or concerns during the day please call ENT clinic and [phone]. If at night you can call [facility] at [phone] and ask for the \"ENT resident on call\".  [facility]  Same-Day Surgery   Adult Discharge Orders & Instructions     For 24 hours after surgery    Get plenty of rest.  A responsible adult must stay with you for at least 24 hours after you leave the hospital.   Do not drive or use heavy equipment.  If you have weakness or tingling, don't drive or use heavy equipment until this feeling goes away.  Do not drink alcohol.  Avoid strenuous or risky activities.  Ask for help when climbing stairs.   You may feel lightheaded.  IF so, sit for a few minutes before standing.  Have someone help you get up.   If you have nausea (feel sick to your stomach): Drink only clear liquids such as apple juice, ginger ale, broth or 7-Up.  Rest may also help.  Be sure to drink enough fluids.  Move to a regular diet as you feel able.  You may have a slight fever. Call the doctor if your fever is over 100 F (37.7 C) (taken under the tongue) or lasts longer than 24 hours.  You may have a dry mouth, a sore throat, muscle aches or trouble sleeping.  These should go away after 24 hours.  Do not make important or legal decisions.   Call your doctor for any of the followin.  Signs of infection (fever, growing tenderness at the surgery site, a large " amount of drainage or bleeding, severe pain, foul-smelling drainage, redness, swelling).    2. It has been over 8 to 10 hours since surgery and you are still not able to urinate (pass water).    3.  Headache for over 24 hours.    To contact a doctor, call Dr Burns's at the Otolaryngology/ENT clinic @ [phone]   or:    '   [phone] and ask for the resident on call for   Otolaryngology (answered 24 hours a day)  '   Emergency Department:    [facility]: [phone]       (TTY for hearing impaired: [phone])

## 2023-10-16 NOTE — OP NOTE
Otolaryngology Operative Report    Procedure Date: October 16, 2023      SURGEON: Tano Burns MD  ASSISTANT:  Baron Pyle MD     PREOPERATIVE DIAGNOSIS: Leukoplakia, hyperkeratosis  POSTOPERATIVE DIAGNOSIS:  Same     PROCEDURE:    1.  Excision of tongue lesion  2.  Closure of tongue defect    FINDINGS:   Thick, ~2cm x 1.5cm leukoplakia on left lateral tongue     ANESTHESIA:  MAC  ESTIMATED BLOOD LOSS:  5mL  SPECIMEN:  Left tongue lesion.  COMPLICATIONS:  None     INDICATIONS FOR PROCEDURE: Trixie Sofia is a 63 year old female with a past medical history of longstanding hyperkeratosis of her left lateral tongue which she finds bothersome.She elected to proceed with the above stated procedures given her symptoms. After a full discussion of risks and benefits of the procedure, informed consent was obtained.     OPERATIVE COURSE:  The patient was brought to the operating room and placed on the operating table supine.  MAC anesthesia was induced.  The patient was prepped and draped in standard fashion.  A timeout was performed to identify the patient and correctness of the procedure.  A side biter was used to provide exposure to the tongue.  The tongue was retracted to the right exposing the left lateral leukoplakia lesion.  This was infiltrated with 4 cc of 1% lidocaine with 1-100,000 epinephrine.  The lesion was then outlined using a Bovie cautery and excised down to a plane in between the intrinsic tongue muscle and submucosa.  Hemostasis was achieved using monopolar cautery.  The remaining defect was then closed using a series of simple interrupted stitches with 3-0 Vicryl. This marked the end of the procedure. The patient tolerated it well without any intra-operative complications.     Dr. Burns was present for the entire case    Baron Pyle MD  Otolaryngology - Head and Neck Surgery

## 2023-10-16 NOTE — BRIEF OP NOTE
[facility]    Brief Operative Note    Pre-operative diagnosis: Tongue lesion [K14.8]  Post-operative diagnosis Same as pre-operative diagnosis    Procedure: Excision of  Left Tongue Lesion with Closure, Left - Mouth    Surgeon: Surgeon(s) and Role:     * Tano Burns MD - Primary  Anesthesia: MAC with Local   Estimated Blood Loss: 5mL    Drains: None  Specimens:   ID Type Source Tests Collected by Time Destination   1 : Left Tongue Lesion Tissue Other SURGICAL PATHOLOGY EXAM Tano Burns MD 10/16/2023  4:47 PM      Findings:   See op note .  Complications: None.  Implants: * No implants in log *

## 2023-10-16 NOTE — ANESTHESIA CARE TRANSFER NOTE
Patient: Nerenny Net    Procedure: Procedure(s):  Excision of  Left Tongue Lesion with Closure       Diagnosis: Tongue lesion [K14.8]  Diagnosis Additional Information: No value filed.    Anesthesia Type:   MAC     Note:    Oropharynx: oropharynx clear of all foreign objects and spontaneously breathing  Level of Consciousness: awake  Oxygen Supplementation: room air    Independent Airway: airway patency satisfactory and stable  Dentition: dentition unchanged  Vital Signs Stable: post-procedure vital signs reviewed and stable  Report to RN Given: handoff report given  Patient transferred to: Phase II    Handoff Report: Identifed the Patient, Identified the Reponsible Provider, Reviewed the pertinent medical history, Discussed the surgical course, Reviewed Intra-OP anesthesia mangement and issues during anesthesia, Set expectations for post-procedure period and Allowed opportunity for questions and acknowledgement of understanding      Vitals:  Vitals Value Taken Time   BP     Temp     Pulse     Resp     SpO2         Electronically Signed By: WALTER Gallagher CRNA  October 16, 2023  5:02 PM

## 2023-10-16 NOTE — ANESTHESIA POSTPROCEDURE EVALUATION
Patient: Trixie Net    Procedure: Procedure(s):  Excision of  Left Tongue Lesion with Closure       Anesthesia Type:  MAC    Note:  Disposition: Admission   Postop Pain Control: Uneventful            Sign Out: Well controlled pain   PONV: No   Neuro/Psych: Uneventful            Sign Out: Acceptable/Baseline neuro status   Airway/Respiratory: Uneventful            Sign Out: Acceptable/Baseline resp. status   CV/Hemodynamics: Uneventful            Sign Out: Acceptable CV status; No obvious hypovolemia; No obvious fluid overload   Other NRE: NONE   DID A NON-ROUTINE EVENT OCCUR? No    Event details/Postop Comments:  No complications.           Last vitals:  Vitals Value Taken Time   BP 95/53 10/16/23 1700   Temp 36.7  C (98.1  F) 10/16/23 1700   Pulse 68 10/16/23 1700   Resp 16 10/16/23 1700   SpO2 97 % 10/16/23 1715   Vitals shown include unfiled device data.    Electronically Signed By: Cyrus Lam MD  October 16, 2023  5:16 PM

## 2023-10-17 ENCOUNTER — PATIENT OUTREACH (OUTPATIENT)
Dept: CARE COORDINATION | Facility: CLINIC | Age: 63
End: 2023-10-17
Payer: COMMERCIAL

## 2023-10-17 NOTE — PROGRESS NOTES
Clinic Care Coordination Contact  Program:  [facility]: [place]   Renewal: [employer]  Date Applied:     REMY Outreach:   10/17/23: CTA called to see if patient needed assistance with their [employer] Renewal. Patient declined needing assistance and no follow up needed   CTA will mail application to maddi Davidson  Care   [facility]  Clinic Care Coordination  [phone]       Health Insurance:      Referral/Screening:

## 2023-10-26 ENCOUNTER — TELEPHONE (OUTPATIENT)
Dept: OTOLARYNGOLOGY | Facility: CLINIC | Age: 63
End: 2023-10-26
Payer: COMMERCIAL

## 2023-10-26 DIAGNOSIS — L43.9 LICHEN PLANUS: ICD-10-CM

## 2023-10-26 NOTE — TELEPHONE ENCOUNTER
[facility] Call Center    Phone Message    May a detailed message be left on voicemail: yes     Reason for Call: Other: Pt is having problems with her stitches and wants to know if they can come out now? Please call to discuss.      Action Taken: Message routed to:  [facility] ([facility] ENT    Travel Screening: Not Applicable

## 2023-10-26 NOTE — TELEPHONE ENCOUNTER
Called patient's caregiver and confirmed that sutures are dissolvable. Discussed that stiches could be trimmed at post-op appointment on Tuesday if needed.  Caregiver also requesting refill of magic mouthwash be sent over. Will send to pharmacy. Caregiver or patient will call with further questions or concerns.    Gunjan ROSEN, RN

## 2023-10-31 ENCOUNTER — OFFICE VISIT (OUTPATIENT)
Dept: OTOLARYNGOLOGY | Facility: CLINIC | Age: 63
End: 2023-10-31
Payer: COMMERCIAL

## 2023-10-31 VITALS
TEMPERATURE: 96.3 F | WEIGHT: 192 LBS | BODY MASS INDEX: 38.71 KG/M2 | DIASTOLIC BLOOD PRESSURE: 80 MMHG | HEIGHT: 59 IN | HEART RATE: 67 BPM | SYSTOLIC BLOOD PRESSURE: 124 MMHG

## 2023-10-31 DIAGNOSIS — L43.9 LICHEN PLANUS: Primary | ICD-10-CM

## 2023-10-31 PROCEDURE — 99024 POSTOP FOLLOW-UP VISIT: CPT | Performed by: OTOLARYNGOLOGY

## 2023-10-31 ASSESSMENT — PAIN SCALES - GENERAL: PAINLEVEL: NO PAIN (0)

## 2023-10-31 NOTE — PATIENT INSTRUCTIONS
"You were seen in the clinic today by Dr. Burns. If you have any questions or concerns after your appointment, please call the clinic at [phone]. Press \"1\" for scheduling, press \"3\" for nurse advice.      3.   Plan to return to the clinic in 6 weeks     Gunjan CANTU, RN  [facility]  Department of Otolaryngology  [phone]     "

## 2023-10-31 NOTE — LETTER
10/31/2023       RE: Trixie Sofia  [address] [place] [address]     Dear Colleague,    Thank you for referring your patient, Trixie Sofia, to the [facility] [place] [facility]. Please see a copy of my visit note below.    The patient is here for follow-up today in about a sizable lesion taken from the left side of the tongue this is some type of a shaggy leukoplakia type lesion and it appears very benign in the microscope and there are no other abnormalities that are otherwise noted.  We will leave the stitches in place for now neck exam is clear we will see her again in approximately 6 weeks cranial nerves are intact nasal pyramid is normal breathing is normal at rest the patient is not a pain or discomfort.      Again, thank you for allowing me to participate in the care of your patient.      Sincerely,    Tano Burns MD

## 2023-10-31 NOTE — PROGRESS NOTES
The patient is here for follow-up today in about a sizable lesion taken from the left side of the tongue this is some type of a shaggy leukoplakia type lesion and it appears very benign in the microscope and there are no other abnormalities that are otherwise noted.  We will leave the stitches in place for now neck exam is clear we will see her again in approximately 6 weeks cranial nerves are intact nasal pyramid is normal breathing is normal at rest the patient is not a pain or discomfort.

## 2023-11-14 DIAGNOSIS — Z53.9 DIAGNOSIS NOT YET DEFINED: Primary | ICD-10-CM

## 2023-11-14 PROCEDURE — G0179 MD RECERTIFICATION HHA PT: HCPCS | Performed by: FAMILY MEDICINE

## 2023-11-16 DIAGNOSIS — E78.5 HYPERLIPIDEMIA LDL GOAL <70: ICD-10-CM

## 2023-11-16 DIAGNOSIS — M47.819 FACET ARTHROPATHY OF SPINE: ICD-10-CM

## 2023-11-16 DIAGNOSIS — D50.8 IRON DEFICIENCY ANEMIA SECONDARY TO INADEQUATE DIETARY IRON INTAKE: ICD-10-CM

## 2023-11-16 DIAGNOSIS — Z76.0 ENCOUNTER FOR MEDICATION REFILL: ICD-10-CM

## 2023-11-16 RX ORDER — FERROUS SULFATE 325(65) MG
TABLET ORAL
Qty: 180 TABLET | Refills: 1 | Status: SHIPPED | OUTPATIENT
Start: 2023-11-16 | End: 2024-05-30

## 2023-11-16 RX ORDER — ATORVASTATIN CALCIUM 20 MG/1
TABLET, FILM COATED ORAL
Qty: 90 TABLET | Refills: 1 | Status: SHIPPED | OUTPATIENT
Start: 2023-11-16 | End: 2023-11-29

## 2023-11-16 RX ORDER — NAPROXEN 500 MG/1
TABLET ORAL
Qty: 60 TABLET | Refills: 3 | Status: SHIPPED | OUTPATIENT
Start: 2023-11-16 | End: 2024-08-07

## 2023-11-29 ENCOUNTER — TELEPHONE (OUTPATIENT)
Dept: FAMILY MEDICINE | Facility: CLINIC | Age: 63
End: 2023-11-29

## 2023-11-29 ENCOUNTER — OFFICE VISIT (OUTPATIENT)
Dept: FAMILY MEDICINE | Facility: CLINIC | Age: 63
End: 2023-11-29
Payer: COMMERCIAL

## 2023-11-29 VITALS
SYSTOLIC BLOOD PRESSURE: 107 MMHG | BODY MASS INDEX: 36.37 KG/M2 | HEIGHT: 60 IN | HEART RATE: 69 BPM | OXYGEN SATURATION: 95 % | WEIGHT: 185.25 LBS | TEMPERATURE: 98 F | RESPIRATION RATE: 16 BRPM | DIASTOLIC BLOOD PRESSURE: 71 MMHG

## 2023-11-29 DIAGNOSIS — E11.65 TYPE 2 DIABETES MELLITUS WITH HYPERGLYCEMIA, WITH LONG-TERM CURRENT USE OF INSULIN (H): ICD-10-CM

## 2023-11-29 DIAGNOSIS — Z12.31 VISIT FOR SCREENING MAMMOGRAM: ICD-10-CM

## 2023-11-29 DIAGNOSIS — E78.5 HYPERLIPIDEMIA LDL GOAL <70: ICD-10-CM

## 2023-11-29 DIAGNOSIS — Z79.4 TYPE 2 DIABETES MELLITUS WITH HYPERGLYCEMIA, WITH LONG-TERM CURRENT USE OF INSULIN (H): ICD-10-CM

## 2023-11-29 DIAGNOSIS — K13.21 LEUKOPLAKIA OF ORAL MUCOSA, INCLUDING TONGUE: Primary | ICD-10-CM

## 2023-11-29 DIAGNOSIS — F17.200 SMOKER: ICD-10-CM

## 2023-11-29 PROCEDURE — 99214 OFFICE O/P EST MOD 30 MIN: CPT | Performed by: FAMILY MEDICINE

## 2023-11-29 RX ORDER — ATORVASTATIN CALCIUM 40 MG/1
40 TABLET, FILM COATED ORAL DAILY
Qty: 90 TABLET | Refills: 3 | Status: SHIPPED | OUTPATIENT
Start: 2023-11-29

## 2023-11-29 NOTE — PROGRESS NOTES
"  Assessment & Plan     Leukoplakia of oral mucosa, including tongue  Good interim healing in spite of no insulin use as originally planned. Continue to monitor.    Type 2 diabetes mellitus with hyperglycemia, with long-term current use of insulin (H)  Last HgbA1c 10/04 12.7, not at goal <7.8%. On Jardiance, glipizide, and sitagliptin-metformin. DM foot up to date (03/2023), eye exam up to date (10/2023). Tried insulin, multiple issues with compliance. Given needle aversion, will send rx to start Rybelsus. Return to clinic in 1 month for HgbA1c and Rybelsus dose uptitration.  - Semaglutide (RYBELSUS) 3 MG tablet  Dispense: 30 tablet; Refill: 0  - atorvastatin (LIPITOR) 40 MG tablet  Dispense: 90 tablet; Refill: 3  - if rybelsus approved, stop sitagliptin    Hyperlipidemia LDL goal <70  ASCVD risk remains elevated at 13.3%. Increase to 40 mg atorvastatin. Recheck lipids in 6-12 months.   - atorvastatin (LIPITOR) 40 MG tablet  Dispense: 90 tablet; Refill: 3    Tobacco use disorder  Continues to smoke pipe tobacco. Declined annual CT screening stating \"I would know if I had it and I am fine so far\". Continue to monitor/address at future visits.      Return in about 1 month (around 12/29/2023) for diabetes follow up.    Devin Earl MD  [facility] NANCY Marcum is a 63 year old, presenting for the following health issues:  Follow Up (Post procedure)      History of Present Illness       Back Pain:  She presents for follow up of back pain. Patient's back pain is a chronic problem.  Location of back pain:  Right lower back, left lower back, right buttock and left buttock  Description of back pain: burning, shooting and stabbing  Back pain spreads: right thigh, left thigh, right shoulder and left shoulder    Since patient first noticed back pain, pain is: always present, but gets better and worse  Does back pain interfere with her job:  Yes       Diabetes:   She presents for follow up of " diabetes.  She is checking home blood glucose a few times a week.   She checks blood glucose before meals.  Blood glucose is sometimes over 200 and never under 70. She is aware of hypoglycemia symptoms including weakness and lethargy.    She has no concerns regarding her diabetes at this time.  She is having numbness in feet and burning in feet.            She eats 2-3 servings of fruits and vegetables daily.She consumes 0 sweetened beverage(s) daily.She exercises with enough effort to increase her heart rate 9 or less minutes per day.  She exercises with enough effort to increase her heart rate 3 or less days per week.   She is taking medications regularly.       Patient started on insulin before tongue excision to improve chances of healing. She does not want to be on it long term, glucose has been hard to control because of dietary choices. See previous notes for details.     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 Healing has been appropriate in the interim.     Last HgbA1c 12.7 on 10/04/23. Has been taking Jardiance and Glucotrol but not insulin. Not interested in doing weekly injection but open to pills. Does not have glucose monitor today.    Smokes pipe tobacco. Not amenable to annual CT screen.    Currently on atorvastatin 20 mg every day. Lipids 03/2023 w/  and cholesterol 229.   The 10-year ASCVD risk score (Man SEBASTIAN, et al., 2019) is: 13.3%    Values used to calculate the score:      Age: 63 years      Sex: Female      Is Non- : No      Diabetic: Yes      Tobacco smoker: Yes      Systolic Blood Pressure: 107 mmHg      Is BP treated: No      HDL Cholesterol: 44 mg/dL      Total Cholesterol: 229 mg/dL     Review of Systems   Constitutional, HEENT, cardiovascular, pulmonary, gi and gu systems are negative, except as otherwise noted.      Objective    BP  "107/71   Pulse 69   Temp 98  F (36.7  C) (Oral)   Resp 16   Ht 1.515 m (4' 11.65\")   Wt 84 kg (185 lb 4 oz)   LMP  (LMP Unknown)   SpO2 95%   BMI 36.61 kg/m    Body mass index is 36.61 kg/m .  Physical Exam   GENERAL: healthy, alert and no distress  NECK: no adenopathy, no asymmetry, masses, or scars and thyroid normal to palpation  RESP: lungs clear to auscultation - no rales, rhonchi or wheezes  CV: regular rate and rhythm, normal S1 S2, no S3 or S4, no murmur, click or rub, no peripheral edema and peripheral pulses strong  ABDOMEN: soft, nontender, no hepatosplenomegaly, no masses and bowel sounds normal  MS: no gross musculoskeletal defects noted, no edema    No results found for any visits on 11/29/23.  No results found for this or any previous visit (from the past 24 hour(s)).    Surgical pathology   A. ORAL CAVITY, LEFT TONGUE LESION, EXCISION:  - Squamous mucosa with shaggy hyperparakeratosis and bacterial colonization  - No evidence of high grade dysplasia/carcinoma              "

## 2023-11-29 NOTE — TELEPHONE ENCOUNTER
Central Prior Authorization Team   Phone: [phone]    PRIOR AUTHORIZATION DENIED    Medication: RYBELSUS 3 MG PO TABS  Insurance Company: [employer] - Phone [phone] Fax [phone]  Denial Date: 11/29/2023  Denial Rational: MUST TRY/FAIL TWO OR MORE PREFERRED ALTERNATIVES - RONNIE NGUYEN VICTOZA       Appeal Information: IF PROVIDER WOULD LIKE TO APPEAL THIS DECISION PLEASE PROVIDE THE PA TEAM WITH A LETTER OF MEDICAL NECESSITY      Patient Notified: No

## 2023-12-14 DIAGNOSIS — I27.20 PULMONARY HTN (H): ICD-10-CM

## 2023-12-14 RX ORDER — FUROSEMIDE 40 MG
40 TABLET ORAL DAILY
Qty: 90 TABLET | Refills: 2 | Status: SHIPPED | OUTPATIENT
Start: 2023-12-14

## 2024-01-10 ENCOUNTER — OFFICE VISIT (OUTPATIENT)
Dept: FAMILY MEDICINE | Facility: CLINIC | Age: 64
End: 2024-01-10
Attending: FAMILY MEDICINE
Payer: COMMERCIAL

## 2024-01-10 VITALS
DIASTOLIC BLOOD PRESSURE: 71 MMHG | BODY MASS INDEX: 37.13 KG/M2 | WEIGHT: 189.12 LBS | TEMPERATURE: 98 F | HEIGHT: 60 IN | HEART RATE: 65 BPM | OXYGEN SATURATION: 100 % | SYSTOLIC BLOOD PRESSURE: 107 MMHG | RESPIRATION RATE: 16 BRPM

## 2024-01-10 DIAGNOSIS — E66.01 MORBID OBESITY (H): ICD-10-CM

## 2024-01-10 DIAGNOSIS — E78.49 OTHER HYPERLIPIDEMIA: ICD-10-CM

## 2024-01-10 DIAGNOSIS — I27.20 PULMONARY HTN (H): ICD-10-CM

## 2024-01-10 DIAGNOSIS — F33.1 MODERATE EPISODE OF RECURRENT MAJOR DEPRESSIVE DISORDER (H): Primary | ICD-10-CM

## 2024-01-10 DIAGNOSIS — D69.6 THROMBOCYTOPENIA (H): ICD-10-CM

## 2024-01-10 DIAGNOSIS — E11.65 TYPE 2 DIABETES MELLITUS WITH HYPERGLYCEMIA, WITHOUT LONG-TERM CURRENT USE OF INSULIN (H): ICD-10-CM

## 2024-01-10 DIAGNOSIS — Z12.31 VISIT FOR SCREENING MAMMOGRAM: ICD-10-CM

## 2024-01-10 PROBLEM — D50.9 IRON DEFICIENCY ANEMIA: Status: RESOLVED | Noted: 2019-01-03 | Resolved: 2024-01-10

## 2024-01-10 LAB
ALBUMIN SERPL BCG-MCNC: 4 G/DL (ref 3.5–5.2)
ALP SERPL-CCNC: 208 U/L (ref 40–150)
ALT SERPL W P-5'-P-CCNC: 32 U/L (ref 0–50)
ANION GAP SERPL CALCULATED.3IONS-SCNC: 8 MMOL/L (ref 7–15)
AST SERPL W P-5'-P-CCNC: 33 U/L (ref 0–45)
BILIRUB SERPL-MCNC: 0.6 MG/DL
BUN SERPL-MCNC: 13.7 MG/DL (ref 8–23)
CALCIUM SERPL-MCNC: 10 MG/DL (ref 8.8–10.2)
CHLORIDE SERPL-SCNC: 99 MMOL/L (ref 98–107)
CREAT SERPL-MCNC: 0.73 MG/DL (ref 0.51–0.95)
CREAT UR-MCNC: 39.5 MG/DL
DEPRECATED HCO3 PLAS-SCNC: 30 MMOL/L (ref 22–29)
EGFRCR SERPLBLD CKD-EPI 2021: >90 ML/MIN/1.73M2
ERYTHROCYTE [DISTWIDTH] IN BLOOD BY AUTOMATED COUNT: 13.8 % (ref 10–15)
GLUCOSE SERPL-MCNC: 152 MG/DL (ref 70–99)
HBA1C MFR BLD: 9.9 % (ref 0–5.6)
HCT VFR BLD AUTO: 42.3 % (ref 35–47)
HGB BLD-MCNC: 13.5 G/DL (ref 11.7–15.7)
MCH RBC QN AUTO: 28.1 PG (ref 26.5–33)
MCHC RBC AUTO-ENTMCNC: 31.9 G/DL (ref 31.5–36.5)
MCV RBC AUTO: 88 FL (ref 78–100)
MICROALBUMIN UR-MCNC: <12 MG/L
MICROALBUMIN/CREAT UR: NORMAL MG/G{CREAT}
PLATELET # BLD AUTO: 102 10E3/UL (ref 150–450)
POTASSIUM SERPL-SCNC: 4.6 MMOL/L (ref 3.4–5.3)
PROT SERPL-MCNC: 7.5 G/DL (ref 6.4–8.3)
RBC # BLD AUTO: 4.81 10E6/UL (ref 3.8–5.2)
SODIUM SERPL-SCNC: 137 MMOL/L (ref 135–145)
WBC # BLD AUTO: 4.8 10E3/UL (ref 4–11)

## 2024-01-10 PROCEDURE — 83036 HEMOGLOBIN GLYCOSYLATED A1C: CPT | Performed by: FAMILY MEDICINE

## 2024-01-10 PROCEDURE — 99214 OFFICE O/P EST MOD 30 MIN: CPT | Performed by: FAMILY MEDICINE

## 2024-01-10 PROCEDURE — 36415 COLL VENOUS BLD VENIPUNCTURE: CPT | Performed by: FAMILY MEDICINE

## 2024-01-10 PROCEDURE — 80053 COMPREHEN METABOLIC PANEL: CPT | Performed by: FAMILY MEDICINE

## 2024-01-10 PROCEDURE — 82043 UR ALBUMIN QUANTITATIVE: CPT | Performed by: FAMILY MEDICINE

## 2024-01-10 PROCEDURE — 85027 COMPLETE CBC AUTOMATED: CPT | Performed by: FAMILY MEDICINE

## 2024-01-10 PROCEDURE — 82570 ASSAY OF URINE CREATININE: CPT | Performed by: FAMILY MEDICINE

## 2024-01-10 ASSESSMENT — PATIENT HEALTH QUESTIONNAIRE - PHQ9
SUM OF ALL RESPONSES TO PHQ QUESTIONS 1-9: 3
SUM OF ALL RESPONSES TO PHQ QUESTIONS 1-9: 3
10. IF YOU CHECKED OFF ANY PROBLEMS, HOW DIFFICULT HAVE THESE PROBLEMS MADE IT FOR YOU TO DO YOUR WORK, TAKE CARE OF THINGS AT HOME, OR GET ALONG WITH OTHER PEOPLE: NOT DIFFICULT AT ALL

## 2024-01-10 NOTE — PROGRESS NOTES
Assessment & Plan       Type 2 diabetes mellitus with hyperglycemia, without long-term current use of insulin (H)  A1c improved from 12.2% to 9.9%, still well above goal of <8%. She refuses any injection medications. Should consider at least a GLP-1, likely bydureon due to insurance coverage. Will increase her jardiance to maximize everything. Could consider actos?  - Hemoglobin A1c  - Albumin Random Urine Quantitative with Creat Ratio  - CBC with platelets  - Comprehensive metabolic panel (BMP + Alb, Alk Phos, ALT, AST, Total. Bili, TP)  - Hemoglobin A1c  - Albumin Random Urine Quantitative with Creat Ratio  - CBC with platelets  - Comprehensive metabolic panel (BMP + Alb, Alk Phos, ALT, AST, Total. Bili, TP)  - empagliflozin (JARDIANCE) 25 MG TABS tablet  Dispense: 90 tablet; Refill: 1    Morbid obesity (H)  Declined intervention. Says she wants to eat what she wants to eat and wants to live however she wants to live.     Other hyperlipidemia  Continue statin.     Pulmonary HTN (H)  Continue lasix.       Thrombocytopenia (H24)  Stable, monitor cbc every 6-12 months.     Visit for screening mammogram  Refused.       Return in about 3 months (around 4/10/2024) for Routine preventive, diabetes follow up.      Devin Earl MD  [facility] NANCY Marcum is a 64 year old, presenting for the following health issues:  follow up  (Dm )      History of Present Illness       Diabetes:   She presents for follow up of diabetes.  She is checking home blood glucose a few times a week.   She checks blood glucose before meals.  Blood glucose is sometimes over 200 and never under 70. She is aware of hypoglycemia symptoms including weakness.    She has no concerns regarding her diabetes at this time.  She is having numbness in feet and burning in feet.                Patient refuses any injection medications at this time, cannot get rybelsus unless tries and fails katie rolon  She is even resistant  "to increasing her medications.   She has intermittent numbness/tingling in her toes. Some burning pain that comes and goes.   Same with blurry vision.     Review of Systems   Constitutional, HEENT, cardiovascular, pulmonary, gi and gu systems are negative, except as otherwise noted.      Objective    /71 (BP Location: Right arm, Patient Position: Sitting, Cuff Size: Adult Regular)   Pulse 65   Temp 98  F (36.7  C) (Oral)   Resp 16   Ht 1.515 m (4' 11.65\")   Wt 85.8 kg (189 lb 1.9 oz)   LMP  (LMP Unknown)   SpO2 100%   BMI 37.37 kg/m    Body mass index is 37.37 kg/m .  Physical Exam   GENERAL: healthy, alert and no distress  NECK: no adenopathy, no asymmetry, masses, or scars and thyroid normal to palpation  RESP: lungs clear to auscultation - no rales, rhonchi or wheezes  CV: regular rate and rhythm, normal S1 S2, no S3 or S4, no murmur, click or rub, no peripheral edema and peripheral pulses strong  ABDOMEN: soft, nontender, no hepatosplenomegaly, no masses and bowel sounds normal  MS: no gross musculoskeletal defects noted, no edema  Diabetic foot exam: normal DP and PT pulses, no trophic changes or ulcerative lesions, and normal sensory exam      Results for orders placed or performed in visit on 01/10/24   Hemoglobin A1c     Status: Abnormal   Result Value Ref Range    Hemoglobin A1C 9.9 (H) 0.0 - 5.6 %   CBC with platelets     Status: Abnormal   Result Value Ref Range    WBC Count 4.8 4.0 - 11.0 10e3/uL    RBC Count 4.81 3.80 - 5.20 10e6/uL    Hemoglobin 13.5 11.7 - 15.7 g/dL    Hematocrit 42.3 35.0 - 47.0 %    MCV 88 78 - 100 fL    MCH 28.1 26.5 - 33.0 pg    MCHC 31.9 31.5 - 36.5 g/dL    RDW 13.8 10.0 - 15.0 %    Platelet Count 102 (L) 150 - 450 10e3/uL     Results for orders placed or performed in visit on 01/10/24 (from the past 24 hour(s))   Hemoglobin A1c   Result Value Ref Range    Hemoglobin A1C 9.9 (H) 0.0 - 5.6 %   CBC with platelets   Result Value Ref Range    WBC Count 4.8 4.0 - 11.0 " 10e3/uL    RBC Count 4.81 3.80 - 5.20 10e6/uL    Hemoglobin 13.5 11.7 - 15.7 g/dL    Hematocrit 42.3 35.0 - 47.0 %    MCV 88 78 - 100 fL    MCH 28.1 26.5 - 33.0 pg    MCHC 31.9 31.5 - 36.5 g/dL    RDW 13.8 10.0 - 15.0 %    Platelet Count 102 (L) 150 - 450 10e3/uL

## 2024-01-17 DIAGNOSIS — Z53.9 DIAGNOSIS NOT YET DEFINED: Primary | ICD-10-CM

## 2024-01-17 PROCEDURE — G0179 MD RECERTIFICATION HHA PT: HCPCS | Performed by: FAMILY MEDICINE

## 2024-01-23 DIAGNOSIS — E55.9 VITAMIN D DEFICIENCY: ICD-10-CM

## 2024-01-23 RX ORDER — ERGOCALCIFEROL 1.25 MG/1
CAPSULE, LIQUID FILLED ORAL
Qty: 4 CAPSULE | Refills: 11 | Status: SHIPPED | OUTPATIENT
Start: 2024-01-23

## 2024-02-20 DIAGNOSIS — E11.42 TYPE 2 DIABETES MELLITUS WITH DIABETIC POLYNEUROPATHY, WITHOUT LONG-TERM CURRENT USE OF INSULIN (H): ICD-10-CM

## 2024-02-20 RX ORDER — SITAGLIPTIN AND METFORMIN HYDROCHLORIDE 1000; 50 MG/1; MG/1
TABLET, FILM COATED ORAL
Qty: 60 TABLET | Refills: 3 | Status: SHIPPED | OUTPATIENT
Start: 2024-02-20 | End: 2024-07-09

## 2024-03-07 DIAGNOSIS — Z53.9 DIAGNOSIS NOT YET DEFINED: Primary | ICD-10-CM

## 2024-03-07 PROCEDURE — G0179 MD RECERTIFICATION HHA PT: HCPCS | Performed by: FAMILY MEDICINE

## 2024-04-08 ENCOUNTER — TELEPHONE (OUTPATIENT)
Dept: MAMMOGRAPHY | Facility: CLINIC | Age: 64
End: 2024-04-08
Payer: COMMERCIAL

## 2024-04-08 NOTE — TELEPHONE ENCOUNTER
Patient Quality Outreach    Patient is due for the following:   Breast Cancer Screening - Mammogram    Next Steps:   Patient declined follow up at this time.    Type of outreach:    Phone, spoke to patient/parent. Declined exam.    Next Steps:  Reach out within 90 days via Phone.    Max number of attempts reached: No. Will try again in 90 days if patient still on fail list.    Questions for provider review:    None           SUJATA Tavarez  Chart routed to Care Team.

## 2024-04-17 ENCOUNTER — OFFICE VISIT (OUTPATIENT)
Dept: FAMILY MEDICINE | Facility: CLINIC | Age: 64
End: 2024-04-17
Attending: FAMILY MEDICINE
Payer: COMMERCIAL

## 2024-04-17 VITALS
BODY MASS INDEX: 36.92 KG/M2 | TEMPERATURE: 97.9 F | WEIGHT: 188.08 LBS | RESPIRATION RATE: 16 BRPM | OXYGEN SATURATION: 94 % | DIASTOLIC BLOOD PRESSURE: 64 MMHG | HEIGHT: 60 IN | HEART RATE: 73 BPM | SYSTOLIC BLOOD PRESSURE: 100 MMHG

## 2024-04-17 DIAGNOSIS — H40.002 GLAUCOMA SUSPECT, LEFT: ICD-10-CM

## 2024-04-17 DIAGNOSIS — G47.8 PULMONARY HYPERTENSION DUE TO SLEEP-DISORDERED BREATHING (H): ICD-10-CM

## 2024-04-17 DIAGNOSIS — Z00.00 ROUTINE GENERAL MEDICAL EXAMINATION AT A HEALTH CARE FACILITY: Primary | ICD-10-CM

## 2024-04-17 DIAGNOSIS — I27.29 PULMONARY HYPERTENSION DUE TO SLEEP-DISORDERED BREATHING (H): ICD-10-CM

## 2024-04-17 DIAGNOSIS — I27.20 PULMONARY HTN (H): ICD-10-CM

## 2024-04-17 DIAGNOSIS — E11.65 TYPE 2 DIABETES MELLITUS WITH HYPERGLYCEMIA, WITH LONG-TERM CURRENT USE OF INSULIN (H): ICD-10-CM

## 2024-04-17 DIAGNOSIS — Z79.4 TYPE 2 DIABETES MELLITUS WITH HYPERGLYCEMIA, WITH LONG-TERM CURRENT USE OF INSULIN (H): ICD-10-CM

## 2024-04-17 DIAGNOSIS — E66.01 MORBID OBESITY (H): ICD-10-CM

## 2024-04-17 LAB
CHOLEST SERPL-MCNC: 198 MG/DL
FASTING STATUS PATIENT QL REPORTED: NO
HBA1C MFR BLD: 11.3 % (ref 0–5.6)
HDLC SERPL-MCNC: 39 MG/DL
LDLC SERPL CALC-MCNC: 109 MG/DL
NONHDLC SERPL-MCNC: 159 MG/DL
TRIGL SERPL-MCNC: 252 MG/DL

## 2024-04-17 PROCEDURE — 36415 COLL VENOUS BLD VENIPUNCTURE: CPT | Performed by: FAMILY MEDICINE

## 2024-04-17 PROCEDURE — 83036 HEMOGLOBIN GLYCOSYLATED A1C: CPT | Performed by: FAMILY MEDICINE

## 2024-04-17 PROCEDURE — 80061 LIPID PANEL: CPT | Performed by: FAMILY MEDICINE

## 2024-04-17 PROCEDURE — 99214 OFFICE O/P EST MOD 30 MIN: CPT | Mod: 25 | Performed by: FAMILY MEDICINE

## 2024-04-17 PROCEDURE — 99396 PREV VISIT EST AGE 40-64: CPT | Performed by: FAMILY MEDICINE

## 2024-04-17 RX ORDER — PIOGLITAZONEHYDROCHLORIDE 15 MG/1
15 TABLET ORAL DAILY
Qty: 90 TABLET | Refills: 3 | Status: SHIPPED | OUTPATIENT
Start: 2024-04-17 | End: 2024-05-30 | Stop reason: DRUGHIGH

## 2024-04-17 SDOH — HEALTH STABILITY: PHYSICAL HEALTH: ON AVERAGE, HOW MANY DAYS PER WEEK DO YOU ENGAGE IN MODERATE TO STRENUOUS EXERCISE (LIKE A BRISK WALK)?: 0 DAYS

## 2024-04-17 SDOH — HEALTH STABILITY: PHYSICAL HEALTH: ON AVERAGE, HOW MANY MINUTES DO YOU ENGAGE IN EXERCISE AT THIS LEVEL?: 0 MIN

## 2024-04-17 ASSESSMENT — SOCIAL DETERMINANTS OF HEALTH (SDOH): HOW OFTEN DO YOU GET TOGETHER WITH FRIENDS OR RELATIVES?: ONCE A WEEK

## 2024-04-17 NOTE — PATIENT INSTRUCTIONS
Preventive Care Advice   This is general advice given by our system to help you stay healthy. However, your care team may have specific advice just for you. Please talk to your care team about your preventive care needs.  Nutrition  Eat 5 or more servings of fruits and vegetables each day.  Try wheat bread, brown rice and whole grain pasta (instead of white bread, rice, and pasta).  Get enough calcium and vitamin D. Check the label on foods and aim for 100% of the RDA (recommended daily allowance).  Lifestyle  Exercise at least 150 minutes each week   (30 minutes a day, 5 days a week).  Do muscle strengthening activities 2 days a week. These help control your weight and prevent disease.  No smoking.  Wear sunscreen to prevent skin cancer.  Have a dental exam and cleaning every 6 months.  Yearly exams  See your health care team every year to talk about:  Any changes in your health.  Any medicines your care team has prescribed.  Preventive care, family planning, and ways to prevent chronic diseases.  Shots (vaccines)   HPV shots (up to age 26), if you've never had them before.  Hepatitis B shots (up to age 59), if you've never had them before.  COVID-19 shot: Get this shot when it's due.  Flu shot: Get a flu shot every year.  Tetanus shot: Get a tetanus shot every 10 years.  Pneumococcal, hepatitis A, and RSV shots: Ask your care team if you need these based on your risk.  Shingles shot (for age 50 and up).  General health tests  Diabetes screening:  Starting at age 35, Get screened for diabetes at least every 3 years.  If you are younger than age 35, ask your care team if you should be screened for diabetes.  Cholesterol test: At age 39, start having a cholesterol test every 5 years, or more often if advised.  Bone density scan (DEXA): At age 50, ask your care team if you should have this scan for osteoporosis (brittle bones).  Hepatitis C: Get tested at least once in your life.  STIs (sexually transmitted  infections)  Before age 24: Ask your care team if you should be screened for STIs.  After age 24: Get screened for STIs if you're at risk. You are at risk for STIs (including HIV) if:  You are sexually active with more than one person.  You don't use condoms every time.  You or a partner was diagnosed with a sexually transmitted infection.  If you are at risk for HIV, ask about PrEP medicine to prevent HIV.  Get tested for HIV at least once in your life, whether you are at risk for HIV or not.  Cancer screening tests  Cervical cancer screening: If you have a cervix, begin getting regular cervical cancer screening tests at age 21. Most people who have regular screenings with normal results can stop after age 65. Talk about this with your provider.  Breast cancer scan (mammogram): If you've ever had breasts, begin having regular mammograms starting at age 40. This is a scan to check for breast cancer.  Colon cancer screening: It is important to start screening for colon cancer at age 45.  Have a colonoscopy test every 10 years (or more often if you're at risk) Or, ask your provider about stool tests like a FIT test every year or Cologuard test every 3 years.  To learn more about your testing options, visit: https://www.Imagine [employer]/142515.pdf.  For help making a decision, visit: https://bit.ly/bz65905.  Prostate cancer screening test: If you have a prostate and are age 55 to 69, ask your provider if you would benefit from a yearly prostate cancer screening test.  Lung cancer screening: If you are a current or former smoker age 50 to 80, ask your care team if ongoing lung cancer screenings are right for you.  For informational purposes only. Not to replace the advice of your health care provider. Copyright   2023 [employer]. All rights reserved. Clinically reviewed by the [facility] Transitions Program. Mint 904146 - REV 01/24.

## 2024-04-17 NOTE — COMMUNITY RESOURCES LIST (ENGLISH)
April 17, 2024           YOUR PERSONALIZED LIST OF SERVICES & PROGRAMS           & RECREATION    Sports      [facility] [place] - Sports clubs and recreational activities  [address] [place] [address] (Distance: 1.3 miles)  Language: English  Fee: Free, Self pay  Accessibility: Ada accessible      Community Services [facility] Services  [address] [place] [address] (Distance: 4.0 miles)  Phone: [phone]  Website: [email or web address]/seniors/  Language: English, Citizen of [place]  Fee: Free, Self pay, Sliding scale  Accessibility: Translation services      [facility] - Adult Enrichment  Phone: [phone]  Website: [email or web address]-seniors/adult-enrichment/  Language: English  Hours: Mon 7:30 AM - 4:00 PM Tue 7:30 AM - 4:00 PM Wed 7:30 AM - 4:00 PM Thu 7:30 AM - 4:00 PM Fri 7:30 AM - 4:00 PM    Classes/Groups      [facility] - Personal training  [address] [place] [address] (Distance: 17.9 miles)  Phone: [phone]  Website: https://www.[employer]/  Language: English, Citizen of [place]  Fee: Sliding scale, Self pay, Insurance      [facility] [place] - Gym or workout facility - [place] [facility] [place] [facility]  [address] [place] [address] (Distance: 0.7 miles)  Language: English  Fee: Free, Self pay  Accessibility: Ada accessible      [facility] - Adult Enrichment  Phone: [phone]  Website: [email or web address]-seniors/adult-[email or web address]  Language: English  Hours: Mon 7:30 AM - 4:00 PM Tue 7:30 AM - 4:00 PM Wed 7:30 AM - 4:00 PM Thu 7:30 AM - 4:00 PM Fri 7:30 AM - 4:00 PM               IMPORTANT NUMBERS & WEBSITES        Emergency Services  911  .   United Way  211 http://211unitedway.org  .   Poison Control  (715) 632-7519 http://[place]poison.org http://wis[place]poison.org  .     Suicide and Crisis Lifeline  529 http://988[facility] Hospitalline.org  .   Childhelp National Child Abuse  Hotline  417.211.4913 http://Childhelphotline.org   .   National Sexual Assault Hotline  (755) 607-4741 (HOPE) http://Rainn.org   .     National Runaway Safeline  (661) 964-8782 (RUNAWAY) http://[employer]  .   Pregnancy & Postpartum Support  Call/text 239-022-0374  [place]: http://ppsupportmn.org  [place]: http://psichapters.com/wi  .   Substance Abuse National Helpline ([facility])  513-221-HELP (0260) http://Findtreatment.gov   .                DISCLAIMER: These resources have been generated via the [employer] Platform. [employer] does not endorse any service providers mentioned in this resource list. [employer] does not guarantee that the services mentioned in this resource list will be available to you or will improve your health or wellness.    [facility]

## 2024-04-17 NOTE — PROGRESS NOTES
"Preventive Care Visit  [facility] NANCY Earl MD, Family Medicine  Apr 17, 2024      Assessment & Plan     Routine general medical examination at a health care facility  - *MA Screening Digital Bilateral    Type 2 diabetes mellitus with hyperglycemia, with long-term current use of insulin (H)  REFUSED insulin. A1c increased again to 11.3%, from 9.9%. the decrease previously was after her tongue lesion excision and she was unable to eat. Discussed risks vs benefits of insulin or adding another medication - she said absolutely no insulin, she would rather die from the diabetes. No history of chf, will start low dose pioglitazone, advised on side effects.   - Hemoglobin A1c  - Lipid panel reflex to direct LDL Non-fasting  - Hemoglobin A1c  - Lipid panel reflex to direct LDL Non-fasting  - pioglitazone (ACTOS) 15 MG tablet  Dispense: 90 tablet; Refill: 3    Morbid obesity (H)  Pulmonary HTN (H)  Pulmonary hypertension due to sleep-disordered breathing (H)  Declined any interventions. She does take her furosemide.     Glaucoma suspect, left  Refused procedure to relieve pressure, she says she just needs glasses and said she is willing to consider the procedure after she gets her glasses. Lengthy discussion about the fact that glasses will not help her if she were to get them now because she has glaucoma and hyperglycemia, she will need new ones if she were to get them now and then follow through with the surgery.         Nicotine/Tobacco Cessation  She reports that she has been smoking pipe. She has never been exposed to tobacco smoke. She has never used smokeless tobacco.  Nicotine/Tobacco Cessation Plan  Information offered: Patient not interested at this time      BMI  Estimated body mass index is 37.16 kg/m  as calculated from the following:    Height as of this encounter: 1.515 m (4' 11.65\").    Weight as of this encounter: 85.3 kg (188 lb 1.3 oz).   Weight management plan: Discussed " healthy diet and exercise guidelines    Counseling  Appropriate preventive services were discussed with this patient, including applicable screening as appropriate for fall prevention, nutrition, physical activity, Tobacco-use cessation, weight loss and cognition.  Checklist reviewing preventive services available has been given to the patient.  Reviewed patient's diet, addressing concerns and/or questions.   The patient was instructed to see the dentist every 6 months.   She is at risk for psychosocial distress and has been provided with information to reduce risk.           Subjective   Trixie is a 64 year old, presenting for the following:  Physical        4/17/2024     8:42 AM   Additional Questions   Roomed by eugene yoon   Accompanied by PCA        Health Care Directive  Patient has a Health Care Directive on file  Advance care planning document is on file and is current.    HPI          4/17/2024   General Health   How would you rate your overall physical health? (!) FAIR   Feel stress (tense, anxious, or unable to sleep) Only a little   (!) STRESS CONCERN      4/17/2024   Nutrition   Three or more servings of calcium each day? (!) NO   Diet: Regular (no restrictions)   How many servings of fruit and vegetables per day? (!) 0-1   How many sweetened beverages each day? 0-1         4/17/2024   Exercise   Days per week of moderate/strenous exercise 0 days   Average minutes spent exercising at this level 0 min   (!) EXERCISE CONCERN      4/17/2024   Social Factors   Frequency of gathering with friends or relatives Once a week   Worry food won't last until get money to buy more No   Food not last or not have enough money for food? No   Do you have housing?  Yes   Are you worried about losing your housing? No   Lack of transportation? No   Unable to get utilities (heat,electricity)? No         4/17/2024   Fall Risk   Fallen 2 or more times in the past year? No   Trouble with walking or balance? No          4/17/2024  "  Dental   Dentist two times every year? (!) NO         4/17/2024   TB Screening   Were you born outside of the [place]? No         Today's PHQ-9 Score:       1/10/2024     8:09 AM   PHQ-9 SCORE   PHQ-9 Total Score MyChart 3 (Minimal depression)   PHQ-9 Total Score 3           4/17/2024   Substance Use   Alcohol more than 3/day or more than 7/wk No   Do you use any other substances recreationally? No     Social History     Tobacco Use    Smoking status: Every Day     Types: Pipe     Passive exposure: Never    Smokeless tobacco: Never   Vaping Use    Vaping status: Never Used   Substance Use Topics    Alcohol use: No    Drug use: No             4/17/2024   Breast Cancer Screening   Family history of breast, colon, or ovarian cancer? No / Unknown      Mammogram Screening - Mammogram every 1-2 years updated in Health Maintenance based on mutual decision making        4/17/2024   STI Screening   New sexual partner(s) since last STI/HIV test? No     History of abnormal Pap smear: NO - age 30-65 PAP every 5 years with negative HPV co-testing recommended  Patient absolutely refused.        ASCVD Risk   The 10-year ASCVD risk score (Man SEBASTIAN, et al., 2019) is: 12.5%    Values used to calculate the score:      Age: 64 years      Sex: Female      Is Non- : No      Diabetic: Yes      Tobacco smoker: Yes      Systolic Blood Pressure: 100 mmHg      Is BP treated: No      HDL Cholesterol: 44 mg/dL      Total Cholesterol: 229 mg/dL           Reviewed and updated as needed this visit by Provider   Tobacco  Allergies  Meds  Problems  Med Hx  Surg Hx  Fam Hx     Sexual Activity                   Objective    Exam  /64 (BP Location: Left arm, Patient Position: Sitting, Cuff Size: Adult Regular)   Pulse 73   Temp 97.9  F (36.6  C) (Oral)   Resp 16   Ht 1.515 m (4' 11.65\")   Wt 85.3 kg (188 lb 1.3 oz)   LMP  (LMP Unknown)   SpO2 94%   BMI 37.16 kg/m     Estimated body mass index is " "37.16 kg/m  as calculated from the following:    Height as of this encounter: 1.515 m (4' 11.65\").    Weight as of this encounter: 85.3 kg (188 lb 1.3 oz).    Physical Exam  GENERAL: alert and no distress  NECK: no adenopathy, no asymmetry, masses, or scars  RESP: lungs clear to auscultation - no rales, rhonchi or wheezes  CV: regular rate and rhythm, normal S1 S2, no S3 or S4, no murmur, click or rub, no peripheral edema  ABDOMEN: soft, nontender, no hepatosplenomegaly, no masses and bowel sounds normal  MS: no gross musculoskeletal defects noted, no edema        Signed Electronically by: Devin Earl MD    "

## 2024-05-14 DIAGNOSIS — Z53.9 DIAGNOSIS NOT YET DEFINED: Primary | ICD-10-CM

## 2024-05-14 PROCEDURE — G0179 MD RECERTIFICATION HHA PT: HCPCS | Performed by: FAMILY MEDICINE

## 2024-05-28 DIAGNOSIS — Z76.0 ENCOUNTER FOR MEDICATION REFILL: ICD-10-CM

## 2024-05-28 DIAGNOSIS — K21.00 GASTROESOPHAGEAL REFLUX DISEASE WITH ESOPHAGITIS WITHOUT HEMORRHAGE: ICD-10-CM

## 2024-05-30 ENCOUNTER — OFFICE VISIT (OUTPATIENT)
Dept: FAMILY MEDICINE | Facility: CLINIC | Age: 64
End: 2024-05-30
Payer: COMMERCIAL

## 2024-05-30 ENCOUNTER — VIRTUAL VISIT (OUTPATIENT)
Dept: INTERPRETER SERVICES | Facility: CLINIC | Age: 64
End: 2024-05-30

## 2024-05-30 VITALS
RESPIRATION RATE: 16 BRPM | TEMPERATURE: 98 F | WEIGHT: 189.56 LBS | HEIGHT: 60 IN | DIASTOLIC BLOOD PRESSURE: 75 MMHG | OXYGEN SATURATION: 97 % | SYSTOLIC BLOOD PRESSURE: 113 MMHG | HEART RATE: 67 BPM | BODY MASS INDEX: 37.21 KG/M2

## 2024-05-30 DIAGNOSIS — E11.65 TYPE 2 DIABETES MELLITUS WITH HYPERGLYCEMIA, WITH LONG-TERM CURRENT USE OF INSULIN (H): Primary | ICD-10-CM

## 2024-05-30 DIAGNOSIS — Z79.4 TYPE 2 DIABETES MELLITUS WITH HYPERGLYCEMIA, WITH LONG-TERM CURRENT USE OF INSULIN (H): Primary | ICD-10-CM

## 2024-05-30 PROCEDURE — T1013 SIGN LANG/ORAL INTERPRETER: HCPCS | Mod: U4 | Performed by: INTERPRETER

## 2024-05-30 PROCEDURE — G2211 COMPLEX E/M VISIT ADD ON: HCPCS | Performed by: FAMILY MEDICINE

## 2024-05-30 PROCEDURE — 99214 OFFICE O/P EST MOD 30 MIN: CPT | Performed by: FAMILY MEDICINE

## 2024-05-30 RX ORDER — PIOGLITAZONEHYDROCHLORIDE 30 MG/1
30 TABLET ORAL DAILY
Qty: 90 TABLET | Refills: 1 | Status: SHIPPED | OUTPATIENT
Start: 2024-05-30

## 2024-05-30 NOTE — PROGRESS NOTES
Assessment & Plan     Type 2 diabetes mellitus with hyperglycemia, with long-term current use of insulin (H)  Patient declined increasing number of pills, but will increase strength of actos. Has no low sugars, is usually in the 200s-300s all the time, fasting glucose 190s. She did not bring her meter. Will hold on any other increases until recheck A1c and she has her meter. She absolutely refuses insulin or dietary changes, eating significant amounts of fruits. See previous notes/discussions.   - pioglitazone (ACTOS) 30 MG tablet  Dispense: 90 tablet; Refill: 1        Return in about 2 months (around 7/30/2024) for diabetes follow up.    The longitudinal plan of care for the diagnosis(es)/condition(s) as documented were addressed during this visit. Due to the added complexity in care, I will continue to support Trixie in the subsequent management and with ongoing continuity of care.      Subjective   Trixie is a 64 year old, presenting for the following health issues:  Diabetes    History of Present Illness       Diabetes:   She presents for follow up of diabetes.  She is checking home blood glucose three times daily.   She checks blood glucose before meals.  Blood glucose is never over 200 and never under 70.  When her blood glucose is low, the patient is asymptomatic for confusion, blurred vision, lethargy and reports not feeling dizzy, shaky, or weak.   She has no concerns regarding her diabetes at this time.  She is having numbness in feet, burning in feet, redness, sores, or blisters on feet and blurry vision.                Type 2 diabetes mellitus with hyperglycemia, with long-term current use of insulin (H)  REFUSED insulin. A1c increased again to 11.3%, from 9.9%. the decrease previously was after her tongue lesion excision and she was unable to eat. Discussed risks vs benefits of insulin or adding another medication - she said absolutely no insulin, she would rather die from the diabetes. No history of chf,  "will start low dose pioglitazone, advised on side effects.   today   No lows, almost always high. Her fasting glucose is 190s on a good day    Morbid obesity (H)  Pulmonary HTN (H)  Pulmonary hypertension due to sleep-disordered breathing (H)  Declined any interventions. She does take her furosemide.      Glaucoma suspect, left  Refused procedure to relieve pressure, she says she just needs glasses and said she is willing to consider the procedure after she gets her glasses. Lengthy discussion about the fact that glasses will not help her if she were to get them now because she has glaucoma and hyperglycemia, she will need new ones if she were to get them now and then follow through with the surgery.         Objective    /75   Pulse 67   Temp 98  F (36.7  C) (Oral)   Resp 16   Ht 1.515 m (4' 11.65\")   Wt 86 kg (189 lb 9 oz)   LMP  (LMP Unknown)   SpO2 97%   BMI 37.46 kg/m    Body mass index is 37.46 kg/m .  Physical Exam   GENERAL: alert and no distress  NECK: no adenopathy, no asymmetry, masses, or scars  RESP: lungs clear to auscultation - no rales, rhonchi or wheezes  CV: regular rate and rhythm, normal S1 S2, no S3 or S4, no murmur, click or rub, no peripheral edema  ABDOMEN: soft, nontender, no hepatosplenomegaly, no masses and bowel sounds normal  MS: no gross musculoskeletal defects noted, no edema    No results found for any visits on 05/30/24.  No results found for this or any previous visit (from the past 24 hour(s)).        Signed Electronically by: Devin Earl MD    "

## 2024-06-13 ENCOUNTER — ANCILLARY PROCEDURE (OUTPATIENT)
Dept: MAMMOGRAPHY | Facility: CLINIC | Age: 64
End: 2024-06-13
Payer: COMMERCIAL

## 2024-06-13 DIAGNOSIS — Z00.00 ROUTINE GENERAL MEDICAL EXAMINATION AT A HEALTH CARE FACILITY: ICD-10-CM

## 2024-06-13 PROCEDURE — 77067 SCR MAMMO BI INCL CAD: CPT | Mod: TC | Performed by: RADIOLOGY

## 2024-06-25 ENCOUNTER — TRANSFERRED RECORDS (OUTPATIENT)
Dept: HEALTH INFORMATION MANAGEMENT | Facility: CLINIC | Age: 64
End: 2024-06-25
Payer: COMMERCIAL

## 2024-07-07 ENCOUNTER — MEDICAL CORRESPONDENCE (OUTPATIENT)
Dept: HEALTH INFORMATION MANAGEMENT | Facility: CLINIC | Age: 64
End: 2024-07-07
Payer: COMMERCIAL

## 2024-07-09 DIAGNOSIS — E11.00 TYPE 2 DIABETES MELLITUS WITH HYPEROSMOLARITY WITHOUT COMA, WITHOUT LONG-TERM CURRENT USE OF INSULIN (H): Chronic | ICD-10-CM

## 2024-07-09 DIAGNOSIS — E11.42 TYPE 2 DIABETES MELLITUS WITH DIABETIC POLYNEUROPATHY, WITHOUT LONG-TERM CURRENT USE OF INSULIN (H): ICD-10-CM

## 2024-07-09 RX ORDER — GLIPIZIDE 10 MG/1
20 TABLET, FILM COATED, EXTENDED RELEASE ORAL DAILY
Qty: 180 TABLET | Refills: 0 | Status: SHIPPED | OUTPATIENT
Start: 2024-07-09 | End: 2024-10-01

## 2024-07-09 RX ORDER — SITAGLIPTIN AND METFORMIN HYDROCHLORIDE 1000; 50 MG/1; MG/1
TABLET, FILM COATED ORAL
Qty: 60 TABLET | Refills: 2 | Status: SHIPPED | OUTPATIENT
Start: 2024-07-09

## 2024-08-02 DIAGNOSIS — Z12.11 COLON CANCER SCREENING: ICD-10-CM

## 2024-08-06 NOTE — PROGRESS NOTES
Assessment & Plan     Morbid obesity (H)  Type 2 diabetes mellitus with hyperglycemia, with long-term current use of insulin (H)  A1c improved to 9.1% but patient refusing to make any other changes. Risks discussed - recommend increasing pioglitazone again because she absolutely refuses insulin or additional pills. Pioglitazone increased last visit - continue all other medications.   - Hemoglobin A1c  - Comprehensive metabolic panel (BMP + Alb, Alk Phos, ALT, AST, Total. Bili, TP)    DEIRDRE (obstructive sleep apnea)  Decline dintervention.     Facet arthropathy of spine  Previously on mobic but concerned about ulcers - had a bleeding ulcer a few years ago. Discussed warning signs when she should be concerned about that. Ok to use naproxen prn (she is not using daily), she uses acetaminophen most days.   - naproxen (NAPROSYN) 500 MG tablet  Dispense: 60 tablet; Refill: 5        Return in about 3 months (around 11/7/2024) for diabetes follow up.    The longitudinal plan of care for the diagnosis(es)/condition(s) as documented were addressed during this visit. Due to the added complexity in care, I will continue to support Trixie in the subsequent management and with ongoing continuity of care.    Subjective   Trixie is a 64 year old, presenting for the following health issues:  Follow up  ( Dm )    History of Present Illness       Diabetes:   She presents for follow up of diabetes.  She is checking home blood glucose a few times a month.   She checks blood glucose before meals.  Blood glucose is sometimes over 200 and never under 70. She is aware of hypoglycemia symptoms including shakiness.    She has no concerns regarding her diabetes at this time.  She is having numbness in feet, burning in feet and blurry vision.            She eats 2-3 servings of fruits and vegetables daily.She consumes 0 sweetened beverage(s) daily.She exercises with enough effort to increase her heart rate 9 or less minutes per day.  She exercises  "with enough effort to increase her heart rate 3 or less days per week.   She is taking medications regularly.         Type 2 diabetes mellitus with hyperglycemia, with long-term current use of insulin (H)  Patient declined increasing number of pills, but will increase strength of actos. Has no low sugars, is usually in the 200s-300s all the time, fasting glucose 190s. She did not bring her meter. Will hold on any other increases until recheck A1c and she has her meter. She absolutely refuses insulin or dietary changes, eating significant amounts of fruits. See previous notes/discussions.   - pioglitazone (ACTOS) 30 MG tablet  Dispense: 90 tablet; Refill: 1          Objective    /72 (BP Location: Right arm, Patient Position: Sitting, Cuff Size: Adult Regular)   Pulse 67   Temp 98.9  F (37.2  C) (Oral)   Resp 16   Ht 1.515 m (4' 11.65\")   Wt 90.3 kg (199 lb 0.6 oz)   LMP  (LMP Unknown)   SpO2 99%   BMI 39.33 kg/m    Body mass index is 39.33 kg/m .  Physical Exam   GENERAL: alert and no distress  NECK: no adenopathy, no asymmetry, masses, or scars  RESP: lungs clear to auscultation - no rales, rhonchi or wheezes  CV: regular rate and rhythm, normal S1 S2, no S3 or S4, no murmur, click or rub, no peripheral edema  ABDOMEN: soft, nontender, no hepatosplenomegaly, no masses and bowel sounds normal  MS: no gross musculoskeletal defects noted, no edema    Results for orders placed or performed in visit on 08/07/24   Hemoglobin A1c     Status: Abnormal   Result Value Ref Range    Hemoglobin A1C 9.1 (H) 0.0 - 5.6 %    Narrative    Results confirmed by repeat test.      Results for orders placed or performed in visit on 08/07/24 (from the past 24 hour(s))   Hemoglobin A1c   Result Value Ref Range    Hemoglobin A1C 9.1 (H) 0.0 - 5.6 %    Narrative    Results confirmed by repeat test.            Signed Electronically by: Devin Earl MD    "

## 2024-08-07 ENCOUNTER — OFFICE VISIT (OUTPATIENT)
Dept: FAMILY MEDICINE | Facility: CLINIC | Age: 64
End: 2024-08-07
Payer: COMMERCIAL

## 2024-08-07 VITALS
BODY MASS INDEX: 39.08 KG/M2 | DIASTOLIC BLOOD PRESSURE: 72 MMHG | TEMPERATURE: 98.9 F | OXYGEN SATURATION: 99 % | HEART RATE: 67 BPM | WEIGHT: 199.04 LBS | RESPIRATION RATE: 16 BRPM | HEIGHT: 60 IN | SYSTOLIC BLOOD PRESSURE: 111 MMHG

## 2024-08-07 DIAGNOSIS — G47.33 OSA (OBSTRUCTIVE SLEEP APNEA): ICD-10-CM

## 2024-08-07 DIAGNOSIS — E11.65 TYPE 2 DIABETES MELLITUS WITH HYPERGLYCEMIA, WITHOUT LONG-TERM CURRENT USE OF INSULIN (H): ICD-10-CM

## 2024-08-07 DIAGNOSIS — Z79.4 TYPE 2 DIABETES MELLITUS WITH HYPERGLYCEMIA, WITH LONG-TERM CURRENT USE OF INSULIN (H): ICD-10-CM

## 2024-08-07 DIAGNOSIS — E66.01 MORBID OBESITY (H): Primary | ICD-10-CM

## 2024-08-07 DIAGNOSIS — M47.819 FACET ARTHROPATHY OF SPINE: ICD-10-CM

## 2024-08-07 DIAGNOSIS — E11.65 TYPE 2 DIABETES MELLITUS WITH HYPERGLYCEMIA, WITH LONG-TERM CURRENT USE OF INSULIN (H): ICD-10-CM

## 2024-08-07 LAB
ALBUMIN SERPL BCG-MCNC: 4.1 G/DL (ref 3.5–5.2)
ALP SERPL-CCNC: 166 U/L (ref 40–150)
ALT SERPL W P-5'-P-CCNC: 23 U/L (ref 0–50)
ANION GAP SERPL CALCULATED.3IONS-SCNC: 11 MMOL/L (ref 7–15)
AST SERPL W P-5'-P-CCNC: 26 U/L (ref 0–45)
BILIRUB SERPL-MCNC: 0.6 MG/DL
BUN SERPL-MCNC: 23.5 MG/DL (ref 8–23)
CALCIUM SERPL-MCNC: 9.1 MG/DL (ref 8.8–10.4)
CHLORIDE SERPL-SCNC: 99 MMOL/L (ref 98–107)
CREAT SERPL-MCNC: 0.86 MG/DL (ref 0.51–0.95)
EGFRCR SERPLBLD CKD-EPI 2021: 75 ML/MIN/1.73M2
GLUCOSE SERPL-MCNC: 153 MG/DL (ref 70–99)
HBA1C MFR BLD: 9.1 % (ref 0–5.6)
HCO3 SERPL-SCNC: 30 MMOL/L (ref 22–29)
POTASSIUM SERPL-SCNC: 4 MMOL/L (ref 3.4–5.3)
PROT SERPL-MCNC: 7.7 G/DL (ref 6.4–8.3)
SODIUM SERPL-SCNC: 140 MMOL/L (ref 135–145)

## 2024-08-07 PROCEDURE — 99214 OFFICE O/P EST MOD 30 MIN: CPT | Performed by: FAMILY MEDICINE

## 2024-08-07 PROCEDURE — 83036 HEMOGLOBIN GLYCOSYLATED A1C: CPT | Performed by: FAMILY MEDICINE

## 2024-08-07 PROCEDURE — 80053 COMPREHEN METABOLIC PANEL: CPT | Performed by: FAMILY MEDICINE

## 2024-08-07 PROCEDURE — G2211 COMPLEX E/M VISIT ADD ON: HCPCS | Performed by: FAMILY MEDICINE

## 2024-08-07 PROCEDURE — 36415 COLL VENOUS BLD VENIPUNCTURE: CPT | Performed by: FAMILY MEDICINE

## 2024-08-07 RX ORDER — NAPROXEN 500 MG/1
500 TABLET ORAL 2 TIMES DAILY PRN
Qty: 60 TABLET | Refills: 5 | Status: SHIPPED | OUTPATIENT
Start: 2024-08-07

## 2024-08-07 RX ORDER — EMPAGLIFLOZIN 25 MG/1
25 TABLET, FILM COATED ORAL DAILY
Qty: 30 TABLET | Refills: 2 | Status: SHIPPED | OUTPATIENT
Start: 2024-08-07

## 2024-08-07 ASSESSMENT — PATIENT HEALTH QUESTIONNAIRE - PHQ9
10. IF YOU CHECKED OFF ANY PROBLEMS, HOW DIFFICULT HAVE THESE PROBLEMS MADE IT FOR YOU TO DO YOUR WORK, TAKE CARE OF THINGS AT HOME, OR GET ALONG WITH OTHER PEOPLE: NOT DIFFICULT AT ALL
SUM OF ALL RESPONSES TO PHQ QUESTIONS 1-9: 4
SUM OF ALL RESPONSES TO PHQ QUESTIONS 1-9: 4

## 2024-08-16 ENCOUNTER — ORDERS ONLY (AUTO-RELEASED) (OUTPATIENT)
Dept: ADMISSION | Facility: CLINIC | Age: 64
End: 2024-08-16
Payer: COMMERCIAL

## 2024-08-16 DIAGNOSIS — Z12.11 COLON CANCER SCREENING: ICD-10-CM

## 2024-08-29 ENCOUNTER — MEDICAL CORRESPONDENCE (OUTPATIENT)
Dept: HEALTH INFORMATION MANAGEMENT | Facility: CLINIC | Age: 64
End: 2024-08-29
Payer: COMMERCIAL

## 2024-09-11 DIAGNOSIS — Z53.9 DIAGNOSIS NOT YET DEFINED: Primary | ICD-10-CM

## 2024-09-11 PROCEDURE — G0179 MD RECERTIFICATION HHA PT: HCPCS | Performed by: FAMILY MEDICINE

## 2024-09-13 ENCOUNTER — TRANSFERRED RECORDS (OUTPATIENT)
Dept: HEALTH INFORMATION MANAGEMENT | Facility: CLINIC | Age: 64
End: 2024-09-13
Payer: COMMERCIAL

## 2024-10-01 DIAGNOSIS — E11.00 TYPE 2 DIABETES MELLITUS WITH HYPEROSMOLARITY WITHOUT COMA, WITHOUT LONG-TERM CURRENT USE OF INSULIN (H): Chronic | ICD-10-CM

## 2024-10-01 RX ORDER — GLIPIZIDE 10 MG/1
20 TABLET, FILM COATED, EXTENDED RELEASE ORAL DAILY
Qty: 180 TABLET | Refills: 0 | Status: SHIPPED | OUTPATIENT
Start: 2024-10-01

## 2024-10-15 DIAGNOSIS — E11.42 TYPE 2 DIABETES MELLITUS WITH DIABETIC POLYNEUROPATHY, WITHOUT LONG-TERM CURRENT USE OF INSULIN (H): ICD-10-CM

## 2024-10-15 DIAGNOSIS — I27.20 PULMONARY HTN (H): ICD-10-CM

## 2024-10-15 RX ORDER — FUROSEMIDE 40 MG/1
40 TABLET ORAL DAILY
Qty: 90 TABLET | Refills: 2 | OUTPATIENT
Start: 2024-10-15

## 2024-10-15 RX ORDER — SITAGLIPTIN AND METFORMIN HYDROCHLORIDE 1000; 50 MG/1; MG/1
TABLET, FILM COATED ORAL
Qty: 60 TABLET | Refills: 1 | Status: SHIPPED | OUTPATIENT
Start: 2024-10-15

## 2024-10-28 ENCOUNTER — MEDICAL CORRESPONDENCE (OUTPATIENT)
Dept: HEALTH INFORMATION MANAGEMENT | Facility: CLINIC | Age: 64
End: 2024-10-28
Payer: COMMERCIAL

## 2024-11-11 ENCOUNTER — OFFICE VISIT (OUTPATIENT)
Dept: FAMILY MEDICINE | Facility: CLINIC | Age: 64
End: 2024-11-11
Payer: COMMERCIAL

## 2024-11-11 ENCOUNTER — ALLIED HEALTH/NURSE VISIT (OUTPATIENT)
Dept: NURSING | Facility: CLINIC | Age: 64
End: 2024-11-11
Payer: COMMERCIAL

## 2024-11-11 VITALS
OXYGEN SATURATION: 96 % | WEIGHT: 205.08 LBS | RESPIRATION RATE: 16 BRPM | SYSTOLIC BLOOD PRESSURE: 97 MMHG | BODY MASS INDEX: 40.26 KG/M2 | HEIGHT: 60 IN | HEART RATE: 68 BPM | DIASTOLIC BLOOD PRESSURE: 62 MMHG | TEMPERATURE: 97.5 F

## 2024-11-11 DIAGNOSIS — E78.5 HYPERLIPIDEMIA LDL GOAL <70: ICD-10-CM

## 2024-11-11 DIAGNOSIS — Z79.4 TYPE 2 DIABETES MELLITUS WITH HYPERGLYCEMIA, WITH LONG-TERM CURRENT USE OF INSULIN (H): ICD-10-CM

## 2024-11-11 DIAGNOSIS — I27.20 PULMONARY HTN (H): ICD-10-CM

## 2024-11-11 DIAGNOSIS — Z71.89 COMPLEX CARE COORDINATION: Primary | ICD-10-CM

## 2024-11-11 DIAGNOSIS — E11.65 TYPE 2 DIABETES MELLITUS WITH HYPERGLYCEMIA, WITH LONG-TERM CURRENT USE OF INSULIN (H): ICD-10-CM

## 2024-11-11 DIAGNOSIS — E11.00 TYPE 2 DIABETES MELLITUS WITH HYPEROSMOLARITY WITHOUT COMA, WITHOUT LONG-TERM CURRENT USE OF INSULIN (H): Chronic | ICD-10-CM

## 2024-11-11 DIAGNOSIS — Z23 IMMUNIZATION DUE: Primary | ICD-10-CM

## 2024-11-11 LAB
ALBUMIN SERPL BCG-MCNC: 3.9 G/DL (ref 3.5–5.2)
ALP SERPL-CCNC: 137 U/L (ref 40–150)
ALT SERPL W P-5'-P-CCNC: 24 U/L (ref 0–50)
ANION GAP SERPL CALCULATED.3IONS-SCNC: 12 MMOL/L (ref 7–15)
AST SERPL W P-5'-P-CCNC: 29 U/L (ref 0–45)
BILIRUB SERPL-MCNC: 0.6 MG/DL
BUN SERPL-MCNC: 17.6 MG/DL (ref 8–23)
CALCIUM SERPL-MCNC: 9.5 MG/DL (ref 8.8–10.4)
CHLORIDE SERPL-SCNC: 100 MMOL/L (ref 98–107)
CREAT SERPL-MCNC: 0.89 MG/DL (ref 0.51–0.95)
EGFRCR SERPLBLD CKD-EPI 2021: 72 ML/MIN/1.73M2
EST. AVERAGE GLUCOSE BLD GHB EST-MCNC: 171 MG/DL
GLUCOSE SERPL-MCNC: 159 MG/DL (ref 70–99)
HBA1C MFR BLD: 7.6 % (ref 0–5.6)
HCO3 SERPL-SCNC: 28 MMOL/L (ref 22–29)
POTASSIUM SERPL-SCNC: 4.1 MMOL/L (ref 3.4–5.3)
PROT SERPL-MCNC: 7.5 G/DL (ref 6.4–8.3)
SODIUM SERPL-SCNC: 140 MMOL/L (ref 135–145)

## 2024-11-11 PROCEDURE — 80053 COMPREHEN METABOLIC PANEL: CPT | Performed by: FAMILY MEDICINE

## 2024-11-11 PROCEDURE — 83036 HEMOGLOBIN GLYCOSYLATED A1C: CPT | Performed by: FAMILY MEDICINE

## 2024-11-11 PROCEDURE — 99214 OFFICE O/P EST MOD 30 MIN: CPT | Performed by: FAMILY MEDICINE

## 2024-11-11 PROCEDURE — 99207 PR NO CHARGE LOS: CPT

## 2024-11-11 PROCEDURE — 36415 COLL VENOUS BLD VENIPUNCTURE: CPT | Performed by: FAMILY MEDICINE

## 2024-11-11 RX ORDER — GLIPIZIDE 10 MG/1
20 TABLET, FILM COATED, EXTENDED RELEASE ORAL DAILY
Qty: 180 TABLET | Refills: 0 | Status: SHIPPED | OUTPATIENT
Start: 2024-11-11 | End: 2024-11-11

## 2024-11-11 RX ORDER — FUROSEMIDE 40 MG/1
40 TABLET ORAL DAILY
Qty: 90 TABLET | Refills: 0 | Status: SHIPPED | OUTPATIENT
Start: 2024-11-11

## 2024-11-11 RX ORDER — PIOGLITAZONE 30 MG/1
30 TABLET ORAL DAILY
Qty: 90 TABLET | Refills: 0 | Status: SHIPPED | OUTPATIENT
Start: 2024-11-11

## 2024-11-11 RX ORDER — GLIPIZIDE 10 MG/1
10 TABLET, FILM COATED, EXTENDED RELEASE ORAL DAILY
Qty: 180 TABLET | Refills: 0 | Status: SHIPPED | OUTPATIENT
Start: 2024-11-11

## 2024-11-11 RX ORDER — ATORVASTATIN CALCIUM 40 MG/1
40 TABLET, FILM COATED ORAL DAILY
Qty: 90 TABLET | Refills: 0 | Status: SHIPPED | OUTPATIENT
Start: 2024-11-11

## 2024-11-11 NOTE — PROGRESS NOTES
"  Assessment & Plan     Type 2 diabetes mellitus with hyperglycemia, with long-term current use of insulin (H)  A1c significantly improved to 7.6% but likley having lows and very low appetite. Will decrease glipizide to 10mg daily. Follow up in 2-3 months. Lapse in insurance, working with [facility] but has most of her medications.   - HEMOGLOBIN A1C  - Comprehensive metabolic panel (BMP + Alb, Alk Phos, ALT, AST, Total. Bili, TP)  - HEMOGLOBIN A1C  - Comprehensive metabolic panel (BMP + Alb, Alk Phos, ALT, AST, Total. Bili, TP)  - atorvastatin (LIPITOR) 40 MG tablet  Dispense: 90 tablet; Refill: 0  - pioglitazone (ACTOS) 30 MG tablet  Dispense: 90 tablet; Refill: 0  - glipiZIDE (GLUCOTROL XL) 10 MG 24 hr tablet  Dispense: 180 tablet; Refill: 0    Immunization due  Wants flu shot but possibly not covered by insurance at this time. She wants to wait.     Pulmonary HTN (H)  - furosemide (LASIX) 40 MG tablet  Dispense: 90 tablet; Refill: 0    Hyperlipidemia LDL goal <70  - atorvastatin (LIPITOR) 40 MG tablet  Dispense: 90 tablet; Refill: 0      Insurance lapse  Working to get reinstated. Has enough medications other than the ones above. Holding jardiance due to cost. She has enough janumet for at least 2 months (due to previous nonadherence)      BMI  Estimated body mass index is 40.52 kg/m  as calculated from the following:    Height as of this encounter: 1.515 m (4' 11.65\").    Weight as of this encounter: 93 kg (205 lb 1.3 oz).   Weight management plan: Discussed healthy diet and exercise guidelines      Return in about 3 months (around 2/11/2025) for diabetes follow up.      Subjective   Trixie is a 64 year old, presenting for the following health issues:  Diabetes (INSURANCE IS NOT ACTIVE SINCE LAST MONTH BUT COUNTY SAID THEY WERE FAR BEHIND. SOME MEDICATION RAN OUT UNSURE WHAT IS IMPORTANT TO TAKE. )    History of Present Illness       Diabetes:   She presents for follow up of diabetes.  She is checking home blood glucose " a few times a week.   She checks blood glucose before meals.  Blood glucose is sometimes over 200 and never under 70. She is aware of hypoglycemia symptoms including shakiness, dizziness and weakness.    She has no concerns regarding her diabetes at this time.  She is having numbness in feet, burning in feet and excessive thirst.            She eats 0-1 servings of fruits and vegetables daily.She consumes 1 sweetened beverage(s) daily.She exercises with enough effort to increase her heart rate 9 or less minutes per day.  She exercises with enough effort to increase her heart rate 3 or less days per week.   She is taking medications regularly.       Morbid obesity (H)  Type 2 diabetes mellitus with hyperglycemia, with long-term current use of insulin (H)  A1c improved to 9.1% but patient refusing to make any other changes. Risks discussed - recommend increasing pioglitazone again because she absolutely refuses insulin or additional pills. Pioglitazone increased last visit - continue all other medications.   Today:   A1c significantly improved to 7.6%, but likely because she has not been eating well after daughter had a stroke and is in TCU unable to speak and with a dense right hemiparesis. She feels stiff and shakey sometimes. I think she might be having some symptomatic lows.         Facet arthropathy of spine  Previously on mobic but concerned about ulcers - had a bleeding ulcer a few years ago. Discussed warning signs when she should be concerned about that. Ok to use naproxen prn (she is not using daily), she uses acetaminophen most days.   Today:   Declined any addition medications. Sometimes her left arm gets frozen and twisted behind her back for a few minutes. Her muscles get stiff. Her caretaker thinks it is related to her stress levels. She does not want any intervention. Suggested a muscle relaxer or a prn anxiolytic (hydroxyzine would be good for both), she declined.                  Objective    BP  "97/62   Pulse 68   Temp 97.5  F (36.4  C) (Temporal)   Resp 16   Ht 1.515 m (4' 11.65\")   Wt 93 kg (205 lb 1.3 oz)   LMP  (LMP Unknown)   SpO2 96%   BMI 40.52 kg/m    Body mass index is 40.52 kg/m .  Physical Exam   GENERAL: alert and no distress  NECK: no adenopathy, no asymmetry, masses, or scars  RESP: lungs clear to auscultation - no rales, rhonchi or wheezes  CV: regular rate and rhythm, normal S1 S2, no S3 or S4, no murmur, click or rub, no peripheral edema  ABDOMEN: soft, nontender, no hepatosplenomegaly, no masses and bowel sounds normal  MS: no gross musculoskeletal defects noted, no edema    Results for orders placed or performed in visit on 11/11/24   HEMOGLOBIN A1C     Status: Abnormal   Result Value Ref Range    Estimated Average Glucose 171 (H) <117 mg/dL    Hemoglobin A1C 7.6 (H) 0.0 - 5.6 %   Comprehensive metabolic panel (BMP + Alb, Alk Phos, ALT, AST, Total. Bili, TP)     Status: Abnormal   Result Value Ref Range    Sodium 140 135 - 145 mmol/L    Potassium 4.1 3.4 - 5.3 mmol/L    Carbon Dioxide (CO2) 28 22 - 29 mmol/L    Anion Gap 12 7 - 15 mmol/L    Urea Nitrogen 17.6 8.0 - 23.0 mg/dL    Creatinine 0.89 0.51 - 0.95 mg/dL    GFR Estimate 72 >60 mL/min/1.73m2    Calcium 9.5 8.8 - 10.4 mg/dL    Chloride 100 98 - 107 mmol/L    Glucose 159 (H) 70 - 99 mg/dL    Alkaline Phosphatase 137 40 - 150 U/L    AST 29 0 - 45 U/L    ALT 24 0 - 50 U/L    Protein Total 7.5 6.4 - 8.3 g/dL    Albumin 3.9 3.5 - 5.2 g/dL    Bilirubin Total 0.6 <=1.2 mg/dL     Results for orders placed or performed in visit on 11/11/24 (from the past 24 hours)   HEMOGLOBIN A1C   Result Value Ref Range    Estimated Average Glucose 171 (H) <117 mg/dL    Hemoglobin A1C 7.6 (H) 0.0 - 5.6 %   Comprehensive metabolic panel (BMP + Alb, Alk Phos, ALT, AST, Total. Bili, TP)   Result Value Ref Range    Sodium 140 135 - 145 mmol/L    Potassium 4.1 3.4 - 5.3 mmol/L    Carbon Dioxide (CO2) 28 22 - 29 mmol/L    Anion Gap 12 7 - 15 mmol/L    " Urea Nitrogen 17.6 8.0 - 23.0 mg/dL    Creatinine 0.89 0.51 - 0.95 mg/dL    GFR Estimate 72 >60 mL/min/1.73m2    Calcium 9.5 8.8 - 10.4 mg/dL    Chloride 100 98 - 107 mmol/L    Glucose 159 (H) 70 - 99 mg/dL    Alkaline Phosphatase 137 40 - 150 U/L    AST 29 0 - 45 U/L    ALT 24 0 - 50 U/L    Protein Total 7.5 6.4 - 8.3 g/dL    Albumin 3.9 3.5 - 5.2 g/dL    Bilirubin Total 0.6 <=1.2 mg/dL           Signed Electronically by: Devin Earl MD

## 2024-11-11 NOTE — PROGRESS NOTES
Clinic Care Coordination Contact      Direct care provided in primary language, no  needed.     Progress Note      Assessment: Patient and PCA requested assist to refill prescriptions due to lapsed of insurance. CCRn met with patient and her PCA in clinic today. Reviewed prescriptions and patient is out of glipizide and furosemide. PCA agreed to pay out of pocket to be filled at [facility] for low cost prescription program around $10 each for 90 days supply. CCRN provided patient and PCA with [facility] prescription low cost information and PCA plans to pick it up today. PCP will send new scripts for both prescriptions today. PCA already assisted patient re-apply for health insurance. No additional support needed.     Plan: Resources provided. No additional support needed.

## 2024-12-18 DIAGNOSIS — E11.65 TYPE 2 DIABETES MELLITUS WITH HYPERGLYCEMIA, WITH LONG-TERM CURRENT USE OF INSULIN (H): ICD-10-CM

## 2024-12-18 DIAGNOSIS — Z79.4 TYPE 2 DIABETES MELLITUS WITH HYPERGLYCEMIA, WITH LONG-TERM CURRENT USE OF INSULIN (H): ICD-10-CM

## 2024-12-18 DIAGNOSIS — E11.9 DIABETES MELLITUS, TYPE 2 (H): ICD-10-CM

## 2024-12-18 DIAGNOSIS — Z76.0 ENCOUNTER FOR MEDICATION REFILL: ICD-10-CM

## 2024-12-18 RX ORDER — LANCETS 30 GAUGE
EACH MISCELLANEOUS
Qty: 100 EACH | Refills: 1 | Status: SHIPPED | OUTPATIENT
Start: 2024-12-18

## 2024-12-27 ENCOUNTER — MEDICAL CORRESPONDENCE (OUTPATIENT)
Dept: HEALTH INFORMATION MANAGEMENT | Facility: CLINIC | Age: 64
End: 2024-12-27
Payer: COMMERCIAL

## 2025-01-07 DIAGNOSIS — Z53.9 DIAGNOSIS NOT YET DEFINED: Primary | ICD-10-CM

## 2025-01-07 PROCEDURE — G0179 MD RECERTIFICATION HHA PT: HCPCS | Mod: 4MD | Performed by: FAMILY MEDICINE

## 2025-01-14 DIAGNOSIS — E11.42 TYPE 2 DIABETES MELLITUS WITH DIABETIC POLYNEUROPATHY, WITHOUT LONG-TERM CURRENT USE OF INSULIN (H): ICD-10-CM

## 2025-01-14 RX ORDER — SITAGLIPTIN AND METFORMIN HYDROCHLORIDE 1000; 50 MG/1; MG/1
TABLET, FILM COATED ORAL
Qty: 60 TABLET | Refills: 0 | Status: SHIPPED | OUTPATIENT
Start: 2025-01-14

## 2025-01-20 DIAGNOSIS — Z53.9 DIAGNOSIS NOT YET DEFINED: Primary | ICD-10-CM

## 2025-02-11 DIAGNOSIS — I27.20 PULMONARY HTN (H): ICD-10-CM

## 2025-02-11 DIAGNOSIS — E11.42 TYPE 2 DIABETES MELLITUS WITH DIABETIC POLYNEUROPATHY, WITHOUT LONG-TERM CURRENT USE OF INSULIN (H): ICD-10-CM

## 2025-02-11 RX ORDER — FUROSEMIDE 40 MG/1
40 TABLET ORAL DAILY
Qty: 90 TABLET | Refills: 1 | Status: SHIPPED | OUTPATIENT
Start: 2025-02-11

## 2025-02-11 RX ORDER — SITAGLIPTIN AND METFORMIN HYDROCHLORIDE 1000; 50 MG/1; MG/1
TABLET, FILM COATED ORAL
Qty: 60 TABLET | Refills: 2 | Status: SHIPPED | OUTPATIENT
Start: 2025-02-11

## 2025-02-13 ENCOUNTER — OFFICE VISIT (OUTPATIENT)
Dept: FAMILY MEDICINE | Facility: CLINIC | Age: 65
End: 2025-02-13
Payer: COMMERCIAL

## 2025-02-13 VITALS
HEART RATE: 71 BPM | WEIGHT: 214.08 LBS | RESPIRATION RATE: 16 BRPM | DIASTOLIC BLOOD PRESSURE: 64 MMHG | SYSTOLIC BLOOD PRESSURE: 95 MMHG | BODY MASS INDEX: 42.03 KG/M2 | HEIGHT: 60 IN | TEMPERATURE: 97.2 F | OXYGEN SATURATION: 98 %

## 2025-02-13 DIAGNOSIS — G47.8 PULMONARY HYPERTENSION DUE TO SLEEP-DISORDERED BREATHING (H): ICD-10-CM

## 2025-02-13 DIAGNOSIS — E66.01 MORBID OBESITY (H): ICD-10-CM

## 2025-02-13 DIAGNOSIS — D69.6 THROMBOCYTOPENIA: Primary | ICD-10-CM

## 2025-02-13 DIAGNOSIS — F33.1 MODERATE EPISODE OF RECURRENT MAJOR DEPRESSIVE DISORDER (H): ICD-10-CM

## 2025-02-13 DIAGNOSIS — E78.5 HYPERLIPIDEMIA LDL GOAL <70: ICD-10-CM

## 2025-02-13 DIAGNOSIS — E11.42 TYPE 2 DIABETES MELLITUS WITH DIABETIC POLYNEUROPATHY, WITHOUT LONG-TERM CURRENT USE OF INSULIN (H): ICD-10-CM

## 2025-02-13 DIAGNOSIS — Z13.29 SCREENING FOR THYROID DISORDER: ICD-10-CM

## 2025-02-13 DIAGNOSIS — I27.29 PULMONARY HYPERTENSION DUE TO SLEEP-DISORDERED BREATHING (H): ICD-10-CM

## 2025-02-13 LAB
ALBUMIN UR-MCNC: NEGATIVE MG/DL
APPEARANCE UR: CLEAR
BACTERIA #/AREA URNS HPF: ABNORMAL /HPF
BILIRUB UR QL STRIP: NEGATIVE
COLOR UR AUTO: YELLOW
ERYTHROCYTE [DISTWIDTH] IN BLOOD BY AUTOMATED COUNT: 15 % (ref 10–15)
EST. AVERAGE GLUCOSE BLD GHB EST-MCNC: 171 MG/DL
GLUCOSE UR STRIP-MCNC: >=1000 MG/DL
HBA1C MFR BLD: 7.6 % (ref 0–5.6)
HCT VFR BLD AUTO: 33.6 % (ref 35–47)
HGB BLD-MCNC: 10.3 G/DL (ref 11.7–15.7)
HGB UR QL STRIP: NEGATIVE
KETONES UR STRIP-MCNC: NEGATIVE MG/DL
LEUKOCYTE ESTERASE UR QL STRIP: ABNORMAL
MCH RBC QN AUTO: 24.6 PG (ref 26.5–33)
MCHC RBC AUTO-ENTMCNC: 30.7 G/DL (ref 31.5–36.5)
MCV RBC AUTO: 80 FL (ref 78–100)
NITRATE UR QL: NEGATIVE
PH UR STRIP: 5.5 [PH] (ref 5–7)
PLATELET # BLD AUTO: 104 10E3/UL (ref 150–450)
RBC # BLD AUTO: 4.18 10E6/UL (ref 3.8–5.2)
RBC #/AREA URNS AUTO: ABNORMAL /HPF
SP GR UR STRIP: 1.01 (ref 1–1.03)
SQUAMOUS #/AREA URNS AUTO: ABNORMAL /LPF
UROBILINOGEN UR STRIP-ACNC: 0.2 E.U./DL
WBC # BLD AUTO: 5 10E3/UL (ref 4–11)
WBC #/AREA URNS AUTO: ABNORMAL /HPF

## 2025-02-13 RX ORDER — PIOGLITAZONE 30 MG/1
30 TABLET ORAL DAILY
Qty: 90 TABLET | Refills: 0 | Status: SHIPPED | OUTPATIENT
Start: 2025-02-13 | End: 2025-02-13 | Stop reason: DRUGHIGH

## 2025-02-13 RX ORDER — GLIPIZIDE 10 MG/1
10 TABLET, FILM COATED, EXTENDED RELEASE ORAL DAILY
Qty: 90 TABLET | Refills: 3 | Status: SHIPPED | OUTPATIENT
Start: 2025-02-13

## 2025-02-13 RX ORDER — ATORVASTATIN CALCIUM 40 MG/1
40 TABLET, FILM COATED ORAL DAILY
Qty: 90 TABLET | Refills: 3 | Status: SHIPPED | OUTPATIENT
Start: 2025-02-13

## 2025-02-13 RX ORDER — PIOGLITAZONE 15 MG/1
15 TABLET ORAL DAILY
Qty: 90 TABLET | Refills: 1 | Status: SHIPPED | OUTPATIENT
Start: 2025-02-13

## 2025-02-13 ASSESSMENT — PATIENT HEALTH QUESTIONNAIRE - PHQ9
SUM OF ALL RESPONSES TO PHQ QUESTIONS 1-9: 4
SUM OF ALL RESPONSES TO PHQ QUESTIONS 1-9: 4
10. IF YOU CHECKED OFF ANY PROBLEMS, HOW DIFFICULT HAVE THESE PROBLEMS MADE IT FOR YOU TO DO YOUR WORK, TAKE CARE OF THINGS AT HOME, OR GET ALONG WITH OTHER PEOPLE: NOT DIFFICULT AT ALL

## 2025-02-13 NOTE — PROGRESS NOTES
Assessment & Plan     Morbid obesity (H)  Type 2 diabetes mellitus with diabetic polyneuropathy, without long-term current use of insulin (H)  Looking at weight gain and edema since 5/2024, this coincides with increasing actos. Discussed with [facility] pharmacist. Will decrease back down to 15mg.   - HEMOGLOBIN A1C  - Albumin Random Urine Quantitative with Creat Ratio  - Basic metabolic panel  (Ca, Cl, CO2, Creat, Gluc, K, Na, BUN)  - HEMOGLOBIN A1C  - Albumin Random Urine Quantitative with Creat Ratio  - Basic metabolic panel  (Ca, Cl, CO2, Creat, Gluc, K, Na, BUN)  - pioglitazone (ACTOS) 30 MG tablet  Dispense: 90 tablet; Refill: 0  - atorvastatin (LIPITOR) 40 MG tablet  Dispense: 90 tablet; Refill: 3    Thrombocytopenia  Recheck today.   - CBC with platelets    Screening for thyroid disorder  - TSH with free T4 reflex    Pulmonary hypertension due to sleep-disordered breathing (H)  Patient was told to have a sleep study in the past but refused. Will discuss again in a few weeks - follow up scheduled.     Moderate episode of recurrent major depressive disorder (H)  Declined interventions.     Hyperlipidemia LDL goal <70  Continue statin  - atorvastatin (LIPITOR) 40 MG tablet  Dispense: 90 tablet; Refill: 3        Return in about 2 weeks (around 2/27/2025) for leg swelling.    54 minutes spent on the date of the encounter doing chart review, history and exam, documentation and further activities per the note    Subjective   Trixie is a 65 year old, presenting for the following health issues:  Diabetes    History of Present Illness       Back Pain:  She presents for follow up of back pain. Patient's back pain is a chronic problem.  Location of back pain:  Right lower back, left lower back, right buttock, left buttock, right hip and left hip  Description of back pain: burning, gnawing, sharp and stabbing  Back pain spreads: right thigh, left thigh, right knee, left knee, right foot, left foot, right shoulder and left  shoulder    Since patient first noticed back pain, pain is: unchanged  Does back pain interfere with her job:  Not applicable       Diabetes:   She presents for follow up of diabetes.  She is checking home blood glucose a few times a week.   She checks blood glucose before meals.  Blood glucose is never over 200 and never under 70.  When her blood glucose is low, the patient is asymptomatic for confusion, blurred vision, lethargy and reports not feeling dizzy, shaky, or weak.   She has no concerns regarding her diabetes at this time.  She is having numbness in feet, burning in feet, redness, sores, or blisters on feet, excessive thirst, blurry vision and weight gain.            Hyperlipidemia:  She presents for follow up of hyperlipidemia.   She is taking medication to lower cholesterol. She is not having myalgia or other side effects to statin medications.    She eats 2-3 servings of fruits and vegetables daily.She consumes 1 sweetened beverage(s) daily.She exercises with enough effort to increase her heart rate 9 or less minutes per day.  She exercises with enough effort to increase her heart rate 3 or less days per week.   She is taking medications regularly.       Type 2 diabetes mellitus with hyperglycemia, with long-term current use of insulin (H)  A1c significantly improved to 7.6% but likley having lows and very low appetite. Will decrease glipizide to 10mg daily. Follow up in 2-3 months. Lapse in insurance, working with [facility] but has most of her medications.   Today:   A1c continues at 7.6%.        Pulmonary HTN (H)  Bilateral edema  - furosemide (LASIX) 40 MG tablet  Dispense: 90 tablet; Refill: 0  Today:   Unclear why she is having weight gain and edema. Her furosemide is empty but probably because her home nurse already filled her med box and requested a refill last week.     Wt Readings from Last 4 Encounters:   02/13/25 97.1 kg (214 lb 1.3 oz)   11/11/24 93 kg (205 lb 1.3 oz)   08/07/24 90.3 kg (199 lb  "0.6 oz)   05/30/24 86 kg (189 lb 9 oz)          Hyperlipidemia LDL goal <70  - atorvastatin (LIPITOR) 40 MG tablet  Dispense: 90 tablet; Refill: 0          Objective    BP 95/64   Pulse 71   Temp 97.2  F (36.2  C) (Temporal)   Resp 16   Ht 1.515 m (4' 11.65\")   Wt 97.1 kg (214 lb 1.3 oz)   LMP  (LMP Unknown)   SpO2 98%   BMI 42.30 kg/m    Body mass index is 42.3 kg/m .  Physical Exam   GENERAL: alert and no distress  NECK: no adenopathy, no asymmetry, masses, or scars  RESP: lungs clear to auscultation - no rales, rhonchi or wheezes  CV: regular rate and rhythm, normal S1 S2, no S3 or S4, no murmur, click or rub, no peripheral edema  ABDOMEN: soft, nontender, no hepatosplenomegaly, no masses and bowel sounds normal  MS: no gross musculoskeletal defects noted, no edema  Diabetic foot exam: normal DP and PT pulses, no trophic changes or ulcerative lesions, and decreased sensation on monofilament exam on big toes      Results for orders placed or performed in visit on 02/13/25   HEMOGLOBIN A1C     Status: Abnormal   Result Value Ref Range    Estimated Average Glucose 171 (H) <117 mg/dL    Hemoglobin A1C 7.6 (H) 0.0 - 5.6 %   CBC with platelets     Status: Abnormal   Result Value Ref Range    WBC Count 5.0 4.0 - 11.0 10e3/uL    RBC Count 4.18 3.80 - 5.20 10e6/uL    Hemoglobin 10.3 (L) 11.7 - 15.7 g/dL    Hematocrit 33.6 (L) 35.0 - 47.0 %    MCV 80 78 - 100 fL    MCH 24.6 (L) 26.5 - 33.0 pg    MCHC 30.7 (L) 31.5 - 36.5 g/dL    RDW 15.0 10.0 - 15.0 %    Platelet Count 104 (L) 150 - 450 10e3/uL   UA Macroscopic with reflex to Microscopic and Culture - Lab Collect     Status: Abnormal    Specimen: Urine, NOS   Result Value Ref Range    Color Urine Yellow Colorless, Straw, Light Yellow, Yellow    Appearance Urine Clear Clear    Glucose Urine >=1000 (A) Negative mg/dL    Bilirubin Urine Negative Negative    Ketones Urine Negative Negative mg/dL    Specific Gravity Urine 1.010 1.005 - 1.030    Blood Urine Negative " Negative    pH Urine 5.5 5.0 - 7.0    Protein Albumin Urine Negative Negative mg/dL    Urobilinogen Urine 0.2 0.2, 1.0 E.U./dL    Nitrite Urine Negative Negative    Leukocyte Esterase Urine Trace (A) Negative   UA Microscopic with Reflex to Culture     Status: Abnormal   Result Value Ref Range    Bacteria Urine Few (A) None Seen /HPF    RBC Urine 0-2 0-2 /HPF /HPF    WBC Urine 0-5 0-5 /HPF /HPF    Squamous Epithelials Urine Few (A) None Seen /LPF    Narrative    Urine Culture not indicated     Results for orders placed or performed in visit on 02/13/25 (from the past 24 hours)   HEMOGLOBIN A1C   Result Value Ref Range    Estimated Average Glucose 171 (H) <117 mg/dL    Hemoglobin A1C 7.6 (H) 0.0 - 5.6 %   CBC with platelets   Result Value Ref Range    WBC Count 5.0 4.0 - 11.0 10e3/uL    RBC Count 4.18 3.80 - 5.20 10e6/uL    Hemoglobin 10.3 (L) 11.7 - 15.7 g/dL    Hematocrit 33.6 (L) 35.0 - 47.0 %    MCV 80 78 - 100 fL    MCH 24.6 (L) 26.5 - 33.0 pg    MCHC 30.7 (L) 31.5 - 36.5 g/dL    RDW 15.0 10.0 - 15.0 %    Platelet Count 104 (L) 150 - 450 10e3/uL   UA Macroscopic with reflex to Microscopic and Culture - Lab Collect    Specimen: Urine, NOS   Result Value Ref Range    Color Urine Yellow Colorless, Straw, Light Yellow, Yellow    Appearance Urine Clear Clear    Glucose Urine >=1000 (A) Negative mg/dL    Bilirubin Urine Negative Negative    Ketones Urine Negative Negative mg/dL    Specific Gravity Urine 1.010 1.005 - 1.030    Blood Urine Negative Negative    pH Urine 5.5 5.0 - 7.0    Protein Albumin Urine Negative Negative mg/dL    Urobilinogen Urine 0.2 0.2, 1.0 E.U./dL    Nitrite Urine Negative Negative    Leukocyte Esterase Urine Trace (A) Negative   UA Microscopic with Reflex to Culture   Result Value Ref Range    Bacteria Urine Few (A) None Seen /HPF    RBC Urine 0-2 0-2 /HPF /HPF    WBC Urine 0-5 0-5 /HPF /HPF    Squamous Epithelials Urine Few (A) None Seen /LPF    Narrative    Urine Culture not indicated            Signed Electronically by: Devin Earl MD

## 2025-02-13 NOTE — Clinical Note
Please call patient (number listed is her PCA, C2C on file, she speaks fluent english) and let her know that we will decrease the medication called Actos from 30mg to 15mg. It could be causing her weight gain.

## 2025-02-26 ENCOUNTER — OFFICE VISIT (OUTPATIENT)
Dept: FAMILY MEDICINE | Facility: CLINIC | Age: 65
End: 2025-02-26
Payer: COMMERCIAL

## 2025-02-26 VITALS
WEIGHT: 214.1 LBS | BODY MASS INDEX: 42.03 KG/M2 | SYSTOLIC BLOOD PRESSURE: 110 MMHG | HEIGHT: 60 IN | OXYGEN SATURATION: 99 % | HEART RATE: 75 BPM | TEMPERATURE: 97.6 F | DIASTOLIC BLOOD PRESSURE: 71 MMHG | RESPIRATION RATE: 16 BRPM

## 2025-02-26 DIAGNOSIS — E11.42 TYPE 2 DIABETES MELLITUS WITH DIABETIC POLYNEUROPATHY, WITHOUT LONG-TERM CURRENT USE OF INSULIN (H): ICD-10-CM

## 2025-02-26 PROCEDURE — G2211 COMPLEX E/M VISIT ADD ON: HCPCS | Performed by: FAMILY MEDICINE

## 2025-02-26 PROCEDURE — 3074F SYST BP LT 130 MM HG: CPT | Performed by: FAMILY MEDICINE

## 2025-02-26 PROCEDURE — 3078F DIAST BP <80 MM HG: CPT | Performed by: FAMILY MEDICINE

## 2025-02-26 PROCEDURE — 99213 OFFICE O/P EST LOW 20 MIN: CPT | Performed by: FAMILY MEDICINE

## 2025-02-26 RX ORDER — SITAGLIPTIN AND METFORMIN HYDROCHLORIDE 1000; 50 MG/1; MG/1
TABLET, FILM COATED ORAL
Qty: 60 TABLET | Refills: 11 | Status: SHIPPED | OUTPATIENT
Start: 2025-02-26

## 2025-02-26 NOTE — PROGRESS NOTES
"  Assessment & Plan     Type 2 diabetes mellitus with diabetic polyneuropathy, without long-term current use of insulin (H)  Overall improving but still has some pitting edema. Discussed diuresis, watchful waiting, or stopping actos - she wants to hold here.   - sitagliptin-metFORMIN (JANUMET)  MG tablet  Dispense: 60 tablet; Refill: 11  - empagliflozin (JARDIANCE) 25 MG TABS tablet  Dispense: 30 tablet; Refill: 11  - continue actos at 15mg per patient preference.   - consider discontinuing actos and increasing glipizide, but patient declined.   - does not check her glucose which makes it super hard to titrate medications.         Return in about 3 months (around 5/26/2025) for diabetes follow up.      Subjective   Trixie is a 65 year old, presenting for the following health issues:  swelling leg       2/26/2025     8:15 AM   Additional Questions   Roomed by eugene yoon   Accompanied by PCA     HPI       Last visit, had worsening edema and weight gain. Traced timeline and likley due to increase in actos. Decreased back to 15mg daily and has been doing better. Weight decreased, swelling in legs improved. Discussed changing the medicine completely but she does not regularly check her glucose. She is on glipizide 10 as well.             Objective    /71   Pulse 75   Temp 97.6  F (36.4  C) (Oral)   Resp 16   Ht 1.515 m (4' 11.65\")   Wt 97.1 kg (214 lb 1.6 oz)   LMP  (LMP Unknown)   SpO2 99%   BMI 42.31 kg/m    Body mass index is 42.31 kg/m .  Physical Exam   GENERAL: alert and no distress  NECK: no adenopathy, no asymmetry, masses, or scars  RESP: lungs clear to auscultation - no rales, rhonchi or wheezes  CV: regular rate and rhythm, normal S1 S2, no S3 or S4, no murmur, click or rub, no peripheral edema  ABDOMEN: soft, nontender, no hepatosplenomegaly, no masses and bowel sounds normal  MS: no gross musculoskeletal defects noted, no edema    No results found for any visits on 02/26/25.  No results " found for this or any previous visit (from the past 24 hours).        Signed Electronically by: Devin Earl MD

## 2025-03-11 DIAGNOSIS — K21.00 GASTROESOPHAGEAL REFLUX DISEASE WITH ESOPHAGITIS WITHOUT HEMORRHAGE: ICD-10-CM

## 2025-03-11 DIAGNOSIS — Z76.0 ENCOUNTER FOR MEDICATION REFILL: ICD-10-CM

## 2025-03-11 DIAGNOSIS — E55.9 VITAMIN D DEFICIENCY: ICD-10-CM

## 2025-03-11 RX ORDER — OMEPRAZOLE 20 MG/1
CAPSULE, DELAYED RELEASE ORAL
Qty: 90 CAPSULE | Refills: 2 | Status: SHIPPED | OUTPATIENT
Start: 2025-03-11

## 2025-03-11 RX ORDER — ERGOCALCIFEROL 1.25 MG/1
CAPSULE, LIQUID FILLED ORAL
Qty: 4 CAPSULE | Refills: 11 | Status: SHIPPED | OUTPATIENT
Start: 2025-03-11

## 2025-03-18 ENCOUNTER — PATIENT OUTREACH (OUTPATIENT)
Dept: CARE COORDINATION | Facility: CLINIC | Age: 65
End: 2025-03-18
Payer: COMMERCIAL

## 2025-04-01 ENCOUNTER — PATIENT OUTREACH (OUTPATIENT)
Dept: CARE COORDINATION | Facility: CLINIC | Age: 65
End: 2025-04-01
Payer: COMMERCIAL

## 2025-04-08 DIAGNOSIS — M47.819 FACET ARTHROPATHY OF SPINE: ICD-10-CM

## 2025-04-08 RX ORDER — NAPROXEN 500 MG/1
500 TABLET ORAL 2 TIMES DAILY PRN
Qty: 60 TABLET | Refills: 5 | Status: SHIPPED | OUTPATIENT
Start: 2025-04-08

## 2025-04-10 ENCOUNTER — PATIENT OUTREACH (OUTPATIENT)
Dept: CARE COORDINATION | Facility: CLINIC | Age: 65
End: 2025-04-10
Payer: COMMERCIAL

## 2025-04-21 ENCOUNTER — TELEPHONE (OUTPATIENT)
Dept: FAMILY MEDICINE | Facility: CLINIC | Age: 65
End: 2025-04-21
Payer: COMMERCIAL

## 2025-04-21 NOTE — TELEPHONE ENCOUNTER
Patient Quality Outreach    Patient is due for the following:   Diabetes -  A1C  Colon Cancer Screening  Physical Preventive Adult Physical    Action(s) Taken:   Patient has upcoming appointment, these items will be addressed at that time.    Type of outreach:    Chart review performed, no outreach needed.    Questions for provider review:    None         Daryn Rodriguez CMA  Chart routed to None.

## 2025-04-24 ENCOUNTER — PATIENT OUTREACH (OUTPATIENT)
Dept: CARE COORDINATION | Facility: CLINIC | Age: 65
End: 2025-04-24
Payer: COMMERCIAL

## 2025-04-28 ENCOUNTER — MEDICAL CORRESPONDENCE (OUTPATIENT)
Dept: HEALTH INFORMATION MANAGEMENT | Facility: CLINIC | Age: 65
End: 2025-04-28
Payer: COMMERCIAL

## 2025-05-07 DIAGNOSIS — Z53.9 DIAGNOSIS NOT YET DEFINED: Primary | ICD-10-CM

## 2025-05-07 PROCEDURE — G0179 MD RECERTIFICATION HHA PT: HCPCS | Performed by: FAMILY MEDICINE

## 2025-05-22 ENCOUNTER — TRANSFERRED RECORDS (OUTPATIENT)
Dept: HEALTH INFORMATION MANAGEMENT | Facility: CLINIC | Age: 65
End: 2025-05-22
Payer: COMMERCIAL

## 2025-05-29 ENCOUNTER — VIRTUAL VISIT (OUTPATIENT)
Dept: INTERPRETER SERVICES | Facility: CLINIC | Age: 65
End: 2025-05-29

## 2025-05-29 ENCOUNTER — OFFICE VISIT (OUTPATIENT)
Dept: FAMILY MEDICINE | Facility: CLINIC | Age: 65
End: 2025-05-29
Payer: COMMERCIAL

## 2025-05-29 ENCOUNTER — ORDERS ONLY (AUTO-RELEASED) (OUTPATIENT)
Dept: FAMILY MEDICINE | Facility: CLINIC | Age: 65
End: 2025-05-29

## 2025-05-29 VITALS
RESPIRATION RATE: 16 BRPM | HEIGHT: 60 IN | OXYGEN SATURATION: 98 % | TEMPERATURE: 97.4 F | DIASTOLIC BLOOD PRESSURE: 70 MMHG | WEIGHT: 210.75 LBS | HEART RATE: 71 BPM | BODY MASS INDEX: 41.37 KG/M2 | SYSTOLIC BLOOD PRESSURE: 107 MMHG

## 2025-05-29 DIAGNOSIS — I27.20 PULMONARY HTN (H): ICD-10-CM

## 2025-05-29 DIAGNOSIS — Z12.11 SCREEN FOR COLON CANCER: ICD-10-CM

## 2025-05-29 DIAGNOSIS — M47.819 FACET ARTHROPATHY OF SPINE: ICD-10-CM

## 2025-05-29 DIAGNOSIS — E66.01 MORBID OBESITY (H): ICD-10-CM

## 2025-05-29 DIAGNOSIS — E11.65 TYPE 2 DIABETES MELLITUS WITH HYPERGLYCEMIA, WITH LONG-TERM CURRENT USE OF INSULIN (H): ICD-10-CM

## 2025-05-29 DIAGNOSIS — Z79.4 TYPE 2 DIABETES MELLITUS WITH HYPERGLYCEMIA, WITH LONG-TERM CURRENT USE OF INSULIN (H): ICD-10-CM

## 2025-05-29 DIAGNOSIS — Z23 IMMUNIZATION DUE: Primary | ICD-10-CM

## 2025-05-29 DIAGNOSIS — M62.838 MUSCLE SPASM: ICD-10-CM

## 2025-05-29 LAB
EST. AVERAGE GLUCOSE BLD GHB EST-MCNC: 223 MG/DL
HBA1C MFR BLD: 9.4 % (ref 0–5.6)
HOLD SPECIMEN: NORMAL

## 2025-05-29 RX ORDER — NAPROXEN 500 MG/1
500 TABLET ORAL 2 TIMES DAILY PRN
Qty: 60 TABLET | Refills: 5 | Status: SHIPPED | OUTPATIENT
Start: 2025-05-29

## 2025-05-29 RX ORDER — ACETAMINOPHEN 500 MG
1000 TABLET ORAL 3 TIMES DAILY PRN
Qty: 100 TABLET | Refills: 3 | Status: SHIPPED | OUTPATIENT
Start: 2025-05-29

## 2025-05-29 RX ORDER — GLIPIZIDE 10 MG/1
20 TABLET, FILM COATED, EXTENDED RELEASE ORAL DAILY
Qty: 180 TABLET | Refills: 3 | Status: SHIPPED | OUTPATIENT
Start: 2025-05-29

## 2025-05-29 NOTE — PROGRESS NOTES
"  Assessment & Plan     Type 2 diabetes mellitus with hyperglycemia, with long-term current use of insulin (H)  A1c significantly increased since decreasing actos AND she increases fruit consumption around the summer time. She refuses to change this. Recommend STOP actos, INCREASE glipizide. If no weight changes, restart actos if needed  - HEMOGLOBIN A1C  - Lipid panel reflex to direct LDL Non-fasting  - Extra Urine (LAB USE ONLY)  - Basic metabolic panel  (Ca, Cl, CO2, Creat, Gluc, K, Na, BUN)  - STOP actos  - INCREASE glipizide    Screen for colon cancer  - YOLANDA([employer])    Immunization due  Refused.     Facet arthropathy of spine  - acetaminophen (TYLENOL) 500 MG tablet  Dispense: 100 tablet; Refill: 3  - naproxen (NAPROSYN) 500 MG tablet  Dispense: 60 tablet; Refill: 5    Muscle spasm  Chronic issue with left arm, bends almost backwards. Declines any new intervention but noticed it is more frequent. Likely due to glucose, but will check bmp to rule out electrolyte issues.             Nicotine/Tobacco Cessation  She reports that she has been smoking pipe. She has never been exposed to tobacco smoke. She has never used smokeless tobacco.  Nicotine/Tobacco Cessation Plan  Information offered: Patient not interested at this time      BMI  Estimated body mass index is 41.65 kg/m  as calculated from the following:    Height as of this encounter: 1.515 m (4' 11.65\").    Weight as of this encounter: 95.6 kg (210 lb 12 oz).   Weight management plan: Discussed healthy diet and exercise guidelines      Follow-up  Return in about 2 months (around 7/29/2025) for diabetes follow up.    Subjective   Trixie is a 65 year old, presenting for the following health issues:  Diabetes    History of Present Illness       Back Pain:  She presents for follow up of back pain. Patient's back pain is a chronic problem.  Location of back pain:  Right lower back, left lower back, right middle of back, left middle of back, right " "buttock and left buttock  Description of back pain: burning, sharp and stabbing  Back pain spreads: right buttocks, left buttocks, right thigh, left thigh, right shoulder and left shoulder    Since patient first noticed back pain, pain is: unchanged  Does back pain interfere with her job:  Yes       Diabetes:   She presents for follow up of diabetes.  She is checking home blood glucose a few times a week.   She checks blood glucose before meals.  Blood glucose is sometimes over 200 and never under 70. She is aware of hypoglycemia symptoms including shakiness, dizziness and weakness.    She has no concerns regarding her diabetes at this time.  She is having numbness in feet, burning in feet, excessive thirst, blurry vision and weight gain.            She eats 2-3 servings of fruits and vegetables daily.She consumes 1 sweetened beverage(s) daily.She exercises with enough effort to increase her heart rate 9 or less minutes per day.  She exercises with enough effort to increase her heart rate 7 days per week.   She is taking medications regularly.          DM  Overall improving but still has some pitting edema. Discussed diuresis, watchful waiting, or stopping actos - she wants to hold here. Significant weight gain on higher dose actos.   - sitagliptin-metFORMIN (JANUMET)  MG tablet  Dispense: 60 tablet; Refill: 11  - empagliflozin (JARDIANCE) 25 MG TABS tablet  Dispense: 30 tablet; Refill: 11  - continue actos at 15mg per patient preference.   - consider discontinuing actos and increasing glipizide, but patient declined.   - does not check her glucose which makes it super hard to titrate medications.                Objective    /70 (BP Location: Right arm, Patient Position: Sitting, Cuff Size: Adult Large)   Pulse 71   Temp 97.4  F (36.3  C) (Temporal)   Resp 16   Ht 1.515 m (4' 11.65\")   Wt 95.6 kg (210 lb 12 oz)   LMP  (LMP Unknown)   SpO2 98%   BMI 41.65 kg/m    Body mass index is 41.65 " kg/m .  Physical Exam   GENERAL: alert and no distress  NECK: no adenopathy, no asymmetry, masses, or scars  RESP: lungs clear to auscultation - no rales, rhonchi or wheezes  CV: regular rate and rhythm, normal S1 S2, no S3 or S4, no murmur, click or rub, no peripheral edema  ABDOMEN: soft, nontender, no hepatosplenomegaly, no masses and bowel sounds normal  MS: no gross musculoskeletal defects noted, no edema    Results for orders placed or performed in visit on 05/29/25   HEMOGLOBIN A1C     Status: Abnormal   Result Value Ref Range    Estimated Average Glucose 223 (H) <117 mg/dL    Hemoglobin A1C 9.4 (H) 0.0 - 5.6 %    Narrative    Results reviewed, correlates with previous.       Results for orders placed or performed in visit on 05/29/25 (from the past 24 hours)   HEMOGLOBIN A1C   Result Value Ref Range    Estimated Average Glucose 223 (H) <117 mg/dL    Hemoglobin A1C 9.4 (H) 0.0 - 5.6 %    Narrative    Results reviewed, correlates with previous.             Signed Electronically by: Devin Earl MD

## 2025-06-03 ENCOUNTER — MEDICAL CORRESPONDENCE (OUTPATIENT)
Dept: HEALTH INFORMATION MANAGEMENT | Facility: CLINIC | Age: 65
End: 2025-06-03

## 2025-06-22 LAB — NONINV COLON CA DNA+OCC BLD SCRN STL QL: POSITIVE

## 2025-06-23 ENCOUNTER — RESULTS FOLLOW-UP (OUTPATIENT)
Dept: GASTROENTEROLOGY | Facility: CLINIC | Age: 65
End: 2025-06-23
Payer: COMMERCIAL

## 2025-06-23 ENCOUNTER — PATIENT OUTREACH (OUTPATIENT)
Dept: GASTROENTEROLOGY | Facility: CLINIC | Age: 65
End: 2025-06-23
Payer: COMMERCIAL

## 2025-06-23 DIAGNOSIS — Z12.11 SPECIAL SCREENING FOR MALIGNANT NEOPLASMS, COLON: Primary | ICD-10-CM

## 2025-06-23 NOTE — PROGRESS NOTES
"Patient has had a positive cologuard. Screening colonoscopy is being recommended to be completed within 3 months per protocol. Patient notified via US mail.    [facility] Screening Colonoscopy Referral Review    Patient meets the inclusion criteria for screening colonoscopy standing order.    Ordering/Referring Provider:  Devin Earl      BMI: Estimated body mass index is 41.65 kg/m  as calculated from the following:    Height as of 5/29/25: 1.515 m (4' 11.65\").    Weight as of 5/29/25: 95.6 kg (210 lb 12 oz).     Sedation:  Does patient have any of the following conditions affecting sedation?  Hx of severe PTSD, anxiety, or psychosis: MAC sedation recommended    Previous Scopes:  Any previous recommendations or follow up needs based on previous scope?  na / No recommendations.    Medical Concerns to Postpone Order:  Does patient have any of the following medical concerns that should postpone/delay colonoscopy referral?  No medical conditions affecting colonoscopy referral.    Final Referral Details:  Based on patient's medical history patient is appropriate for referral order with MAC/deep sedation.   BMI<= 45 45 < BMI <= 48 48 < BMI < = 50  BMI > 50   No Restrictions No [facility]  [facility]  [facility] with exceptions [facility] Only OR Only     "

## 2025-07-02 ENCOUNTER — TELEPHONE (OUTPATIENT)
Dept: FAMILY MEDICINE | Facility: CLINIC | Age: 65
End: 2025-07-02
Payer: COMMERCIAL

## 2025-07-02 NOTE — TELEPHONE ENCOUNTER
Forms/Letter Request    Type of form/letter: Home Health Certification    Cert Period: 6/28/2025 to 8/26/2025    Do we have the form/letter: Yes: Placed in Amee Earl's Blue folder for completion    Who is the form from? Home care    Where did/will the form come from? form was faxed in    When is form/letter needed by: When completed    How would you like the form/letter returned: [phone]       Detail Level: Detailed

## 2025-07-10 NOTE — TELEPHONE ENCOUNTER
Form reviewed, completed, and signed by physician. Form successfully faxed to [phone]  as requested.   Copied to EHR scanning.

## 2025-07-29 DIAGNOSIS — I27.20 PULMONARY HTN (H): ICD-10-CM

## 2025-07-29 RX ORDER — FUROSEMIDE 40 MG/1
40 TABLET ORAL DAILY
Qty: 30 TABLET | Refills: 2 | Status: SHIPPED | OUTPATIENT
Start: 2025-07-29

## 2025-07-30 ENCOUNTER — OFFICE VISIT (OUTPATIENT)
Dept: FAMILY MEDICINE | Facility: CLINIC | Age: 65
End: 2025-07-30
Payer: COMMERCIAL

## 2025-07-30 VITALS
RESPIRATION RATE: 16 BRPM | WEIGHT: 204.25 LBS | TEMPERATURE: 98 F | OXYGEN SATURATION: 98 % | HEIGHT: 60 IN | HEART RATE: 74 BPM | DIASTOLIC BLOOD PRESSURE: 69 MMHG | BODY MASS INDEX: 40.1 KG/M2 | SYSTOLIC BLOOD PRESSURE: 106 MMHG

## 2025-07-30 DIAGNOSIS — G47.8 PULMONARY HYPERTENSION DUE TO SLEEP-DISORDERED BREATHING (H): ICD-10-CM

## 2025-07-30 DIAGNOSIS — M47.819 FACET ARTHROPATHY OF SPINE: ICD-10-CM

## 2025-07-30 DIAGNOSIS — E66.01 MORBID OBESITY (H): ICD-10-CM

## 2025-07-30 DIAGNOSIS — E78.49 OTHER HYPERLIPIDEMIA: ICD-10-CM

## 2025-07-30 DIAGNOSIS — D50.8 IRON DEFICIENCY ANEMIA SECONDARY TO INADEQUATE DIETARY IRON INTAKE: ICD-10-CM

## 2025-07-30 DIAGNOSIS — K13.79 MOUTH PAIN: ICD-10-CM

## 2025-07-30 DIAGNOSIS — Z79.4 TYPE 2 DIABETES MELLITUS WITH HYPERGLYCEMIA, WITH LONG-TERM CURRENT USE OF INSULIN (H): Primary | ICD-10-CM

## 2025-07-30 DIAGNOSIS — I27.29 PULMONARY HYPERTENSION DUE TO SLEEP-DISORDERED BREATHING (H): ICD-10-CM

## 2025-07-30 DIAGNOSIS — R53.83 OTHER FATIGUE: ICD-10-CM

## 2025-07-30 DIAGNOSIS — E11.65 TYPE 2 DIABETES MELLITUS WITH HYPERGLYCEMIA, WITH LONG-TERM CURRENT USE OF INSULIN (H): Primary | ICD-10-CM

## 2025-07-30 LAB
ANION GAP SERPL CALCULATED.3IONS-SCNC: 10 MMOL/L (ref 7–15)
BUN SERPL-MCNC: 7.9 MG/DL (ref 8–23)
CALCIUM SERPL-MCNC: 8.8 MG/DL (ref 8.8–10.4)
CHLORIDE SERPL-SCNC: 104 MMOL/L (ref 98–107)
CREAT SERPL-MCNC: 0.61 MG/DL (ref 0.51–0.95)
EGFRCR SERPLBLD CKD-EPI 2021: >90 ML/MIN/1.73M2
ERYTHROCYTE [DISTWIDTH] IN BLOOD BY AUTOMATED COUNT: 18.4 % (ref 10–15)
EST. AVERAGE GLUCOSE BLD GHB EST-MCNC: 180 MG/DL
GLUCOSE SERPL-MCNC: 202 MG/DL (ref 70–99)
HBA1C MFR BLD: 7.9 % (ref 0–5.6)
HCO3 SERPL-SCNC: 26 MMOL/L (ref 22–29)
HCT VFR BLD AUTO: 30.2 % (ref 35–47)
HGB BLD-MCNC: 8 G/DL (ref 11.7–15.7)
MCH RBC QN AUTO: 18.4 PG (ref 26.5–33)
MCHC RBC AUTO-ENTMCNC: 26.5 G/DL (ref 31.5–36.5)
MCV RBC AUTO: 69 FL (ref 78–100)
PLATELET # BLD AUTO: 93 10E3/UL (ref 150–450)
POTASSIUM SERPL-SCNC: 3.5 MMOL/L (ref 3.4–5.3)
RBC # BLD AUTO: 4.35 10E6/UL (ref 3.8–5.2)
SODIUM SERPL-SCNC: 140 MMOL/L (ref 135–145)
WBC # BLD AUTO: 3.6 10E3/UL (ref 4–11)

## 2025-07-30 PROCEDURE — 83036 HEMOGLOBIN GLYCOSYLATED A1C: CPT | Performed by: FAMILY MEDICINE

## 2025-07-30 PROCEDURE — 99214 OFFICE O/P EST MOD 30 MIN: CPT | Performed by: FAMILY MEDICINE

## 2025-07-30 PROCEDURE — 3078F DIAST BP <80 MM HG: CPT | Performed by: FAMILY MEDICINE

## 2025-07-30 PROCEDURE — 80048 BASIC METABOLIC PNL TOTAL CA: CPT | Performed by: FAMILY MEDICINE

## 2025-07-30 PROCEDURE — 3074F SYST BP LT 130 MM HG: CPT | Performed by: FAMILY MEDICINE

## 2025-07-30 PROCEDURE — 3051F HG A1C>EQUAL 7.0%<8.0%: CPT | Performed by: FAMILY MEDICINE

## 2025-07-30 PROCEDURE — 36415 COLL VENOUS BLD VENIPUNCTURE: CPT | Performed by: FAMILY MEDICINE

## 2025-07-30 PROCEDURE — 85027 COMPLETE CBC AUTOMATED: CPT | Performed by: FAMILY MEDICINE

## 2025-07-30 PROCEDURE — G2211 COMPLEX E/M VISIT ADD ON: HCPCS | Performed by: FAMILY MEDICINE

## 2025-07-30 RX ORDER — ACETAMINOPHEN 500 MG
1000 TABLET ORAL 3 TIMES DAILY PRN
Qty: 100 TABLET | Refills: 3 | Status: SHIPPED | OUTPATIENT
Start: 2025-07-30

## 2025-07-30 RX ORDER — CHLORHEXIDINE GLUCONATE ORAL RINSE 1.2 MG/ML
15 SOLUTION DENTAL 2 TIMES DAILY
Qty: 473 ML | Refills: 0 | Status: SHIPPED | OUTPATIENT
Start: 2025-07-30

## 2025-07-30 RX ORDER — PIOGLITAZONE 15 MG/1
15 TABLET ORAL DAILY
Qty: 90 TABLET | Refills: 3 | Status: SHIPPED | OUTPATIENT
Start: 2025-07-30

## 2025-07-30 RX ORDER — NAPROXEN 500 MG/1
500 TABLET ORAL 2 TIMES DAILY PRN
Qty: 60 TABLET | Refills: 5 | Status: SHIPPED | OUTPATIENT
Start: 2025-07-30

## 2025-07-30 RX ORDER — FERROUS SULFATE 325(65) MG
325 TABLET ORAL
Qty: 90 TABLET | Refills: 1 | Status: SHIPPED | OUTPATIENT
Start: 2025-07-30

## 2025-07-30 ASSESSMENT — PATIENT HEALTH QUESTIONNAIRE - PHQ9
SUM OF ALL RESPONSES TO PHQ QUESTIONS 1-9: 6
10. IF YOU CHECKED OFF ANY PROBLEMS, HOW DIFFICULT HAVE THESE PROBLEMS MADE IT FOR YOU TO DO YOUR WORK, TAKE CARE OF THINGS AT HOME, OR GET ALONG WITH OTHER PEOPLE: SOMEWHAT DIFFICULT
SUM OF ALL RESPONSES TO PHQ QUESTIONS 1-9: 6

## 2025-07-30 NOTE — PROGRESS NOTES
Assessment & Plan     Type 2 diabetes mellitus with hyperglycemia, with long-term current use of insulin (H)  A1C significantly improved to 7.9%. She might be tired due to lower glucose - start checking glucose at those times. Continue actos  - Hemoglobin A1c  - pioglitazone (ACTOS) 15 MG tablet  Dispense: 90 tablet; Refill: 3  - Hemoglobin A1c    Pulmonary hypertension due to sleep-disordered breathing (H)  Continue furosemide.     Other hyperlipidemia  Continue statin.     Morbid obesity (H)  Declined any intervention. Says she wants to live and eat the way she wants.     Other fatigue  Recently started feeling tired on random days -  not all the time.   - CBC with platelets  - Basic metabolic panel  (Ca, Cl, CO2, Creat, Gluc, K, Na, BUN)  ADDENDUM 07/30/25  Hgb resulted at 8, way lower. No known sources of bleeding other than her recent dental extraction. Start ferrous sulfate daily. Follow up with recheck in 1-2 months.       Mouth pain  Recent extraction, unable to eat much due to dental pain. No infection or swelling. Will do 2 weeks of chlorhexidine rinses.   - chlorhexidine (PERIDEX) 0.12 % solution  Dispense: 473 mL; Refill: 0    Facet arthropathy of spine  Ok to continue prn.   - naproxen (NAPROSYN) 500 MG tablet  Dispense: 60 tablet; Refill: 5  - acetaminophen (TYLENOL) 500 MG tablet  Dispense: 100 tablet; Refill: 3      Follow-up  Return in about 1 month (around 8/30/2025).    Subjective   Trixie is a 65 year old, presenting for the following health issues:  Diabetes        7/30/2025     7:16 AM   Additional Questions   Roomed by Gayle BROOKE   Accompanied by PCA     History of Present Illness       Diabetes:   She presents for follow up of diabetes.  She is checking home blood glucose a few times a week.   She checks blood glucose before meals.  Blood glucose is sometimes over 200 and never under 70. She is aware of hypoglycemia symptoms including dizziness, weakness and lethargy.    She has no concerns  "regarding her diabetes at this time.  She is having numbness in feet, burning in feet, excessive thirst and blurry vision.            She eats 0-1 servings of fruits and vegetables daily.She consumes 0 sweetened beverage(s) daily.She exercises with enough effort to increase her heart rate 9 or less minutes per day.  She exercises with enough effort to increase her heart rate 3 or less days per week.   She is taking medications regularly.          Feeling super tired lately. It comes and goes, sometimes feels great, sometimes not. No pattern to it. Does not check her glucose during these times.             Objective    /69   Pulse 74   Temp 98  F (36.7  C) (Oral)   Resp 16   Ht 1.515 m (4' 11.65\")   Wt 92.6 kg (204 lb 4 oz)   LMP  (LMP Unknown)   SpO2 98%   BMI 40.36 kg/m    Body mass index is 40.36 kg/m .  Physical Exam   GENERAL: alert and no distress  NECK: no adenopathy, no asymmetry, masses, or scars  HEENT: poor dentition  RESP: lungs clear to auscultation - no rales, rhonchi or wheezes  CV: regular rate and rhythm, normal S1 S2, no S3 or S4, no murmur, click or rub, no peripheral edema  ABDOMEN: soft, nontender, no hepatosplenomegaly, no masses and bowel sounds normal  MS: no gross musculoskeletal defects noted, no edema    Results for orders placed or performed in visit on 07/30/25   CBC with platelets     Status: Abnormal   Result Value Ref Range    WBC Count 3.6 (L) 4.0 - 11.0 10e3/uL    RBC Count 4.35 3.80 - 5.20 10e6/uL    Hemoglobin 8.0 (L) 11.7 - 15.7 g/dL    Hematocrit 30.2 (L) 35.0 - 47.0 %    MCV 69 (L) 78 - 100 fL    MCH 18.4 (L) 26.5 - 33.0 pg    MCHC 26.5 (L) 31.5 - 36.5 g/dL    RDW 18.4 (H) 10.0 - 15.0 %    Platelet Count 93 (L) 150 - 450 10e3/uL   Hemoglobin A1c     Status: Abnormal   Result Value Ref Range    Estimated Average Glucose 180 (H) <117 mg/dL    Hemoglobin A1C 7.9 (H) 0.0 - 5.6 %     Recent Results (from the past 24 hours)   CBC with platelets   Result Value Ref Range "    WBC Count 3.6 (L) 4.0 - 11.0 10e3/uL    RBC Count 4.35 3.80 - 5.20 10e6/uL    Hemoglobin 8.0 (L) 11.7 - 15.7 g/dL    Hematocrit 30.2 (L) 35.0 - 47.0 %    MCV 69 (L) 78 - 100 fL    MCH 18.4 (L) 26.5 - 33.0 pg    MCHC 26.5 (L) 31.5 - 36.5 g/dL    RDW 18.4 (H) 10.0 - 15.0 %    Platelet Count 93 (L) 150 - 450 10e3/uL   Hemoglobin A1c   Result Value Ref Range    Estimated Average Glucose 180 (H) <117 mg/dL    Hemoglobin A1C 7.9 (H) 0.0 - 5.6 %           Signed Electronically by: Devin Earl MD

## 2025-08-31 ENCOUNTER — MEDICAL CORRESPONDENCE (OUTPATIENT)
Dept: HEALTH INFORMATION MANAGEMENT | Facility: CLINIC | Age: 65
End: 2025-08-31
Payer: COMMERCIAL

## (undated) DEVICE — SU SILK 2-0 SH CR 8X18" C012D

## (undated) DEVICE — SU VICRYL 3-0 SH 27" UND J416H

## (undated) DEVICE — SU SILK 2-0 TIE 12X30" A305H

## (undated) DEVICE — ESU GROUND PAD ADULT W/CORD E7507

## (undated) DEVICE — LINEN TOWEL PACK X5 5464

## (undated) DEVICE — SOL NACL 0.9% IRRIG 1000ML BOTTLE 2F7124

## (undated) DEVICE — PACK NEURO MINOR UMMC SNE32MNMU4

## (undated) DEVICE — SU SILK 4-0 TIE 12X30" A303H

## (undated) DEVICE — DRSG TELFA 3X8" 1238

## (undated) DEVICE — SU SILK 3-0 X-1 18" 632G

## (undated) DEVICE — TOOTHBRUSH ADULT NON STERILE MDS136850

## (undated) DEVICE — SUCTION MANIFOLD NEPTUNE 2 SYS 4 PORT 0702-020-000

## (undated) DEVICE — GLOVE BIOGEL PI ULTRATOUCH SZ 8.0 41180

## (undated) RX ORDER — CHLORHEXIDINE GLUCONATE ORAL RINSE 1.2 MG/ML
SOLUTION DENTAL
Status: DISPENSED
Start: 2023-10-16

## (undated) RX ORDER — FENTANYL CITRATE 50 UG/ML
INJECTION, SOLUTION INTRAMUSCULAR; INTRAVENOUS
Status: DISPENSED
Start: 2023-10-16

## (undated) RX ORDER — ACETAMINOPHEN 325 MG/1
TABLET ORAL
Status: DISPENSED
Start: 2023-10-16

## (undated) RX ORDER — LIDOCAINE HYDROCHLORIDE AND EPINEPHRINE 10; 10 MG/ML; UG/ML
INJECTION, SOLUTION INFILTRATION; PERINEURAL
Status: DISPENSED
Start: 2023-10-16

## (undated) RX ORDER — PROPOFOL 10 MG/ML
INJECTION, EMULSION INTRAVENOUS
Status: DISPENSED
Start: 2023-10-16